# Patient Record
Sex: MALE | Race: WHITE | NOT HISPANIC OR LATINO | URBAN - METROPOLITAN AREA
[De-identification: names, ages, dates, MRNs, and addresses within clinical notes are randomized per-mention and may not be internally consistent; named-entity substitution may affect disease eponyms.]

---

## 2023-08-06 ENCOUNTER — INPATIENT (INPATIENT)
Facility: HOSPITAL | Age: 63
LOS: 9 days | Discharge: HOME HEALTH | DRG: 617 | End: 2023-08-16
Attending: GENERAL PRACTICE | Admitting: GENERAL PRACTICE
Payer: MEDICARE

## 2023-08-06 VITALS
TEMPERATURE: 99 F | DIASTOLIC BLOOD PRESSURE: 57 MMHG | HEART RATE: 89 BPM | OXYGEN SATURATION: 97 % | SYSTOLIC BLOOD PRESSURE: 93 MMHG | RESPIRATION RATE: 20 BRPM | HEIGHT: 73 IN | WEIGHT: 259.93 LBS

## 2023-08-06 DIAGNOSIS — E11.628 TYPE 2 DIABETES MELLITUS WITH OTHER SKIN COMPLICATIONS: ICD-10-CM

## 2023-08-06 DIAGNOSIS — E78.5 HYPERLIPIDEMIA, UNSPECIFIED: ICD-10-CM

## 2023-08-06 DIAGNOSIS — E11.9 TYPE 2 DIABETES MELLITUS WITHOUT COMPLICATIONS: ICD-10-CM

## 2023-08-06 DIAGNOSIS — L08.9 LOCAL INFECTION OF THE SKIN AND SUBCUTANEOUS TISSUE, UNSPECIFIED: ICD-10-CM

## 2023-08-06 DIAGNOSIS — I10 ESSENTIAL (PRIMARY) HYPERTENSION: ICD-10-CM

## 2023-08-06 DIAGNOSIS — I82.621 ACUTE EMBOLISM AND THROMBOSIS OF DEEP VEINS OF RIGHT UPPER EXTREMITY: ICD-10-CM

## 2023-08-06 DIAGNOSIS — I50.9 HEART FAILURE, UNSPECIFIED: ICD-10-CM

## 2023-08-06 LAB
ANION GAP SERPL CALC-SCNC: 17 MMOL/L — SIGNIFICANT CHANGE UP (ref 5–17)
BASE EXCESS BLDV CALC-SCNC: -0.3 MMOL/L — SIGNIFICANT CHANGE UP (ref -2–3)
BASOPHILS # BLD AUTO: 0.03 K/UL — SIGNIFICANT CHANGE UP (ref 0–0.2)
BASOPHILS NFR BLD AUTO: 0.4 % — SIGNIFICANT CHANGE UP (ref 0–2)
BUN SERPL-MCNC: 35 MG/DL — HIGH (ref 7–23)
CA-I SERPL-SCNC: 1.27 MMOL/L — SIGNIFICANT CHANGE UP (ref 1.15–1.33)
CALCIUM SERPL-MCNC: 10 MG/DL — SIGNIFICANT CHANGE UP (ref 8.4–10.5)
CHLORIDE BLDV-SCNC: 100 MMOL/L — SIGNIFICANT CHANGE UP (ref 96–108)
CHLORIDE SERPL-SCNC: 101 MMOL/L — SIGNIFICANT CHANGE UP (ref 96–108)
CO2 BLDV-SCNC: 28 MMOL/L — HIGH (ref 22–26)
CO2 SERPL-SCNC: 21 MMOL/L — LOW (ref 22–31)
CREAT SERPL-MCNC: 1.56 MG/DL — HIGH (ref 0.5–1.3)
EGFR: 50 ML/MIN/1.73M2 — LOW
EOSINOPHIL # BLD AUTO: 0.01 K/UL — SIGNIFICANT CHANGE UP (ref 0–0.5)
EOSINOPHIL NFR BLD AUTO: 0.1 % — SIGNIFICANT CHANGE UP (ref 0–6)
ERYTHROCYTE [SEDIMENTATION RATE] IN BLOOD: 114 MM/HR — HIGH (ref 0–20)
GAS PNL BLDV: 136 MMOL/L — SIGNIFICANT CHANGE UP (ref 136–145)
GAS PNL BLDV: SIGNIFICANT CHANGE UP
GAS PNL BLDV: SIGNIFICANT CHANGE UP
GLUCOSE BLDC GLUCOMTR-MCNC: 115 MG/DL — HIGH (ref 70–99)
GLUCOSE BLDV-MCNC: 151 MG/DL — HIGH (ref 70–99)
GLUCOSE SERPL-MCNC: 152 MG/DL — HIGH (ref 70–99)
HCO3 BLDV-SCNC: 26 MMOL/L — SIGNIFICANT CHANGE UP (ref 22–29)
HCT VFR BLD CALC: 32.8 % — LOW (ref 39–50)
HCT VFR BLDA CALC: 33 % — LOW (ref 39–51)
HGB BLD CALC-MCNC: 11.1 G/DL — LOW (ref 12.6–17.4)
HGB BLD-MCNC: 10.4 G/DL — LOW (ref 13–17)
IMM GRANULOCYTES NFR BLD AUTO: 1.3 % — HIGH (ref 0–0.9)
LACTATE BLDV-MCNC: 3.5 MMOL/L — HIGH (ref 0.5–2)
LYMPHOCYTES # BLD AUTO: 0.59 K/UL — LOW (ref 1–3.3)
LYMPHOCYTES # BLD AUTO: 8.3 % — LOW (ref 13–44)
MCHC RBC-ENTMCNC: 28.1 PG — SIGNIFICANT CHANGE UP (ref 27–34)
MCHC RBC-ENTMCNC: 31.7 GM/DL — LOW (ref 32–36)
MCV RBC AUTO: 88.6 FL — SIGNIFICANT CHANGE UP (ref 80–100)
MONOCYTES # BLD AUTO: 0.87 K/UL — SIGNIFICANT CHANGE UP (ref 0–0.9)
MONOCYTES NFR BLD AUTO: 12.3 % — SIGNIFICANT CHANGE UP (ref 2–14)
NEUTROPHILS # BLD AUTO: 5.51 K/UL — SIGNIFICANT CHANGE UP (ref 1.8–7.4)
NEUTROPHILS NFR BLD AUTO: 77.6 % — HIGH (ref 43–77)
NRBC # BLD: 0 /100 WBCS — SIGNIFICANT CHANGE UP (ref 0–0)
PCO2 BLDV: 51 MMHG — SIGNIFICANT CHANGE UP (ref 42–55)
PH BLDV: 7.32 — SIGNIFICANT CHANGE UP (ref 7.32–7.43)
PLATELET # BLD AUTO: 150 K/UL — SIGNIFICANT CHANGE UP (ref 150–400)
PO2 BLDV: 23 MMHG — LOW (ref 25–45)
POTASSIUM BLDV-SCNC: 4.3 MMOL/L — SIGNIFICANT CHANGE UP (ref 3.5–5.1)
POTASSIUM SERPL-MCNC: 4.6 MMOL/L — SIGNIFICANT CHANGE UP (ref 3.5–5.3)
POTASSIUM SERPL-SCNC: 4.6 MMOL/L — SIGNIFICANT CHANGE UP (ref 3.5–5.3)
RBC # BLD: 3.7 M/UL — LOW (ref 4.2–5.8)
RBC # FLD: 16 % — HIGH (ref 10.3–14.5)
SAO2 % BLDV: 25.6 % — LOW (ref 67–88)
SODIUM SERPL-SCNC: 139 MMOL/L — SIGNIFICANT CHANGE UP (ref 135–145)
WBC # BLD: 7.1 K/UL — SIGNIFICANT CHANGE UP (ref 3.8–10.5)
WBC # FLD AUTO: 7.1 K/UL — SIGNIFICANT CHANGE UP (ref 3.8–10.5)

## 2023-08-06 PROCEDURE — 73610 X-RAY EXAM OF ANKLE: CPT | Mod: 26,RT

## 2023-08-06 PROCEDURE — 99285 EMERGENCY DEPT VISIT HI MDM: CPT | Mod: FS

## 2023-08-06 PROCEDURE — 73620 X-RAY EXAM OF FOOT: CPT | Mod: 26,RT

## 2023-08-06 RX ORDER — SODIUM CHLORIDE 9 MG/ML
500 INJECTION INTRAMUSCULAR; INTRAVENOUS; SUBCUTANEOUS ONCE
Refills: 0 | Status: COMPLETED | OUTPATIENT
Start: 2023-08-06 | End: 2023-08-06

## 2023-08-06 RX ORDER — ACETAMINOPHEN 500 MG
650 TABLET ORAL EVERY 6 HOURS
Refills: 0 | Status: DISCONTINUED | OUTPATIENT
Start: 2023-08-06 | End: 2023-08-08

## 2023-08-06 RX ORDER — GLUCAGON INJECTION, SOLUTION 0.5 MG/.1ML
1 INJECTION, SOLUTION SUBCUTANEOUS ONCE
Refills: 0 | Status: DISCONTINUED | OUTPATIENT
Start: 2023-08-06 | End: 2023-08-08

## 2023-08-06 RX ORDER — ACETAMINOPHEN 500 MG
1000 TABLET ORAL ONCE
Refills: 0 | Status: COMPLETED | OUTPATIENT
Start: 2023-08-06 | End: 2023-08-06

## 2023-08-06 RX ORDER — PIPERACILLIN AND TAZOBACTAM 4; .5 G/20ML; G/20ML
3.38 INJECTION, POWDER, LYOPHILIZED, FOR SOLUTION INTRAVENOUS ONCE
Refills: 0 | Status: COMPLETED | OUTPATIENT
Start: 2023-08-06 | End: 2023-08-06

## 2023-08-06 RX ORDER — ATORVASTATIN CALCIUM 80 MG/1
80 TABLET, FILM COATED ORAL AT BEDTIME
Refills: 0 | Status: DISCONTINUED | OUTPATIENT
Start: 2023-08-06 | End: 2023-08-08

## 2023-08-06 RX ORDER — SACUBITRIL AND VALSARTAN 24; 26 MG/1; MG/1
1 TABLET, FILM COATED ORAL
Refills: 0 | Status: DISCONTINUED | OUTPATIENT
Start: 2023-08-06 | End: 2023-08-08

## 2023-08-06 RX ORDER — SODIUM CHLORIDE 9 MG/ML
1000 INJECTION INTRAMUSCULAR; INTRAVENOUS; SUBCUTANEOUS ONCE
Refills: 0 | Status: DISCONTINUED | OUTPATIENT
Start: 2023-08-06 | End: 2023-08-06

## 2023-08-06 RX ORDER — INSULIN LISPRO 100/ML
VIAL (ML) SUBCUTANEOUS
Refills: 0 | Status: DISCONTINUED | OUTPATIENT
Start: 2023-08-06 | End: 2023-08-08

## 2023-08-06 RX ORDER — RIVAROXABAN 15 MG-20MG
20 KIT ORAL
Refills: 0 | Status: DISCONTINUED | OUTPATIENT
Start: 2023-08-06 | End: 2023-08-08

## 2023-08-06 RX ORDER — ASPIRIN/CALCIUM CARB/MAGNESIUM 324 MG
1 TABLET ORAL
Refills: 0 | DISCHARGE

## 2023-08-06 RX ORDER — SODIUM CHLORIDE 9 MG/ML
1000 INJECTION, SOLUTION INTRAVENOUS
Refills: 0 | Status: DISCONTINUED | OUTPATIENT
Start: 2023-08-06 | End: 2023-08-08

## 2023-08-06 RX ORDER — FUROSEMIDE 40 MG
40 TABLET ORAL DAILY
Refills: 0 | Status: DISCONTINUED | OUTPATIENT
Start: 2023-08-06 | End: 2023-08-08

## 2023-08-06 RX ORDER — SPIRONOLACTONE 25 MG/1
25 TABLET, FILM COATED ORAL DAILY
Refills: 0 | Status: DISCONTINUED | OUTPATIENT
Start: 2023-08-06 | End: 2023-08-08

## 2023-08-06 RX ORDER — METOPROLOL TARTRATE 50 MG
25 TABLET ORAL DAILY
Refills: 0 | Status: DISCONTINUED | OUTPATIENT
Start: 2023-08-06 | End: 2023-08-08

## 2023-08-06 RX ORDER — HEPARIN SODIUM 5000 [USP'U]/ML
5000 INJECTION INTRAVENOUS; SUBCUTANEOUS EVERY 8 HOURS
Refills: 0 | Status: DISCONTINUED | OUTPATIENT
Start: 2023-08-06 | End: 2023-08-06

## 2023-08-06 RX ORDER — PIPERACILLIN AND TAZOBACTAM 4; .5 G/20ML; G/20ML
3.38 INJECTION, POWDER, LYOPHILIZED, FOR SOLUTION INTRAVENOUS EVERY 8 HOURS
Refills: 0 | Status: DISCONTINUED | OUTPATIENT
Start: 2023-08-06 | End: 2023-08-08

## 2023-08-06 RX ORDER — VANCOMYCIN HCL 1 G
1000 VIAL (EA) INTRAVENOUS EVERY 12 HOURS
Refills: 0 | Status: DISCONTINUED | OUTPATIENT
Start: 2023-08-06 | End: 2023-08-08

## 2023-08-06 RX ORDER — DEXTROSE 50 % IN WATER 50 %
15 SYRINGE (ML) INTRAVENOUS ONCE
Refills: 0 | Status: DISCONTINUED | OUTPATIENT
Start: 2023-08-06 | End: 2023-08-08

## 2023-08-06 RX ORDER — RIVAROXABAN 15 MG-20MG
1 KIT ORAL
Refills: 0 | DISCHARGE

## 2023-08-06 RX ORDER — DEXTROSE 50 % IN WATER 50 %
25 SYRINGE (ML) INTRAVENOUS ONCE
Refills: 0 | Status: DISCONTINUED | OUTPATIENT
Start: 2023-08-06 | End: 2023-08-08

## 2023-08-06 RX ORDER — ONDANSETRON 8 MG/1
4 TABLET, FILM COATED ORAL EVERY 8 HOURS
Refills: 0 | Status: DISCONTINUED | OUTPATIENT
Start: 2023-08-06 | End: 2023-08-08

## 2023-08-06 RX ORDER — LANOLIN ALCOHOL/MO/W.PET/CERES
3 CREAM (GRAM) TOPICAL AT BEDTIME
Refills: 0 | Status: DISCONTINUED | OUTPATIENT
Start: 2023-08-06 | End: 2023-08-08

## 2023-08-06 RX ORDER — DEXTROSE 50 % IN WATER 50 %
12.5 SYRINGE (ML) INTRAVENOUS ONCE
Refills: 0 | Status: DISCONTINUED | OUTPATIENT
Start: 2023-08-06 | End: 2023-08-08

## 2023-08-06 RX ORDER — METFORMIN HYDROCHLORIDE 850 MG/1
1 TABLET ORAL
Refills: 0 | DISCHARGE

## 2023-08-06 RX ORDER — SITAGLIPTIN 50 MG/1
1 TABLET, FILM COATED ORAL
Refills: 0 | DISCHARGE

## 2023-08-06 RX ORDER — VANCOMYCIN HCL 1 G
1000 VIAL (EA) INTRAVENOUS ONCE
Refills: 0 | Status: COMPLETED | OUTPATIENT
Start: 2023-08-06 | End: 2023-08-06

## 2023-08-06 RX ORDER — VANCOMYCIN HCL 1 G
1750 VIAL (EA) INTRAVENOUS EVERY 12 HOURS
Refills: 0 | Status: DISCONTINUED | OUTPATIENT
Start: 2023-08-06 | End: 2023-08-06

## 2023-08-06 RX ORDER — ROSUVASTATIN CALCIUM 5 MG/1
1 TABLET ORAL
Refills: 0 | DISCHARGE

## 2023-08-06 RX ADMIN — Medication 400 MILLIGRAM(S): at 12:52

## 2023-08-06 RX ADMIN — Medication 3 MILLIGRAM(S): at 21:34

## 2023-08-06 RX ADMIN — Medication 1000 MILLIGRAM(S): at 20:28

## 2023-08-06 RX ADMIN — PIPERACILLIN AND TAZOBACTAM 25 GRAM(S): 4; .5 INJECTION, POWDER, LYOPHILIZED, FOR SOLUTION INTRAVENOUS at 21:34

## 2023-08-06 RX ADMIN — SODIUM CHLORIDE 500 MILLILITER(S): 9 INJECTION INTRAMUSCULAR; INTRAVENOUS; SUBCUTANEOUS at 14:57

## 2023-08-06 RX ADMIN — Medication 100 MILLIGRAM(S): at 14:57

## 2023-08-06 RX ADMIN — PIPERACILLIN AND TAZOBACTAM 3.38 GRAM(S): 4; .5 INJECTION, POWDER, LYOPHILIZED, FOR SOLUTION INTRAVENOUS at 20:28

## 2023-08-06 RX ADMIN — SACUBITRIL AND VALSARTAN 1 TABLET(S): 24; 26 TABLET, FILM COATED ORAL at 21:34

## 2023-08-06 RX ADMIN — ATORVASTATIN CALCIUM 80 MILLIGRAM(S): 80 TABLET, FILM COATED ORAL at 21:34

## 2023-08-06 RX ADMIN — PIPERACILLIN AND TAZOBACTAM 200 GRAM(S): 4; .5 INJECTION, POWDER, LYOPHILIZED, FOR SOLUTION INTRAVENOUS at 12:18

## 2023-08-06 RX ADMIN — Medication 250 MILLIGRAM(S): at 12:18

## 2023-08-06 NOTE — ED ADULT NURSE NOTE - NSFALLUNIVINTERV_ED_ALL_ED
Bed/Stretcher in lowest position, wheels locked, appropriate side rails in place/Call bell, personal items and telephone in reach/Instruct patient to call for assistance before getting out of bed/chair/stretcher/Non-slip footwear applied when patient is off stretcher/Tiff to call system/Physically safe environment - no spills, clutter or unnecessary equipment/Purposeful proactive rounding/Room/bathroom lighting operational, light cord in reach

## 2023-08-06 NOTE — CONSULT NOTE ADULT - ASSESSMENT
Patient is a 63yo Male with past medical history of HTN, DM, defibrillator, CAD post CABG, CHF, hx of Sommers fracture, non-union many years ago, presenting with  ulceration, recent bone biopsy with Dr Foss as op , came back positive for staph, treated with Levaquin, presented to ED with acute and significant swelling to the foot as advised to go to the ER for eval.   denies recent fall or trauma.  states  over the past few days right foot has been increasing in swelling. and has been having some fevers ... states hard to walk on his foot due to pain. states had temp at home of 101-102. denies n/v/d, CP, SOB, HA, dizziness, abdominal pain, urinary symptoms, cough, leg pain, swelling.  patient evaluated by podiatry in ED for presence of gas.. MRI and vascular studies ordered already, antibiotics given..   pt with sig cardiac hx for possible vascular and podiatric surgery  will call PMD to get records  TTE  continue all cardiac meds  will adjust cardiac meds  awaiting ecg  will check AICD  abx

## 2023-08-06 NOTE — ED ADULT NURSE NOTE - OBJECTIVE STATEMENT
63 y/o male PMH diabetes presents to ED reporting RLE pain. Patient endorses recently treated by podiatry for infection to RLE, completed PO abx. On exam, AOx3, speaking in complete sentences. Unlabored, spontaneous respirations, NAD. Redness, swelling and blister noted to R foot. Seen and evaluated by MD. Patient endorses fever occasionally, taking acetaminophen at home. Heplock placed, labs sent.

## 2023-08-06 NOTE — H&P ADULT - PROBLEM SELECTOR PLAN 2
patient states has been overall well controlled but in past 1-2 days more elevated...   ---monitor finger sticks closely  ---Insulin regimen as ordered and monitor / adjust as needed  --- hold home medications for now   ---Diabetic diet  ---A1c

## 2023-08-06 NOTE — H&P ADULT - PROBLEM SELECTOR PLAN 1
appreciated podiatry evaluation  local wound care  post antibiotics in ED >> continue  ID consulted  MRI and vascular studies ordered>> follow   pain management as needed  supportive care   diuretics ( home dose: continue)   leg elevation

## 2023-08-06 NOTE — ED PROVIDER NOTE - ATTENDING APP SHARED VISIT CONTRIBUTION OF CARE
I have personally performed a face to face medical and diagnostic evaluation of the patient. I have discussed with and reviewed the Resident's and/or ACP's and/or Medical/PA/NP student's note and agree with the History, ROS, Physical Exam and MDM unless otherwise indicated. A brief summary of my personal evaluation and impression can be found below.     63 y/o male, hx of HTN, DM, CHF, defibrillator, pacemaker, on Eliquis, hx of Sommers fracture, non-union, with ulceration, recent bone biopsy with Dr Foss came back positive for staph, treated with Levaquin, was found to have swelling to the foot that increased this morning and advised to go to the ER today for eval. denies recent fall or trauma. states over the past few days right foot has been increasing in swelling. states hard to walk on his foot due to pain. states had temp at home of 101-102. denies n/v/d, CP, SOB, HA, dizziness, abdominal pain, urinary symptoms, cough, leg pain, swelling.    PE:  Gen: nad  	Lung: CTAB, tachypneic 23  	CV: rrr  	Abd: nondistended, soft  	MSK:  R foot + ankle erythema, edema, induration, +ttp to ankle and foot full ROM active + passive w/ no reproducible pain full foot ROM no fluctuance no discharge, distal str 5/5, refill <2 sec. wound to 5th MT base.   	  pt likely requiring abx for foot cellulitis, obtain xr r/o osteo, necrotising infection although clinically not supported at this time, will get inflammatory markers, labs, start iv abx, ekg, iv fliuds, podiatry consultaiton, admission. No concern for septic arthritis at this time w. full rom and overlying cellulitis contraindicates arthocentesis. I have personally performed a face to face medical and diagnostic evaluation of the patient. I have discussed with and reviewed the Resident's and/or ACP's and/or Medical/PA/NP student's note and agree with the History, ROS, Physical Exam and MDM unless otherwise indicated. A brief summary of my personal evaluation and impression can be found below.     61 y/o male, hx of HTN, DM, CHF, defibrillator, pacemaker, on Eliquis, hx of Sommers fracture, non-union, with ulceration, recent bone biopsy with Dr Foss came back positive for staph, treated with Levaquin, was found to have swelling to the foot that increased this morning and advised to go to the ER today for eval. denies recent fall or trauma. states over the past few days right foot has been increasing in swelling. states hard to walk on his foot due to pain. states had temp at home of 101-102. denies n/v/d, CP, SOB, HA, dizziness, abdominal pain, urinary symptoms, cough, leg pain, swelling.    PE:  Gen: nad  	Lung: CTAB  	CV: rrr  	Abd: nondistended, soft  	MSK:  R foot + ankle erythema, edema, induration, +ttp to ankle and foot full ROM active + passive w/ no reproducible pain full foot ROM no fluctuance no discharge, distal str 5/5, refill <2 sec. wound to 5th MT base.   	  pt likely requiring abx for foot cellulitis, obtain xr r/o osteo, necrotising infection although clinically not supported at this time, will get inflammatory markers, labs, start iv abx, ekg, iv fliuds, podiatry consultaiton, admission. No concern for septic arthritis at this time w. full rom and overlying cellulitis contraindicates arthocentesis.

## 2023-08-06 NOTE — ED ADULT NURSE REASSESSMENT NOTE - NS ED NURSE REASSESS COMMENT FT1
Received report from DAWN Amor RN. Patient admitted to medicine. Failed outpatient abx for osteomyelitis of L foot. patient notes no pain at this time. Scheduled for MRI and doppler. Prior RN completed MRI forms and sent to [facility].

## 2023-08-06 NOTE — CONSULT NOTE ADULT - ASSESSMENT
62 y.o M w/ Right foot 5th metatarsal base wound with cellulitis to distal leg  - Pt was seen and evaluated.   - Afebrile, WBC 7.1, ESR/CRP pending   - X-Ray: no OM, possible gas to lateral midfoot   - Right foot 5th metatarsal base wound to subQ w/ cellulitis to distal leg, abscess to the dorsal lateral midfoot, serous draining from the wound, no malodor, wound tracks dorsal lateral and circumferential. Left foot with no open wounds or signs of infection.   - After verbal consent was obtained, attention was drawn to the right foot where a V-block was given using 10cc of 1% lidocaine plain. Then, using Sterile suture removal kit and #15 blade, the wound was explored and stab incision down to the level of SubQ and not beyond the level of SubQ was made to area of fluctuance over the dorsal lateral right foot, 2cc of purulent drainge was expressed no probe to bone, no malodor.   - The wound was flushed and packed, and dressed with dry sterile dressing   - Pt tolerated the procedure well  - Recommend admission through medicine.  - Recommend IV antibiotics Vanco/zosyn   - Right foot wound cultured  - Ordered MRI of the right foot   - Ordered ABIs/PVR - recommend Vasc c/s if ABIs abnormal   - recommend EP consult for pacemaker interrogation   - Pod Plan: Local wound care pending cellulitis resolution   - Please document medical clearance for podiatric surgical intervention   - Discussed with attending.

## 2023-08-06 NOTE — CONSULT NOTE ADULT - SUBJECTIVE AND OBJECTIVE BOX
Patient is a 62y old  Male who presents with a chief complaint of      INTERVAL HPI/OVERNIGHT EVENTS:  Patient seen and evaluated at bedside.  Pt is resting comfortable in NAD. Denies N/V/F/C.    Allergies    No Known Allergies    Intolerances        Vital Signs Last 24 Hrs  T(C): 36.8 (06 Aug 2023 12:19), Max: 37.1 (06 Aug 2023 09:26)  T(F): 98.3 (06 Aug 2023 12:19), Max: 98.7 (06 Aug 2023 09:26)  HR: 77 (06 Aug 2023 12:19) (77 - 89)  BP: 104/66 (06 Aug 2023 12:19) (93/57 - 104/66)  BP(mean): 75 (06 Aug 2023 12:19) (75 - 75)  RR: 16 (06 Aug 2023 12:19) (16 - 20)  SpO2: 97% (06 Aug 2023 12:19) (97% - 97%)    Parameters below as of 06 Aug 2023 12:19  Patient On (Oxygen Delivery Method): room air        LABS:                        10.4   7.10  )-----------( 150      ( 06 Aug 2023 11:39 )             32.8     08-06    139  |  101  |  35<H>  ----------------------------<  152<H>  4.6   |  21<L>  |  1.56<H>    Ca    10.0      06 Aug 2023 11:40        Urinalysis Basic - ( 06 Aug 2023 11:40 )    Color: x / Appearance: x / SG: x / pH: x  Gluc: 152 mg/dL / Ketone: x  / Bili: x / Urobili: x   Blood: x / Protein: x / Nitrite: x   Leuk Esterase: x / RBC: x / WBC x   Sq Epi: x / Non Sq Epi: x / Bacteria: x      CAPILLARY BLOOD GLUCOSE          Lower Extremity Physical Exam:  Vascular: DP/PT 0/4 B/L, CFT <3 sec x 10, Temp gradient warm to warm, RLE, warm to cool LLE.    Neurology: Epicritic sensation intact to level of digits, B/L  Musculoskeletal/Ortho: pain to palpation over right foot 5th digit   Skin: Right foot 5th metatarsal base wound to subQ w/ cellulitis to distal leg, abscess to the dorsal lateral midfoot, serous draining from the wound, no malodor, wound tracks dorsal lateral and circumferential. Left foot with no open wounds or signs of infection.     RADIOLOGY & ADDITIONAL TESTS:   Patient is a 62y old  Male who presents with a chief complaint of  right foot wound     HPI: 63 y/o male, hx of HTN, DM, defibrillator, pacemaker, on Eliquis, hx of Sommers fracture, non-union, with ulceration, recent bone biopsy with Dr Foss came back positive for staph, treated with Levaquin, was found to have swelling to the foot that increased this morning and advised to go to the ER today for eval. denies recent fall or trauma. states over the past few days right foot has been increasing in swelling. states hard to walk on his foot due to pain. states had temp at home of 101-102. denies n/v/d, CP, SOB, HA, dizziness, abdominal pain, urinary symptoms, cough, leg pain, swelling.    Allergies    No Known Allergies    Intolerances        Vital Signs Last 24 Hrs  T(C): 36.8 (06 Aug 2023 12:19), Max: 37.1 (06 Aug 2023 09:26)  T(F): 98.3 (06 Aug 2023 12:19), Max: 98.7 (06 Aug 2023 09:26)  HR: 77 (06 Aug 2023 12:19) (77 - 89)  BP: 104/66 (06 Aug 2023 12:19) (93/57 - 104/66)  BP(mean): 75 (06 Aug 2023 12:19) (75 - 75)  RR: 16 (06 Aug 2023 12:19) (16 - 20)  SpO2: 97% (06 Aug 2023 12:19) (97% - 97%)    Parameters below as of 06 Aug 2023 12:19  Patient On (Oxygen Delivery Method): room air        LABS:                        10.4   7.10  )-----------( 150      ( 06 Aug 2023 11:39 )             32.8     08-06    139  |  101  |  35<H>  ----------------------------<  152<H>  4.6   |  21<L>  |  1.56<H>    Ca    10.0      06 Aug 2023 11:40        Urinalysis Basic - ( 06 Aug 2023 11:40 )    Color: x / Appearance: x / SG: x / pH: x  Gluc: 152 mg/dL / Ketone: x  / Bili: x / Urobili: x   Blood: x / Protein: x / Nitrite: x   Leuk Esterase: x / RBC: x / WBC x   Sq Epi: x / Non Sq Epi: x / Bacteria: x      CAPILLARY BLOOD GLUCOSE          Lower Extremity Physical Exam:  Vascular: DP/PT 0/4 B/L, CFT <3 sec x 10, Temp gradient warm to warm, RLE, warm to cool LLE.    Neurology: Epicritic sensation intact to level of digits, B/L  Musculoskeletal/Ortho: pain to palpation over right foot 5th digit   Skin: Right foot 5th metatarsal base wound to subQ w/ cellulitis to distal leg, abscess to the dorsal lateral midfoot, serous draining from the wound, no malodor, wound tracks dorsal lateral and circumferential. Left foot with no open wounds or signs of infection.     RADIOLOGY & ADDITIONAL TESTS:

## 2023-08-06 NOTE — H&P ADULT - PROBLEM SELECTOR PLAN 3
patient with likely chronic systolic CHF on entresto and lasix / Epleronon   continue home medications  check Echo  cardio consulted for optimization in case sx needed  monitor otherwise

## 2023-08-06 NOTE — ED PROVIDER NOTE - OBJECTIVE STATEMENT
63 y/o male, hx of HTN, DM, defibrillator, pacemaker, on Eliquis, hx of Sommers fracture, non-union, with ulceration, recent bone biopsy with Dr Foss came back positive for staph, treated with Levaquin, was found to have swelling to the foot that increased this morning and advised to go to the ER today for eval. denies recent fall or trauma. states over the past few days right foot has been increasing in swelling. states hard to walk on his foot due to pain. states had temp at home of 101-102. denies n/v/d, CP, SOB, HA, dizziness, abdominal pain, urinary symptoms, cough, leg pain, swelling. 61 y/o male, hx of HTN, DM, CHF, defibrillator, pacemaker, on Eliquis, hx of Sommers fracture, non-union, with ulceration, recent bone biopsy with Dr Foss came back positive for staph, treated with Levaquin, was found to have swelling to the foot that increased this morning and advised to go to the ER today for eval. denies recent fall or trauma. states over the past few days right foot has been increasing in swelling. states hard to walk on his foot due to pain. states had temp at home of 101-102. denies n/v/d, CP, SOB, HA, dizziness, abdominal pain, urinary symptoms, cough, leg pain, swelling.

## 2023-08-06 NOTE — H&P ADULT - HISTORY OF PRESENT ILLNESS
>>>>>>Medical Attending Initial / Admission note<<<<<<  -------------------------------------------------------------------------------  CHIEF COMPLAINT & HISTORY OF PRESENT ILLNESS:      Patient is a 61yo Male with past medical history of HTN, DM, defibrillator, CAD post CABG, CHF, hx of Sommers fracture, non-union many years ago, presenting with  ulceration, recent bone biopsy with Dr Foss as op , came back positive for staph, treated with Levaquin, presented to ED with acute and significant swelling to the foot as advised to go to the ER for eval.   denies recent fall or trauma. states over the past few days right foot has been increasing in swelling. and has been having some fevers ... states hard to walk on his foot due to pain. states had temp at home of 101-102. denies n/v/d, CP, SOB, HA, dizziness, abdominal pain, urinary symptoms, cough, leg pain, swelling.  patient evaluated by podiatry in ED for presence of gas.. MRI and vascular studies ordered already, antibiotics given..     --------------------------------------------------------------------------------  PAST MEDICAL / SURGICAL  HISTORY:    DM  HTN  CAD  CHF  recent right elbow infection post abc IV      recent Rt arm DVT >> xarelto   ( ~ 2.5 months ago)     CABG X3: ~ 2020  AICD  Rt hand sx with plate..   Rt Elbow I&D ~ 06/2023    --------------------------------------------------------------------------------  FAMILY HISTORY:    father : heart disease  father: brain cancer   otherwise negative     --------------------------------------------------------------------------------  SOCIAL HISTORY:  Alcohol: None reported  Smoking: None reported     --------------------------------------------------------------------------------  ALLERGIES:    [As bellow, reviewed]    --------------------------------------------------------------------------------  MEDICATIONS:   [As bellow, reviewed]    --------------------------------------------------------------------------------  REVIEW OF SYSTEM:    GEN: no fever, no chills, no pain  RESP: no SOB, no cough, no sputum  CVS: no chest pain, no palpitations, + edema, Rt leg   GI: no abdominal pain, no nausea, no vomiting, no constipation, no diarrhea  : no dysuria, no frequency, no hematuria  Neuro: no headache, no dizziness  PSYCH: no anxiety, no depression  Derm : no itching, no rash    --------------------------------------------------------------------------------  VITAL SIGNS:  Height (cm): 185.4  Weight (kg): 117.9  BMI (kg/m2): 34.3  Vital Signs Last 24 HrsT(C): 36.9 (08-06-23 @ 15:00)  T(F): 98.5 (08-06-23 @ 15:00), Max: 98.7 (08-06-23 @ 09:26)  HR: 67 (08-06-23 @ 15:00) (67 - 89)  BP: 100/49 (08-06-23 @ 16:55)  RR: 18 (08-06-23 @ 15:00) (16 - 20)  SpO2: 96% (08-06-23 @ 15:00) (96% - 97%)      CAPILLARY BLOOD GLUCOSE          --------------------------------------------------------------------------------  PHYSICAL EXAM:    GEN: A&O X 3 , NAD , comfortable, pleasant, calm  HEENT: NCAT, PERRL, MMM, hearing intact  Neck: supple , no JVD  CVS: S1S2 , regular , No M/R/G appreciated  PULM: CTA B/L,  no W/R/R appreciated  ABD.: soft. non tender, non distended,  bowel sounds present  Extrem: intact pulses , no edema   Derm: No rash , no ecchymoses  PSYCH : normal mood,  no delusion not anxious    --------------------------------------------------------------------------------  LAB AND IMAGING:   [As gus, reviewed]    --------------------------------------------------------------------------------  ASSESSMENT AND PLAN:   [As bellow]    --------------------  Case discussed with   ___________________________  H. RACHAEL Boudreaux.  Pager: [phone]  08-06-23

## 2023-08-06 NOTE — ED PROVIDER NOTE - PROGRESS NOTE DETAILS
Slick nava PA-C: spoke with Dr. Foss. aware patient in ER. okay with vanc and zosyn IV. podiatry team aware patient in ER. both will follow patient. discussed with ED attending. Slick Godwin PA-C: podiatry re-paged. imaging showing trace amount of subcutaneous emphysema. abnormal kidney function noted on labs. concern for necrotizing infection not excluded. awaiting podiatry call back. discussed with ED attending Slick Godwin PA-C: spoke with podiatry. aware of ALEJANDRO. states did bedside incision with little concern for necrotizing infection and will continue to follow. will admit to medicine for continued management. discussed with ED attending.

## 2023-08-06 NOTE — ED PROVIDER NOTE - NS_BEDUNITTYPES_ED_ALL_ED
Minutes:  45    Treatment/Activity Tolerance:  [x] Patient tolerated treatment well [] Patient limited by fatigue  [] Patient limited by pain  [] Patient limited by other medical complications  [] Other:     Plan:   [x] Continue per plan of care [] Alter current plan (see comments)  [] Plan of care initiated [] Hold pending MD visit [] Discharge  Plan for Next Session:         Treatment Charges: Mins Units   Initial Evaluation     Re-Evaluation     Ther Exercise         TE 20 1   Manual Therapy     MT     Ther Activities        TA 20 1   Gait Training          GT     Neuro Re-education NR     Modalities     Non-Billable Service Time 5    Other     Total Time/Units 45 2     Electronically signed by:   Gerald Miguel MEDICINE

## 2023-08-06 NOTE — CONSULT NOTE ADULT - SUBJECTIVE AND OBJECTIVE BOX
Date of Service, 08-06-23 @ 19:52  CHIEF Complaint: patient is a 62y old  Male who presents with a chief complaint of diabetic foot infection, possible osteo (06 Aug 2023 17:44    CHIEF COMPLAINT & HISTORY OF PRESENT ILLNESS:    Patient is a 61yo Male with past medical history of HTN, DM, defibrillator, CAD post CABG, CHF, hx of Sommers fracture, non-union many years ago, presenting with  ulceration, recent bone biopsy with Dr Foss as op , came back positive for staph, treated with Levaquin, presented to ED with acute and significant swelling to the foot as advised to go to the ER for eval.   denies recent fall or trauma.  states  over the past few days right foot has been increasing in swelling. and has been having some fevers ... states hard to walk on his foot due to pain. states had temp at home of 101-102. denies n/v/d, CP, SOB, HA, dizziness, abdominal pain, urinary symptoms, cough, leg pain, swelling.  patient evaluated by podiatry in ED for presence of gas.. MRI and vascular studies ordered already, antibiotics given..     PAST MEDICAL / SURGICAL  HISTORY:  DM  HTN  CAD  CHF  recent right elbow infection post abc IV      recent Rt arm DVT >> xarelto   ( ~ 2.5 months ago)   CABG X3: ~ 2020  AICD  Rt hand sx with plate..   Rt Elbow I&D ~ 06/2023      MEDICATIONS  (STANDING):  atorvastatin 80 milliGRAM(s) Oral at bedtime  dextrose 5%. 1000 milliLiter(s) (50 mL/Hr) IV Continuous <Continuous>  dextrose 5%. 1000 milliLiter(s) (100 mL/Hr) IV Continuous <Continuous>  dextrose 50% Injectable 25 Gram(s) IV Push once  dextrose 50% Injectable 12.5 Gram(s) IV Push once  dextrose 50% Injectable 25 Gram(s) IV Push once  furosemide    Tablet 40 milliGRAM(s) Oral daily  glucagon  Injectable 1 milliGRAM(s) IntraMuscular once  insulin lispro (ADMELOG) corrective regimen sliding scale   SubCutaneous three times a day before meals  metoprolol succinate ER 25 milliGRAM(s) Oral daily  piperacillin/tazobactam IVPB.. 3.375 Gram(s) IV Intermittent every 8 hours  rivaroxaban 20 milliGRAM(s) Oral with dinner  sacubitril 97 mG/valsartan 103 mG 1 Tablet(s) Oral two times a day  spironolactone 25 milliGRAM(s) Oral daily  vancomycin  IVPB 1000 milliGRAM(s) IV Intermittent every 12 hours    MEDICATIONS  (PRN):  acetaminophen     Tablet .. 650 milliGRAM(s) Oral every 6 hours PRN Temp greater or equal to 38C (100.4F), Mild Pain (1 - 3)  aluminum hydroxide/magnesium hydroxide/simethicone Suspension 30 milliLiter(s) Oral every 4 hours PRN Dyspepsia  dextrose Oral Gel 15 Gram(s) Oral once PRN Blood Glucose LESS THAN 70 milliGRAM(s)/deciliter  melatonin 3 milliGRAM(s) Oral at bedtime PRN Insomnia  ondansetron Injectable 4 milliGRAM(s) IV Push every 8 hours PRN Nausea and/or Vomiting      FAMILY HISTORY:      SOCIAL HISTORY:    [ x] Non-smoker  [ ] Smoker  [ ] Alcohol    Allergies    No Known Allergies    Intolerances    	  REVIEW OF SYSTEMS:  CONSTITUTIONAL: No fever, weight loss, or fatigue  EYES: No eye pain, visual disturbances, or discharge  ENT:  No difficulty hearing, tinnitus, vertigo; No sinus or throat pain  NECK: No pain or stiffness  RESPIRATORY: No cough, wheezing, chills or hemoptysis; + Shortness of Breath  CARDIOVASCULAR: No chest pain, palpitations, passing out, dizziness, or leg swelling  GASTROINTESTINAL: No abdominal or epigastric pain. No nausea, vomiting, or hematemesis; No diarrhea or constipation. No melena or hematochezia.  GENITOURINARY: No dysuria, frequency, hematuria, or incontinence  NEUROLOGICAL: No headaches, memory loss, loss of strength, numbness, or tremors  SKIN: No itching, burning, rashes, or lesions   LYMPH Nodes: No enlarged glands  ENDOCRINE: No heat or cold intolerance; No hair loss  MUSCULOSKELETAL: No joint pain or swelling; No muscle, back, or extremity pain  PSYCHIATRIC: No depression, anxiety, mood swings, or difficulty sleeping  HEME/LYMPH: No easy bruising, or bleeding gums  ALLERGY AND IMMUNOLOGIC: No hives or eczema	    [ x] All others negative	  [ ] Unable to obtain    PHYSICAL EXAM:  T(C): 36.9 (08-06-23 @ 15:00), Max: 37.1 (08-06-23 @ 09:26)  HR: 67 (08-06-23 @ 15:00) (67 - 89)  BP: 100/49 (08-06-23 @ 16:55) (93/57 - 104/66)  RR: 18 (08-06-23 @ 15:00) (16 - 20)  SpO2: 96% (08-06-23 @ 15:00) (96% - 97%)  Wt(kg): --  I&O's Summary      Appearance: Normal	  HEENT:   Normal oral mucosa, PERRL, EOMI	  Lymphatic: No lymphadenopathy  Cardiovascular: Normal S1 S2, No JVD, + murmurs, No edema  Respiratory: rhonchi  Psychiatry: A & O x 3, Mood & affect appropriate  Gastrointestinal:  Soft, Non-tender, + BS	  Skin: No rashes, No ecchymoses, No cyanosis	  Neurologic: Non-focal  Extremities: Normal range of motion, No clubbing, cyanosis or edema  Vascular + pvd    TELEMETRY: 	    ECG:  	  RADIOLOGY:  OTHER: 	  	  LABS:	 	    CARDIAC MARKERS:                        10.4   7.10  )-----------( 150      ( 06 Aug 2023 11:39 )             32.8     08-06    139  |  101  |  35<H>  ----------------------------<  152<H>  4.6   |  21<L>  |  1.56<H>    Ca    10.0      06 Aug 2023 11:40      proBNP:   Lipid Profile:   HgA1c:   TSH:       PREVIOUS DIAGNOSTIC TESTING:    < from: Xray Foot AP + Lateral, Right (08.06.23 @ 11:40) >  Diffuse soft tissue swelling throughout the right foot with a possible   trace amount of subcutaneous emphysema along the lateral aspect of the   right mid foot.  No definite cortical erosion seen to indicate acute osteomyelitis.    MR may be useful in further evaluation if there are no contraindications    - Pt was seen and evaluated.   - Afebrile, WBC 7.1, ESR/CRP pending   - X-Ray: no OM, possible gas to lateral midfoot   - Right foot 5th metatarsal base wound to subQ w/ cellulitis to distal leg, abscess to the dorsal lateral midfoot, serous draining from the wound, no malodor, wound tracks dorsal lateral and circumferential. Left foot with no open wounds or signs of infection.   - After verbal consent was obtained, attention was drawn to the right foot where a V-block was given using 10cc of 1% lidocaine plain. Then, using Sterile suture removal kit and #15 blade, the wound was explored and stab incision down to the level of SubQ and not beyond the level of SubQ was made to area of fluctuance over the dorsal lateral right foot, 2cc of purulent drainge was expressed no probe to bone, no malodor.   - The wound was flushed and packed, and dressed with dry sterile dressing   - Pt tolerated the procedure well  - Recommend admission through medicine.  - Recommend IV antibiotics Vanco/zosyn   - Right foot wound cultured  - Ordered MRI of the right foot   - Ordered ABIs/PVR - recommend Vasc c/s if ABIs abnormal   - recommend EP consult for pacemaker interrogation   - Pod Plan: Local wound care pending cellulitis resolution   - Please document medical clearance for podiatric surgical intervention

## 2023-08-06 NOTE — ED ADULT TRIAGE NOTE - WEIGHT METHOD
stated 17 year old who presents with R leg pain. Believes he started having pain after basketball tryouts. No trauma. No swelling. Has been able to walk without limp. Jumps without pain. On exam, has point tenderness in lower R tibia. No redness or swelling. Will get x-rays to r/o fracture and reassess.

## 2023-08-06 NOTE — H&P ADULT - CONVERSATION DETAILS
*** Advance directive /  goals of care discussion      I had a long discussion with patient ( and family) about patient's overall diagnosis, expected prognosis, and potential complications.       Discussed treatment options, comfort care / hospice as appropriate, and all other potential options of care.         Discussed risks, benefits, and alternatives of treatment as well.          Opportunity given for and all questions answered.            Reviewed available advance directives as available >      At this time patient to be > full code, with continuation of current medical therapy.     Goal is for pt to return > home and follow up as outpatient with current doctors      will continue to discuss GOC with pt and family and update plan as needed.     Patient's family have my contact information and will contact me with questions.     Additional time spent on Goals of care: 22 min. *** Advance directive /  goals of care discussion      I had a long discussion with patient ( and family) about patient's overall diagnosis, expected prognosis, and potential complications.       Discussed treatment options, comfort care / hospice as appropriate, and all other potential options of care.         Discussed risks, benefits, and alternatives of treatment as well.          Opportunity given for and all questions answered.            Reviewed available advance directives as available > none      At this time patient to be  full code, with continuation of current medical therapy.     Goal is for pt to return > home and follow up as outpatient with current doctors      will continue to discuss GOC with pt and family and update plan as needed.     Patient's family have my contact information and will contact me with questions.     Additional time spent on Goals of care: 22 min.

## 2023-08-06 NOTE — H&P ADULT - PROBLEM SELECTOR PLAN 6
on Xarelto for past ~ 2.5 months ( was on Eliqius first)  for Rt upper extremity DVT post infection of Elbow !   continue for now  likely would repeat Duplex and decide need for further treatment / duration...

## 2023-08-06 NOTE — ED ADULT NURSE NOTE - CCCP TRG CHIEF CMPLNT
lower leg pain/injury Dapsone Counseling: I discussed with the patient the risks of dapsone including but not limited to hemolytic anemia, agranulocytosis, rashes, methemoglobinemia, kidney failure, peripheral neuropathy, headaches, GI upset, and liver toxicity.  Patients who start dapsone require monitoring including baseline LFTs and weekly CBCs for the first month, then every month thereafter.  The patient verbalized understanding of the proper use and possible adverse effects of dapsone.  All of the patient's questions and concerns were addressed.

## 2023-08-06 NOTE — H&P ADULT - ASSESSMENT
Patient is a 63yo Male with past medical history of HTN, DM, defibrillator, CAD post CABG, CHF, hx of Sommers fracture, non-union many years ago, presenting with  ulceration, recent bone biopsy with Dr Foss as op , came back positive for staph, treated with Levaquin, presented to ED with acute and significant swelling to the foot as advised to go to the ER for eval.   denies recent fall or trauma. states over the past few days right foot has been increasing in swelling. and has been having some fevers ... states hard to walk on his foot due to pain. states had temp at home of 101-102. denies n/v/d, CP, SOB, HA, dizziness, abdominal pain, urinary symptoms, cough, leg pain, swelling.  patient evaluated by podiatry in ED for presence of gas.. MRI and vascular studies ordered already, antibiotics given..

## 2023-08-06 NOTE — ED PROVIDER NOTE - PHYSICAL EXAMINATION
MSK: swelling appreciated to right foot/ankle. +ttp to ankle and foot. full ROM present to ankle joint. able to range foot and toes. erythema, warmth and swelling present. no drainage/bleeding present.

## 2023-08-06 NOTE — ED PROVIDER NOTE - WR ORDER STATUS 2
Patient did not appear for in-person therapy visit today. Therapist attempted to call but was unable to get through or leave a voice message.   
Resulted

## 2023-08-06 NOTE — ED ADULT TRIAGE NOTE - CHIEF COMPLAINT QUOTE
R lower leg swelling, redness and pain since Monday. Went to the ortho, finished course of oral antibiotics without improvement. sent by ortho for IV antibiotics.

## 2023-08-07 ENCOUNTER — TRANSCRIPTION ENCOUNTER (OUTPATIENT)
Age: 63
End: 2023-08-07

## 2023-08-07 LAB
A1C WITH ESTIMATED AVERAGE GLUCOSE RESULT: 6 % — HIGH (ref 4–5.6)
ALBUMIN SERPL ELPH-MCNC: 3.5 G/DL — SIGNIFICANT CHANGE UP (ref 3.3–5)
ALP SERPL-CCNC: 61 U/L — SIGNIFICANT CHANGE UP (ref 40–120)
ALT FLD-CCNC: 33 U/L — SIGNIFICANT CHANGE UP (ref 10–45)
ANION GAP SERPL CALC-SCNC: 15 MMOL/L — SIGNIFICANT CHANGE UP (ref 5–17)
AST SERPL-CCNC: 44 U/L — HIGH (ref 10–40)
BASOPHILS # BLD AUTO: 0.02 K/UL — SIGNIFICANT CHANGE UP (ref 0–0.2)
BASOPHILS NFR BLD AUTO: 0.3 % — SIGNIFICANT CHANGE UP (ref 0–2)
BILIRUB SERPL-MCNC: 0.6 MG/DL — SIGNIFICANT CHANGE UP (ref 0.2–1.2)
BUN SERPL-MCNC: 30 MG/DL — HIGH (ref 7–23)
CALCIUM SERPL-MCNC: 9.4 MG/DL — SIGNIFICANT CHANGE UP (ref 8.4–10.5)
CHLORIDE SERPL-SCNC: 100 MMOL/L — SIGNIFICANT CHANGE UP (ref 96–108)
CHOLEST SERPL-MCNC: 75 MG/DL — SIGNIFICANT CHANGE UP
CO2 SERPL-SCNC: 23 MMOL/L — SIGNIFICANT CHANGE UP (ref 22–31)
CREAT SERPL-MCNC: 1.49 MG/DL — HIGH (ref 0.5–1.3)
EGFR: 53 ML/MIN/1.73M2 — LOW
EOSINOPHIL # BLD AUTO: 0.05 K/UL — SIGNIFICANT CHANGE UP (ref 0–0.5)
EOSINOPHIL NFR BLD AUTO: 0.8 % — SIGNIFICANT CHANGE UP (ref 0–6)
ERYTHROCYTE [SEDIMENTATION RATE] IN BLOOD: 120 MM/HR — HIGH (ref 0–20)
ESTIMATED AVERAGE GLUCOSE: 126 MG/DL — HIGH (ref 68–114)
GLUCOSE BLDC GLUCOMTR-MCNC: 122 MG/DL — HIGH (ref 70–99)
GLUCOSE BLDC GLUCOMTR-MCNC: 125 MG/DL — HIGH (ref 70–99)
GLUCOSE BLDC GLUCOMTR-MCNC: 165 MG/DL — HIGH (ref 70–99)
GLUCOSE BLDC GLUCOMTR-MCNC: 180 MG/DL — HIGH (ref 70–99)
GLUCOSE SERPL-MCNC: 122 MG/DL — HIGH (ref 70–99)
GRAM STN FLD: SIGNIFICANT CHANGE UP
GRAM STN FLD: SIGNIFICANT CHANGE UP
HCT VFR BLD CALC: 29.6 % — LOW (ref 39–50)
HCV AB S/CO SERPL IA: 0.09 S/CO — SIGNIFICANT CHANGE UP (ref 0–0.99)
HCV AB SERPL-IMP: SIGNIFICANT CHANGE UP
HDLC SERPL-MCNC: 27 MG/DL — LOW
HGB BLD-MCNC: 9.4 G/DL — LOW (ref 13–17)
IMM GRANULOCYTES NFR BLD AUTO: 0.6 % — SIGNIFICANT CHANGE UP (ref 0–0.9)
LIPID PNL WITH DIRECT LDL SERPL: 26 MG/DL — SIGNIFICANT CHANGE UP
LYMPHOCYTES # BLD AUTO: 0.71 K/UL — LOW (ref 1–3.3)
LYMPHOCYTES # BLD AUTO: 11.1 % — LOW (ref 13–44)
MCHC RBC-ENTMCNC: 28 PG — SIGNIFICANT CHANGE UP (ref 27–34)
MCHC RBC-ENTMCNC: 31.8 GM/DL — LOW (ref 32–36)
MCV RBC AUTO: 88.1 FL — SIGNIFICANT CHANGE UP (ref 80–100)
MONOCYTES # BLD AUTO: 0.69 K/UL — SIGNIFICANT CHANGE UP (ref 0–0.9)
MONOCYTES NFR BLD AUTO: 10.8 % — SIGNIFICANT CHANGE UP (ref 2–14)
NEUTROPHILS # BLD AUTO: 4.9 K/UL — SIGNIFICANT CHANGE UP (ref 1.8–7.4)
NEUTROPHILS NFR BLD AUTO: 76.4 % — SIGNIFICANT CHANGE UP (ref 43–77)
NON HDL CHOLESTEROL: 47 MG/DL — SIGNIFICANT CHANGE UP
NRBC # BLD: 0 /100 WBCS — SIGNIFICANT CHANGE UP (ref 0–0)
PLATELET # BLD AUTO: 133 K/UL — LOW (ref 150–400)
POTASSIUM SERPL-MCNC: 3.7 MMOL/L — SIGNIFICANT CHANGE UP (ref 3.5–5.3)
POTASSIUM SERPL-SCNC: 3.7 MMOL/L — SIGNIFICANT CHANGE UP (ref 3.5–5.3)
PROT SERPL-MCNC: 6.7 G/DL — SIGNIFICANT CHANGE UP (ref 6–8.3)
RBC # BLD: 3.36 M/UL — LOW (ref 4.2–5.8)
RBC # FLD: 16.1 % — HIGH (ref 10.3–14.5)
SODIUM SERPL-SCNC: 138 MMOL/L — SIGNIFICANT CHANGE UP (ref 135–145)
SPECIMEN SOURCE: SIGNIFICANT CHANGE UP
SPECIMEN SOURCE: SIGNIFICANT CHANGE UP
TRIGL SERPL-MCNC: 112 MG/DL — SIGNIFICANT CHANGE UP
WBC # BLD: 6.41 K/UL — SIGNIFICANT CHANGE UP (ref 3.8–10.5)
WBC # FLD AUTO: 6.41 K/UL — SIGNIFICANT CHANGE UP (ref 3.8–10.5)

## 2023-08-07 PROCEDURE — 99223 1ST HOSP IP/OBS HIGH 75: CPT | Mod: GC

## 2023-08-07 PROCEDURE — 93923 UPR/LXTR ART STDY 3+ LVLS: CPT | Mod: 26

## 2023-08-07 PROCEDURE — 93306 TTE W/DOPPLER COMPLETE: CPT | Mod: 26

## 2023-08-07 RX ORDER — CHLORHEXIDINE GLUCONATE 213 G/1000ML
1 SOLUTION TOPICAL
Refills: 0 | Status: DISCONTINUED | OUTPATIENT
Start: 2023-08-07 | End: 2023-08-08

## 2023-08-07 RX ADMIN — Medication 2: at 11:53

## 2023-08-07 RX ADMIN — Medication 250 MILLIGRAM(S): at 01:48

## 2023-08-07 RX ADMIN — SACUBITRIL AND VALSARTAN 1 TABLET(S): 24; 26 TABLET, FILM COATED ORAL at 09:49

## 2023-08-07 RX ADMIN — PIPERACILLIN AND TAZOBACTAM 25 GRAM(S): 4; .5 INJECTION, POWDER, LYOPHILIZED, FOR SOLUTION INTRAVENOUS at 21:41

## 2023-08-07 RX ADMIN — Medication 25 MILLIGRAM(S): at 05:28

## 2023-08-07 RX ADMIN — Medication 3 MILLIGRAM(S): at 21:41

## 2023-08-07 RX ADMIN — PIPERACILLIN AND TAZOBACTAM 25 GRAM(S): 4; .5 INJECTION, POWDER, LYOPHILIZED, FOR SOLUTION INTRAVENOUS at 05:28

## 2023-08-07 RX ADMIN — SACUBITRIL AND VALSARTAN 1 TABLET(S): 24; 26 TABLET, FILM COATED ORAL at 18:33

## 2023-08-07 RX ADMIN — PIPERACILLIN AND TAZOBACTAM 25 GRAM(S): 4; .5 INJECTION, POWDER, LYOPHILIZED, FOR SOLUTION INTRAVENOUS at 15:01

## 2023-08-07 RX ADMIN — Medication 40 MILLIGRAM(S): at 05:27

## 2023-08-07 RX ADMIN — RIVAROXABAN 20 MILLIGRAM(S): KIT at 18:33

## 2023-08-07 RX ADMIN — ATORVASTATIN CALCIUM 80 MILLIGRAM(S): 80 TABLET, FILM COATED ORAL at 21:41

## 2023-08-07 RX ADMIN — SPIRONOLACTONE 25 MILLIGRAM(S): 25 TABLET, FILM COATED ORAL at 05:28

## 2023-08-07 RX ADMIN — Medication 250 MILLIGRAM(S): at 18:34

## 2023-08-07 NOTE — PROGRESS NOTE ADULT - ASSESSMENT
_________________________________________________________________________________________  ========>>  M E D I C A L   A T T E N D I N G    F O L L O W  U P  N O T E  <<=========  -----------------------------------------------------------------------------------------------------    - Patient seen and examined by me earlier today.   - In summary,  RYLEE HOWE is a 62y year old man admitted with diabetic foot infection   - Patient today overall doing ok, comfortable, eating OK.     >> patient now telling our team, his AICD is not MRI compatible per prior experience) !!     ==================>> REVIEW OF SYSTEM <<=================    GEN: no fever, no chills, no significant pain reported   RESP: no SOB, no cough, no sputum  CVS: no chest pain, no palpitations, no edema  GI: no abdominal pain, no nausea, no constipation, no diarrhea  : no dysuria, no frequency, no hematuria  Neuro: no headache, no dizziness  Derm : no itching, no rash    ==================>> PHYSICAL EXAM <<=================    GEN: A&O X 3 , NAD , comfortable, pleasant, calm   HEENT: NCAT, PERRL, MMM, hearing intact  Neck: supple , no JVD appreciated  CVS: S1S2 , regular , No M/R/G appreciated  PULM: CTA B/L,  no W/R/R appreciated  ABD.: soft. non tender, non distended,  bowel sounds present  Extrem: intact pulses , + Rt LE edema and erythema,, wound dressed by pod team   PSYCH : normal mood,  not anxious                            ( Note Written / Date of service :  08-07-23 )    ==================>> MEDICATIONS <<====================    MEDICATIONS  (STANDING):  atorvastatin 80 milliGRAM(s) Oral at bedtime  dextrose 5%. 1000 milliLiter(s) (100 mL/Hr) IV Continuous <Continuous>  dextrose 5%. 1000 milliLiter(s) (50 mL/Hr) IV Continuous <Continuous>  dextrose 50% Injectable 25 Gram(s) IV Push once  dextrose 50% Injectable 25 Gram(s) IV Push once  dextrose 50% Injectable 12.5 Gram(s) IV Push once  furosemide    Tablet 40 milliGRAM(s) Oral daily  glucagon  Injectable 1 milliGRAM(s) IntraMuscular once  insulin lispro (ADMELOG) corrective regimen sliding scale   SubCutaneous three times a day before meals  metoprolol succinate ER 25 milliGRAM(s) Oral daily  piperacillin/tazobactam IVPB.. 3.375 Gram(s) IV Intermittent every 8 hours  rivaroxaban 20 milliGRAM(s) Oral with dinner  sacubitril 97 mG/valsartan 103 mG 1 Tablet(s) Oral two times a day  spironolactone 25 milliGRAM(s) Oral daily  vancomycin  IVPB 1000 milliGRAM(s) IV Intermittent every 12 hours    MEDICATIONS  (PRN):  acetaminophen     Tablet .. 650 milliGRAM(s) Oral every 6 hours PRN Temp greater or equal to 38C (100.4F), Mild Pain (1 - 3)  aluminum hydroxide/magnesium hydroxide/simethicone Suspension 30 milliLiter(s) Oral every 4 hours PRN Dyspepsia  dextrose Oral Gel 15 Gram(s) Oral once PRN Blood Glucose LESS THAN 70 milliGRAM(s)/deciliter  melatonin 3 milliGRAM(s) Oral at bedtime PRN Insomnia  ondansetron Injectable 4 milliGRAM(s) IV Push every 8 hours PRN Nausea and/or Vomiting    ___________  Active diet:  Diet, Regular:   Consistent Carbohydrate Evening Snack (CSTCHOSN)  DASH/TLC Sodium & Cholesterol Restricted (DASH)  ___________________    ==================>> VITAL SIGNS <<==================    T(C): 36.8 (08-07-23 @ 07:50), Max: 37.4 (08-06-23 @ 20:23)  HR: 82 (08-07-23 @ 07:50) (67 - 82)  BP: 100/62 (08-07-23 @ 07:50) (100/49 - 117/76)  BP(mean): 75 (08-07-23 @ 04:20)  RR: 18 (08-07-23 @ 07:50) (18 - 18)  SpO2: 98% (08-07-23 @ 07:50) (96% - 98%)     CAPILLARY BLOOD GLUCOSE  POCT Blood Glucose.: 165 mg/dL (07 Aug 2023 11:11)  POCT Blood Glucose.: 122 mg/dL (07 Aug 2023 07:38)  POCT Blood Glucose.: 115 mg/dL (06 Aug 2023 21:21)     ==================>> LAB AND IMAGING <<==================                        9.4    6.41  )-----------( 133      ( 07 Aug 2023 06:07 )             29.6        08-07    138  |  100  |  30<H>  ----------------------------<  122<H>  3.7   |  23  |  1.49<H>    Ca    9.4      07 Aug 2023 06:07    TPro  6.7  /  Alb  3.5  /  TBili  0.6  /  DBili  x   /  AST  44<H>  /  ALT  33  /  AlkPhos  61  08-07     Urinalysis Basic - ( 07 Aug 2023 06:07 )  Color: x / Appearance: x / SG: x / pH: x  Gluc: 122 mg/dL / Ketone: x  / Bili: x / Urobili: x   Blood: x / Protein: x / Nitrite: x   Leuk Esterase: x / RBC: x / WBC x   Sq Epi: x / Non Sq Epi: x / Bacteria: x    Lipid profile:  (08-07-23)     Total: 75     LDL  : (p)     HDL  :27     TG   :112     HgA1C:   (08-07-23)          (08-07-23)      6.0    < from: Xray Foot AP + Lateral, Right (08.06.23 @ 11:40) >  IMPRESSION:  Diffuse soft tissue swelling throughout the right foot with a possible   trace amount of subcutaneous emphysema along the lateral aspect of the   right mid foot.  No definite cortical erosion seen to indicate acute osteomyelitis.  MR may be useful in further evaluation if there are no contraindications  < end of copied text >    ___________________________________________________________________________________  ===============>>  A S S E S S M E N T   A N D   P L A N <<===============  ------------------------------------------------------------------------------------------    · Assessment	  Patient is a 63yo Male with past medical history of HTN, DM, defibrillator, CAD post CABG, CHF, hx of Sommers fracture, non-union many years ago, presenting with  ulceration, recent bone biopsy with Dr Foss as op , came back positive for staph, treated with Levaquin, presented to ED with acute and significant swelling to the foot as advised to go to the ER for eval.   denies recent fall or trauma. states over the past few days right foot has been increasing in swelling. and has been having some fevers ... states hard to walk on his foot due to pain. states had temp at home of 101-102. denies n/v/d, CP, SOB, HA, dizziness, abdominal pain, urinary symptoms, cough, leg pain, swelling.  patient evaluated by podiatry in ED for presence of gas.. MRI and vascular studies ordered already, antibiotics given..         Problem/Plan - 1:  ·  Problem: Diabetic foot infection.   ·  Plan: appreciated podiatry evaluation  local wound care  post antibiotics in ED >> continue  ID follow up and management appreciated      MRI likely not an options as above ( awaiting confirmation / interrogation)      vascular studies pending >> vascular consult if abnormal   likely plan for OR debridement tomorrow per podiatry  medically stable  Echo pending  cardio also following   pain management as needed  supportive care   diuretics ( home dose: continue)   leg elevation.    Problem/Plan - 2:  ·  Problem: Diabetes.   ·  Plan: patient states has been overall well controlled but in past 1-2 days more elevated...   ---monitor finger sticks closely  ---Insulin regimen as ordered and monitor / adjust as needed  --- hold home medications for now   ---Diabetic diet  ---A1c. 6    Problem/Plan - 3:  ·  Problem: Heart failure, unspecified HF chronicity, unspecified heart failure type.   ·  Plan: patient with likely chronic systolic CHF on entresto and lasix / Epleronon   continue home medications  check Echo  cardio following for optimization   monitor otherwise.    Problem/Plan - 4:  ·  Problem: HLD (hyperlipidemia).   ·  Plan: continue equivalent statin dose  lipid profile.    Problem/Plan - 5:  ·  Problem: Hypertension.   ·  Plan: Continue home medications and monitor.  echo  cardio f/u.    Problem/Plan - 6:  ·  Problem: Deep vein thrombosis (DVT) of right upper extremity.   ·  Plan: on Xarelto for past ~ 2.5 months ( was on Eliqius first)  for Rt upper extremity DVT post infection of Elbow !   continue for now  likely would repeat Duplex and decide need for further treatment / duration...    Problem/Plan - 7:  ·  Problem: R/O Acute osteomyelitis.   ·  Plan: as above  as above re MRI >> ? bone scan ? ( vs direct bone biopsy)     -GI/DVT Prophylaxis per protocol.    --------------------------------------------  Case discussed with patient, NP/..   Education given on findings and plan of care  ___________________________  H. RACHAEL Boudreaux.  Pager: [phone]

## 2023-08-07 NOTE — PATIENT PROFILE ADULT - FALL HARM RISK - RISK INTERVENTIONS

## 2023-08-07 NOTE — ED ADULT NURSE REASSESSMENT NOTE - NS ED NURSE REASSESS COMMENT FT1
Patient notes pain 5/10 at present. Notes discomfort secondary to stretcher. States pain is "alright" at this time.

## 2023-08-07 NOTE — PROGRESS NOTE ADULT - ASSESSMENT
62 y.o M w/ Right foot 5th metatarsal base wound with cellulitis to distal leg  - Pt was seen and evaluated.   - Afebrile, WBC 6,41, , CRP 24.1  - X-Ray: no OM, possible gas to lateral midfoot   - 8/7 s/p b/s Right foot I&D, 1cc of purulent drainage expressed, persistent cellulitis to distal leg, serous draining from peripheral wound, no malodor, wound tracks dorsal lateral and circumferential. Left foot with no open wounds or signs of infection.   - The wound was flushed and packed, and dressed with dry sterile dressing   - Re-cultured the wound 2/2 first culture likely yielding skin containment   - Recommend ID consult   - Right foot MRI pending   - DIONI/PVR pending - recommend Vasc c/s if ABIs abnormal   - recommend EP consult for pacemaker interrogation    - Please document medical clearance for podiatric surgical intervention   - Please document cardiac clearance for podiatric surgical intervention   - Pod Plan: Local wound vs. Right foot wound debridement pending MR/cellulitis resolution  - Discussed with attending.

## 2023-08-07 NOTE — CONSULT NOTE ADULT - SUBJECTIVE AND OBJECTIVE BOX
Patient is a 62y old  Male who presents with a chief complaint of diabetic foot infection, possible osteo (07 Aug 2023 09:23)    HPI:    >>>>>>Medical Attending Initial / Admission note<<<<<<  -------------------------------------------------------------------------------  CHIEF COMPLAINT & HISTORY OF PRESENT ILLNESS:      Patient is a 61yo Male with past medical history of HTN, DM, defibrillator, CAD post CABG, CHF, hx of Sommers fracture, non-union many years ago, presenting with  ulceration, recent bone biopsy with Dr Foss as op , came back positive for staph, treated with Levaquin, presented to ED with acute and significant swelling to the foot as advised to go to the ER for eval.   denies recent fall or trauma. states over the past few days right foot has been increasing in swelling. and has been having some fevers ... states hard to walk on his foot due to pain. states had temp at home of 101-102. denies n/v/d, CP, SOB, HA, dizziness, abdominal pain, urinary symptoms, cough, leg pain, swelling.  patient evaluated by podiatry in ED for presence of gas.. MRI and vascular studies ordered already, antibiotics given..     Foot ulcer duration?:  Symptoms such as purulence, erythema, warmth, edema, pain/tenderness?   Evidence of neuropathy or ischemia?    --------------------------------------------------------------------------------  PAST MEDICAL / SURGICAL  HISTORY:    DM  HTN  CAD  CHF  recent right elbow infection post abc IV      recent Rt arm DVT >> xarelto   ( ~ 2.5 months ago)     CABG X3: ~ 2020  AICD  Rt hand sx with plate..   Rt Elbow I&D ~ 06/2023    --------------------------------------------------------------------------------  FAMILY HISTORY:    father : heart disease  father: brain cancer   otherwise negative     --------------------------------------------------------------------------------  SOCIAL HISTORY:  Alcohol: None reported  Smoking: None reported     --------------------------------------------------------------------------------  ALLERGIES:    [As bellow, reviewed]    --------------------------------------------------------------------------------  MEDICATIONS:   [As bellow, reviewed]    --------------------------------------------------------------------------------  REVIEW OF SYSTEM:    GEN: no fever, no chills, no pain  RESP: no SOB, no cough, no sputum  CVS: no chest pain, no palpitations, + edema, Rt leg   GI: no abdominal pain, no nausea, no vomiting, no constipation, no diarrhea  : no dysuria, no frequency, no hematuria  Neuro: no headache, no dizziness  PSYCH: no anxiety, no depression  Derm : no itching, no rash    --------------------------------------------------------------------------------  VITAL SIGNS:  Height (cm): 185.4  Weight (kg): 117.9  BMI (kg/m2): 34.3  Vital Signs Last 24 HrsT(C): 36.9 (08-06-23 @ 15:00)  T(F): 98.5 (08-06-23 @ 15:00), Max: 98.7 (08-06-23 @ 09:26)  HR: 67 (08-06-23 @ 15:00) (67 - 89)  BP: 100/49 (08-06-23 @ 16:55)  RR: 18 (08-06-23 @ 15:00) (16 - 20)  SpO2: 96% (08-06-23 @ 15:00) (96% - 97%)      CAPILLARY BLOOD GLUCOSE          --------------------------------------------------------------------------------  PHYSICAL EXAM:    GEN: A&O X 3 , NAD , comfortable, pleasant, calm  HEENT: NCAT, PERRL, MMM, hearing intact  Neck: supple , no JVD  CVS: S1S2 , regular , No M/R/G appreciated  PULM: CTA B/L,  no W/R/R appreciated  ABD.: soft. non tender, non distended,  bowel sounds present  Extrem: intact pulses , no edema   Derm: No rash , no ecchymoses  PSYCH : normal mood,  no delusion not anxious    --------------------------------------------------------------------------------  LAB AND IMAGING:   [As gus, reviewed]    --------------------------------------------------------------------------------  ASSESSMENT AND PLAN:   [As gus]    --------------------  Case discussed with   ___________________________  HVicky Boudreaux D.O.  Pager: [phone]  08-06-23 (06 Aug 2023 17:44)       REVIEW OF SYSTEMS  Constitutional: No fevers, chills, weight loss or fatigue   Skin: No rash, no phlebitis	  Eyes: No discharge	  ENMT: No sore throat, oral thrush, ulcers or exudate  Respiratory: No cough, no SOB  Cardiovascular:  No chest pain, palpitations or edema   Gastrointestinal: No pain, nausea, vomiting, diarrhea or constipation	  Genitourinary: No dysuria, discharge or flank pain  MSK: No arthralgias or back pain   Neurological: No HA, no weakness, no seizures, no AMS       prior hospital charts reviewed [V]  primary team notes reviewed [V]  other consultant notes reviewed [V]    PAST MEDICAL & SURGICAL HISTORY:      SOCIAL HISTORY:  - Denied smoking/vaping/alcohol/recreational drug use    FAMILY HISTORY:      Allergies  No Known Allergies        ANTIMICROBIALS:  piperacillin/tazobactam IVPB.. 3.375 every 8 hours  vancomycin  IVPB 1000 every 12 hours      ANTIMICROBIALS (past 90 days):  MEDICATIONS  (STANDING):  clindamycin IVPB   100 mL/Hr IV Intermittent (08-06-23 @ 14:57)    piperacillin/tazobactam IVPB..   25 mL/Hr IV Intermittent (08-07-23 @ 05:28)   25 mL/Hr IV Intermittent (08-06-23 @ 21:34)    piperacillin/tazobactam IVPB...   200 mL/Hr IV Intermittent (08-06-23 @ 12:18)    vancomycin  IVPB   250 mL/Hr IV Intermittent (08-07-23 @ 01:48)    vancomycin  IVPB.   250 mL/Hr IV Intermittent (08-06-23 @ 12:18)        OTHER MEDS:   MEDICATIONS  (STANDING):  acetaminophen     Tablet .. 650 every 6 hours PRN  aluminum hydroxide/magnesium hydroxide/simethicone Suspension 30 every 4 hours PRN  atorvastatin 80 at bedtime  dextrose 50% Injectable 25 once  dextrose 50% Injectable 25 once  dextrose 50% Injectable 12.5 once  dextrose Oral Gel 15 once PRN  furosemide    Tablet 40 daily  glucagon  Injectable 1 once  insulin lispro (ADMELOG) corrective regimen sliding scale  three times a day before meals  melatonin 3 at bedtime PRN  metoprolol succinate ER 25 daily  ondansetron Injectable 4 every 8 hours PRN  rivaroxaban 20 with dinner  sacubitril 97 mG/valsartan 103 mG 1 two times a day  spironolactone 25 daily      VITALS:  Vital Signs Last 24 Hrs  T(F): 98.3 (08-07-23 @ 07:50), Max: 99.3 (08-06-23 @ 20:23)    Vital Signs Last 24 Hrs  HR: 82 (08-07-23 @ 07:50) (67 - 82)  BP: 100/62 (08-07-23 @ 07:50) (100/49 - 117/76)  RR: 18 (08-07-23 @ 07:50)  SpO2: 98% (08-07-23 @ 07:50) (96% - 98%)  Wt(kg): --    EXAM:  General: Patient in no acute distress   HEENT: NCAT, EOMI, PERRL, no oral lesions  CV: S1+S2, no m/r/g appreciated   Lungs: No respiratory distress, CTAB  Abd: Soft, nontender, no guarding, no rebound tenderness, + bowel sounds   Ext: No cyanosis, no edema  Neuro: Alert and oriented, no focal deficits, CN II-XII grossly intact   Skin: No rash   IV: No phlebitis      Labs:                        9.4    6.41  )-----------( 133      ( 07 Aug 2023 06:07 )             29.6     08-07    138  |  100  |  30<H>  ----------------------------<  122<H>  3.7   |  23  |  1.49<H>    Ca    9.4      07 Aug 2023 06:07    TPro  6.7  /  Alb  3.5  /  TBili  0.6  /  DBili  x   /  AST  44<H>  /  ALT  33  /  AlkPhos  61  08-07      WBC Trend:  WBC Count: 6.41 (08-07-23 @ 06:07)  WBC Count: 7.10 (08-06-23 @ 11:39)      Auto Neutrophil #: 4.90 K/uL (08-07-23 @ 06:07)  Auto Neutrophil #: 5.51 K/uL (08-06-23 @ 11:39)      Creatine Trend:  Creatinine: 1.49 (08-07)  Creatinine: 1.56 (08-06)      Liver Biochemical Testing Trend:  Alanine Aminotransferase (ALT/SGPT): 33 (08-07)  Aspartate Aminotransferase (AST/SGOT): 44 (08-07-23 @ 06:07)  Bilirubin Total: 0.6 (08-07)      Trend LDH      Auto Eosinophil %: 0.8 % (08-07-23 @ 06:07)  Auto Eosinophil %: 0.1 % (08-06-23 @ 11:39)      Urinalysis Basic - ( 07 Aug 2023 06:07 )    Color: x / Appearance: x / SG: x / pH: x  Gluc: 122 mg/dL / Ketone: x  / Bili: x / Urobili: x   Blood: x / Protein: x / Nitrite: x   Leuk Esterase: x / RBC: x / WBC x   Sq Epi: x / Non Sq Epi: x / Bacteria: x        MICROBIOLOGY:        Culture - Abscess with Gram Stain (collected 06 Aug 2023 15:42)  Source: .Abscess right foot    C-Reactive Protein, Serum: 241 (08-06)    Blood Gas Venous - Lactate: 3.5 (08-06 @ 11:30)        RADIOLOGY:  imaging below personally reviewed   Patient is a 62y old  Male who presents with a chief complaint of diabetic foot infection, possible osteo (07 Aug 2023 09:23)    HPI:    >>>>>>Adapted from Medical Attending Initial / Admission note<<<<<<  -------------------------------------------------------------------------------  CHIEF COMPLAINT & HISTORY OF PRESENT ILLNESS:      Patient is our 62M who is consulted for a diabetic foot infection of the right, dorsal lateral foot. Past medical history of DM, defibrillator, CAD post CABG, CHF, hx of non-union Sommers fracture around 2014, right upper extremity infection in June 2023 s/p 1 month of daptomycin and ceftriaxone.     Patient noticed an ulcer on his lateral right foot for the past several weeks. Over the past few days right foot has been having increased edema, erythema and pain on ambulation. Temperature at home was 101-102. Denies recent fall or trauma. states denies n/v/d, CP, SOB, HA, dizziness, abdominal pain, urinary symptoms, cough, leg pain, swelling.      On August 1, 2023, the patient saw podiatrist Dr. Foss for the foot infection and bone biopsy came back positive for staph. He began treatment with levaquin but his symptoms progressed and patient came to the ED for further evaluation.      In the ED, VSS stable. Podiatry team performed wound exploration and 2 cc of purulent drainage was noted. Probe to bone test was negative. Xray consistent with diffuse soft tissue swelling with possible air. MRI and vascular studies ordered and patient was started on vancomycin and zosyn.    --------------------------------------------------------------------------------  PAST MEDICAL / SURGICAL  HISTORY:    DM  HTN  CAD  CHF  recent right elbow infection post abc IV      recent Rt arm DVT >> xarelto   ( ~ 2.5 months ago)     CABG X3: ~ 2020  AICD  Rt hand sx with plate..   Rt Elbow I&D ~ 06/2023    --------------------------------------------------------------------------------  FAMILY HISTORY:    father : heart disease  father: brain cancer   otherwise negative     --------------------------------------------------------------------------------  SOCIAL HISTORY:  Alcohol: None reported  Smoking: None reported     --------------------------------------------------------------------------------  ALLERGIES:    [As bellow, reviewed]    --------------------------------------------------------------------------------  MEDICATIONS:   [As gus, reviewed]    --------------------------------------------------------------------------------  REVIEW OF SYSTEM:    GEN: no fever, no chills, no pain  RESP: no SOB, no cough, no sputum  CVS: no chest pain, no palpitations, + edema, Rt leg   GI: no abdominal pain, no nausea, no vomiting, no constipation, no diarrhea  : no dysuria, no frequency, no hematuria  Neuro: no headache, no dizziness  PSYCH: no anxiety, no depression  Derm : no itching, no rash    --------------------------------------------------------------------------------  VITAL SIGNS:  Height (cm): 185.4  Weight (kg): 117.9  BMI (kg/m2): 34.3  Vital Signs Last 24 HrsT(C): 36.9 (08-06-23 @ 15:00)  T(F): 98.5 (08-06-23 @ 15:00), Max: 98.7 (08-06-23 @ 09:26)  HR: 67 (08-06-23 @ 15:00) (67 - 89)  BP: 100/49 (08-06-23 @ 16:55)  RR: 18 (08-06-23 @ 15:00) (16 - 20)  SpO2: 96% (08-06-23 @ 15:00) (96% - 97%)      CAPILLARY BLOOD GLUCOSE          --------------------------------------------------------------------------------  PHYSICAL EXAM:    GEN: A&O X 3 , NAD , comfortable, pleasant, calm  HEENT: NCAT, PERRL, MMM, hearing intact  CVS: S1S2 , regular , No M/R/G appreciated  PULM: CTA B/L,  no W/R/R appreciated  ABD.: soft. non tender, non distended,  bowel sounds present  Foot: ulceration on right, dorsal lateral foot with serous drainage. surrounding edema and erythema present. Necrosis around bone biopsy site. left foot unremarkable  Leg:  overlying edema and erythema on right anterior leg. left leg unremarkable.  PSYCH : normal mood,  no delusion not anxious    --------------------------------------------------------------------------------  LAB AND IMAGING:   [As gus, reviewed]    --------------------------------------------------------------------------------  ASSESSMENT AND PLAN:   [As gus]    --------------------  Case discussed with   ___________________________  HVicky Boudreaux D.O.  Pager: [phone]  08-06-23 (06 Aug 2023 17:44)       REVIEW OF SYSTEMS  Constitutional: No fevers, chills, weight loss or fatigue   Skin: No rash, no phlebitis	  Eyes: No discharge	  ENMT: No sore throat, oral thrush, ulcers or exudate  Respiratory: No cough, no SOB  Cardiovascular:  No chest pain, palpitations or edema   Gastrointestinal: No pain, nausea, vomiting, diarrhea or constipation	  Genitourinary: No dysuria, discharge or flank pain  MSK: No arthralgias or back pain   Neurological: No HA, no weakness, no seizures, no AMS       prior hospital charts reviewed [V]  primary team notes reviewed [V]  other consultant notes reviewed [V]    PAST MEDICAL & SURGICAL HISTORY:      SOCIAL HISTORY:  - Denied smoking/vaping/alcohol/recreational drug use    FAMILY HISTORY:      Allergies  No Known Allergies        ANTIMICROBIALS:  piperacillin/tazobactam IVPB.. 3.375 every 8 hours  vancomycin  IVPB 1000 every 12 hours      ANTIMICROBIALS (past 90 days):  MEDICATIONS  (STANDING):  clindamycin IVPB   100 mL/Hr IV Intermittent (08-06-23 @ 14:57)    piperacillin/tazobactam IVPB..   25 mL/Hr IV Intermittent (08-07-23 @ 05:28)   25 mL/Hr IV Intermittent (08-06-23 @ 21:34)    piperacillin/tazobactam IVPB...   200 mL/Hr IV Intermittent (08-06-23 @ 12:18)    vancomycin  IVPB   250 mL/Hr IV Intermittent (08-07-23 @ 01:48)    vancomycin  IVPB.   250 mL/Hr IV Intermittent (08-06-23 @ 12:18)        OTHER MEDS:   MEDICATIONS  (STANDING):  acetaminophen     Tablet .. 650 every 6 hours PRN  aluminum hydroxide/magnesium hydroxide/simethicone Suspension 30 every 4 hours PRN  atorvastatin 80 at bedtime  dextrose 50% Injectable 25 once  dextrose 50% Injectable 25 once  dextrose 50% Injectable 12.5 once  dextrose Oral Gel 15 once PRN  furosemide    Tablet 40 daily  glucagon  Injectable 1 once  insulin lispro (ADMELOG) corrective regimen sliding scale  three times a day before meals  melatonin 3 at bedtime PRN  metoprolol succinate ER 25 daily  ondansetron Injectable 4 every 8 hours PRN  rivaroxaban 20 with dinner  sacubitril 97 mG/valsartan 103 mG 1 two times a day  spironolactone 25 daily      VITALS:  Vital Signs Last 24 Hrs  T(F): 98.3 (08-07-23 @ 07:50), Max: 99.3 (08-06-23 @ 20:23)    Vital Signs Last 24 Hrs  HR: 82 (08-07-23 @ 07:50) (67 - 82)  BP: 100/62 (08-07-23 @ 07:50) (100/49 - 117/76)  RR: 18 (08-07-23 @ 07:50)  SpO2: 98% (08-07-23 @ 07:50) (96% - 98%)  Wt(kg): --    EXAM:  General: Patient in no acute distress   HEENT: NCAT, EOMI, PERRL, no oral lesions  CV: S1+S2, no m/r/g appreciated   Lungs: No respiratory distress, CTAB  Abd: Soft, nontender, no guarding, no rebound tenderness, + bowel sounds   Ext: No cyanosis, no edema  Neuro: Alert and oriented, no focal deficits, CN II-XII grossly intact   Skin: No rash   IV: No phlebitis      Labs:                        9.4    6.41  )-----------( 133      ( 07 Aug 2023 06:07 )             29.6     08-07    138  |  100  |  30<H>  ----------------------------<  122<H>  3.7   |  23  |  1.49<H>    Ca    9.4      07 Aug 2023 06:07    TPro  6.7  /  Alb  3.5  /  TBili  0.6  /  DBili  x   /  AST  44<H>  /  ALT  33  /  AlkPhos  61  08-07      WBC Trend:  WBC Count: 6.41 (08-07-23 @ 06:07)  WBC Count: 7.10 (08-06-23 @ 11:39)      Auto Neutrophil #: 4.90 K/uL (08-07-23 @ 06:07)  Auto Neutrophil #: 5.51 K/uL (08-06-23 @ 11:39)      Creatine Trend:  Creatinine: 1.49 (08-07)  Creatinine: 1.56 (08-06)      Liver Biochemical Testing Trend:  Alanine Aminotransferase (ALT/SGPT): 33 (08-07)  Aspartate Aminotransferase (AST/SGOT): 44 (08-07-23 @ 06:07)  Bilirubin Total: 0.6 (08-07)      Trend LDH      Auto Eosinophil %: 0.8 % (08-07-23 @ 06:07)  Auto Eosinophil %: 0.1 % (08-06-23 @ 11:39)      Urinalysis Basic - ( 07 Aug 2023 06:07 )    Color: x / Appearance: x / SG: x / pH: x  Gluc: 122 mg/dL / Ketone: x  / Bili: x / Urobili: x   Blood: x / Protein: x / Nitrite: x   Leuk Esterase: x / RBC: x / WBC x   Sq Epi: x / Non Sq Epi: x / Bacteria: x    MICROBIOLOGY:  Culture - Abscess with Gram Stain (collected 06 Aug 2023 15:42)  Source:  Abscess right foot  C-Reactive Protein, Serum: 241 (08-06)  Blood Gas Venous - Lactate: 3.5 (08-06 @ 11:30)        RADIOLOGY:  imaging below personally reviewed   Patient is a 62y old  Male who presents with a chief complaint of diabetic foot infection, possible osteo (07 Aug 2023 09:23)    HPI:    >>>>>>Adapted from Medical Attending Initial / Admission note<<<<<<  -------------------------------------------------------------------------------  CHIEF COMPLAINT & HISTORY OF PRESENT ILLNESS:      Patient is our 62M who is consulted for a diabetic foot infection of the right, dorsal lateral foot. Past medical history of DM, defibrillator, CAD post CABG, CHF, hx of non-union Sommers fracture around 2014, right upper extremity infection in June 2023 s/p 1 month of daptomycin and ceftriaxone.     Patient noticed an ulcer on his lateral right foot for the past several weeks. Over the past few days right foot has been having increased edema, erythema and pain on ambulation. Temperature at home was 101-102. Denies recent fall or trauma. states denies n/v/d, CP, SOB, HA, dizziness, abdominal pain, urinary symptoms, cough, leg pain, swelling. Patient endorses stools have become darker.     On August 1, 2023, the patient saw podiatrist Dr. Foss for the foot infection and bone biopsy came back positive for staph. He began treatment with levaquin but his symptoms progressed and patient came to the ED for further evaluation.      In the ED, VSS stable. Podiatry team performed wound exploration and 2 cc of purulent drainage was noted. Probe to bone test was negative. Xray consistent with diffuse soft tissue swelling with possible air. MRI and vascular studies ordered and patient was started on vancomycin and zosyn.    --------------------------------------------------------------------------------  PAST MEDICAL / SURGICAL  HISTORY:    DM  HTN  CAD  CHF  recent right elbow infection post abc IV      recent Rt arm DVT >> xarelto   ( ~ 2.5 months ago)     CABG X3: ~ 2020  AICD  Rt hand sx with plate..   Rt Elbow I&D ~ 06/2023    --------------------------------------------------------------------------------  FAMILY HISTORY:    father : heart disease  father: brain cancer   otherwise negative     --------------------------------------------------------------------------------  SOCIAL HISTORY:  Alcohol: None reported  Smoking: None reported     --------------------------------------------------------------------------------  ALLERGIES:    [As bellow, reviewed]    --------------------------------------------------------------------------------  MEDICATIONS:   [As gus, reviewed]    --------------------------------------------------------------------------------  REVIEW OF SYSTEM:    GEN: no fever, no chills, no pain  RESP: no SOB, no cough, no sputum  CVS: no chest pain, no palpitations, + edema, Rt leg   GI: no abdominal pain, no nausea, no vomiting, no constipation, no diarrhea  : no dysuria, no frequency, no hematuria  Neuro: no headache, no dizziness  PSYCH: no anxiety, no depression  Derm : no itching, no rash    --------------------------------------------------------------------------------  VITAL SIGNS:  Height (cm): 185.4  Weight (kg): 117.9  BMI (kg/m2): 34.3  Vital Signs Last 24 HrsT(C): 36.9 (08-06-23 @ 15:00)  T(F): 98.5 (08-06-23 @ 15:00), Max: 98.7 (08-06-23 @ 09:26)  HR: 67 (08-06-23 @ 15:00) (67 - 89)  BP: 100/49 (08-06-23 @ 16:55)  RR: 18 (08-06-23 @ 15:00) (16 - 20)  SpO2: 96% (08-06-23 @ 15:00) (96% - 97%)      CAPILLARY BLOOD GLUCOSE          --------------------------------------------------------------------------------  PHYSICAL EXAM:    GEN: A&O X 3 , NAD , comfortable, pleasant, calm  HEENT: NCAT, PERRL, MMM, hearing intact  CVS: S1S2 , regular , No M/R/G appreciated  PULM: CTA B/L,  no W/R/R appreciated  ABD.: soft. non tender, non distended,  bowel sounds present  Foot: ulceration on right, dorsal lateral foot with serous drainage. surrounding edema and erythema present. Necrosis around bone biopsy site. left foot unremarkable  Leg:  overlying edema and erythema on right anterior leg. left leg unremarkable.  PSYCH : normal mood,  no delusion not anxious    --------------------------------------------------------------------------------  LAB AND IMAGING:   [As gus, reviewed]    --------------------------------------------------------------------------------  ASSESSMENT AND PLAN:   [As gus]    --------------------  Case discussed with   ___________________________  HVicky Boudreaux D.O.  Pager: [phone]  08-06-23 (06 Aug 2023 17:44)       REVIEW OF SYSTEMS  Constitutional: No fevers, chills, weight loss or fatigue   Skin: No rash, no phlebitis	  Eyes: No discharge	  ENMT: No sore throat, oral thrush, ulcers or exudate  Respiratory: No cough, no SOB  Cardiovascular:  No chest pain, palpitations or edema   Gastrointestinal: No pain, nausea, vomiting, diarrhea or constipation	  Genitourinary: No dysuria, discharge or flank pain  MSK: No arthralgias or back pain   Neurological: No HA, no weakness, no seizures, no AMS       prior hospital charts reviewed [V]  primary team notes reviewed [V]  other consultant notes reviewed [V]    PAST MEDICAL & SURGICAL HISTORY:      SOCIAL HISTORY:  - Denied smoking/vaping/alcohol/recreational drug use    FAMILY HISTORY:      Allergies  No Known Allergies        ANTIMICROBIALS:  piperacillin/tazobactam IVPB.. 3.375 every 8 hours  vancomycin  IVPB 1000 every 12 hours      ANTIMICROBIALS (past 90 days):  MEDICATIONS  (STANDING):  clindamycin IVPB   100 mL/Hr IV Intermittent (08-06-23 @ 14:57)    piperacillin/tazobactam IVPB..   25 mL/Hr IV Intermittent (08-07-23 @ 05:28)   25 mL/Hr IV Intermittent (08-06-23 @ 21:34)    piperacillin/tazobactam IVPB...   200 mL/Hr IV Intermittent (08-06-23 @ 12:18)    vancomycin  IVPB   250 mL/Hr IV Intermittent (08-07-23 @ 01:48)    vancomycin  IVPB.   250 mL/Hr IV Intermittent (08-06-23 @ 12:18)        OTHER MEDS:   MEDICATIONS  (STANDING):  acetaminophen     Tablet .. 650 every 6 hours PRN  aluminum hydroxide/magnesium hydroxide/simethicone Suspension 30 every 4 hours PRN  atorvastatin 80 at bedtime  dextrose 50% Injectable 25 once  dextrose 50% Injectable 25 once  dextrose 50% Injectable 12.5 once  dextrose Oral Gel 15 once PRN  furosemide    Tablet 40 daily  glucagon  Injectable 1 once  insulin lispro (ADMELOG) corrective regimen sliding scale  three times a day before meals  melatonin 3 at bedtime PRN  metoprolol succinate ER 25 daily  ondansetron Injectable 4 every 8 hours PRN  rivaroxaban 20 with dinner  sacubitril 97 mG/valsartan 103 mG 1 two times a day  spironolactone 25 daily      VITALS:  Vital Signs Last 24 Hrs  T(F): 98.3 (08-07-23 @ 07:50), Max: 99.3 (08-06-23 @ 20:23)    Vital Signs Last 24 Hrs  HR: 82 (08-07-23 @ 07:50) (67 - 82)  BP: 100/62 (08-07-23 @ 07:50) (100/49 - 117/76)  RR: 18 (08-07-23 @ 07:50)  SpO2: 98% (08-07-23 @ 07:50) (96% - 98%)  Wt(kg): --    EXAM:  General: Patient in no acute distress   HEENT: NCAT, EOMI, PERRL, no oral lesions  CV: S1+S2, no m/r/g appreciated   Lungs: No respiratory distress, CTAB  Abd: Soft, nontender, no guarding, no rebound tenderness, + bowel sounds   Ext: No cyanosis, no edema  Neuro: Alert and oriented, no focal deficits, CN II-XII grossly intact   Skin: No rash   IV: No phlebitis      Labs:                        9.4    6.41  )-----------( 133      ( 07 Aug 2023 06:07 )             29.6     08-07    138  |  100  |  30<H>  ----------------------------<  122<H>  3.7   |  23  |  1.49<H>    Ca    9.4      07 Aug 2023 06:07    TPro  6.7  /  Alb  3.5  /  TBili  0.6  /  DBili  x   /  AST  44<H>  /  ALT  33  /  [employer]  61  08-07      WBC Trend:  WBC Count: 6.41 (08-07-23 @ 06:07)  WBC Count: 7.10 (08-06-23 @ 11:39)      Auto Neutrophil #: 4.90 K/uL (08-07-23 @ 06:07)  Auto Neutrophil #: 5.51 K/uL (08-06-23 @ 11:39)      Creatine Trend:  Creatinine: 1.49 (08-07)  Creatinine: 1.56 (08-06)      Liver Biochemical Testing Trend:  Alanine Aminotransferase (ALT/SGPT): 33 (08-07)  Aspartate Aminotransferase (AST/SGOT): 44 (08-07-23 @ 06:07)  Bilirubin Total: 0.6 (08-07)      Trend LDH      Auto Eosinophil %: 0.8 % (08-07-23 @ 06:07)  Auto Eosinophil %: 0.1 % (08-06-23 @ 11:39)      Urinalysis Basic - ( 07 Aug 2023 06:07 )    Color: x / Appearance: x / SG: x / pH: x  Gluc: 122 mg/dL / Ketone: x  / Bili: x / Urobili: x   Blood: x / Protein: x / Nitrite: x   Leuk Esterase: x / RBC: x / WBC x   Sq Epi: x / Non Sq Epi: x / Bacteria: x    MICROBIOLOGY:  Culture - Abscess with Gram Stain (collected 06 Aug 2023 15:42)  Source:  Abscess right foot  C-Reactive Protein, Serum: 241 (08-06)  Blood Gas Venous - Lactate: 3.5 (08-06 @ 11:30)        RADIOLOGY:  imaging below personally reviewed   Patient is a 62y old  Male who presents with a chief complaint of diabetic foot infection, possible osteo (07 Aug 2023 09:23)    HPI:    >>>>>>Adapted from Medical Attending Initial / Admission note<<<<<<  -------------------------------------------------------------------------------  CHIEF COMPLAINT & HISTORY OF PRESENT ILLNESS:      Patient is our 62M who is consulted for a diabetic foot infection of the right, dorsal lateral foot. Past medical history of DM, defibrillator, CAD post CABG, CHF, hx of non-union Sommers fracture around 2014, right upper extremity infection in June 2023 s/p 1 month of daptomycin and ceftriaxone.     Patient noticed an ulcer on his lateral right foot for the past several weeks. Over the past few days right foot has been having increased edema, erythema and pain on ambulation. Temperature at home was 101-102. Denies recent fall or trauma. states denies n/v/d, CP, SOB, HA, dizziness, abdominal pain, urinary symptoms, cough, leg pain, swelling. Patient endorses stools have become darker.     On August 1, 2023, the patient saw podiatrist Dr. Foss for the foot infection and bone biopsy came back positive for staph. He began treatment with levaquin but his symptoms progressed and patient came to the ED for further evaluation.      In the ED, VSS stable. Podiatry team performed wound exploration and 2 cc of purulent drainage was noted. Probe to bone test was negative. Xray consistent with diffuse soft tissue swelling with possible air. MRI and vascular studies ordered and patient was started on vancomycin and zosyn.    --------------------------------------------------------------------------------  PAST MEDICAL / SURGICAL  HISTORY:    DM  HTN  CAD  CHF  recent right elbow infection post abc IV      recent Rt arm DVT >> xarelto   ( ~ 2.5 months ago)     CABG X3: ~ 2020  AICD  Rt hand sx with plate..   Rt Elbow I&D ~ 06/2023    --------------------------------------------------------------------------------  FAMILY HISTORY:    father : heart disease  father: brain cancer   otherwise negative     --------------------------------------------------------------------------------  SOCIAL HISTORY:  Alcohol: None reported  Smoking: None reported     --------------------------------------------------------------------------------  ALLERGIES:    [As bellow, reviewed]    --------------------------------------------------------------------------------  MEDICATIONS:   [As gus, reviewed]    --------------------------------------------------------------------------------  REVIEW OF SYSTEM:    GEN: no fever, no chills, no pain  RESP: no SOB, no cough, no sputum  CVS: no chest pain, no palpitations, + edema, Rt leg   GI: no abdominal pain, no nausea, no vomiting, no constipation, no diarrhea  : no dysuria, no frequency, no hematuria  Neuro: no headache, no dizziness  PSYCH: no anxiety, no depression  Derm : no itching, no rash    --------------------------------------------------------------------------------  VITAL SIGNS:  Height (cm): 185.4  Weight (kg): 117.9  BMI (kg/m2): 34.3  Vital Signs Last 24 HrsT(C): 36.9 (08-06-23 @ 15:00)  T(F): 98.5 (08-06-23 @ 15:00), Max: 98.7 (08-06-23 @ 09:26)  HR: 67 (08-06-23 @ 15:00) (67 - 89)  BP: 100/49 (08-06-23 @ 16:55)  RR: 18 (08-06-23 @ 15:00) (16 - 20)  SpO2: 96% (08-06-23 @ 15:00) (96% - 97%)      CAPILLARY BLOOD GLUCOSE          --------------------------------------------------------------------------------  PHYSICAL EXAM:    GEN: A&O X 3 , NAD , comfortable, pleasant, calm  HEENT: NCAT, PERRL, MMM, hearing intact  CVS: S1S2 , regular , No M/R/G appreciated  PULM: CTA B/L,  no W/R/R appreciated  ABD.: soft. non tender, non distended,  bowel sounds present  Foot: ulceration on right, dorsal lateral foot with serous drainage. surrounding edema and erythema present. Necrosis around bone biopsy site. left foot unremarkable  Leg:  overlying edema and erythema on right anterior leg. left leg unremarkable.  PSYCH : normal mood,  no delusion not anxious    --------------------------------------------------------------------------------  LAB AND IMAGING:   [As gus, reviewed]    --------------------------------------------------------------------------------  ASSESSMENT AND PLAN:   [As gus]    --------------------  Case discussed with   ___________________________  HVicky Boudreaux D.O.  Pager: [phone]  08-06-23 (06 Aug 2023 17:44)       REVIEW OF SYSTEMS  Constitutional: No fevers, chills, weight loss or fatigue   Skin: No rash, no phlebitis	  Eyes: No discharge	  ENMT: No sore throat, oral thrush, ulcers or exudate  Respiratory: No cough, no SOB  Cardiovascular:  No chest pain, palpitations or edema   Gastrointestinal: No pain, nausea, vomiting, diarrhea or constipation	  Genitourinary: No dysuria, discharge or flank pain  MSK: No arthralgias or back pain   Neurological: No HA, no weakness, no seizures, no AMS       prior hospital charts reviewed [V]  primary team notes reviewed [V]  other consultant notes reviewed [V]    PAST MEDICAL & SURGICAL HISTORY:      SOCIAL HISTORY:  - Denied smoking/vaping/alcohol/recreational drug use    FAMILY HISTORY:      Allergies  No Known Allergies        ANTIMICROBIALS:  piperacillin/tazobactam IVPB.. 3.375 every 8 hours  vancomycin  IVPB 1000 every 12 hours      ANTIMICROBIALS (past 90 days):  MEDICATIONS  (STANDING):  clindamycin IVPB   100 mL/Hr IV Intermittent (08-06-23 @ 14:57)    piperacillin/tazobactam IVPB..   25 mL/Hr IV Intermittent (08-07-23 @ 05:28)   25 mL/Hr IV Intermittent (08-06-23 @ 21:34)    piperacillin/tazobactam IVPB...   200 mL/Hr IV Intermittent (08-06-23 @ 12:18)    vancomycin  IVPB   250 mL/Hr IV Intermittent (08-07-23 @ 01:48)    vancomycin  IVPB.   250 mL/Hr IV Intermittent (08-06-23 @ 12:18)        OTHER MEDS:   MEDICATIONS  (STANDING):  acetaminophen     Tablet .. 650 every 6 hours PRN  aluminum hydroxide/magnesium hydroxide/simethicone Suspension 30 every 4 hours PRN  atorvastatin 80 at bedtime  dextrose 50% Injectable 25 once  dextrose 50% Injectable 25 once  dextrose 50% Injectable 12.5 once  dextrose Oral Gel 15 once PRN  furosemide    Tablet 40 daily  glucagon  Injectable 1 once  insulin lispro (ADMELOG) corrective regimen sliding scale  three times a day before meals  melatonin 3 at bedtime PRN  metoprolol succinate ER 25 daily  ondansetron Injectable 4 every 8 hours PRN  rivaroxaban 20 with dinner  sacubitril 97 mG/valsartan 103 mG 1 two times a day  spironolactone 25 daily      VITALS:  Vital Signs Last 24 Hrs  T(F): 98.3 (08-07-23 @ 07:50), Max: 99.3 (08-06-23 @ 20:23)    Vital Signs Last 24 Hrs  HR: 82 (08-07-23 @ 07:50) (67 - 82)  BP: 100/62 (08-07-23 @ 07:50) (100/49 - 117/76)  RR: 18 (08-07-23 @ 07:50)  SpO2: 98% (08-07-23 @ 07:50) (96% - 98%)  Wt(kg): --    EXAM:  General: Patient in no acute distress   HEENT: NCAT, EOMI, PERRL, no oral lesions  CV: S1+S2, no m/r/g appreciated   Lungs: No respiratory distress, CTAB  Abd: Soft, nontender, no guarding, no rebound tenderness, + bowel sounds   Ext: No cyanosis, no edema  Neuro: Alert and oriented, no focal deficits, CN II-XII grossly intact   Skin: No rash   IV: No phlebitis      Labs:                        9.4    6.41  )-----------( 133      ( 07 Aug 2023 06:07 )             29.6     08-07    138  |  100  |  30<H>  ----------------------------<  122<H>  3.7   |  23  |  1.49<H>    Ca    9.4      07 Aug 2023 06:07    TPro  6.7  /  Alb  3.5  /  TBili  0.6  /  DBili  x   /  AST  44<H>  /  ALT  33  /  AlkPhos  61  08-07      WBC Trend:  WBC Count: 6.41 (08-07-23 @ 06:07)  WBC Count: 7.10 (08-06-23 @ 11:39)      Auto Neutrophil #: 4.90 K/uL (08-07-23 @ 06:07)  Auto Neutrophil #: 5.51 K/uL (08-06-23 @ 11:39)      Creatine Trend:  Creatinine: 1.49 (08-07)  Creatinine: 1.56 (08-06)      Liver Biochemical Testing Trend:  Alanine Aminotransferase (ALT/SGPT): 33 (08-07)  Aspartate Aminotransferase (AST/SGOT): 44 (08-07-23 @ 06:07)  Bilirubin Total: 0.6 (08-07)      Trend LDH      Auto Eosinophil %: 0.8 % (08-07-23 @ 06:07)  Auto Eosinophil %: 0.1 % (08-06-23 @ 11:39)      Urinalysis Basic - ( 07 Aug 2023 06:07 )    Color: x / Appearance: x / SG: x / pH: x  Gluc: 122 mg/dL / Ketone: x  / Bili: x / Urobili: x   Blood: x / Protein: x / Nitrite: x   Leuk Esterase: x / RBC: x / WBC x   Sq Epi: x / Non Sq Epi: x / Bacteria: x    MICROBIOLOGY:  Culture - Abscess with Gram Stain (collected 06 Aug 2023 15:42)  Source:  Abscess right foot  C-Reactive Protein, Serum: 241 (08-06)  Blood Gas Venous - Lactate: 3.5 (08-06 @ 11:30)        RADIOLOGY:  < from: Xray Foot AP + Lateral, Right (08.06.23 @ 11:40) >  IMPRESSION:  Diffuse soft tissue swelling throughout the right foot with a possible   trace amount of subcutaneous emphysema along the lateral aspect of the   right mid foot.  No definite cortical erosion seen to indicate acute osteomyelitis.    MR may be useful in further evaluation if there are no contraindications    < end of copied text >

## 2023-08-07 NOTE — PROGRESS NOTE ADULT - SUBJECTIVE AND OBJECTIVE BOX
Patient is a 62y old  Male who presents with a chief complaint of diabetic foot infection, possible osteo (06 Aug 2023 19:51)       INTERVAL HPI/OVERNIGHT EVENTS:  Patient seen and evaluated at bedside.  Pt is resting comfortable in NAD. Denies N/V/F/C.    Allergies    No Known Allergies    Intolerances        Vital Signs Last 24 Hrs  T(C): 36.8 (07 Aug 2023 07:50), Max: 37.4 (06 Aug 2023 20:23)  T(F): 98.3 (07 Aug 2023 07:50), Max: 99.3 (06 Aug 2023 20:23)  HR: 82 (07 Aug 2023 07:50) (67 - 89)  BP: 100/62 (07 Aug 2023 07:50) (93/57 - 117/76)  BP(mean): 75 (07 Aug 2023 04:20) (66 - 75)  RR: 18 (07 Aug 2023 07:50) (16 - 20)  SpO2: 98% (07 Aug 2023 07:50) (96% - 98%)    Parameters below as of 07 Aug 2023 07:50  Patient On (Oxygen Delivery Method): room air        LABS:                        9.4    6.41  )-----------( 133      ( 07 Aug 2023 06:07 )             29.6     08-07    138  |  100  |  30<H>  ----------------------------<  122<H>  3.7   |  23  |  1.49<H>    Ca    9.4      07 Aug 2023 06:07    TPro  6.7  /  Alb  3.5  /  TBili  0.6  /  DBili  x   /  AST  44<H>  /  ALT  33  /  AlkPhos  61  08-07      Urinalysis Basic - ( 07 Aug 2023 06:07 )    Color: x / Appearance: x / SG: x / pH: x  Gluc: 122 mg/dL / Ketone: x  / Bili: x / Urobili: x   Blood: x / Protein: x / Nitrite: x   Leuk Esterase: x / RBC: x / WBC x   Sq Epi: x / Non Sq Epi: x / Bacteria: x      CAPILLARY BLOOD GLUCOSE      POCT Blood Glucose.: 122 mg/dL (07 Aug 2023 07:38)  POCT Blood Glucose.: 115 mg/dL (06 Aug 2023 21:21)      Lower Extremity Physical Exam:  Vascular: DP/PT 0/4 B/L, CFT <3 sec x 10, Temp gradient warm to warm, RLE, warm to cool LLE.    Neurology: Epicritic sensation intact to level of digits, B/L  Musculoskeletal/Ortho: pain to palpation over right foot 5th digit   Skin: 8/7 s/p b/s Right foot I&D, 1cc of purulent drainage expressed, persistent cellulitis to distal leg, serous draining from peripheral wound, no malodor, wound tracks dorsal lateral and circumferential. Left foot with no open wounds or signs of infection.       RADIOLOGY & ADDITIONAL TESTS:

## 2023-08-07 NOTE — PROGRESS NOTE ADULT - SUBJECTIVE AND OBJECTIVE BOX
Date of Service: 08-07-23 @ 10:29           CARDIOLOGY     PROGRESS  NOTE   ________________________________________________    CHIEF COMPLAINT:Patient is a 62y old  Male who presents with a chief complaint of diabetic foot infection, possible osteo (07 Aug 2023 09:45)  no complain.  	  REVIEW OF SYSTEMS:  CONSTITUTIONAL: No fever, weight loss, or fatigue  EYES: No eye pain, visual disturbances, or discharge  ENT:  No difficulty hearing, tinnitus, vertigo; No sinus or throat pain  NECK: No pain or stiffness  RESPIRATORY: No cough, wheezing, chills or hemoptysis; No Shortness of Breath  CARDIOVASCULAR: No chest pain, palpitations, passing out, dizziness, or leg swelling  GASTROINTESTINAL: No abdominal or epigastric pain. No nausea, vomiting, or hematemesis; No diarrhea or constipation. No melena or hematochezia.  GENITOURINARY: No dysuria, frequency, hematuria, or incontinence  NEUROLOGICAL: No headaches, memory loss, loss of strength, numbness, or tremors  SKIN: No itching, burning, rashes, or lesions   LYMPH Nodes: No enlarged glands  ENDOCRINE: No heat or cold intolerance; No hair loss  MUSCULOSKELETAL: No joint pain or swelling; No muscle, back, + extremity pain  PSYCHIATRIC: No depression, anxiety, mood swings, or difficulty sleeping  HEME/LYMPH: No easy bruising, or bleeding gums  ALLERGY AND IMMUNOLOGIC: No hives or eczema	    [x ] All others negative	  [ ] Unable to obtain    PHYSICAL EXAM:  T(C): 36.8 (08-07-23 @ 07:50), Max: 37.4 (08-06-23 @ 20:23)  HR: 82 (08-07-23 @ 07:50) (67 - 82)  BP: 100/62 (08-07-23 @ 07:50) (100/49 - 117/76)  RR: 18 (08-07-23 @ 07:50) (16 - 18)  SpO2: 98% (08-07-23 @ 07:50) (96% - 98%)  Wt(kg): --  I&O's Summary      Appearance: Normal	  HEENT:   Normal oral mucosa, PERRL, EOMI	  Lymphatic: No lymphadenopathy  Cardiovascular: Normal S1 S2, No JVD,+murmurs, No edema  Respiratory: rhonchi  Psychiatry: A & O x 3, Mood & affect appropriate  Gastrointestinal:  Soft, Non-tender, + BS	  Skin: No rashes, No ecchymoses, No cyanosis	  Neurologic: Non-focal  Extremities: Normal range of motion,RLE swollen and red    MEDICATIONS  (STANDING):  atorvastatin 80 milliGRAM(s) Oral at bedtime  dextrose 5%. 1000 milliLiter(s) (100 mL/Hr) IV Continuous <Continuous>  dextrose 5%. 1000 milliLiter(s) (50 mL/Hr) IV Continuous <Continuous>  dextrose 50% Injectable 25 Gram(s) IV Push once  dextrose 50% Injectable 25 Gram(s) IV Push once  dextrose 50% Injectable 12.5 Gram(s) IV Push once  furosemide    Tablet 40 milliGRAM(s) Oral daily  glucagon  Injectable 1 milliGRAM(s) IntraMuscular once  insulin lispro (ADMELOG) corrective regimen sliding scale   SubCutaneous three times a day before meals  metoprolol succinate ER 25 milliGRAM(s) Oral daily  piperacillin/tazobactam IVPB.. 3.375 Gram(s) IV Intermittent every 8 hours  rivaroxaban 20 milliGRAM(s) Oral with dinner  sacubitril 97 mG/valsartan 103 mG 1 Tablet(s) Oral two times a day  spironolactone 25 milliGRAM(s) Oral daily  vancomycin  IVPB 1000 milliGRAM(s) IV Intermittent every 12 hours      TELEMETRY: 	    ECG:  	  RADIOLOGY:  OTHER: 	  	  LABS:	 	    CARDIAC MARKERS:                                9.4    6.41  )-----------( 133      ( 07 Aug 2023 06:07 )             29.6     08-07    138  |  100  |  30<H>  ----------------------------<  122<H>  3.7   |  23  |  1.49<H>    Ca    9.4      07 Aug 2023 06:07    TPro  6.7  /  Alb  3.5  /  TBili  0.6  /  DBili  x   /  AST  44<H>  /  ALT  33  /  AlkPhos  61  08-07    proBNP:   Lipid Profile: Cholesterol 75  LDL --  HDL 27      HgA1c:   TSH:     Culture - Abscess with Gram Stain (08.06.23 @ 15:42)    Gram Stain:   Rare polymorphonuclear leukocytes seen per low power field  No organisms seen per oil power field   Specimen Source: .Abscess right foot        Assessment and plan  ---------------------------  Patient is a 63yo Male with past medical history of HTN, DM, defibrillator, CAD post CABG, CHF, hx of Sommers fracture, non-union many years ago, presenting with  ulceration, recent bone biopsy with Dr Foss as op , came back positive for staph, treated with Levaquin, presented to ED with acute and significant swelling to the foot as advised to go to the ER for eval.   denies recent fall or trauma.  states  over the past few days right foot has been increasing in swelling. and has been having some fevers ... states hard to walk on his foot due to pain. states had temp at home of 101-102. denies n/v/d, CP, SOB, HA, dizziness, abdominal pain, urinary symptoms, cough, leg pain, swelling.  patient evaluated by podiatry in ED for presence of gas.. MRI and vascular studies ordered already, antibiotics given..   pt with sig cardiac hx for possible vascular and podiatric surgery  will call PMD to get records  TTE  continue all cardiac meds  will adjust cardiac meds  awaiting ecg  will check AICD  abx , fu blood cultures  vascular/ ID eval  doubt DVT pt on AC  will call primary cardiology to get records

## 2023-08-07 NOTE — CONSULT NOTE ADULT - ASSESSMENT
Patient is a 63yo Male with past medical history of HTN, DM, defibrillator, CAD post CABG, CHF, Sommers fracture with staph osteomyelitis in _____ treated with Levaquin, presented to ED with acute and significant swelling to the foot and consulted for a likely diabetic foot infection. IWGDF grade 2 or 3.     Plan:  -continue vanc/zosyn  -f/u cultures  -f/u MRI  -f/u TTE    Note incomplete Patient is our 62M who is consulted for a diabetic foot infection of the right, dorsal lateral foot that is most consistent with MSSA osteomyelitis based on symptoms and bone biopsy culture. IWGDF grade 3(O). Patient was on 5 days of levaquin with symptom progression, likely due to a component of poor vascularization.    Plan:  -switch vanc/zosyn to either nafcillin or cefazolin MSSA  -f/u abscess cultures  -f/u DIONI and rest of vascular workup  -consider MRI if AICD is compatible, otherwise obtain a CT with contrast  -obtain TTE  -consult podiatry or orthopedic surgery for surgical evaluation due to likely poor perfusion and antibiotic penetration     Note incomplete Patient is our 62M who is consulted for a diabetic foot infection of the right, dorsal lateral foot that is most consistent with MSSA osteomyelitis based on symptoms and bone biopsy culture. IWGDF grade 3(O). Patient was on 5 days of levaquin with symptom progression, likely due to a component of poor vascularization.    Plan:  -discontinue vanc and zosyn   -start cefazolin 2 grams q8h IV for MSSA osteo for likely 6 weeks. Duration pending further workup  -f/u abscess cultures  -f/u DIONI and rest of vascular workup  -consider MRI if AICD is compatible, otherwise obtain a CT with contrast  -obtain TTE

## 2023-08-08 ENCOUNTER — TRANSCRIPTION ENCOUNTER (OUTPATIENT)
Age: 63
End: 2023-08-08

## 2023-08-08 LAB
ALBUMIN SERPL ELPH-MCNC: 3.2 G/DL — LOW (ref 3.3–5)
ALP SERPL-CCNC: 77 U/L — SIGNIFICANT CHANGE UP (ref 40–120)
ALT FLD-CCNC: 78 U/L — HIGH (ref 10–45)
ANION GAP SERPL CALC-SCNC: 13 MMOL/L — SIGNIFICANT CHANGE UP (ref 5–17)
ANION GAP SERPL CALC-SCNC: 14 MMOL/L — SIGNIFICANT CHANGE UP (ref 5–17)
APTT BLD: 38.5 SEC — HIGH (ref 24.5–35.6)
AST SERPL-CCNC: 128 U/L — HIGH (ref 10–40)
BILIRUB SERPL-MCNC: 0.5 MG/DL — SIGNIFICANT CHANGE UP (ref 0.2–1.2)
BLD GP AB SCN SERPL QL: NEGATIVE — SIGNIFICANT CHANGE UP
BUN SERPL-MCNC: 23 MG/DL — SIGNIFICANT CHANGE UP (ref 7–23)
BUN SERPL-MCNC: 24 MG/DL — HIGH (ref 7–23)
CALCIUM SERPL-MCNC: 9.1 MG/DL — SIGNIFICANT CHANGE UP (ref 8.4–10.5)
CALCIUM SERPL-MCNC: 9.2 MG/DL — SIGNIFICANT CHANGE UP (ref 8.4–10.5)
CHLORIDE SERPL-SCNC: 103 MMOL/L — SIGNIFICANT CHANGE UP (ref 96–108)
CHLORIDE SERPL-SCNC: 104 MMOL/L — SIGNIFICANT CHANGE UP (ref 96–108)
CO2 SERPL-SCNC: 24 MMOL/L — SIGNIFICANT CHANGE UP (ref 22–31)
CO2 SERPL-SCNC: 24 MMOL/L — SIGNIFICANT CHANGE UP (ref 22–31)
CREAT SERPL-MCNC: 1.13 MG/DL — SIGNIFICANT CHANGE UP (ref 0.5–1.3)
CREAT SERPL-MCNC: 1.26 MG/DL — SIGNIFICANT CHANGE UP (ref 0.5–1.3)
CULTURE RESULTS: SIGNIFICANT CHANGE UP
EGFR: 64 ML/MIN/1.73M2 — SIGNIFICANT CHANGE UP
EGFR: 73 ML/MIN/1.73M2 — SIGNIFICANT CHANGE UP
GLUCOSE BLDC GLUCOMTR-MCNC: 128 MG/DL — HIGH (ref 70–99)
GLUCOSE BLDC GLUCOMTR-MCNC: 131 MG/DL — HIGH (ref 70–99)
GLUCOSE BLDC GLUCOMTR-MCNC: 145 MG/DL — HIGH (ref 70–99)
GLUCOSE BLDC GLUCOMTR-MCNC: 151 MG/DL — HIGH (ref 70–99)
GLUCOSE SERPL-MCNC: 125 MG/DL — HIGH (ref 70–99)
GLUCOSE SERPL-MCNC: 133 MG/DL — HIGH (ref 70–99)
HCT VFR BLD CALC: 30.8 % — LOW (ref 39–50)
HCT VFR BLD CALC: 31.6 % — LOW (ref 39–50)
HGB BLD-MCNC: 10 G/DL — LOW (ref 13–17)
HGB BLD-MCNC: 9.8 G/DL — LOW (ref 13–17)
INR BLD: 2.39 RATIO — HIGH (ref 0.85–1.18)
MCHC RBC-ENTMCNC: 27.9 PG — SIGNIFICANT CHANGE UP (ref 27–34)
MCHC RBC-ENTMCNC: 28.1 PG — SIGNIFICANT CHANGE UP (ref 27–34)
MCHC RBC-ENTMCNC: 31.6 GM/DL — LOW (ref 32–36)
MCHC RBC-ENTMCNC: 31.8 GM/DL — LOW (ref 32–36)
MCV RBC AUTO: 88.3 FL — SIGNIFICANT CHANGE UP (ref 80–100)
MCV RBC AUTO: 88.3 FL — SIGNIFICANT CHANGE UP (ref 80–100)
MRSA PCR RESULT.: SIGNIFICANT CHANGE UP
NRBC # BLD: 0 /100 WBCS — SIGNIFICANT CHANGE UP (ref 0–0)
NRBC # BLD: 0 /100 WBCS — SIGNIFICANT CHANGE UP (ref 0–0)
PLATELET # BLD AUTO: 141 K/UL — LOW (ref 150–400)
PLATELET # BLD AUTO: 147 K/UL — LOW (ref 150–400)
POTASSIUM SERPL-MCNC: 3.8 MMOL/L — SIGNIFICANT CHANGE UP (ref 3.5–5.3)
POTASSIUM SERPL-MCNC: 3.8 MMOL/L — SIGNIFICANT CHANGE UP (ref 3.5–5.3)
POTASSIUM SERPL-SCNC: 3.8 MMOL/L — SIGNIFICANT CHANGE UP (ref 3.5–5.3)
POTASSIUM SERPL-SCNC: 3.8 MMOL/L — SIGNIFICANT CHANGE UP (ref 3.5–5.3)
PROT SERPL-MCNC: 6.5 G/DL — SIGNIFICANT CHANGE UP (ref 6–8.3)
PROTHROM AB SERPL-ACNC: 24.5 SEC — HIGH (ref 9.5–13)
RBC # BLD: 3.49 M/UL — LOW (ref 4.2–5.8)
RBC # BLD: 3.58 M/UL — LOW (ref 4.2–5.8)
RBC # FLD: 16.1 % — HIGH (ref 10.3–14.5)
RBC # FLD: 16.1 % — HIGH (ref 10.3–14.5)
RH IG SCN BLD-IMP: POSITIVE — SIGNIFICANT CHANGE UP
S AUREUS DNA NOSE QL NAA+PROBE: SIGNIFICANT CHANGE UP
SODIUM SERPL-SCNC: 141 MMOL/L — SIGNIFICANT CHANGE UP (ref 135–145)
SODIUM SERPL-SCNC: 141 MMOL/L — SIGNIFICANT CHANGE UP (ref 135–145)
SPECIMEN SOURCE: SIGNIFICANT CHANGE UP
VANCOMYCIN TROUGH SERPL-MCNC: 9 UG/ML — LOW (ref 10–20)
WBC # BLD: 5.92 K/UL — SIGNIFICANT CHANGE UP (ref 3.8–10.5)
WBC # BLD: 5.98 K/UL — SIGNIFICANT CHANGE UP (ref 3.8–10.5)
WBC # FLD AUTO: 5.92 K/UL — SIGNIFICANT CHANGE UP (ref 3.8–10.5)
WBC # FLD AUTO: 5.98 K/UL — SIGNIFICANT CHANGE UP (ref 3.8–10.5)

## 2023-08-08 PROCEDURE — 71045 X-RAY EXAM CHEST 1 VIEW: CPT | Mod: 26

## 2023-08-08 PROCEDURE — 99232 SBSQ HOSP IP/OBS MODERATE 35: CPT

## 2023-08-08 PROCEDURE — 88311 DECALCIFY TISSUE: CPT | Mod: 26

## 2023-08-08 PROCEDURE — 73701 CT LOWER EXTREMITY W/DYE: CPT | Mod: 26,RT

## 2023-08-08 PROCEDURE — 93284 PRGRMG EVAL IMPLANTABLE DFB: CPT | Mod: 26

## 2023-08-08 PROCEDURE — 73630 X-RAY EXAM OF FOOT: CPT | Mod: 26,RT

## 2023-08-08 PROCEDURE — 88304 TISSUE EXAM BY PATHOLOGIST: CPT | Mod: 26

## 2023-08-08 RX ORDER — VANCOMYCIN HCL 1 G
1250 VIAL (EA) INTRAVENOUS EVERY 12 HOURS
Refills: 0 | Status: DISCONTINUED | OUTPATIENT
Start: 2023-08-08 | End: 2023-08-08

## 2023-08-08 RX ORDER — DEXTROSE 50 % IN WATER 50 %
12.5 SYRINGE (ML) INTRAVENOUS ONCE
Refills: 0 | Status: DISCONTINUED | OUTPATIENT
Start: 2023-08-08 | End: 2023-08-16

## 2023-08-08 RX ORDER — HYDROMORPHONE HYDROCHLORIDE 2 MG/ML
0.5 INJECTION INTRAMUSCULAR; INTRAVENOUS; SUBCUTANEOUS EVERY 4 HOURS
Refills: 0 | Status: DISCONTINUED | OUTPATIENT
Start: 2023-08-08 | End: 2023-08-10

## 2023-08-08 RX ORDER — METOPROLOL TARTRATE 50 MG
25 TABLET ORAL DAILY
Refills: 0 | Status: DISCONTINUED | OUTPATIENT
Start: 2023-08-08 | End: 2023-08-09

## 2023-08-08 RX ORDER — HYDROMORPHONE HYDROCHLORIDE 2 MG/ML
0.5 INJECTION INTRAMUSCULAR; INTRAVENOUS; SUBCUTANEOUS
Refills: 0 | Status: DISCONTINUED | OUTPATIENT
Start: 2023-08-08 | End: 2023-08-08

## 2023-08-08 RX ORDER — FUROSEMIDE 40 MG
40 TABLET ORAL DAILY
Refills: 0 | Status: DISCONTINUED | OUTPATIENT
Start: 2023-08-08 | End: 2023-08-12

## 2023-08-08 RX ORDER — SODIUM CHLORIDE 9 MG/ML
1000 INJECTION, SOLUTION INTRAVENOUS
Refills: 0 | Status: DISCONTINUED | OUTPATIENT
Start: 2023-08-08 | End: 2023-08-16

## 2023-08-08 RX ORDER — ACETAMINOPHEN 500 MG
650 TABLET ORAL EVERY 6 HOURS
Refills: 0 | Status: DISCONTINUED | OUTPATIENT
Start: 2023-08-08 | End: 2023-08-16

## 2023-08-08 RX ORDER — PIPERACILLIN AND TAZOBACTAM 4; .5 G/20ML; G/20ML
3.38 INJECTION, POWDER, LYOPHILIZED, FOR SOLUTION INTRAVENOUS EVERY 8 HOURS
Refills: 0 | Status: DISCONTINUED | OUTPATIENT
Start: 2023-08-08 | End: 2023-08-09

## 2023-08-08 RX ORDER — SPIRONOLACTONE 25 MG/1
25 TABLET, FILM COATED ORAL DAILY
Refills: 0 | Status: DISCONTINUED | OUTPATIENT
Start: 2023-08-08 | End: 2023-08-16

## 2023-08-08 RX ORDER — SACUBITRIL AND VALSARTAN 24; 26 MG/1; MG/1
1 TABLET, FILM COATED ORAL
Refills: 0 | Status: DISCONTINUED | OUTPATIENT
Start: 2023-08-08 | End: 2023-08-11

## 2023-08-08 RX ORDER — SODIUM CHLORIDE 9 MG/ML
1000 INJECTION, SOLUTION INTRAVENOUS
Refills: 0 | Status: DISCONTINUED | OUTPATIENT
Start: 2023-08-08 | End: 2023-08-08

## 2023-08-08 RX ORDER — INSULIN LISPRO 100/ML
VIAL (ML) SUBCUTANEOUS
Refills: 0 | Status: DISCONTINUED | OUTPATIENT
Start: 2023-08-08 | End: 2023-08-16

## 2023-08-08 RX ORDER — ONDANSETRON 8 MG/1
4 TABLET, FILM COATED ORAL ONCE
Refills: 0 | Status: DISCONTINUED | OUTPATIENT
Start: 2023-08-08 | End: 2023-08-16

## 2023-08-08 RX ORDER — CEFAZOLIN SODIUM 1 G
2000 VIAL (EA) INJECTION EVERY 8 HOURS
Refills: 0 | Status: DISCONTINUED | OUTPATIENT
Start: 2023-08-08 | End: 2023-08-14

## 2023-08-08 RX ORDER — GLUCAGON INJECTION, SOLUTION 0.5 MG/.1ML
1 INJECTION, SOLUTION SUBCUTANEOUS ONCE
Refills: 0 | Status: DISCONTINUED | OUTPATIENT
Start: 2023-08-08 | End: 2023-08-16

## 2023-08-08 RX ORDER — ONDANSETRON 8 MG/1
4 TABLET, FILM COATED ORAL EVERY 8 HOURS
Refills: 0 | Status: DISCONTINUED | OUTPATIENT
Start: 2023-08-08 | End: 2023-08-16

## 2023-08-08 RX ORDER — CEFAZOLIN SODIUM 1 G
2000 VIAL (EA) INJECTION EVERY 8 HOURS
Refills: 0 | Status: DISCONTINUED | OUTPATIENT
Start: 2023-08-08 | End: 2023-08-08

## 2023-08-08 RX ORDER — CHLORHEXIDINE GLUCONATE 213 G/1000ML
1 SOLUTION TOPICAL
Refills: 0 | Status: DISCONTINUED | OUTPATIENT
Start: 2023-08-08 | End: 2023-08-16

## 2023-08-08 RX ORDER — LANOLIN ALCOHOL/MO/W.PET/CERES
3 CREAM (GRAM) TOPICAL AT BEDTIME
Refills: 0 | Status: DISCONTINUED | OUTPATIENT
Start: 2023-08-08 | End: 2023-08-16

## 2023-08-08 RX ORDER — POLYETHYLENE GLYCOL 3350 17 G/17G
17 POWDER, FOR SOLUTION ORAL DAILY
Refills: 0 | Status: DISCONTINUED | OUTPATIENT
Start: 2023-08-08 | End: 2023-08-16

## 2023-08-08 RX ORDER — DEXTROSE 50 % IN WATER 50 %
15 SYRINGE (ML) INTRAVENOUS ONCE
Refills: 0 | Status: DISCONTINUED | OUTPATIENT
Start: 2023-08-08 | End: 2023-08-16

## 2023-08-08 RX ORDER — ATORVASTATIN CALCIUM 80 MG/1
80 TABLET, FILM COATED ORAL AT BEDTIME
Refills: 0 | Status: DISCONTINUED | OUTPATIENT
Start: 2023-08-08 | End: 2023-08-16

## 2023-08-08 RX ORDER — POLYETHYLENE GLYCOL 3350 17 G/17G
17 POWDER, FOR SOLUTION ORAL DAILY
Refills: 0 | Status: DISCONTINUED | OUTPATIENT
Start: 2023-08-08 | End: 2023-08-08

## 2023-08-08 RX ORDER — CEFAZOLIN SODIUM 1 G
VIAL (EA) INJECTION
Refills: 0 | Status: DISCONTINUED | OUTPATIENT
Start: 2023-08-08 | End: 2023-08-08

## 2023-08-08 RX ORDER — DEXTROSE 50 % IN WATER 50 %
25 SYRINGE (ML) INTRAVENOUS ONCE
Refills: 0 | Status: DISCONTINUED | OUTPATIENT
Start: 2023-08-08 | End: 2023-08-16

## 2023-08-08 RX ORDER — CEFAZOLIN SODIUM 1 G
2000 VIAL (EA) INJECTION ONCE
Refills: 0 | Status: COMPLETED | OUTPATIENT
Start: 2023-08-08 | End: 2023-08-08

## 2023-08-08 RX ADMIN — ATORVASTATIN CALCIUM 80 MILLIGRAM(S): 80 TABLET, FILM COATED ORAL at 22:06

## 2023-08-08 RX ADMIN — SODIUM CHLORIDE 75 MILLILITER(S): 9 INJECTION, SOLUTION INTRAVENOUS at 20:15

## 2023-08-08 RX ADMIN — Medication 650 MILLIGRAM(S): at 22:12

## 2023-08-08 RX ADMIN — SACUBITRIL AND VALSARTAN 1 TABLET(S): 24; 26 TABLET, FILM COATED ORAL at 05:14

## 2023-08-08 RX ADMIN — PIPERACILLIN AND TAZOBACTAM 25 GRAM(S): 4; .5 INJECTION, POWDER, LYOPHILIZED, FOR SOLUTION INTRAVENOUS at 22:05

## 2023-08-08 RX ADMIN — Medication 3 MILLIGRAM(S): at 22:05

## 2023-08-08 RX ADMIN — Medication 650 MILLIGRAM(S): at 05:13

## 2023-08-08 RX ADMIN — Medication 650 MILLIGRAM(S): at 23:12

## 2023-08-08 RX ADMIN — Medication 25 MILLIGRAM(S): at 05:14

## 2023-08-08 RX ADMIN — Medication 100 MILLIGRAM(S): at 09:23

## 2023-08-08 RX ADMIN — Medication 650 MILLIGRAM(S): at 06:13

## 2023-08-08 RX ADMIN — Medication 100 MILLIGRAM(S): at 22:05

## 2023-08-08 RX ADMIN — SPIRONOLACTONE 25 MILLIGRAM(S): 25 TABLET, FILM COATED ORAL at 05:14

## 2023-08-08 RX ADMIN — Medication 40 MILLIGRAM(S): at 05:14

## 2023-08-08 RX ADMIN — CHLORHEXIDINE GLUCONATE 1 APPLICATION(S): 213 SOLUTION TOPICAL at 06:54

## 2023-08-08 RX ADMIN — PIPERACILLIN AND TAZOBACTAM 25 GRAM(S): 4; .5 INJECTION, POWDER, LYOPHILIZED, FOR SOLUTION INTRAVENOUS at 05:13

## 2023-08-08 RX ADMIN — Medication 100 MILLIGRAM(S): at 14:43

## 2023-08-08 NOTE — CONSULT NOTE ADULT - ASSESSMENT
63yo Male with past medical history of HTN, DM, defibrillator, CAD post CABG, CHF, hx of Sommers fracture, non-union many years ago, presenting with  ulceration, recent bone biopsy with Dr Foss as op , came back positive for staph, treated with Levaquin, presented to ED with acute and significant swelling to the foot as advised to go to the ER for eval.   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     - plan for podiatry OR I&D today   - c/w abx for cellulitis and possible MSSA osteo, ID following   - Recommend Duplex of bilateral lower extremities to rule out DVT, ordered  - no immediate vascular surgery invention at this time   - discussed with vascular fellow on call, Dr. Sb Poon on behalf of Dr. Jauregui   - will continue to follow   - care per primary team     Vascular   [id number]

## 2023-08-08 NOTE — PROGRESS NOTE ADULT - SUBJECTIVE AND OBJECTIVE BOX
In summary,  RYLEE HOWE is a 62y year old man admitted with diabetic foot infection   - Patient today overall doing ok, comfortable, NPO for OR today     ==================>> REVIEW OF SYSTEM <<=================    GEN: no fever, no chills, no significant pain reported   RESP: no SOB, no cough, no sputum  CVS: no chest pain, no palpitations, no edema  GI: no abdominal pain, no nausea, no constipation, no diarrhea  : no dysuria, no frequency, no hematuria  Neuro: no headache, no dizziness  Derm : no itching, no rash    ==================>> PHYSICAL EXAM <<=================    GEN: A&O X 3 , NAD , comfortable, pleasant, calm   HEENT: NCAT, PERRL, MMM, hearing intact  Neck: supple , no JVD appreciated  CVS: S1S2 , regular , No M/R/G appreciated  PULM: CTA B/L,  no W/R/R appreciated  ABD.: soft. non tender, non distended,  bowel sounds present  Extrem: intact pulses , + Rt LE edema and erythema MUCH improved ,, wound dressed by pod team   PSYCH : normal mood,  not anxious                             ( Note written / Date of service 08-08-23 )    ==================>> MEDICATIONS <<====================    atorvastatin 80 milliGRAM(s) Oral at bedtime  ceFAZolin   IVPB 2000 milliGRAM(s) IV Intermittent every 8 hours  ceFAZolin   IVPB      chlorhexidine 2% Cloths 1 Application(s) Topical <User Schedule>  dextrose 5%. 1000 milliLiter(s) IV Continuous <Continuous>  dextrose 5%. 1000 milliLiter(s) IV Continuous <Continuous>  dextrose 50% Injectable 12.5 Gram(s) IV Push once  dextrose 50% Injectable 25 Gram(s) IV Push once  dextrose 50% Injectable 25 Gram(s) IV Push once  furosemide    Tablet 40 milliGRAM(s) Oral daily  glucagon  Injectable 1 milliGRAM(s) IntraMuscular once  insulin lispro (ADMELOG) corrective regimen sliding scale   SubCutaneous three times a day before meals  metoprolol succinate ER 25 milliGRAM(s) Oral daily  polyethylene glycol 3350 17 Gram(s) Oral daily  rivaroxaban 20 milliGRAM(s) Oral with dinner  sacubitril 97 mG/valsartan 103 mG 1 Tablet(s) Oral two times a day  spironolactone 25 milliGRAM(s) Oral daily    MEDICATIONS  (PRN):  acetaminophen     Tablet .. 650 milliGRAM(s) Oral every 6 hours PRN Temp greater or equal to 38C (100.4F), Mild Pain (1 - 3)  aluminum hydroxide/magnesium hydroxide/simethicone Suspension 30 milliLiter(s) Oral every 4 hours PRN Dyspepsia  dextrose Oral Gel 15 Gram(s) Oral once PRN Blood Glucose LESS THAN 70 milliGRAM(s)/deciliter  melatonin 3 milliGRAM(s) Oral at bedtime PRN Insomnia  ondansetron Injectable 4 milliGRAM(s) IV Push every 8 hours PRN Nausea and/or Vomiting    ___________  Active diet:  Diet, NPO  ___________________    ==================>> VITAL SIGNS <<==================    Height (cm): 185.4  Weight (kg): 117.9  BMI (kg/m2): 34.3  Vital Signs Last 24 HrsT(C): 36.9 (08-08-23 @ 12:36)  T(F): 98.4 (08-08-23 @ 12:36), Max: 98.6 (08-07-23 @ 20:03)  HR: 84 (08-08-23 @ 12:36) (72 - 95)  BP: 97/60 (08-08-23 @ 12:36)  RR: 18 (08-08-23 @ 12:36) (17 - 18)  SpO2: 95% (08-08-23 @ 12:36) (94% - 99%)      CAPILLARY BLOOD GLUCOSE  POCT Blood Glucose.: 145 mg/dL (08 Aug 2023 12:29)  POCT Blood Glucose.: 128 mg/dL (08 Aug 2023 08:19)  POCT Blood Glucose.: 180 mg/dL (07 Aug 2023 21:21)  POCT Blood Glucose.: 125 mg/dL (07 Aug 2023 17:09)     ==================>> LAB AND IMAGING <<==================                        10.0   5.98  )-----------( 147      ( 08 Aug 2023 08:33 )             31.6        08-08    141  |  104  |  23  ----------------------------<  133<H>  3.8   |  24  |  1.13    Ca    9.2      08 Aug 2023 08:33    TPro  6.5  /  Alb  3.2<L>  /  TBili  0.5  /  DBili  x   /  AST  128<H>  /  ALT  78<H>  /  AlkPhos  77  08-08    WBC count:   5.98 <<== ,  5.92 <<== ,  6.41 <<== ,  7.10 <<==   Hemoglobin:   10.0 <<==,  9.8 <<==,  9.4 <<==,  10.4 <<==  platelets:  147 <==, 141 <==, 133 <==, 150 <==    Creatinine:  1.13  <<==, 1.26  <<==, 1.49  <<==, 1.56  <<==  Sodium:   141  <==, 141  <==, 138  <==, 139  <==       AST:          128 <== , 44 <==      ALT:        78  <== , 33  <==      AP:        77  <=, 61  <=     Bili:        0.5  <=, 0.6  <=    ____________________________    M I C R O B I O L O G Y :    Culture - Abscess with Gram Stain (collected 07 Aug 2023 08:21)  Source: .Abscess right foot  Gram Stain (07 Aug 2023 23:24):    Rare polymorphonuclear leukocytes per low power field    No organisms seen per oil power field  Preliminary Report (08 Aug 2023 12:01):    No growth    Culture - Abscess with Gram Stain (collected 06 Aug 2023 15:42)  Source: .Abscess right foot  Gram Stain (07 Aug 2023 01:46):    Rare polymorphonuclear leukocytes seen per low power field    No organisms seen per oil power field  Preliminary Report (07 Aug 2023 16:33):    Rare Coag Negative Staphylococcus "Susceptibilities not performed"    Culture - Blood (collected 06 Aug 2023 11:30)  Source: .Blood Blood  Preliminary Report (07 Aug 2023 17:02):    No growth at 24 hours    Culture - Blood (collected 06 Aug 2023 11:15)  Source: .Blood Blood  Preliminary Report (07 Aug 2023 17:02):    No growth at 24 hours    < from: TTE W or WO Ultrasound Enhancing Agent (08.07.23 @ 15:33) >  CONCLUSIONS:   1. Severely dilated left ventricular cavity size.The left ventricular systolic function is severely decreased with an ejection fraction visually estimated at 25 to 30 %. There is global left ventricular hypokinesis.   2. Multiple segmental abnormalities exist. See findings.   3. Device lead is visualized in the right ventricle.   4. The aortic annulus and aortic root appear normal in size.  < end of copied text >    < from: Xray Foot AP + Lateral, Right (08.06.23 @ 11:40) >  IMPRESSION:  Diffuse soft tissue swelling throughout the right foot with a possible   trace amount of subcutaneous emphysema along the lateral aspect of the   right mid foot.  No definite cortical erosion seen to indicate acute osteomyelitis.  MR may be useful in further evaluation if there are no contraindications  < end of copied text >    ___________________________________________________________________________________  ===============>>  A S S E S S M E N T   A N D   P L A N <<===============  ------------------------------------------------------------------------------------------    · Assessment	  Patient is a 63yo Male with past medical history of HTN, DM, defibrillator, CAD post CABG, CHF, hx of Sommers fracture, non-union many years ago, presenting with  ulceration, recent bone biopsy with Dr Foss as op , came back positive for staph, treated with Levaquin, presented to ED with acute and significant swelling to the foot as advised to go to the ER for eval.   denies recent fall or trauma. states over the past few days right foot has been increasing in swelling. and has been having some fevers ... states hard to walk on his foot due to pain. states had temp at home of 101-102. denies n/v/d, CP, SOB, HA, dizziness, abdominal pain, urinary symptoms, cough, leg pain, swelling.  patient evaluated by podiatry in ED for presence of gas.. MRI and vascular studies ordered already, antibiotics given..          Problem/Plan - 1:  ·  Problem: Diabetic foot infection.   ·  Plan: appreciated podiatry evaluation  local wound care  continue antibiotics per ID  : appreciated      MRI likely not an options as ICD / wires not compatible above ( awaiting confirmation / interrogation)          CT scan ordered      vascular studies noted >> vascular consulted /   plan for OR debridement today per podiatry  medically stable  Echo as above   cardio following , appreciated   pain management as needed  supportive care   diuretics ( home dose: continue)   leg elevation: helping a lot      Problem/Plan - 2:  ·  Problem: Diabetes.   ·  Plan: patient states has been overall well controlled but in past 1-2 days more elevated...   ---monitor finger sticks closely  ---Insulin regimen as ordered and monitor / adjust as needed  --- hold home medications for now   ---Diabetic diet  ---A1c. 6     Problem/Plan - 3:  ·  Problem: Heart failure, unspecified HF chronicity, unspecified heart failure type.   ·  Plan: patient with likely chronic systolic CHF on entresto and lasix / Epleronon   continue home medications  Echo as above   cardio following for optimization   monitor otherwise.     Problem/Plan - 4:  ·  Problem: HLD (hyperlipidemia).   ·  Plan: continue equivalent statin dose  lipid profile.     Problem/Plan - 5:  ·  Problem: Deep vein thrombosis (DVT) of right upper extremity.   ·  Plan: on Xarelto for past ~ 2.5 months ( was on Eliqius first)  for Rt upper extremity DVT post infection of Elbow !   continue for now  likely would repeat Duplex and decide need for further treatment / duration...     Problem/Plan - 6:  ·  Problem: R/O Acute osteomyelitis.   ·  Plan: as above  follow CT scan and OR biopsy ..   ID /  pod following     -GI/DVT Prophylaxis per protocol.    --------------------------------------------  Case discussed with patient, NP/..   Education given on findings and plan of care  ___________________________

## 2023-08-08 NOTE — PROGRESS NOTE ADULT - SUBJECTIVE AND OBJECTIVE BOX
Date of Service: 08-08-23 @ 07:45           CARDIOLOGY     PROGRESS  NOTE   ________________________________________________    CHIEF COMPLAINT:Patient is a 62y old  Male who presents with a chief complaint of diabetic foot infection, possible osteo (07 Aug 2023 12:43)  doing better  	  REVIEW OF SYSTEMS:  CONSTITUTIONAL: No fever, weight loss, or fatigue  EYES: No eye pain, visual disturbances, or discharge  ENT:  No difficulty hearing, tinnitus, vertigo; No sinus or throat pain  NECK: No pain or stiffness  RESPIRATORY: No cough, wheezing, chills or hemoptysis; No Shortness of Breath  CARDIOVASCULAR: No chest pain, palpitations, passing out, dizziness, or leg swelling  GASTROINTESTINAL: No abdominal or epigastric pain. No nausea, vomiting, or hematemesis; No diarrhea or constipation. No melena or hematochezia.  GENITOURINARY: No dysuria, frequency, hematuria, or incontinence  NEUROLOGICAL: No headaches, memory loss, loss of strength, numbness, or tremors  SKIN: No itching, burning, rashes, or lesions   LYMPH Nodes: No enlarged glands  ENDOCRINE: No heat or cold intolerance; No hair loss  MUSCULOSKELETAL: No joint pain or swelling; No muscle, back, or extremity pain  PSYCHIATRIC: No depression, anxiety, mood swings, or difficulty sleeping  HEME/LYMPH: No easy bruising, or bleeding gums  ALLERGY AND IMMUNOLOGIC: No hives or eczema	    [x ] All others negative	  [ ] Unable to obtain    PHYSICAL EXAM:  T(C): 36.8 (08-08-23 @ 04:45), Max: 37 (08-07-23 @ 20:03)  HR: 95 (08-08-23 @ 04:45) (73 - 95)  BP: 103/65 (08-08-23 @ 04:45) (100/62 - 114/72)  RR: 17 (08-08-23 @ 04:45) (17 - 18)  SpO2: 95% (08-08-23 @ 04:45) (94% - 99%)  Wt(kg): --  I&O's Summary    07 Aug 2023 07:01  -  08 Aug 2023 07:00  --------------------------------------------------------  IN: 810 mL / OUT: 1050 mL / NET: -240 mL        Appearance: Normal	  HEENT:   Normal oral mucosa, PERRL, EOMI	  Lymphatic: No lymphadenopathy  Cardiovascular: Normal S1 S2, No JVD,+ murmurs, +edema  Respiratory:rho  Psychiatry: A & O x 3, Mood & affect appropriate  Gastrointestinal:  Soft, Non-tender, + BS	  Skin: No rashes, No ecchymoses, No cyanosis	  Neurologic: Non-focal  Extremities: Normal range of motion, + less erythema RLE, bandaged, swollen   Vascular: Peripheral pulses palpable 2+ bilaterally    MEDICATIONS  (STANDING):  atorvastatin 80 milliGRAM(s) Oral at bedtime  chlorhexidine 2% Cloths 1 Application(s) Topical <User Schedule>  dextrose 5%. 1000 milliLiter(s) (100 mL/Hr) IV Continuous <Continuous>  dextrose 5%. 1000 milliLiter(s) (50 mL/Hr) IV Continuous <Continuous>  dextrose 50% Injectable 25 Gram(s) IV Push once  dextrose 50% Injectable 12.5 Gram(s) IV Push once  dextrose 50% Injectable 25 Gram(s) IV Push once  furosemide    Tablet 40 milliGRAM(s) Oral daily  glucagon  Injectable 1 milliGRAM(s) IntraMuscular once  insulin lispro (ADMELOG) corrective regimen sliding scale   SubCutaneous three times a day before meals  metoprolol succinate ER 25 milliGRAM(s) Oral daily  piperacillin/tazobactam IVPB.. 3.375 Gram(s) IV Intermittent every 8 hours  rivaroxaban 20 milliGRAM(s) Oral with dinner  sacubitril 97 mG/valsartan 103 mG 1 Tablet(s) Oral two times a day  spironolactone 25 milliGRAM(s) Oral daily  vancomycin  IVPB 1250 milliGRAM(s) IV Intermittent every 12 hours  vancomycin  IVPB 1000 milliGRAM(s) IV Intermittent every 12 hours      TELEMETRY: 	    ECG:  	  RADIOLOGY:  OTHER: 	  	  LABS:	 	    CARDIAC MARKERS:                                9.8    5.92  )-----------( 141      ( 08 Aug 2023 05:06 )             30.8     08-08    141  |  103  |  24<H>  ----------------------------<  125<H>  3.8   |  24  |  1.26    Ca    9.1      08 Aug 2023 05:05    TPro  6.5  /  Alb  3.2<L>  /  TBili  0.5  /  DBili  x   /  AST  128<H>  /  ALT  78<H>  /  AlkPhos  77  08-08    proBNP:   Lipid Profile: Cholesterol 75  LDL --  HDL 27      HgA1c:   TSH:     - please obtain a copy of outpatient cultures for records  - please repeat blood and wound cultures  - check vascular studies, vascular input  -change to cefazolin  - MRI if AICD is MRI compatible     < from: TTE W or WO Ultrasound Enhancing Agent (08.07.23 @ 15:33) >   1. Severely dilated left ventricular cavity size.The left ventricular systolic function is severely decreased with an ejection fraction visually estimated at 25 to 30 %. There is global left ventricular hypokinesis.   2. Multiple segmental abnormalities exist. See findings.   3. Device lead is visualized in the right ventricle.   4. The aortic annulus and aortic root appear normal in size.    - DIONI/PVR pending - recommend Vasc c/s if ABIs abnormal   - recommend EP consult for pacemaker interrogation    - Please document medical clearance for podiatric surgical intervention   - Please document cardiac clearance for podiatric surgical intervention   - Pod Plan: Local wound vs. Right foot wound debridement pending MR/cellulitis resolution      Assessment and plan  ---------------------------  Patient is a 61yo Male with past medical history of HTN, DM, defibrillator, CAD post CABG, CHF, hx of Sommers fracture, non-union many years ago, presenting with  ulceration, recent bone biopsy with Dr Foss as op , came back positive for staph, treated with Levaquin, presented to ED with acute and significant swelling to the foot as advised to go to the ER for eval.   denies recent fall or trauma.  states  over the past few days right foot has been increasing in swelling. and has been having some fevers ... states hard to walk on his foot due to pain. states had temp at home of 101-102. denies n/v/d, CP, SOB, HA, dizziness, abdominal pain, urinary symptoms, cough, leg pain, swelling.  patient evaluated by podiatry in ED for presence of gas.. MRI and vascular studies ordered already, antibiotics given..   pt with sig cardiac hx for possible vascular and podiatric surgery  will call PMD to get records  continue all cardiac meds  will adjust cardiac meds  will check AICD,about MRI compatibility  abx , fu blood cultures  doubt DVT pt on AC  will call primary cardiology to get records  echo noted with severe LV dysfunction  continue current meds, may add Farxiga 10 mg upon dc for heart failure  will check AICD will call NP  ID noted

## 2023-08-08 NOTE — DISCHARGE NOTE PROVIDER - NPI NUMBER (FOR [employer] USE ONLY) :
[[id number]] Rapid strep negative.  Discussed with pt.  Discussed supportive care and return precautions. [[id number]],[[id number]] [[id number]],[[id number]]

## 2023-08-08 NOTE — PROGRESS NOTE ADULT - ASSESSMENT
_________________________________________________________________________________________  ========>>  M E D I C A L   A T T E N D I N G    F O L L O W  U P  N O T E  <<=========  -----------------------------------------------------------------------------------------------------    - Patient seen and examined by me earlier today.   - In summary,  RYLEE HOWE is a 62y year old man admitted with diabetic foot infection   - Patient today overall doing ok, comfortable, NPO for OR today     ==================>> REVIEW OF SYSTEM <<=================    GEN: no fever, no chills, no significant pain reported   RESP: no SOB, no cough, no sputum  CVS: no chest pain, no palpitations, no edema  GI: no abdominal pain, no nausea, no constipation, no diarrhea  : no dysuria, no frequency, no hematuria  Neuro: no headache, no dizziness  Derm : no itching, no rash    ==================>> PHYSICAL EXAM <<=================    GEN: A&O X 3 , NAD , comfortable, pleasant, calm   HEENT: NCAT, PERRL, MMM, hearing intact  Neck: supple , no JVD appreciated  CVS: S1S2 , regular , No M/R/G appreciated  PULM: CTA B/L,  no W/R/R appreciated  ABD.: soft. non tender, non distended,  bowel sounds present  Extrem: intact pulses , + Rt LE edema and erythema MUCH improved ,, wound dressed by pod team   PSYCH : normal mood,  not anxious                             ( Note written / Date of service 08-08-23 )    ==================>> MEDICATIONS <<====================    atorvastatin 80 milliGRAM(s) Oral at bedtime  ceFAZolin   IVPB 2000 milliGRAM(s) IV Intermittent every 8 hours  ceFAZolin   IVPB      chlorhexidine 2% Cloths 1 Application(s) Topical <User Schedule>  dextrose 5%. 1000 milliLiter(s) IV Continuous <Continuous>  dextrose 5%. 1000 milliLiter(s) IV Continuous <Continuous>  dextrose 50% Injectable 12.5 Gram(s) IV Push once  dextrose 50% Injectable 25 Gram(s) IV Push once  dextrose 50% Injectable 25 Gram(s) IV Push once  furosemide    Tablet 40 milliGRAM(s) Oral daily  glucagon  Injectable 1 milliGRAM(s) IntraMuscular once  insulin lispro (ADMELOG) corrective regimen sliding scale   SubCutaneous three times a day before meals  metoprolol succinate ER 25 milliGRAM(s) Oral daily  polyethylene glycol 3350 17 Gram(s) Oral daily  rivaroxaban 20 milliGRAM(s) Oral with dinner  sacubitril 97 mG/valsartan 103 mG 1 Tablet(s) Oral two times a day  spironolactone 25 milliGRAM(s) Oral daily    MEDICATIONS  (PRN):  acetaminophen     Tablet .. 650 milliGRAM(s) Oral every 6 hours PRN Temp greater or equal to 38C (100.4F), Mild Pain (1 - 3)  aluminum hydroxide/magnesium hydroxide/simethicone Suspension 30 milliLiter(s) Oral every 4 hours PRN Dyspepsia  dextrose Oral Gel 15 Gram(s) Oral once PRN Blood Glucose LESS THAN 70 milliGRAM(s)/deciliter  melatonin 3 milliGRAM(s) Oral at bedtime PRN Insomnia  ondansetron Injectable 4 milliGRAM(s) IV Push every 8 hours PRN Nausea and/or Vomiting    ___________  Active diet:  Diet, NPO  ___________________    ==================>> VITAL SIGNS <<==================    Height (cm): 185.4  Weight (kg): 117.9  BMI (kg/m2): 34.3  Vital Signs Last 24 HrsT(C): 36.9 (08-08-23 @ 12:36)  T(F): 98.4 (08-08-23 @ 12:36), Max: 98.6 (08-07-23 @ 20:03)  HR: 84 (08-08-23 @ 12:36) (72 - 95)  BP: 97/60 (08-08-23 @ 12:36)  RR: 18 (08-08-23 @ 12:36) (17 - 18)  SpO2: 95% (08-08-23 @ 12:36) (94% - 99%)      CAPILLARY BLOOD GLUCOSE  POCT Blood Glucose.: 145 mg/dL (08 Aug 2023 12:29)  POCT Blood Glucose.: 128 mg/dL (08 Aug 2023 08:19)  POCT Blood Glucose.: 180 mg/dL (07 Aug 2023 21:21)  POCT Blood Glucose.: 125 mg/dL (07 Aug 2023 17:09)     ==================>> LAB AND IMAGING <<==================                        10.0   5.98  )-----------( 147      ( 08 Aug 2023 08:33 )             31.6        08-08    141  |  104  |  23  ----------------------------<  133<H>  3.8   |  24  |  1.13    Ca    9.2      08 Aug 2023 08:33    TPro  6.5  /  Alb  3.2<L>  /  TBili  0.5  /  DBili  x   /  AST  128<H>  /  ALT  78<H>  /  AlkPhos  77  08-08    WBC count:   5.98 <<== ,  5.92 <<== ,  6.41 <<== ,  7.10 <<==   Hemoglobin:   10.0 <<==,  9.8 <<==,  9.4 <<==,  10.4 <<==  platelets:  147 <==, 141 <==, 133 <==, 150 <==    Creatinine:  1.13  <<==, 1.26  <<==, 1.49  <<==, 1.56  <<==  Sodium:   141  <==, 141  <==, 138  <==, 139  <==       AST:          128 <== , 44 <==      ALT:        78  <== , 33  <==      AP:        77  <=, 61  <=     Bili:        0.5  <=, 0.6  <=    ____________________________    M I C R O B I O L O G Y :    Culture - Abscess with Gram Stain (collected 07 Aug 2023 08:21)  Source: .Abscess right foot  Gram Stain (07 Aug 2023 23:24):    Rare polymorphonuclear leukocytes per low power field    No organisms seen per oil power field  Preliminary Report (08 Aug 2023 12:01):    No growth    Culture - Abscess with Gram Stain (collected 06 Aug 2023 15:42)  Source: .Abscess right foot  Gram Stain (07 Aug 2023 01:46):    Rare polymorphonuclear leukocytes seen per low power field    No organisms seen per oil power field  Preliminary Report (07 Aug 2023 16:33):    Rare Coag Negative Staphylococcus "Susceptibilities not performed"    Culture - Blood (collected 06 Aug 2023 11:30)  Source: .Blood Blood  Preliminary Report (07 Aug 2023 17:02):    No growth at 24 hours    Culture - Blood (collected 06 Aug 2023 11:15)  Source: .Blood Blood  Preliminary Report (07 Aug 2023 17:02):    No growth at 24 hours    < from: TTE W or WO Ultrasound Enhancing Agent (08.07.23 @ 15:33) >  CONCLUSIONS:   1. Severely dilated left ventricular cavity size.The left ventricular systolic function is severely decreased with an ejection fraction visually estimated at 25 to 30 %. There is global left ventricular hypokinesis.   2. Multiple segmental abnormalities exist. See findings.   3. Device lead is visualized in the right ventricle.   4. The aortic annulus and aortic root appear normal in size.  < end of copied text >    < from: Xray Foot AP + Lateral, Right (08.06.23 @ 11:40) >  IMPRESSION:  Diffuse soft tissue swelling throughout the right foot with a possible   trace amount of subcutaneous emphysema along the lateral aspect of the   right mid foot.  No definite cortical erosion seen to indicate acute osteomyelitis.  MR may be useful in further evaluation if there are no contraindications  < end of copied text >    ___________________________________________________________________________________  ===============>>  A S S E S S M E N T   A N D   P L A N <<===============  ------------------------------------------------------------------------------------------    · Assessment	  Patient is a 63yo Male with past medical history of HTN, DM, defibrillator, CAD post CABG, CHF, hx of Sommers fracture, non-union many years ago, presenting with  ulceration, recent bone biopsy with Dr Foss as op , came back positive for staph, treated with Levaquin, presented to ED with acute and significant swelling to the foot as advised to go to the ER for eval.   denies recent fall or trauma. states over the past few days right foot has been increasing in swelling. and has been having some fevers ... states hard to walk on his foot due to pain. states had temp at home of 101-102. denies n/v/d, CP, SOB, HA, dizziness, abdominal pain, urinary symptoms, cough, leg pain, swelling.  patient evaluated by podiatry in ED for presence of gas.. MRI and vascular studies ordered already, antibiotics given..         Problem/Plan - 1:  ·  Problem: Diabetic foot infection.   ·  Plan: appreciated podiatry evaluation  local wound care  continue antibiotics per ID  : appreciated      MRI likely not an options as ICD / wires not compatible above ( awaiting confirmation / interrogation)          CT scan ordered      vascular studies noted >> vascular consulted /   plan for OR debridement today per podiatry  medically stable  Echo as above   cardio following , appreciated   pain management as needed  supportive care   diuretics ( home dose: continue)   leg elevation: helping a lot     Problem/Plan - 2:  ·  Problem: Diabetes.   ·  Plan: patient states has been overall well controlled but in past 1-2 days more elevated...   ---monitor finger sticks closely  ---Insulin regimen as ordered and monitor / adjust as needed  --- hold home medications for now   ---Diabetic diet  ---A1c. 6    Problem/Plan - 3:  ·  Problem: Heart failure, unspecified HF chronicity, unspecified heart failure type.   ·  Plan: patient with likely chronic systolic CHF on entresto and lasix / Epleronon   continue home medications  Echo as above   cardio following for optimization   monitor otherwise.    Problem/Plan - 4:  ·  Problem: HLD (hyperlipidemia).   ·  Plan: continue equivalent statin dose  lipid profile.    Problem/Plan - 5:  ·  Problem: Deep vein thrombosis (DVT) of right upper extremity.   ·  Plan: on Xarelto for past ~ 2.5 months ( was on Eliqius first)  for Rt upper extremity DVT post infection of Elbow !   continue for now  likely would repeat Duplex and decide need for further treatment / duration...    Problem/Plan - 6:  ·  Problem: R/O Acute osteomyelitis.   ·  Plan: as above  follow CT scan and OR biopsy ..   ID /  pod following     -GI/DVT Prophylaxis per protocol.    --------------------------------------------  Case discussed with patient, NP/..   Education given on findings and plan of care  ___________________________  HVicky Boudreaux D.O.  Pager: [phone]

## 2023-08-08 NOTE — CONSULT NOTE ADULT - ATTENDING COMMENTS
Seen ex'ed dw'ed fellow  R foot infection/ulcer  No reported previous vasc probs.  +R PT, L DP pulses.  R infection limited to foot.  Not ascending.  small v dz suggested by DIONI/PVRs, but overall perf appears to be adequate    Rec:   Med mgmt  Fu podiatry plans.  Can fu outpt for vasc surveillance.
Patient is a 61yo Male with past medical history of HTN, DM, defibrillator, CAD post CABG, CHF, hx of Sommers fracture, non-union many years ago, presenting with  ulceration, recent bone biopsy with Dr Foss as op ,allegedly  came back positive for mssa, treated with Levaquin, presented to ED with acute and significant swelling to the foot as advised to go to the ER for eval.   denies recent fall or trauma. states over the past few days right foot has been increasing in swelling. and has been having some fevers ... states hard to walk on his foot due to pain. states had temp at home of 101-102. denies n/v/d, CP, SOB, HA, dizziness, abdominal pain, urinary symptoms, cough, leg pain, swelling.  patient evaluated by podiatry in ED for presence of gas.. MRI and vascular studies ordered already, antibiotics given..     - please obtain a copy of outpatient cultures for records  - please repeat blood and wound cultures  - check vascular studies, vascular input  -change to cefazolin  - MRI if AICD is MRI compatible       Lori Bennett M.D. ,   please reach via teams   If no answer, or after 5PM/ weekends,  then please call  [phone]    Assessment and plan discussed with the primary team .

## 2023-08-08 NOTE — DISCHARGE NOTE PROVIDER - NSDCCAREPROVSEEN_GEN_ALL_CORE_FT
Jamari Boudreaux Hoorbod Delshadfar  Hoorbod Delshadfar  Hoorbod Delshadfar  Carmelina Bennett  Ad[facility] PracticeTeam [facility]      [ Greater than 35 min spent for discharge services.   CORTNEY Boudreaux ]       ( Note written / Date of service 09-05-23 ( This is certified to be the same as "ENTERED" date above ( for billing purposes)))

## 2023-08-08 NOTE — PROGRESS NOTE ADULT - ASSESSMENT
62 y.o M w/ Right foot 5th metatarsal base wound with cellulitis to distal leg  - Pt was seen and evaluated.   - Afebrile, WBC 5.92, , CRP 24.1  - X-Ray: no OM, possible gas to lateral midfoot   - 8/7 s/p b/s Right foot I&D, 1cc of purulent drainage expressed, increase in cellulitis to distal leg, serous draining from peripheral wound, no malodor, wound tracks dorsal lateral and circumferential. Left foot with no open wounds or signs of infection.   - The wound was flushed and packed, and dressed with dry sterile dressing   - MRI cx 2/2 not PM compatible   - recommend EP consult for pacemaker interrogation    - Emergency add on for right foot incision and drainage w/ Dr. Foss after 4pm   - NPO STAT  - Preop labs ordered   - Please document medical clearance for podiatric surgery today  - Discussed with attending

## 2023-08-08 NOTE — PROGRESS NOTE ADULT - ASSESSMENT
Patient is a 61yo Male with past medical history of HTN, DM, defibrillator, CAD post CABG, CHF, hx of Sommers fracture, non-union many years ago, presenting with  ulceration, recent bone biopsy with Dr Foss as op ,allegedly  came back positive for mssa, treated with Levaquin, presented to ED with acute and significant swelling to the foot as advised to go to the ER for eval.   denies recent fall or trauma. states over the past few days right foot has been increasing in swelling. and has been having some fevers ... states hard to walk on his foot due to pain. states had temp at home of 101-102. denies n/v/d, CP, SOB, HA, dizziness, abdominal pain, urinary symptoms, cough, leg pain, swelling.  patient evaluated by podiatry in ED for presence of gas.. MRI and vascular studies ordered already, antibiotics given..     - please obtain a copy of outpatient cultures for records  - please repeat blood and wound cultures  - vascular studies reviewed , await vascular input  -changed to cefazolin  for surgical debridement today, please obtain cultures   - check mrsa nasal swab      Lori Bennett M.D. ,   please reach via teams   If no answer, or after 5PM/ weekends,  then please call  [phone]    Assessment and plan discussed with the primary team .     I will be away as of tomorrow. My associates will be covering. Please call [phone] with acute issues, questions.

## 2023-08-08 NOTE — DISCHARGE NOTE PROVIDER - CARE PROVIDERS DIRECT ADDRESSES
,DirectAddress_Unknown ,DirectAddress_Unknown,DirectAddress_Unknown ,DirectAddress_Unknown,DirectAddress_Unknown,[email or web address],DirectAddress_Unknown

## 2023-08-08 NOTE — BRIEF OPERATIVE NOTE - SPECIMENS
Path: 1) dirty bone R foot 5th met base. Micro 1) dirty bone R foot 5th met base. 2) deep wound culture

## 2023-08-08 NOTE — DISCHARGE NOTE PROVIDER - HOSPITAL COURSE
Patient is a 61yo Male with past medical history of HTN, DM, defibrillator, CAD post CABG, CHF, hx of Sommers fracture, non-union many years ago, presenting with  ulceration, recent bone biopsy with Dr Foss as op , came back positive for staph, treated with Levaquin, presented to ED with acute and significant swelling to the foot as advised to go to the ER for eval. Pt denies recent fall or trauma. states over the past few days right foot has been increasing in swelling and has been having some fevers. States hard to walk on his foot due to pain. PT ALSO states had temp at home of 101-102. Denies n/v/d, CP, SOB, HA, dizziness, abdominal pain, urinary symptoms, cough, leg pain, swelling.  patient evaluated by podiatry in ED for presence of gas.. MRI and vascular studies ordered already, antibiotics given..         Diabetic foot infection.    podiatry management -8/8 s/p Right Foot incision and drainage with partial 5th ray resection, open.   - High concern for residual bone infection  - High concern for viability  - OR wound culture showing finegoldia   - OR bone culture showing no growth (prelim)     - Vasc recommendations, appreciated->   R LE dsg dry. No asc infeciton.  +R PT, L DP pulses.  small v dz suggested by DIONI/PVRs, but overall perf appears to be adequate    - ID recs appreciated   CT: Chronic transverse fracture through the fifth metatarsal base. Soft tissue wound with foci of air is seen tracking to the level of the base of the fifth metatarsal tracking up to the site of fracture. There is adjacent cortical irregularity along the fifth metatarsal base which could be secondary to prior fracture versus osteomyelitis, soft tissue swelling around the foot most significant at the   lateral aspect. No mature, drainable, or enhancing collection is seen at this time.  s/p I&D with necrotic tissue close to bone so high suspicion for residual osteo  -continue antibiotics per ID  : long-term antibiotics via new picc already inserted  -  Pt is growing MSSA and Finegoldia. No clear if ancef will cover the finegoldia-  switched to unasyn but now in view of abs for easier administration at d/c switched back to ancef 2 gms IVSS q 8 hours and add po flagyl 500 mg po q 12 hours  - reviewed with pharm D   - monitor CBC/diff and CMP  - check ESR and CRP and CBC with diff and CMP weekly at discharge  follow up with ID in two - three weeks once discharged      Diabetes.   ·  Plan: patient states has been overall well controlled but in past 1-2 days more elevated...   ---monitor finger sticks closely  ---Insulin regimen as ordered and monitor / adjust as needed  --- hold home medications for now   ---Diabetic diet  ---A1c. 6    May consider adding Farxiga on discharge give CHF       Heart failure, unspecified HF chronicity, unspecified heart failure type.   ·  Plan: patient with likely chronic systolic CHF on entresto and lasix / Epleronon   continue home medications  Patient takes 75 mg of toprol  at home, here is a 25.  Increase to 50 for now: monitor closely     Further adjustment as per cardiology is needed  Echo-> left ventricular systolic function is severely decreased with an ejection fraction visually estimated at 25 to 30 %. There is global left ventricular hypokinesis.  cardio following for optimization ->bp is better, will gradually will inncrease meds  will increase Lasix to 40 mg daily/ Entresto 49/51 mg bid    monitor otherwise.    HLD (hyperlipidemia).   ·  Plan: continue equivalent statin dose  lipid profile.     history of Deep vein thrombosis (DVT) of right upper extremity.   ·  Plan: on Xarelto for past ~ 2.5 months ( was on Eliqius first)  for Rt upper extremity DVT post infection of Elbow !   continue for now  repeat Duplex as outpatient to decide on duration of further therapy >> recom in 2 weeks ..       Pt is medically stable for discharge 63yo Male with past medical history of HTN, DM, defibrillator, CAD post CABG, CHF, hx of Sommers fracture, non-union many years ago, presenting with  ulceration, recent bone biopsy with Dr Foss as op , came back positive for staph, treated with Levaquin, presented to ED with acute and significant swelling to the foot as advised to go to the ER for eval. Pt denies recent fall or trauma. states over the past few days right foot has been increasing in swelling and has been having some fevers. States hard to walk on his foot due to pain. PT ALSO states had temp at home of 101-102. Denies n/v/d, CP, SOB, HA, dizziness, abdominal pain, urinary symptoms, cough, leg pain, swelling.  patient evaluated by podiatry in ED for presence of gas.. MRI and vascular studies ordered already, antibiotics given..        Diabetic foot infection.    podiatry management -8/8 s/p Right Foot incision and drainage with partial 5th ray resection, open.   - High concern for residual bone infection  - High concern for viability  - OR wound culture showing finegoldia   - OR bone culture showing no growth (prelim)     - Vasc recommendations, appreciated->   R LE dsg dry. No asc infeciton.  +R PT, L DP pulses.  small v dz suggested by DIONI/PVRs, but overall perf appears to be adequate    - ID recs appreciated   CT: Chronic transverse fracture through the fifth metatarsal base. Soft tissue wound with foci of air is seen tracking to the level of the base of the fifth metatarsal tracking up to the site of fracture. There is adjacent cortical irregularity along the fifth metatarsal base which could be secondary to prior fracture versus osteomyelitis, soft tissue swelling around the foot most significant at the   lateral aspect. No mature, drainable, or enhancing collection is seen at this time.  s/p I&D with necrotic tissue close to bone so high suspicion for residual osteo  -continue antibiotics per ID  : long-term antibiotics via new picc already inserted  -  Pt is growing MSSA and Finegoldia. No clear if ancef will cover the finegoldia-  switched to unasyn but now in view of abs for easier administration at d/c switched back to ancef 2 gms IVSS q 8 hours and add po flagyl 500 mg po q 12 hours  - reviewed with pharm D   - monitor CBC/diff and CMP  - check ESR and CRP and CBC with diff and CMP weekly at discharge  follow up with ID in two - three weeks once discharged      Diabetes.   ·  Plan: patient states has been overall well controlled but in past 1-2 days more elevated...   ---monitor finger sticks closely  ---Insulin regimen as ordered and monitor / adjust as needed  --- hold home medications for now   ---Diabetic diet  ---A1c. 6       Heart failure, unspecified HF chronicity, unspecified heart failure type.   ·  Plan: patient with likely chronic systolic CHF on entresto and lasix / Epleronon   continue home medications  Patient takes 75 mg of toprol  at home, here is a 25.  Increase to 50 for now: monitor closely     Further adjustment as per cardiology is needed  Echo-> left ventricular systolic function is severely decreased with an ejection fraction visually estimated at 25 to 30 %. There is global left ventricular hypokinesis.  cardio following for optimization ->bp is better, will gradually will inncrease meds  will increase Lasix to 40 mg daily/ Entresto 49/51 mg bid    monitor otherwise.    HLD (hyperlipidemia).   ·  Plan: continue equivalent statin dose  lipid profile.     history of Deep vein thrombosis (DVT) of right upper extremity.   ·  Plan: on Xarelto for past ~ 2.5 months ( was on Eliqius first)  for Rt upper extremity DVT post infection of Elbow !   continue for now  repeat Duplex as outpatient to decide on duration of further therapy >> recom in 2 weeks ..       Patient is medically clear for discharge by Dr. Boudreaux to home with homecare. Outpatient follow up with podiatry, PCP  Med recc and clearance discussed with attending 63yo Male with past medical history of HTN, DM, defibrillator, CAD post CABG, CHF, hx of Sommers fracture, non-union many years ago, presenting with  ulceration, recent bone biopsy with Dr Foss as op , came back positive for staph, treated with Levaquin, presented to ED with acute and significant swelling to the foot as advised to go to the ER for eval. Pt denies recent fall or trauma. states over the past few days right foot has been increasing in swelling and has been having some fevers. States hard to walk on his foot due to pain. PT ALSO states had temp at home of 101-102. Denies n/v/d, CP, SOB, HA, dizziness, abdominal pain, urinary symptoms, cough, leg pain, swelling.  patient evaluated by podiatry in ED for presence of gas.. MRI and vascular studies ordered already, antibiotics given..     Summery of hospital course:  Patient was seen and evaluated and followed by multiple specialists throughout the stay and treated medically with improvement.  Patient was otherwise stable and had no other complications. See chart for further details.  Patient was discharged to home in stable condition to follow up with primary doctor as outpatient for follow up and further care.    Patient is medically clear for discharge by Dr. Boudreaux to home with homecare. Outpatient follow up with podiatry, PCP  Med recc and clearance discussed with attending

## 2023-08-08 NOTE — DISCHARGE NOTE PROVIDER - CARE PROVIDER_API CALL
Sidney Foss.  Podiatric Medicine and Surgery  [address] [facility], [place] [address]  Phone: [phone]  Fax: [phone]  Follow Up Time:    Sidney Foss.  Podiatric Medicine and Surgery  [address] [facility], [address] [place] [address]  Phone: [phone]  Fax: [phone]  Follow Up Time:     Keyshawn German  Surgery  [address] [place] [address]  Phone: [phone]  Fax: [phone]  Follow Up Time: Routine   Sidney Foss  Podiatric Medicine and Surgery  [address] [facility], [address]  [place] [address]  Phone: [phone]  Fax: [phone]  Follow Up Time:     Keyshawn German  Surgery  [address] [place] [address]  Phone: [phone]  Fax: [phone]  Follow Up Time: Routine    Lori Bennett  Infectious Disease  [address] [place] [id number]  Phone: [phone]  Fax: [phone]  Follow Up Time: 2 weeks    Jhon Wild  Cardiovascular Disease  [address] [place] [address], [address]  [place] [address]  Phone: [phone]  Fax: [phone]  Follow Up Time: Routine

## 2023-08-08 NOTE — DISCHARGE NOTE PROVIDER - PROVIDER TOKENS
PROVIDER:[TOKEN:[[id number]]] PROVIDER:[TOKEN:[[id number]]],PROVIDER:[TOKEN:[[id number]],FOLLOWUP:[Routine]] PROVIDER:[TOKEN:[[id number]]],PROVIDER:[TOKEN:[[id number]],FOLLOWUP:[Routine]],PROVIDER:[TOKEN:[[id number]],FOLLOWUP:[2 weeks]],PROVIDER:[TOKEN:[[id number]],FOLLOWUP:[Routine]]

## 2023-08-08 NOTE — BRIEF OPERATIVE NOTE - COMMENTS
Pt is s/p R foot Incision and Drainage and partial 5th met base resection   - High concern for residual bone infection: bone was hard at level of resection and appeared vitalized intra-op, however given the close proximity of the abscess and the 5th met base, bone residual infection concern remains high.   - High concern for viability: adequate intro-op bleeding, however large necrotic tissue encountered during Incision and Drainage which concerns soft tissue flap viability   - Pod plan VAC starting on Thursday   - R foot prognosis guarded   - RTOR pending erythema improvement and skin flap viability

## 2023-08-08 NOTE — PRE-OP CHECKLIST - IV STARTED
GENERAL SURGERY PROGRESS NOTE     LOS: 0 days       Interval History:     No acute events overnight.  Patient continues to pass flatus, but is still nauseated.  No episodes of emesis.    Medication Review:   melatonin, 6 mg, Oral, Nightly  pantoprazole, 40 mg, Intravenous, Q AM  sodium chloride, 10 mL, Intravenous, Q12H  sodium chloride, , ,         sodium chloride, 125 mL/hr, Last Rate: 125 mL/hr (07/21/22 0927)    Objective     Vital Signs:  Temp:  [96.8 °F (36 °C)-99 °F (37.2 °C)] 96.8 °F (36 °C)  Heart Rate:  [] 94  Resp:  [16-20] 18  BP: (106-139)/(62-83) 127/70    Intake/Output Summary (Last 24 hours) at 7/21/2022 1049  Last data filed at 7/21/2022 0945  Gross per 24 hour   Intake 1000 ml   Output 950 ml   Net 50 ml       Physical Exam  Constitutional:       Appearance: Normal appearance.   HENT:      Head: Normocephalic and atraumatic.   Eyes:      Extraocular Movements: Extraocular movements intact.      Pupils: Pupils are equal, round, and reactive to light.   Cardiovascular:      Rate and Rhythm: Normal rate and regular rhythm.   Pulmonary:      Effort: No respiratory distress.   Abdominal:      Comments: Soft, mildly distended, mildly tender to palpation diffusely without rebound or guarding   Musculoskeletal:         General: No swelling or deformity.   Skin:     General: Skin is warm and dry.      Coloration: Skin is not jaundiced.   Neurological:      General: No focal deficit present.      Mental Status: He is alert and oriented to person, place, and time.   Psychiatric:         Mood and Affect: Mood normal.         Behavior: Behavior normal.         Results Review:    Results from last 7 days   Lab Units 07/21/22  0237 07/20/22  1404   SODIUM mmol/L 138 135*   POTASSIUM mmol/L 4.0 4.8   CHLORIDE mmol/L 102 101   CO2 mmol/L 24.0 21.0*   BUN mg/dL 10 11   CREATININE mg/dL 0.86 0.86   GLUCOSE mg/dL 111* 108*   CALCIUM mg/dL 8.5* 8.7     Results from last 7 days   Lab Units 07/21/22 0238  07/20/22  1233   WBC 10*3/mm3 11.99* 16.63*   HEMOGLOBIN g/dL 14.5 16.9   HEMATOCRIT % 44.8 51.9*   PLATELETS 10*3/mm3 237 359       Assessment:    Intestinal obstruction (HCC)        Plan:    Continue n.p.o. and IV fluids.  Encourage ambulation.  I will obtain an acute abdominal series tomorrow if the patient is not improving.        This document has been electronically signed by Kelechi Sosa MD on July 21, 2022 10:49 CDT       on arrival to sda/yes

## 2023-08-08 NOTE — CONSULT NOTE ADULT - REASON FOR ADMISSION
diabetic foot infection, possible osteo

## 2023-08-08 NOTE — PROGRESS NOTE ADULT - SUBJECTIVE AND OBJECTIVE BOX
Patient is a 62y old  Male who presents with a chief complaint of diabetic foot infection, possible osteo (08 Aug 2023 15:14)    Being followed by ID for        Interval history:  No other acute events      ROS:  No cough,SOB,CP  No N/V/D  No abd pain  No urinary complaints  No HA  No joint or limb pain  No other complaints    PAST MEDICAL & SURGICAL HISTORY:    Allergies    No Known Allergies    Intolerances      Antimicrobials:    ceFAZolin   IVPB      ceFAZolin   IVPB 2000 milliGRAM(s) IV Intermittent every 8 hours    MEDICATIONS  (STANDING):  atorvastatin 80 milliGRAM(s) Oral at bedtime  ceFAZolin   IVPB      ceFAZolin   IVPB 2000 milliGRAM(s) IV Intermittent every 8 hours  chlorhexidine 2% Cloths 1 Application(s) Topical <User Schedule>  dextrose 5%. 1000 milliLiter(s) (100 mL/Hr) IV Continuous <Continuous>  dextrose 5%. 1000 milliLiter(s) (50 mL/Hr) IV Continuous <Continuous>  dextrose 50% Injectable 25 Gram(s) IV Push once  dextrose 50% Injectable 12.5 Gram(s) IV Push once  dextrose 50% Injectable 25 Gram(s) IV Push once  furosemide    Tablet 40 milliGRAM(s) Oral daily  glucagon  Injectable 1 milliGRAM(s) IntraMuscular once  insulin lispro (ADMELOG) corrective regimen sliding scale   SubCutaneous three times a day before meals  metoprolol succinate ER 25 milliGRAM(s) Oral daily  polyethylene glycol 3350 17 Gram(s) Oral daily  rivaroxaban 20 milliGRAM(s) Oral with dinner  sacubitril 97 mG/valsartan 103 mG 1 Tablet(s) Oral two times a day  spironolactone 25 milliGRAM(s) Oral daily      Vital Signs Last 24 Hrs  T(C): 36.9 (08-08-23 @ 12:36), Max: 37 (08-07-23 @ 20:03)  T(F): 98.4 (08-08-23 @ 12:36), Max: 98.6 (08-07-23 @ 20:03)  HR: 84 (08-08-23 @ 12:36) (72 - 95)  BP: 97/60 (08-08-23 @ 12:36) (97/60 - 114/72)  BP(mean): --  RR: 18 (08-08-23 @ 12:36) (17 - 18)  SpO2: 95% (08-08-23 @ 12:36) (94% - 99%)    Physical Exam:    Constitutional well preserved,comfortable,pleasant    HEENT PERRLA EOMI,No pallor or icterus    No oral exudate or erythema    Neck supple no JVD or LN    Chest Good AE,CTA    CVS RRR S1 S2 WNl No murmur or rub or gallop    Abd soft BS normal No tenderness no masses    Ext No cyanosis clubbing or edema    IV site no erythema tenderness or discharge    Joints no swelling or LOM    CNS AAO X 3 no focal    Lab Data:                          10.0   5.98  )-----------( 147      ( 08 Aug 2023 08:33 )             31.6       08-08    141  |  104  |  23  ----------------------------<  133<H>  3.8   |  24  |  1.13    Ca    9.2      08 Aug 2023 08:33    TPro  6.5  /  Alb  3.2<L>  /  TBili  0.5  /  DBili  x   /  AST  128<H>  /  ALT  78<H>  /  AlkPhos  77  08-08      Urinalysis Basic - ( 08 Aug 2023 08:33 )    Color: x / Appearance: x / SG: x / pH: x  Gluc: 133 mg/dL / Ketone: x  / Bili: x / Urobili: x   Blood: x / Protein: x / Nitrite: x   Leuk Esterase: x / RBC: x / WBC x   Sq Epi: x / Non Sq Epi: x / Bacteria: x        .Abscess right foot  08-07-23   No growth  --    Rare polymorphonuclear leukocytes per low power field  No organisms seen per oil power field      .Abscess right foot  08-06-23   Rare Coag Negative Staphylococcus "Susceptibilities not performed"  --    Rare polymorphonuclear leukocytes seen per low power field  No organisms seen per oil power field      .Blood Blood  08-06-23   No growth at 24 hours  --  --      .Blood Blood  08-06-23   No growth at 24 hours  --  --                Vancomycin Level, Trough: 9.0 ug/mL (08-08-23 @ 05:06)      WBC Count: 5.98 (08-08-23 @ 08:33)  WBC Count: 5.92 (08-08-23 @ 05:06)  WBC Count: 6.41 (08-07-23 @ 06:07)  WBC Count: 7.10 (08-06-23 @ 11:39)       Bilirubin Total: 0.5 mg/dL (08-08-23 @ 05:05)  Aspartate Aminotransferase (AST/SGOT): 128 U/L (08-08-23 @ 05:05)  Alanine Aminotransferase (ALT/SGPT): 78 U/L (08-08-23 @ 05:05)  Alkaline Phosphatase: 77 U/L (08-08-23 @ 05:05)  Bilirubin Total: 0.6 mg/dL (08-07-23 @ 06:07)  Aspartate Aminotransferase (AST/SGOT): 44 U/L (08-07-23 @ 06:07)  Alanine Aminotransferase (ALT/SGPT): 33 U/L (08-07-23 @ 06:07)  Alkaline Phosphatase: 61 U/L (08-07-23 @ 06:07)         Patient is a 62y old  Male who presents with a chief complaint of diabetic foot infection, possible osteo (08 Aug 2023 15:14)    Being followed by ID for cellulitis        Interval history:  pt feeling improved  for surgical intervention later today  No other acute events          PAST MEDICAL & SURGICAL HISTORY:    Allergies    No Known Allergies    Intolerances      Antimicrobials:    ceFAZolin   IVPB      ceFAZolin   IVPB 2000 milliGRAM(s) IV Intermittent every 8 hours    MEDICATIONS  (STANDING):  atorvastatin 80 milliGRAM(s) Oral at bedtime  ceFAZolin   IVPB      ceFAZolin   IVPB 2000 milliGRAM(s) IV Intermittent every 8 hours  chlorhexidine 2% Cloths 1 Application(s) Topical <User Schedule>  dextrose 5%. 1000 milliLiter(s) (100 mL/Hr) IV Continuous <Continuous>  dextrose 5%. 1000 milliLiter(s) (50 mL/Hr) IV Continuous <Continuous>  dextrose 50% Injectable 25 Gram(s) IV Push once  dextrose 50% Injectable 12.5 Gram(s) IV Push once  dextrose 50% Injectable 25 Gram(s) IV Push once  furosemide    Tablet 40 milliGRAM(s) Oral daily  glucagon  Injectable 1 milliGRAM(s) IntraMuscular once  insulin lispro (ADMELOG) corrective regimen sliding scale   SubCutaneous three times a day before meals  metoprolol succinate ER 25 milliGRAM(s) Oral daily  polyethylene glycol 3350 17 Gram(s) Oral daily  rivaroxaban 20 milliGRAM(s) Oral with dinner  sacubitril 97 mG/valsartan 103 mG 1 Tablet(s) Oral two times a day  spironolactone 25 milliGRAM(s) Oral daily      Vital Signs Last 24 Hrs  T(C): 36.9 (08-08-23 @ 12:36), Max: 37 (08-07-23 @ 20:03)  T(F): 98.4 (08-08-23 @ 12:36), Max: 98.6 (08-07-23 @ 20:03)  HR: 84 (08-08-23 @ 12:36) (72 - 95)  BP: 97/60 (08-08-23 @ 12:36) (97/60 - 114/72)  BP(mean): --  RR: 18 (08-08-23 @ 12:36) (17 - 18)  SpO2: 95% (08-08-23 @ 12:36) (94% - 99%)    Physical Exam:    Constitutional well preserved,comfortable,pleasant    HEENT PERRLA EOMI,No pallor or icterus    No oral exudate or erythema    Neck supple no JVD or LN    Chest Good AE,CTA    CVS S1 S2     Abd soft BS normal No tenderness     Ext right leg less swollen , still area of distension , erythema ,purulence    IV site no erythema tenderness or discharge    CNS AAO X 3 no focal    Lab Data:                          10.0   5.98  )-----------( 147      ( 08 Aug 2023 08:33 )             31.6       08-08    141  |  104  |  23  ----------------------------<  133<H>  3.8   |  24  |  1.13    Ca    9.2      08 Aug 2023 08:33    TPro  6.5  /  Alb  3.2<L>  /  TBili  0.5  /  DBili  x   /  AST  128<H>  /  ALT  78<H>  /  AlkPhos  77  08-08      < from: [facility] Physiol Extremity Lower 3+ Level, BI (08.07.23 @ 14:02) >  PRESSION:    The quality of this examination is adversely impacted by the   noncompressible nature of the diabetic arteries.    The right toe-brachial index of 0.50 is moderately abnormal and when   coupled with the decreased amplitude of the pulse volume recordings at   the levels of the right metatarsals and toes is evidence for disease   affecting the small arteries of the rightfoot.    The left toe-brachial index of 0.68 is close to normal.    < end of copied text >          .Abscess right foot  08-07-23   No growth  --    Rare polymorphonuclear leukocytes per low power field  No organisms seen per oil power field      .Abscess right foot  08-06-23   Rare Coag Negative Staphylococcus "Susceptibilities not performed"  --    Rare polymorphonuclear leukocytes seen per low power field  No organisms seen per oil power field      .Blood Blood  08-06-23   No growth at 24 hours  --  --      .Blood Blood  08-06-23   No growth at 24 hours  --  --      < from: CT Foot w/ IV Cont, Right (08.08.23 @ 10:21) >    IMPRESSION:  1.  Chronic transverse fracture through the fifth metatarsal base. Soft   tissue wound with foci of air is seen tracking to the level of the base   of the fifth metatarsal tracking up to the site of fracture. There is   adjacent cortical irregularity along the fifth metatarsal base which   could be secondary to prior fracture versus osteomyelitis. Likely   correlate patient history.  2.  There is soft tissue swelling around the foot most significant at the   lateral aspect. No mature, drainable, or enhancing collection is seen at   this time.    Please note there is a delay in the signing of this report waiting for   the technologist to complete reformats.    --- End of Report ---    < end of copied text >            Vancomycin Level, Trough: 9.0 ug/mL (08-08-23 @ 05:06)      WBC Count: 5.98 (08-08-23 @ 08:33)  WBC Count: 5.92 (08-08-23 @ 05:06)  WBC Count: 6.41 (08-07-23 @ 06:07)  WBC Count: 7.10 (08-06-23 @ 11:39)       Bilirubin Total: 0.5 mg/dL (08-08-23 @ 05:05)  Aspartate Aminotransferase (AST/SGOT): 128 U/L (08-08-23 @ 05:05)  Alanine Aminotransferase (ALT/SGPT): 78 U/L (08-08-23 @ 05:05)  Alkaline Phosphatase: 77 U/L (08-08-23 @ 05:05)    Bilirubin Total: 0.6 mg/dL (08-07-23 @ 06:07)  Aspartate Aminotransferase (AST/SGOT): 44 U/L (08-07-23 @ 06:07)  Alanine Aminotransferase (ALT/SGPT): 33 U/L (08-07-23 @ 06:07)  Alkaline Phosphatase: 61 U/L (08-07-23 @ 06:07)

## 2023-08-08 NOTE — CONSULT NOTE ADULT - SUBJECTIVE AND OBJECTIVE BOX
General Surgery Consult  Consulting surgical team: Vascular Surgery   Consulting attending: Dr. Jauregui     HPI:    >>>>>>Medical Attending Initial / Admission note<<<<<<  -------------------------------------------------------------------------------  CHIEF COMPLAINT & HISTORY OF PRESENT ILLNESS:      Patient is a 63yo Male with past medical history of HTN, DM, defibrillator, CAD post CABG, CHF, hx of Sommers fracture, non-union many years ago, presenting with  ulceration, recent bone biopsy with Dr Foss as op , came back positive for staph, treated with Levaquin, presented to ED with acute and significant swelling to the foot as advised to go to the ER for eval.   denies recent fall or trauma. states over the past few days right foot has been increasing in swelling. and has been having some fevers ... states hard to walk on his foot due to pain. states had temp at home of 101-102. denies n/v/d, CP, SOB, HA, dizziness, abdominal pain, urinary symptoms, cough, leg pain, swelling.  patient evaluated by podiatry in ED for presence of gas.. MRI and vascular studies ordered already, antibiotics given..     Subjective: Patient seen and evaluated at the bedside. Patient reports that Dr. Foss is taking him to the operating room today for a right foot incision and drainage. Patient states that he was at home taking oral antibiotics and the swelling got worse and he represented to Dr. Foss's office and he was advised to go to the emergency room. Patient reports that the swelling has gotten better since being in the hospital. Reports that the erythema and bruising looks worse this morning. Patient seen by infectious disease recommending starting IV Cefazolin for MSSA osteo. Patient reports no pain to right foot. Was walking at home but told by podiatry team not to walk on foot while in the hospital. Patient has never seen a vascular surgeon in the past.     --------------------------------------------------------------------------------  PAST MEDICAL / SURGICAL  HISTORY:    DM  HTN  CAD  CHF  recent right elbow infection post abc IV      recent Rt arm DVT >> xarelto   ( ~ 2.5 months ago)     CABG X3: ~ 2020  AICD  Rt hand sx with plate..   Rt Elbow I&D ~ 06/2023    --------------------------------------------------------------------------------  FAMILY HISTORY:    father : heart disease  father: brain cancer   otherwise negative     --------------------------------------------------------------------------------  SOCIAL HISTORY:  Alcohol: None reported  Smoking: None reported     --------------------------------------------------------------------------------  ALLERGIES:   No Known Allergies    Intolerances    --------------------------------------------------------------------------------  REVIEW OF SYSTEM:    GEN: no fever, no chills, no pain  RESP: no SOB, no cough, no sputum  CVS: no chest pain, no palpitations, + edema, Rt foot   GI: no abdominal pain, no nausea, no vomiting, no constipation, no diarrhea  : no dysuria, no frequency, no hematuria  Neuro: no headache, no dizziness  PSYCH: no anxiety, no depression  Derm : no itching, no rash    --------------------------------------------------------------------------------  MEDICATIONS:  acetaminophen     Tablet .. 650 milliGRAM(s) Oral every 6 hours PRN  aluminum hydroxide/magnesium hydroxide/simethicone Suspension 30 milliLiter(s) Oral every 4 hours PRN  atorvastatin 80 milliGRAM(s) Oral at bedtime  ceFAZolin   IVPB      ceFAZolin   IVPB 2000 milliGRAM(s) IV Intermittent every 8 hours  chlorhexidine 2% Cloths 1 Application(s) Topical <User Schedule>  dextrose 5%. 1000 milliLiter(s) IV Continuous <Continuous>  dextrose 5%. 1000 milliLiter(s) IV Continuous <Continuous>  dextrose 50% Injectable 25 Gram(s) IV Push once  dextrose 50% Injectable 25 Gram(s) IV Push once  dextrose 50% Injectable 12.5 Gram(s) IV Push once  dextrose Oral Gel 15 Gram(s) Oral once PRN  furosemide    Tablet 40 milliGRAM(s) Oral daily  glucagon  Injectable 1 milliGRAM(s) IntraMuscular once  insulin lispro (ADMELOG) corrective regimen sliding scale   SubCutaneous three times a day before meals  melatonin 3 milliGRAM(s) Oral at bedtime PRN  metoprolol succinate ER 25 milliGRAM(s) Oral daily  ondansetron Injectable 4 milliGRAM(s) IV Push every 8 hours PRN  rivaroxaban 20 milliGRAM(s) Oral with dinner  sacubitril 97 mG/valsartan 103 mG 1 Tablet(s) Oral two times a day  spironolactone 25 milliGRAM(s) Oral daily        --------------------------------------------------------------------------------  OBJECTIVE:   VITALS & I/Os:  Vital Signs Last 24 Hrs  T(C): 36.4 (08 Aug 2023 08:33), Max: 37 (07 Aug 2023 20:03)  T(F): 97.6 (08 Aug 2023 08:33), Max: 98.6 (07 Aug 2023 20:03)  HR: 72 (08 Aug 2023 08:33) (72 - 95)  BP: 101/64 (08 Aug 2023 08:33) (100/64 - 114/72)  BP(mean): --  RR: 17 (08 Aug 2023 08:33) (17 - 18)  SpO2: 95% (08 Aug 2023 08:33) (94% - 99%)    Parameters below as of 08 Aug 2023 08:24  Patient On (Oxygen Delivery Method): room air        I&O's Summary    07 Aug 2023 07:01  -  08 Aug 2023 07:00  --------------------------------------------------------  IN: 810 mL / OUT: 1050 mL / NET: -240 mL    08 Aug 2023 07:01  -  08 Aug 2023 10:12  --------------------------------------------------------  IN: 50 mL / OUT: 0 mL / NET: 50 mL            LABS:                        10.0   5.98  )-----------( 147      ( 08 Aug 2023 08:33 )             31.6     08-08    141  |  104  |  23  ----------------------------<  133<H>  3.8   |  24  |  1.13    Ca    9.2      08 Aug 2023 08:33    TPro  6.5  /  Alb  3.2<L>  /  TBili  0.5  /  DBili  x   /  AST  128<H>  /  ALT  78<H>  /  AlkPhos  77  08-08    Lactate:    PT/INR - ( 08 Aug 2023 08:33 )   PT: 24.5 sec;   INR: 2.39 ratio   PTT - ( 08 Aug 2023 08:33 )  PTT:38.5 sec    Urinalysis Basic - ( 08 Aug 2023 08:33 )    Color: x / Appearance: x / SG: x / pH: x  Gluc: 133 mg/dL / Ketone: x  / Bili: x / Urobili: x   Blood: x / Protein: x / Nitrite: x   Leuk Esterase: x / RBC: x / WBC x   Sq Epi: x / Non Sq Epi: x / Bacteria: x    Physical Exam:   General: A&O X 3 , NAD , comfortable, pleasant, calm   HEENT: NCAT, PERRL,   Neck: supple , no JVD appreciated  Cardiac: regular pulse   Pulmonary: nonlabored breathing on room air   Abdomen.: soft. non tender, non distended   Extremitry: + Rt LE 3+ pitting pedal edema, pedal erythema, +PT pulse palpable, +DP pulse faintly palpable, strong signal, motor intact, decreased sensation on medial aspect of foot from hallux to heel to light palpation, sensation improved with deep palpation  Left DP/PT palpable   Pysch : normal mood     IMAGING:  < from: [facility] Physiol Extremity Lower 3+ Level, BI (08.07.23 @ 14:02) >  ACC: [id number] EXAM:  PHYSIOL EXTREM LOW 3+ LEV BI   ORDERED BY:  WILEY HORN     PROCEDURE DATE:  08/07/2023          INTERPRETATION:  History: Diminished pedal pulses, nonhealing ulcer    Risk factors: Diabetes, hyperlipidemia, hypertension,coronary artery   disease.    The blood pressure measurements on the right are 175 mmHg in the upper   thigh, 176 in the lower thigh, and in excess of 250 mmHg in the upper   calf, and at the right ankle in both the posterior tibial and dorsal   pedis arteries.    Artificially elevated blood pressure measurements are characteristic for   calcified, noncompressible, diabetic arteries. As such, meaningful   ankle-brachial indices cannot be calculated.    The blood pressure measurement at the right great toe is 57 mmHg and the   right toe-brachial index equals 0.50. This is a moderately low, abnormal   value.    The blood pressure measurements on the left are 158 mmHg in the upper   thigh, 180 in the lower thigh, in excess of 250 in the upper calf, and at   the left ankle in excess of 250 in the posterior tibial artery and 204 in   the dorsal pedis artery.    The blood pressure measurement at the left great toe is 77 mmHg and the   left toe-brachial index equals 0.68.    The amplitude of thepulse volume recordings on the right are normal from   the level of the upper thigh through the ankle and moderately reduced at   the level of the metatarsals and toes.    The amplitude of the pulse volume recordings on the left are normal from   thelevel of the upper thigh through the ankles and moderately reduced at   the levels of the metatarsals and toes.    IMPRESSION:    The quality of this examination is adversely impacted by the   noncompressible nature of the diabetic arteries.    The right toe-brachial index of 0.50 is moderately abnormal and when   coupled with the decreased amplitude of the pulse volume recordings at   the levels of the right metatarsals and toes is evidence for disease   affecting the small arteries of the rightfoot.    The left toe-brachial index of 0.68 is close to normal.    --- End of Report ---      < end of copied text >     General Surgery Consult  Consulting surgical team: Vascular Surgery   Consulting attending: Dr. Jauregui       CHIEF COMPLAINT & HISTORY OF PRESENT ILLNESS:      Patient is a 63yo Male with past medical history of HTN, DM, defibrillator, CAD post CABG, CHF, hx of Sommers fracture, non-union many years ago, presenting with  ulceration, recent bone biopsy with Dr Foss as op , came back positive for staph, treated with Levaquin, presented to ED with acute and significant swelling to the foot as advised to go to the ER for eval.   denies recent fall or trauma. states over the past few days right foot has been increasing in swelling. and has been having some fevers ... states hard to walk on his foot due to pain. states had temp at home of 101-102. denies n/v/d, CP, SOB, HA, dizziness, abdominal pain, urinary symptoms, cough, leg pain, swelling.  patient evaluated by podiatry in ED for presence of gas.. MRI and vascular studies ordered already, antibiotics given..     Subjective: Patient seen and evaluated at the bedside. Patient reports that Dr. Foss is taking him to the operating room today for a right foot incision and drainage. Patient states that he was at home taking oral antibiotics and the swelling got worse and he represented to Dr. Foss's office and he was advised to go to the emergency room. Patient reports that the swelling has gotten better since being in the hospital. Reports that the erythema and bruising looks worse this morning. Patient seen by infectious disease recommending starting IV Cefazolin for MSSA osteo. Patient reports no pain to right foot. Was walking at home but told by podiatry team not to walk on foot while in the hospital. Patient has never seen a vascular surgeon in the past.     --------------------------------------------------------------------------------  PAST MEDICAL / SURGICAL  HISTORY:    DM  HTN  CAD  CHF  recent right elbow infection post abc IV      recent Rt arm DVT >> xarelto   ( ~ 2.5 months ago)     CABG X3: ~ 2020  AICD  Rt hand sx with plate..   Rt Elbow I&D ~ 06/2023    --------------------------------------------------------------------------------  FAMILY HISTORY:    father : heart disease  father: brain cancer   otherwise negative     --------------------------------------------------------------------------------  SOCIAL HISTORY:  Alcohol: None reported  Smoking: None reported     --------------------------------------------------------------------------------  ALLERGIES:   No Known Allergies    Intolerances    --------------------------------------------------------------------------------  REVIEW OF SYSTEM:    GEN: no fever, no chills, no pain  RESP: no SOB, no cough, no sputum  CVS: no chest pain, no palpitations, + edema, Rt foot   GI: no abdominal pain, no nausea, no vomiting, no constipation, no diarrhea  : no dysuria, no frequency, no hematuria  Neuro: no headache, no dizziness  PSYCH: no anxiety, no depression  Derm : no itching, no rash    --------------------------------------------------------------------------------  MEDICATIONS:  acetaminophen     Tablet .. 650 milliGRAM(s) Oral every 6 hours PRN  aluminum hydroxide/magnesium hydroxide/simethicone Suspension 30 milliLiter(s) Oral every 4 hours PRN  atorvastatin 80 milliGRAM(s) Oral at bedtime  ceFAZolin   IVPB      ceFAZolin   IVPB 2000 milliGRAM(s) IV Intermittent every 8 hours  chlorhexidine 2% Cloths 1 Application(s) Topical <User Schedule>  dextrose 5%. 1000 milliLiter(s) IV Continuous <Continuous>  dextrose 5%. 1000 milliLiter(s) IV Continuous <Continuous>  dextrose 50% Injectable 25 Gram(s) IV Push once  dextrose 50% Injectable 25 Gram(s) IV Push once  dextrose 50% Injectable 12.5 Gram(s) IV Push once  dextrose Oral Gel 15 Gram(s) Oral once PRN  furosemide    Tablet 40 milliGRAM(s) Oral daily  glucagon  Injectable 1 milliGRAM(s) IntraMuscular once  insulin lispro (ADMELOG) corrective regimen sliding scale   SubCutaneous three times a day before meals  melatonin 3 milliGRAM(s) Oral at bedtime PRN  metoprolol succinate ER 25 milliGRAM(s) Oral daily  ondansetron Injectable 4 milliGRAM(s) IV Push every 8 hours PRN  rivaroxaban 20 milliGRAM(s) Oral with dinner  sacubitril 97 mG/valsartan 103 mG 1 Tablet(s) Oral two times a day  spironolactone 25 milliGRAM(s) Oral daily        --------------------------------------------------------------------------------  OBJECTIVE:   VITALS & I/Os:  Vital Signs Last 24 Hrs  T(C): 36.4 (08 Aug 2023 08:33), Max: 37 (07 Aug 2023 20:03)  T(F): 97.6 (08 Aug 2023 08:33), Max: 98.6 (07 Aug 2023 20:03)  HR: 72 (08 Aug 2023 08:33) (72 - 95)  BP: 101/64 (08 Aug 2023 08:33) (100/64 - 114/72)  BP(mean): --  RR: 17 (08 Aug 2023 08:33) (17 - 18)  SpO2: 95% (08 Aug 2023 08:33) (94% - 99%)    Parameters below as of 08 Aug 2023 08:24  Patient On (Oxygen Delivery Method): room air        I&O's Summary    07 Aug 2023 07:01  -  08 Aug 2023 07:00  --------------------------------------------------------  IN: 810 mL / OUT: 1050 mL / NET: -240 mL    08 Aug 2023 07:01  -  08 Aug 2023 10:12  --------------------------------------------------------  IN: 50 mL / OUT: 0 mL / NET: 50 mL            LABS:                        10.0   5.98  )-----------( 147      ( 08 Aug 2023 08:33 )             31.6     08-08    141  |  104  |  23  ----------------------------<  133<H>  3.8   |  24  |  1.13    Ca    9.2      08 Aug 2023 08:33    TPro  6.5  /  Alb  3.2<L>  /  TBili  0.5  /  DBili  x   /  AST  128<H>  /  ALT  78<H>  /  AlkPhos  77  08-08    Lactate:    PT/INR - ( 08 Aug 2023 08:33 )   PT: 24.5 sec;   INR: 2.39 ratio   PTT - ( 08 Aug 2023 08:33 )  PTT:38.5 sec    Urinalysis Basic - ( 08 Aug 2023 08:33 )    Color: x / Appearance: x / SG: x / pH: x  Gluc: 133 mg/dL / Ketone: x  / Bili: x / Urobili: x   Blood: x / Protein: x / Nitrite: x   Leuk Esterase: x / RBC: x / WBC x   Sq Epi: x / Non Sq Epi: x / Bacteria: x    Physical Exam:   General: A&O X 3 , NAD , comfortable, pleasant, calm   HEENT: NCAT, PERRL,   Neck: supple , no JVD appreciated  Cardiac: regular pulse   Pulmonary: nonlabored breathing on room air   Abdomen.: soft. non tender, non distended   Extremitry: + Rt LE 3+ pitting pedal edema, pedal erythema, +PT pulse palpable, +DP pulse faintly palpable, strong signal, motor intact, decreased sensation on medial aspect of foot from hallux to heel to light palpation, sensation improved with deep palpation  Left DP/PT palpable   Pysch : normal mood     IMAGING:  < from: [facility] Physiol Extremity Lower 3+ Level, BI (08.07.23 @ 14:02) >  ACC: [id number] EXAM:  PHYSIOL EXTREM LOW 3+ LEV BI   ORDERED BY:  WILEY HORN     PROCEDURE DATE:  08/07/2023          INTERPRETATION:  History: Diminished pedal pulses, nonhealing ulcer    Risk factors: Diabetes, hyperlipidemia, hypertension,coronary artery   disease.    The blood pressure measurements on the right are 175 mmHg in the upper   thigh, 176 in the lower thigh, and in excess of 250 mmHg in the upper   calf, and at the right ankle in both the posterior tibial and dorsal   pedis arteries.    Artificially elevated blood pressure measurements are characteristic for   calcified, noncompressible, diabetic arteries. As such, meaningful   ankle-brachial indices cannot be calculated.    The blood pressure measurement at the right great toe is 57 mmHg and the   right toe-brachial index equals 0.50. This is a moderately low, abnormal   value.    The blood pressure measurements on the left are 158 mmHg in the upper   thigh, 180 in the lower thigh, in excess of 250 in the upper calf, and at   the left ankle in excess of 250 in the posterior tibial artery and 204 in   the dorsal pedis artery.    The blood pressure measurement at the left great toe is 77 mmHg and the   left toe-brachial index equals 0.68.    The amplitude of thepulse volume recordings on the right are normal from   the level of the upper thigh through the ankle and moderately reduced at   the level of the metatarsals and toes.    The amplitude of the pulse volume recordings on the left are normal from   thelevel of the upper thigh through the ankles and moderately reduced at   the levels of the metatarsals and toes.    IMPRESSION:    The quality of this examination is adversely impacted by the   noncompressible nature of the diabetic arteries.    The right toe-brachial index of 0.50 is moderately abnormal and when   coupled with the decreased amplitude of the pulse volume recordings at   the levels of the right metatarsals and toes is evidence for disease   affecting the small arteries of the rightfoot.    The left toe-brachial index of 0.68 is close to normal.    --- End of Report ---      < end of copied text >

## 2023-08-08 NOTE — DISCHARGE NOTE PROVIDER - NSDCFUADDAPPT_GEN_ALL_CORE_FT
================================  Podiatry Discharge Instructions:  - Follow up: Please follow up with Dr. AU within 1 week of discharge from the hospital, please call [phone] for appointment and discuss that you recently were seen in the hospital.  - Wound Care: Please apply VAC at 75 mmgh to R foot wound three times a week.   - Weight bearing: Please weight bearing as tolerated to heel in a surgical shoes to R foot   - Antibiotics: Please continue as instructed.  ================================

## 2023-08-08 NOTE — DISCHARGE NOTE PROVIDER - NSDCCPCAREPLAN_GEN_ALL_CORE_FT
PRINCIPAL DISCHARGE DIAGNOSIS  Diagnosis: Foot infection  Assessment and Plan of Treatment: podiatry management -8/8 s/p Right Foot incision and drainage with partial 5th ray resection, open.   - High concern for residual bone infection  - High concern for viability  - OR wound culture showing finegoldia   - OR bone culture showing no growth (prelim)   - Vasc recommendations, appreciated->   R LE dsg dry. No asc infeciton.  +R PT, L DP pulses.  small v dz suggested by DIONI/PVRs, but overall perf appears to be adequate  - ID recs appreciated   CT: Chronic transverse fracture through the fifth metatarsal base. Soft tissue wound with foci of air is seen tracking to the level of the base of the fifth metatarsal tracking up to the site of fracture. There is adjacent cortical irregularity along the fifth metatarsal base which could be secondary to prior fracture versus osteomyelitis, soft tissue swelling around the foot most significant at the   lateral aspect. No mature, drainable, or enhancing collection is seen at this time.  s/p I&D with necrotic tissue close to bone so high suspicion for residual osteo  -continue antibiotics per ID  : long-term antibiotics via new picc already inserted  -  Pt is growing MSSA and Finegoldia. No clear if ancef will cover the finegoldia-  switched to unasyn but now in view of abs for easier administration at d/c switched back to ancef 2 gms IVSS q 8 hours and add po flagyl 500 mg po q 12 hours  - reviewed with pharm D   - monitor CBC/diff and CMP  - check ESR and CRP and CBC with diff and CMP weekly at discharge  follow up with ID in two - three weeks once discharged         SECONDARY DISCHARGE DIAGNOSES  Diagnosis: Diabetes  Assessment and Plan of Treatment: HgA1C this admission. (6.0)  Make sure you get your HgA1c checked every three months.  If you take oral diabetes medications, check your blood glucose two times a day.  If you take insulin, check your blood glucose before meals and at bedtime.  It's important not to skip any meals.  Keep a log of your blood glucose results and always take it with you to your doctor appointments.  Keep a list of your current medications including injectables and over the counter medications and bring this medication list with you to all your doctor appointments.  If you have not seen your ophthalmologist this year call for appointment.  Check your feet daily for redness, sores, or openings. Do not self treat. If no improvement in two days call your primary care physician for an appointment.  Low blood sugar (hypoglycemia) is a blood sugar below 70mg/dl. Check your blood sugar if you feel signs/symptoms of hypoglycemia. If your blood sugar is below 70 take 15 grams of carbohydrates (ex 4 oz of apple juice, 3-4 glucose tablets, or 4-6 oz of regular soda) wait 15 minutes and repeat blood sugar to make sure it comes up above 70.  If your blood sugar is above 70 and you are due for a meal, have a meal.  If you are not due for a meal have a snack.  This snack helps keeps your blood sugar at a safe range.      Diagnosis: HLD (hyperlipidemia)  Assessment and Plan of Treatment: Continue with your cholesterol medications. Eat a heart healthy diet that is low in saturated fats and salt, and includes whole grains, fruits, vegetables and lean protein; exercise regularly (consult with your physician or cardiologist first); maintain a heart healthy weight; if you smoke - quit (A resource to help you stop smoking is the [facility] – phone number [phone].). Continue to follow with your primary physician or cardiologist.  Seek medical help for dizziness, Lightheadedness, Blurry vision, Headache, Chest pain, Shortness of breath      Diagnosis: Hypertension  Assessment and Plan of Treatment: Low salt diet  Activity as tolerated.  Take all medication as prescribed.  Follow up with your medical doctor for routine blood pressure monitoring at your next visit.  Notify your doctor if you have any of the following symptoms:   Dizziness, Lightheadedness, Blurry vision, Headache, Chest pain, Shortness of breath      Diagnosis: Deep vein thrombosis (DVT) of right upper extremity  Assessment and Plan of Treatment: Take your "blood thinners" as prescribed.  Walking is encouraged, increase activity as tolerated.  If you develop new leg pain, swelling, and/or redness contact your healthcare provider.  If you develop new chest pain with difficulty breathing, a rapid heart rate and/or a feeling of passing out call emergency medical services 911.      Diagnosis: Heart failure, unspecified HF chronicity, unspecified heart failure type  Assessment and Plan of Treatment: Weigh yourself daily.  If you gain 3lbs in 3 days, or 5lbs in a week call your Health Care Provider.  Do not eat or drink foods containing more than 2000mg of salt (sodium) in your diet every day.  Call your Health Care Provider if you have any swelling or increased swelling in your feet, ankles, and/or stomach.  Take all of your medication as directed.  If you become dizzy call your Health Care Provider.       PRINCIPAL DISCHARGE DIAGNOSIS  Diagnosis: Foot infection  Assessment and Plan of Treatment: podiatry management -8/8 s/p Right Foot incision and drainage with partial 5th ray resection, open.   - High concern for residual bone infection  - High concern for viability  - OR wound culture showing finegoldia   - OR bone culture showing no growth (prelim)   - Vasc recommendations, appreciated->   R LE dsg dry. No asc infeciton.  +R PT, L DP pulses.  small v dz suggested by DIONI/PVRs, but overall perf appears to be adequate  - ID recs appreciated   CT: Chronic transverse fracture through the fifth metatarsal base. Soft tissue wound with foci of air is seen tracking to the level of the base of the fifth metatarsal tracking up to the site of fracture. There is adjacent cortical irregularity along the fifth metatarsal base which could be secondary to prior fracture versus osteomyelitis, soft tissue swelling around the foot most significant at the   lateral aspect. No mature, drainable, or enhancing collection is seen at this time.  s/p I&D with necrotic tissue close to bone so high suspicion for residual osteo  -continue antibiotics per ID  : long-term antibiotics via new picc already inserted  -  Pt is growing MSSA and Finegoldia. No clear if ancef will cover the finegoldia-  switched to unasyn but now in view of abs for easier administration at d/c switched back to ancef 2 gms IVSS q 8 hours and add po flagyl 500 mg po q 12 hours  - reviewed with pharm D   - monitor CBC/diff and CMP  - check ESR and CRP and CBC with diff and CMP weekly at discharge  follow up with ID in two - three weeks once discharged         SECONDARY DISCHARGE DIAGNOSES  Diagnosis: Heart failure, unspecified HF chronicity, unspecified heart failure type  Assessment and Plan of Treatment: Weigh yourself daily.  If you gain 3lbs in 3 days, or 5lbs in a week call your Health Care Provider.  Do not eat or drink foods containing more than 2000mg of salt (sodium) in your diet every day.  Call your Health Care Provider if you have any swelling or increased swelling in your feet, ankles, and/or stomach.  Take all of your medication as directed.  If you become dizzy call your Health Care Provider.      Diagnosis: Diabetes  Assessment and Plan of Treatment: HgA1C this admission. (6.0)  Make sure you get your HgA1c checked every three months.  If you take oral diabetes medications, check your blood glucose two times a day.  If you take insulin, check your blood glucose before meals and at bedtime.  It's important not to skip any meals.  Keep a log of your blood glucose results and always take it with you to your doctor appointments.  Keep a list of your current medications including injectables and over the counter medications and bring this medication list with you to all your doctor appointments.  If you have not seen your ophthalmologist this year call for appointment.  Check your feet daily for redness, sores, or openings. Do not self treat. If no improvement in two days call your primary care physician for an appointment.  Low blood sugar (hypoglycemia) is a blood sugar below 70mg/dl. Check your blood sugar if you feel signs/symptoms of hypoglycemia. If your blood sugar is below 70 take 15 grams of carbohydrates (ex 4 oz of apple juice, 3-4 glucose tablets, or 4-6 oz of regular soda) wait 15 minutes and repeat blood sugar to make sure it comes up above 70.  If your blood sugar is above 70 and you are due for a meal, have a meal.  If you are not due for a meal have a snack.  This snack helps keeps your blood sugar at a safe range.      Diagnosis: HLD (hyperlipidemia)  Assessment and Plan of Treatment: Continue with your cholesterol medications. Eat a heart healthy diet that is low in saturated fats and salt, and includes whole grains, fruits, vegetables and lean protein; exercise regularly (consult with your physician or cardiologist first); maintain a heart healthy weight; if you smoke - quit (A resource to help you stop smoking is the [facility] Tobacco [facility] – phone number [phone].). Continue to follow with your primary physician or cardiologist.  Seek medical help for dizziness, Lightheadedness, Blurry vision, Headache, Chest pain, Shortness of breath      Diagnosis: Hypertension  Assessment and Plan of Treatment: Low salt diet  Activity as tolerated.  Take all medication as prescribed.  Follow up with your medical doctor for routine blood pressure monitoring at your next visit.  Notify your doctor if you have any of the following symptoms:   Dizziness, Lightheadedness, Blurry vision, Headache, Chest pain, Shortness of breath      Diagnosis: Deep vein thrombosis (DVT) of right upper extremity  Assessment and Plan of Treatment: Take your "blood thinners" as prescribed.  Walking is encouraged, increase activity as tolerated.  If you develop new leg pain, swelling, and/or redness contact your healthcare provider.  If you develop new chest pain with difficulty breathing, a rapid heart rate and/or a feeling of passing out call emergency medical services 911.

## 2023-08-08 NOTE — DISCHARGE NOTE PROVIDER - NSDCMRMEDTOKEN_GEN_ALL_CORE_FT
aspirin 81 mg oral delayed release tablet: 1 tab(s) orally once a day  Entresto 97 mg-103 mg oral tablet: 1 tab(s) orally 2 times a day  eplerenone 25 mg oral tablet: 1 tab(s) orally every other day  Farxiga 10 mg oral tablet: 1 tab(s) orally once a day  furosemide 40 mg oral tablet: 1 tab(s) orally once a day  Januvia 100 mg oral tablet: 1 tab(s) orally once a day  metFORMIN 1000 mg oral tablet: 1 tab(s) orally 2 times a day  metoprolol succinate 25 mg oral tablet, extended release: 3 tab(s) orally once a day  rosuvastatin 40 mg oral tablet: 1 tab(s) orally once a day  Xarelto 20 mg oral tablet: 1 tab(s) orally once a day   aspirin 81 mg oral delayed release tablet: 1 tab(s) orally once a day  Entresto 97 mg-103 mg oral tablet: 1 tab(s) orally 2 times a day  eplerenone 25 mg oral tablet: 1 tab(s) orally every other day  Farxiga 10 mg oral tablet: 1 tab(s) orally once a day  furosemide 40 mg oral tablet: 1 tab(s) orally once a day  IV Ancef 2g every 8 hours till 9/19/23: Weekly CBC, CMP, ESR, CRP [email or web address] and fax to ID Office [phone]  Januvia 100 mg oral tablet: 1 tab(s) orally once a day  metFORMIN 1000 mg oral tablet: 1 tab(s) orally 2 times a day  metoprolol succinate 25 mg oral tablet, extended release: 3 tab(s) orally once a day  Rolling Walker: Dx Foot OM M86.9, Weakness R53.1  rosuvastatin 40 mg oral tablet: 1 tab(s) orally once a day  Xarelto 20 mg oral tablet: 1 tab(s) orally once a day   aspirin 81 mg oral delayed release tablet: 1 tab(s) orally once a day  eplerenone 25 mg oral tablet: 1 tab(s) orally every other day  Farxiga 10 mg oral tablet: 1 tab(s) orally once a day  furosemide 20 mg oral tablet: 1 tab(s) orally once a day  IV Ancef 2g every 8 hours till 9/19/23: Weekly CBC, CMP, ESR, CRP emailed to [email or web address] and fax to ID Office [phone]  Januvia 100 mg oral tablet: 1 tab(s) orally once a day  metFORMIN 1000 mg oral tablet: 1 tab(s) orally 2 times a day  metoprolol succinate 50 mg oral tablet, extended release: 1 tab(s) orally once a day  metroNIDAZOLE 500 mg oral tablet: 1 tab(s) orally every 12 hours To be taken until 9/19/23  polyethylene glycol 3350 oral powder for reconstitution: 17 gram(s) orally once a day  rosuvastatin 40 mg oral tablet: 1 tab(s) orally once a day  sacubitril-valsartan 49 mg-51 mg oral tablet: 1 tab(s) orally 2 times a day  Xarelto 20 mg oral tablet: 1 tab(s) orally once a day

## 2023-08-08 NOTE — BRIEF OPERATIVE NOTE - OPERATION/FINDINGS
s/p R foot Incision and Drainage and partial 5th met base resection :   - no evidence of purulence tracking proximally, however wound tracking distally from lateral 5th base to central midfoot  - large necrotic tissue encountered

## 2023-08-08 NOTE — PROGRESS NOTE ADULT - SUBJECTIVE AND OBJECTIVE BOX
Patient is a 62y old  Male who presents with a chief complaint of diabetic foot infection, possible osteo (07 Aug 2023 12:43)       INTERVAL HPI/OVERNIGHT EVENTS:  Patient seen and evaluated at bedside.  Pt is resting comfortable in NAD. Denies N/V/F/C.    Allergies    No Known Allergies    Intolerances        Vital Signs Last 24 Hrs  T(C): 36.8 (08 Aug 2023 04:45), Max: 37 (07 Aug 2023 20:03)  T(F): 98.2 (08 Aug 2023 04:45), Max: 98.6 (07 Aug 2023 20:03)  HR: 95 (08 Aug 2023 04:45) (73 - 95)  BP: 103/65 (08 Aug 2023 04:45) (100/62 - 114/72)  BP(mean): --  RR: 17 (08 Aug 2023 04:45) (17 - 18)  SpO2: 95% (08 Aug 2023 04:45) (94% - 99%)    Parameters below as of 08 Aug 2023 04:45  Patient On (Oxygen Delivery Method): room air        LABS:                        9.8    5.92  )-----------( 141      ( 08 Aug 2023 05:06 )             30.8     08-08    141  |  103  |  24<H>  ----------------------------<  125<H>  3.8   |  24  |  1.26    Ca    9.1      08 Aug 2023 05:05    TPro  6.5  /  Alb  3.2<L>  /  TBili  0.5  /  DBili  x   /  AST  128<H>  /  ALT  78<H>  /  AlkPhos  77  08-08      Urinalysis Basic - ( 08 Aug 2023 05:05 )    Color: x / Appearance: x / SG: x / pH: x  Gluc: 125 mg/dL / Ketone: x  / Bili: x / Urobili: x   Blood: x / Protein: x / Nitrite: x   Leuk Esterase: x / RBC: x / WBC x   Sq Epi: x / Non Sq Epi: x / Bacteria: x      CAPILLARY BLOOD GLUCOSE      POCT Blood Glucose.: 180 mg/dL (07 Aug 2023 21:21)  POCT Blood Glucose.: 125 mg/dL (07 Aug 2023 17:09)  POCT Blood Glucose.: 165 mg/dL (07 Aug 2023 11:11)      Lower Extremity Physical Exam:  Vascular: DP/PT 0/4 B/L, CFT <3 sec x 10, Temp gradient warm to warm, RLE, warm to cool LLE.    Neurology: Epicritic sensation intact to level of digits, B/L  Musculoskeletal/Ortho: pain to palpation over right foot 5th digit   Skin: 8/7 s/p b/s Right foot I&D, 1cc of purulent drainage expressed, persistent cellulitis to distal leg, serous draining from peripheral wound, no malodor, wound tracks dorsal lateral and circumferential. Left foot with no open wounds or signs of infection.       RADIOLOGY & ADDITIONAL TESTS:

## 2023-08-09 LAB
ALBUMIN SERPL ELPH-MCNC: 3.3 G/DL — SIGNIFICANT CHANGE UP (ref 3.3–5)
ALP SERPL-CCNC: 93 U/L — SIGNIFICANT CHANGE UP (ref 40–120)
ALT FLD-CCNC: 83 U/L — HIGH (ref 10–45)
ANION GAP SERPL CALC-SCNC: 16 MMOL/L — SIGNIFICANT CHANGE UP (ref 5–17)
AST SERPL-CCNC: 111 U/L — HIGH (ref 10–40)
BILIRUB SERPL-MCNC: 0.5 MG/DL — SIGNIFICANT CHANGE UP (ref 0.2–1.2)
BUN SERPL-MCNC: 26 MG/DL — HIGH (ref 7–23)
CALCIUM SERPL-MCNC: 9 MG/DL — SIGNIFICANT CHANGE UP (ref 8.4–10.5)
CHLORIDE SERPL-SCNC: 101 MMOL/L — SIGNIFICANT CHANGE UP (ref 96–108)
CO2 SERPL-SCNC: 24 MMOL/L — SIGNIFICANT CHANGE UP (ref 22–31)
CREAT SERPL-MCNC: 1.18 MG/DL — SIGNIFICANT CHANGE UP (ref 0.5–1.3)
EGFR: 70 ML/MIN/1.73M2 — SIGNIFICANT CHANGE UP
GLUCOSE BLDC GLUCOMTR-MCNC: 148 MG/DL — HIGH (ref 70–99)
GLUCOSE BLDC GLUCOMTR-MCNC: 151 MG/DL — HIGH (ref 70–99)
GLUCOSE BLDC GLUCOMTR-MCNC: 156 MG/DL — HIGH (ref 70–99)
GLUCOSE BLDC GLUCOMTR-MCNC: 165 MG/DL — HIGH (ref 70–99)
GLUCOSE SERPL-MCNC: 150 MG/DL — HIGH (ref 70–99)
GRAM STN FLD: SIGNIFICANT CHANGE UP
HCT VFR BLD CALC: 32.5 % — LOW (ref 39–50)
HGB BLD-MCNC: 10.3 G/DL — LOW (ref 13–17)
MCHC RBC-ENTMCNC: 28.1 PG — SIGNIFICANT CHANGE UP (ref 27–34)
MCHC RBC-ENTMCNC: 31.7 GM/DL — LOW (ref 32–36)
MCV RBC AUTO: 88.8 FL — SIGNIFICANT CHANGE UP (ref 80–100)
NRBC # BLD: 0 /100 WBCS — SIGNIFICANT CHANGE UP (ref 0–0)
PLATELET # BLD AUTO: 204 K/UL — SIGNIFICANT CHANGE UP (ref 150–400)
POTASSIUM SERPL-MCNC: 3.7 MMOL/L — SIGNIFICANT CHANGE UP (ref 3.5–5.3)
POTASSIUM SERPL-SCNC: 3.7 MMOL/L — SIGNIFICANT CHANGE UP (ref 3.5–5.3)
PROT SERPL-MCNC: 6.8 G/DL — SIGNIFICANT CHANGE UP (ref 6–8.3)
RBC # BLD: 3.66 M/UL — LOW (ref 4.2–5.8)
RBC # FLD: 16.2 % — HIGH (ref 10.3–14.5)
SODIUM SERPL-SCNC: 141 MMOL/L — SIGNIFICANT CHANGE UP (ref 135–145)
SPECIMEN SOURCE: SIGNIFICANT CHANGE UP
WBC # BLD: 7.81 K/UL — SIGNIFICANT CHANGE UP (ref 3.8–10.5)
WBC # FLD AUTO: 7.81 K/UL — SIGNIFICANT CHANGE UP (ref 3.8–10.5)

## 2023-08-09 PROCEDURE — 93970 EXTREMITY STUDY: CPT | Mod: 26

## 2023-08-09 PROCEDURE — 76705 ECHO EXAM OF ABDOMEN: CPT | Mod: 26

## 2023-08-09 RX ORDER — METOPROLOL TARTRATE 50 MG
50 TABLET ORAL DAILY
Refills: 0 | Status: DISCONTINUED | OUTPATIENT
Start: 2023-08-09 | End: 2023-08-16

## 2023-08-09 RX ADMIN — Medication 100 MILLIGRAM(S): at 13:10

## 2023-08-09 RX ADMIN — POLYETHYLENE GLYCOL 3350 17 GRAM(S): 17 POWDER, FOR SOLUTION ORAL at 12:50

## 2023-08-09 RX ADMIN — Medication 2: at 17:00

## 2023-08-09 RX ADMIN — PIPERACILLIN AND TAZOBACTAM 25 GRAM(S): 4; .5 INJECTION, POWDER, LYOPHILIZED, FOR SOLUTION INTRAVENOUS at 05:31

## 2023-08-09 RX ADMIN — CHLORHEXIDINE GLUCONATE 1 APPLICATION(S): 213 SOLUTION TOPICAL at 06:18

## 2023-08-09 RX ADMIN — Medication 100 MILLIGRAM(S): at 05:31

## 2023-08-09 RX ADMIN — Medication 25 MILLIGRAM(S): at 06:11

## 2023-08-09 RX ADMIN — Medication 3 MILLIGRAM(S): at 21:59

## 2023-08-09 RX ADMIN — Medication 100 MILLIGRAM(S): at 21:23

## 2023-08-09 RX ADMIN — PIPERACILLIN AND TAZOBACTAM 25 GRAM(S): 4; .5 INJECTION, POWDER, LYOPHILIZED, FOR SOLUTION INTRAVENOUS at 13:45

## 2023-08-09 RX ADMIN — SACUBITRIL AND VALSARTAN 1 TABLET(S): 24; 26 TABLET, FILM COATED ORAL at 17:16

## 2023-08-09 RX ADMIN — Medication 2: at 09:11

## 2023-08-09 RX ADMIN — ATORVASTATIN CALCIUM 80 MILLIGRAM(S): 80 TABLET, FILM COATED ORAL at 21:23

## 2023-08-09 RX ADMIN — SACUBITRIL AND VALSARTAN 1 TABLET(S): 24; 26 TABLET, FILM COATED ORAL at 06:11

## 2023-08-09 RX ADMIN — Medication 2: at 12:49

## 2023-08-09 NOTE — PHARMACOTHERAPY INTERVENTION NOTE - COMMENTS
JUNAID Keen is a 61 y/o M with a significant PMH including HTN, DM, defibrillator, CAD post CABG, CHF, hx of Sommers fracture. Pt is currently on cefazolin 2g q8h for diabetic foot infection with abscess cultures growing staph aureus (recent bone biopsy with Dr. Foss came back positive for MSSA). Recommend discontinuing Zosyn. Spoke with provider who agreed to change.    Ankush uDbois, PharmD  PGY1 Pharmacy Resident  Spectra: [phone]

## 2023-08-09 NOTE — PROGRESS NOTE ADULT - ASSESSMENT
62 y.o M 8/8 s/p Right Foot incision and drainage with partial 5th ray resection, open  - Pt seen and evaluated  - Afebrile, no leukocytosis   - 8/8 s/p Right Foot incision and drainage with partial 5th ray resection, open, retention sutures intact, no hematoma expressed, mild danielle-wound erythema and dorsal ecchymosis, no pus or drainage noted, skin flap appears warm, viable with normal cap refill time.   - High concern for residual bone infection  - High concern for viability  - OR cultures and pathology, pending   - Pod plan: start VAC tomororw to the Right foot w/ possible return to OR pending viability/ appearance   - Discussed with attending

## 2023-08-09 NOTE — PROGRESS NOTE ADULT - SUBJECTIVE AND OBJECTIVE BOX
Date of Service: 08-09-23 @ 07:08           CARDIOLOGY     PROGRESS  NOTE   ________________________________________________    CHIEF COMPLAINT:Patient is a 62y old  Male who presents with a chief complaint of diabetic foot infection, possible osteo (08 Aug 2023 19:25)  no complain  	  REVIEW OF SYSTEMS:  CONSTITUTIONAL: No fever, weight loss, or fatigue  EYES: No eye pain, visual disturbances, or discharge  ENT:  No difficulty hearing, tinnitus, vertigo; No sinus or throat pain  NECK: No pain or stiffness  RESPIRATORY: No cough, wheezing, chills or hemoptysis; No Shortness of Breath  CARDIOVASCULAR: No chest pain, palpitations, passing out, dizziness, or leg swelling  GASTROINTESTINAL: No abdominal or epigastric pain. No nausea, vomiting, or hematemesis; No diarrhea or constipation. No melena or hematochezia.  GENITOURINARY: No dysuria, frequency, hematuria, or incontinence  NEUROLOGICAL: No headaches, memory loss, loss of strength, numbness, or tremors  SKIN: No itching, burning, rashes, or lesions   LYMPH Nodes: No enlarged glands  ENDOCRINE: No heat or cold intolerance; No hair loss  MUSCULOSKELETAL: No joint pain or swelling; No muscle, back, + extremity pain  PSYCHIATRIC: No depression, anxiety, mood swings, or difficulty sleeping  HEME/LYMPH: No easy bruising, or bleeding gums  ALLERGY AND IMMUNOLOGIC: No hives or eczema	    [x ] All others negative	  [ ] Unable to obtain    PHYSICAL EXAM:  T(C): 36.8 (08-09-23 @ 05:53), Max: 37.2 (08-08-23 @ 17:11)  HR: 86 (08-09-23 @ 05:53) (72 - 101)  BP: 97/63 (08-09-23 @ 05:53) (95/58 - 109/61)  RR: 18 (08-09-23 @ 05:53) (17 - 18)  SpO2: 95% (08-09-23 @ 05:53) (95% - 98%)  Wt(kg): --  I&O's Summary    08 Aug 2023 07:01  -  09 Aug 2023 07:00  --------------------------------------------------------  IN: 275 mL / OUT: 1501 mL / NET: -1226 mL        Appearance: Normal	  HEENT:   Normal oral mucosa, PERRL, EOMI	  Lymphatic: No lymphadenopathy  Cardiovascular: Normal S1 S2, No JVD, + murmurs, No edema  Respiratory: rhonchi  Psychiatry: A & O x 3, Mood & affect appropriate  Gastrointestinal:  Soft, Non-tender, + BS	  Skin: No rashes, No ecchymoses, No cyanosis	  Neurologic: Non-focal  Extremities: Normal range of motion, + swelling/ edema of RLE/ bandaged R foot  Vascular:+ pvd    MEDICATIONS  (STANDING):  atorvastatin 80 milliGRAM(s) Oral at bedtime  ceFAZolin   IVPB 2000 milliGRAM(s) IV Intermittent every 8 hours  chlorhexidine 2% Cloths 1 Application(s) Topical <User Schedule>  dextrose 5%. 1000 milliLiter(s) (100 mL/Hr) IV Continuous <Continuous>  dextrose 5%. 1000 milliLiter(s) (50 mL/Hr) IV Continuous <Continuous>  dextrose 50% Injectable 25 Gram(s) IV Push once  dextrose 50% Injectable 25 Gram(s) IV Push once  dextrose 50% Injectable 12.5 Gram(s) IV Push once  furosemide    Tablet 40 milliGRAM(s) Oral daily  glucagon  Injectable 1 milliGRAM(s) IntraMuscular once  insulin lispro (ADMELOG) corrective regimen sliding scale   SubCutaneous three times a day before meals  metoprolol succinate ER 25 milliGRAM(s) Oral daily  ondansetron Injectable 4 milliGRAM(s) IV Push once  piperacillin/tazobactam IVPB.. 3.375 Gram(s) IV Intermittent every 8 hours  polyethylene glycol 3350 17 Gram(s) Oral daily  sacubitril 97 mG/valsartan 103 mG 1 Tablet(s) Oral two times a day  spironolactone 25 milliGRAM(s) Oral daily      TELEMETRY: 	    ECG:  	  RADIOLOGY:  OTHER: 	  	  LABS:	 	    CARDIAC MARKERS:                                10.0   5.98  )-----------( 147      ( 08 Aug 2023 08:33 )             31.6     08-08    141  |  104  |  23  ----------------------------<  133<H>  3.8   |  24  |  1.13    Ca    9.2      08 Aug 2023 08:33    TPro  6.5  /  Alb  3.2<L>  /  TBili  0.5  /  DBili  x   /  AST  128<H>  /  ALT  78<H>  /  AlkPhos  77  08-08    proBNP:   Lipid Profile: Cholesterol 75  LDL --  HDL 27      HgA1c:   TSH:   PT/INR - ( 08 Aug 2023 08:33 )   PT: 24.5 sec;   INR: 2.39 ratio         PTT - ( 08 Aug 2023 08:33 )  PTT:38.5 sec    - MRI cx 2/2 not PM compatible   - recommend EP consult for pacemaker interrogation    - Emergency add on for right foot incision and drainage w/ Dr. Foss after 4pm   - NPO STAT  - Preop labs ordered   - Please document medical clearance for podiatric surgery today      s/p R foot Incision and Drainage and partial 5th met base resection :   - no evidence of purulence tracking proximally, however wound tracking distally from lateral 5th base to central midfoot  - large necrotic tissue encountered    Assessment and plan  ---------------------------  Patient is a 63yo Male with past medical history of HTN, DM, defibrillator, CAD post CABG, CHF, hx of Sommers fracture, non-union many years ago, presenting with  ulceration, recent bone biopsy with Dr Foss as op , came back positive for staph, treated with Levaquin, presented to ED with acute and significant swelling to the foot as advised to go to the ER for eval.   denies recent fall or trauma.  states  over the past few days right foot has been increasing in swelling. and has been having some fevers ... states hard to walk on his foot due to pain. states had temp at home of 101-102. denies n/v/d, CP, SOB, HA, dizziness, abdominal pain, urinary symptoms, cough, leg pain, swelling.  patient evaluated by podiatry in ED for presence of gas.. MRI and vascular studies ordered already, antibiotics given..   pt with sig cardiac hx for possible vascular and podiatric surgery  will call PMD to get records  continue all cardiac meds  will adjust cardiac meds  will check AICD,about MRI compatibility  abx , fu blood cultures  doubt DVT pt on AC  will call primary cardiology to get records  echo noted with severe LV dysfunction  continue current meds, may add Farxiga 10 mg upon dc for heart failure  will check AICD will call NP, [facility] not MRI compatible  ID noted, s/p I&d  DVT prophylaxis

## 2023-08-09 NOTE — PROGRESS NOTE ADULT - ASSESSMENT
_________________________________________________________________________________________  ========>>  M E D I C A L   A T T E N D I N G    F O L L O W  U P  N O T E  <<=========  -----------------------------------------------------------------------------------------------------    - Patient seen and examined by me earlier today.   - In summary,  RYLEE HOWE is a 62y year old man admitted with diabetic foot infection   - Patient today overall doing ok, comfortable, NPO for OR today     ==================>> REVIEW OF SYSTEM <<=================    GEN: no fever, no chills, no significant pain reported   RESP: no SOB, no cough, no sputum  CVS: no chest pain, no palpitations, no edema  GI: no abdominal pain, no nausea, no constipation, no diarrhea  : no dysuria, no frequency, no hematuria  Neuro: no headache, no dizziness  Derm : no itching, no rash    ==================>> PHYSICAL EXAM <<=================    GEN: A&O X 3 , NAD , comfortable, pleasant, calm   HEENT: NCAT, PERRL, MMM, hearing intact  Neck: supple , no JVD appreciated  CVS: S1S2 , regular , No M/R/G appreciated  PULM: CTA B/L,  no W/R/R appreciated  ABD.: soft. non tender, non distended,  bowel sounds present  Extrem: intact pulses , + Rt LE edema and erythema MUCH improved ,, wound dressed by pod team   PSYCH : normal mood,  not anxious                               ( Note written / Date of service 08-09-23 )    ==================>> MEDICATIONS <<====================    atorvastatin 80 milliGRAM(s) Oral at bedtime  ceFAZolin   IVPB 2000 milliGRAM(s) IV Intermittent every 8 hours  chlorhexidine 2% Cloths 1 Application(s) Topical <User Schedule>  dextrose 5%. 1000 milliLiter(s) IV Continuous <Continuous>  dextrose 5%. 1000 milliLiter(s) IV Continuous <Continuous>  dextrose 50% Injectable 25 Gram(s) IV Push once  dextrose 50% Injectable 12.5 Gram(s) IV Push once  dextrose 50% Injectable 25 Gram(s) IV Push once  furosemide    Tablet 40 milliGRAM(s) Oral daily  glucagon  Injectable 1 milliGRAM(s) IntraMuscular once  insulin lispro (ADMELOG) corrective regimen sliding scale   SubCutaneous three times a day before meals  metoprolol succinate ER 25 milliGRAM(s) Oral daily  ondansetron Injectable 4 milliGRAM(s) IV Push once  piperacillin/tazobactam IVPB.. 3.375 Gram(s) IV Intermittent every 8 hours  polyethylene glycol 3350 17 Gram(s) Oral daily  sacubitril 97 mG/valsartan 103 mG 1 Tablet(s) Oral two times a day  spironolactone 25 milliGRAM(s) Oral daily    MEDICATIONS  (PRN):  acetaminophen     Tablet .. 650 milliGRAM(s) Oral every 6 hours PRN Temp greater or equal to 38C (100.4F), Mild Pain (1 - 3)  acetaminophen     Tablet .. 650 milliGRAM(s) Oral every 6 hours PRN Mild Pain (1 - 3)  aluminum hydroxide/magnesium hydroxide/simethicone Suspension 30 milliLiter(s) Oral every 4 hours PRN Dyspepsia  dextrose Oral Gel 15 Gram(s) Oral once PRN Blood Glucose LESS THAN 70 milliGRAM(s)/deciliter  HYDROmorphone  Injectable 0.5 milliGRAM(s) IV Push every 4 hours PRN Severe Pain (7 - 10)  melatonin 3 milliGRAM(s) Oral at bedtime PRN Insomnia  ondansetron Injectable 4 milliGRAM(s) IV Push every 8 hours PRN Nausea and/or Vomiting  oxycodone    5 mG/acetaminophen 325 mG 2 Tablet(s) Oral every 4 hours PRN Moderate Pain (4 - 6)    ___________  Active diet:  Diet, Regular:   Consistent Carbohydrate Evening Snack (CSTCHOSN)  DASH/TLC Sodium & Cholesterol Restricted (DASH)  ___________________    ==================>> VITAL SIGNS <<==================  Height (cm): 185.4  Weight (kg): 117.9  BMI (kg/m2): 34.3  Vital Signs Last 24 HrsT(C): 36.4 (08-09-23 @ 16:18)  T(F): 97.5 (08-09-23 @ 16:18), Max: 99 (08-08-23 @ 17:11)  HR: 75 (08-09-23 @ 16:18) (60 - 101)  BP: 105/67 (08-09-23 @ 16:18)  RR: 18 (08-09-23 @ 16:18) (18 - 18)  SpO2: 94% (08-09-23 @ 16:18) (94% - 98%)      CAPILLARY BLOOD GLUCOSE      POCT Blood Glucose.: 165 mg/dL (09 Aug 2023 16:53)  POCT Blood Glucose.: 156 mg/dL (09 Aug 2023 12:38)  POCT Blood Glucose.: 151 mg/dL (09 Aug 2023 08:31)  POCT Blood Glucose.: 151 mg/dL (08 Aug 2023 21:18)  POCT Blood Glucose.: 131 mg/dL (08 Aug 2023 19:22)     ==================>> LAB AND IMAGING <<==================                        10.3   7.81  )-----------( 204      ( 09 Aug 2023 07:20 )             32.5        08-09    141  |  101  |  26<H>  ----------------------------<  150<H>  3.7   |  24  |  1.18    Ca    9.0      09 Aug 2023 07:17    TPro  6.8  /  Alb  3.3  /  TBili  0.5  /  DBili  x   /  AST  111<H>  /  ALT  83<H>  /  AlkPhos  93  08-09    WBC count:   7.81 <<== ,  5.98 <<== ,  5.92 <<== ,  6.41 <<== ,  7.10 <<==   Hemoglobin:   10.3 <<==,  10.0 <<==,  9.8 <<==,  9.4 <<==,  10.4 <<==  platelets:  204 <==, 147 <==, 141 <==, 133 <==, 150 <==    Creatinine:  1.18  <<==, 1.13  <<==, 1.26  <<==, 1.49  <<==, 1.56  <<==  Sodium:   141  <==, 141  <==, 141  <==, 138  <==, 139  <==       AST:          111 <== , 128 <== , 44 <==      ALT:        83  <== , 78  <== , 33  <==      AP:        93  <=, 77  <=, 61  <=     Bili:        0.5  <=, 0.5  <=, 0.6  <=    ____________________________    M I C R O B I O L O G Y :    Culture - Tissue with Gram Stain (collected 08 Aug 2023 22:08)  Source: .Tissue Other  Gram Stain (09 Aug 2023 03:19):    No polymorphonuclear cells seen per low power field    No organisms seen per oil power field    Culture - Abscess with Gram Stain (collected 07 Aug 2023 08:21)  Source: .Abscess right foot  Gram Stain (07 Aug 2023 23:24):    Rare polymorphonuclear leukocytes per low power field    No organisms seen per oil power field  Preliminary Report (09 Aug 2023 15:39):    Rare Staphylococcus aureus Susceptibility to follow.    Few Finegoldia magna "Susceptibilities not performed"    Culture - Abscess with Gram Stain (collected 06 Aug 2023 15:42)  Source: .Abscess right foot  Gram Stain (07 Aug 2023 01:46):    Rare polymorphonuclear leukocytes seen per low power field    No organisms seen per oil power field  Final Report (08 Aug 2023 17:11):    Rare Coag Negative Staphylococcus "Susceptibilities not performed"    Few Finegoldia magna "Susceptibilities not performed"    Culture - Blood (collected 06 Aug 2023 11:30)  Source: .Blood Blood  Preliminary Report (08 Aug 2023 17:01):    No growth at 48 Hours    Culture - Blood (collected 06 Aug 2023 11:15)  Source: .Blood Blood  Preliminary Report (08 Aug 2023 17:01):    No growth at 48 Hours            < from: TTE W or WO Ultrasound Enhancing Agent (08.07.23 @ 15:33) >  CONCLUSIONS:   1. Severely dilated left ventricular cavity size.The left ventricular systolic function is severely decreased with an ejection fraction visually estimated at 25 to 30 %. There is global left ventricular hypokinesis.   2. Multiple segmental abnormalities exist. See findings.   3. Device lead is visualized in the right ventricle.   4. The aortic annulus and aortic root appear normal in size.  < end of copied text >    < from: Xray Foot AP + Lateral, Right (08.06.23 @ 11:40) >  IMPRESSION:  Diffuse soft tissue swelling throughout the right foot with a possible   trace amount of subcutaneous emphysema along the lateral aspect of the   right mid foot.  No definite cortical erosion seen to indicate acute osteomyelitis.  MR may be useful in further evaluation if there are no contraindications  < end of copied text >    ___________________________________________________________________________________  ===============>>  A S S E S S M E N T   A N D   P L A N <<===============  ------------------------------------------------------------------------------------------    · Assessment	  Patient is a 63yo Male with past medical history of HTN, DM, defibrillator, CAD post CABG, CHF, hx of Sommers fracture, non-union many years ago, presenting with  ulceration, recent bone biopsy with Dr Foss as op , came back positive for staph, treated with Levaquin, presented to ED with acute and significant swelling to the foot as advised to go to the ER for eval.   denies recent fall or trauma. states over the past few days right foot has been increasing in swelling. and has been having some fevers ... states hard to walk on his foot due to pain. states had temp at home of 101-102. denies n/v/d, CP, SOB, HA, dizziness, abdominal pain, urinary symptoms, cough, leg pain, swelling.  patient evaluated by podiatry in ED for presence of gas.. MRI and vascular studies ordered already, antibiotics given..         Problem/Plan - 1:  ·  Problem: Diabetic foot infection.   ·  Plan: appreciated podiatry evaluation  local wound care  continue antibiotics per ID  : appreciated      MRI likely not an options as ICD / wires not compatible above ( awaiting confirmation / interrogation)          CT scan ordered      vascular studies noted >> vascular consulted /   plan for OR debridement today per podiatry  medically stable  Echo as above   cardio following , appreciated   pain management as needed  supportive care   diuretics ( home dose: continue)   leg elevation: helping a lot     Problem/Plan - 2:  ·  Problem: Diabetes.   ·  Plan: patient states has been overall well controlled but in past 1-2 days more elevated...   ---monitor finger sticks closely  ---Insulin regimen as ordered and monitor / adjust as needed  --- hold home medications for now   ---Diabetic diet  ---A1c. 6    Problem/Plan - 3:  ·  Problem: Heart failure, unspecified HF chronicity, unspecified heart failure type.   ·  Plan: patient with likely chronic systolic CHF on entresto and lasix / Epleronon   continue home medications  Echo as above   cardio following for optimization   monitor otherwise.    Problem/Plan - 4:  ·  Problem: HLD (hyperlipidemia).   ·  Plan: continue equivalent statin dose  lipid profile.    Problem/Plan - 5:  ·  Problem: Deep vein thrombosis (DVT) of right upper extremity.   ·  Plan: on Xarelto for past ~ 2.5 months ( was on Eliqius first)  for Rt upper extremity DVT post infection of Elbow !   continue for now  likely would repeat Duplex and decide need for further treatment / duration...    Problem/Plan - 6:  ·  Problem: R/O Acute osteomyelitis.   ·  Plan: as above  follow CT scan and OR biopsy ..   ID /  pod following     -GI/DVT Prophylaxis per protocol.    --------------------------------------------  Case discussed with patient, NP/..   Education given on findings and plan of care  ___________________________  H. RACHAEL Boudreaux.  Pager: [phone]       _________________________________________________________________________________________  ========>>  M E D I C A L   A T T E N D I N G    F O L L O W  U P  N O T E  <<=========  -----------------------------------------------------------------------------------------------------    - Patient seen and examined by me earlier today.   - In summary,  RYLEE HOWE is a 62y year old man admitted with diabetic foot infection   - Patient today overall doing ok, comfortable, No significant pain post op      Patient is concerned that he's not getting 75 mg of metoprolol as it usually gets at home    ==================>> REVIEW OF SYSTEM <<=================    GEN: no fever, no chills, no significant pain reported   RESP: no SOB, no cough, no sputum  CVS: no chest pain, no palpitations, no edema  GI: no abdominal pain, no nausea, no constipation, no diarrhea  : no dysuria, no frequency, no hematuria  Neuro: no headache, no dizziness  Derm : no itching, no rash    ==================>> PHYSICAL EXAM <<=================    GEN: A&O X 3 , NAD , comfortable, pleasant, calm   HEENT: NCAT, PERRL, MMM, hearing intact  Neck: supple , no JVD appreciated  CVS: S1S2 , regular , No M/R/G appreciated  PULM: CTA B/L,  no W/R/R appreciated  ABD.: soft. non tender, non distended,  bowel sounds present  Extrem: intact pulses , + Rt LE edema and erythema MUCH improved ,, wound dressed by pod team   PSYCH : normal mood,  not anxious                               ( Note written / Date of service 08-09-23 )    ==================>> MEDICATIONS <<====================    atorvastatin 80 milliGRAM(s) Oral at bedtime  ceFAZolin   IVPB 2000 milliGRAM(s) IV Intermittent every 8 hours  chlorhexidine 2% Cloths 1 Application(s) Topical <User Schedule>  dextrose 5%. 1000 milliLiter(s) IV Continuous <Continuous>  dextrose 5%. 1000 milliLiter(s) IV Continuous <Continuous>  dextrose 50% Injectable 25 Gram(s) IV Push once  dextrose 50% Injectable 12.5 Gram(s) IV Push once  dextrose 50% Injectable 25 Gram(s) IV Push once  furosemide    Tablet 40 milliGRAM(s) Oral daily  glucagon  Injectable 1 milliGRAM(s) IntraMuscular once  insulin lispro (ADMELOG) corrective regimen sliding scale   SubCutaneous three times a day before meals  metoprolol succinate ER 25 milliGRAM(s) Oral daily  ondansetron Injectable 4 milliGRAM(s) IV Push once  piperacillin/tazobactam IVPB.. 3.375 Gram(s) IV Intermittent every 8 hours  polyethylene glycol 3350 17 Gram(s) Oral daily  sacubitril 97 mG/valsartan 103 mG 1 Tablet(s) Oral two times a day  spironolactone 25 milliGRAM(s) Oral daily    MEDICATIONS  (PRN):  acetaminophen     Tablet .. 650 milliGRAM(s) Oral every 6 hours PRN Temp greater or equal to 38C (100.4F), Mild Pain (1 - 3)  acetaminophen     Tablet .. 650 milliGRAM(s) Oral every 6 hours PRN Mild Pain (1 - 3)  aluminum hydroxide/magnesium hydroxide/simethicone Suspension 30 milliLiter(s) Oral every 4 hours PRN Dyspepsia  dextrose Oral Gel 15 Gram(s) Oral once PRN Blood Glucose LESS THAN 70 milliGRAM(s)/deciliter  HYDROmorphone  Injectable 0.5 milliGRAM(s) IV Push every 4 hours PRN Severe Pain (7 - 10)  melatonin 3 milliGRAM(s) Oral at bedtime PRN Insomnia  ondansetron Injectable 4 milliGRAM(s) IV Push every 8 hours PRN Nausea and/or Vomiting  oxycodone    5 mG/acetaminophen 325 mG 2 Tablet(s) Oral every 4 hours PRN Moderate Pain (4 - 6)    ___________  Active diet:  Diet, Regular:   Consistent Carbohydrate Evening Snack (CSTCHOSN)  DASH/TLC Sodium & Cholesterol Restricted (DASH)  ___________________    ==================>> VITAL SIGNS <<==================  Height (cm): 185.4  Weight (kg): 117.9  BMI (kg/m2): 34.3  Vital Signs Last 24 HrsT(C): 36.4 (08-09-23 @ 16:18)  T(F): 97.5 (08-09-23 @ 16:18), Max: 99 (08-08-23 @ 17:11)  HR: 75 (08-09-23 @ 16:18) (60 - 101)  BP: 105/67 (08-09-23 @ 16:18)  RR: 18 (08-09-23 @ 16:18) (18 - 18)  SpO2: 94% (08-09-23 @ 16:18) (94% - 98%)      CAPILLARY BLOOD GLUCOSE    POCT Blood Glucose.: 165 mg/dL (09 Aug 2023 16:53)  POCT Blood Glucose.: 156 mg/dL (09 Aug 2023 12:38)  POCT Blood Glucose.: 151 mg/dL (09 Aug 2023 08:31)  POCT Blood Glucose.: 151 mg/dL (08 Aug 2023 21:18)  POCT Blood Glucose.: 131 mg/dL (08 Aug 2023 19:22)     ==================>> LAB AND IMAGING <<==================                        10.3   7.81  )-----------( 204      ( 09 Aug 2023 07:20 )             32.5        08-09    141  |  101  |  26<H>  ----------------------------<  150<H>  3.7   |  24  |  1.18    Ca    9.0      09 Aug 2023 07:17    TPro  6.8  /  Alb  3.3  /  TBili  0.5  /  DBili  x   /  AST  111<H>  /  ALT  83<H>  /  AlkPhos  93  08-09    WBC count:   7.81 <<== ,  5.98 <<== ,  5.92 <<== ,  6.41 <<== ,  7.10 <<==   Hemoglobin:   10.3 <<==,  10.0 <<==,  9.8 <<==,  9.4 <<==,  10.4 <<==  platelets:  204 <==, 147 <==, 141 <==, 133 <==, 150 <==    Creatinine:  1.18  <<==, 1.13  <<==, 1.26  <<==, 1.49  <<==, 1.56  <<==  Sodium:   141  <==, 141  <==, 141  <==, 138  <==, 139  <==       AST:          111 <== , 128 <== , 44 <==      ALT:        83  <== , 78  <== , 33  <==      AP:        93  <=, 77  <=, 61  <=     Bili:        0.5  <=, 0.5  <=, 0.6  <=    ____________________________    M I C R O B I O L O G Y :    Culture - Tissue with Gram Stain (collected 08 Aug 2023 22:08)  Source: .Tissue Other  Gram Stain (09 Aug 2023 03:19):    No polymorphonuclear cells seen per low power field    No organisms seen per oil power field    Culture - Abscess with Gram Stain (collected 07 Aug 2023 08:21)  Source: .Abscess right foot  Gram Stain (07 Aug 2023 23:24):    Rare polymorphonuclear leukocytes per low power field    No organisms seen per oil power field  Preliminary Report (09 Aug 2023 15:39):    Rare Staphylococcus aureus Susceptibility to follow.    Few Finegoldia magna "Susceptibilities not performed"    Culture - Abscess with Gram Stain (collected 06 Aug 2023 15:42)  Source: .Abscess right foot  Gram Stain (07 Aug 2023 01:46):    Rare polymorphonuclear leukocytes seen per low power field    No organisms seen per oil power field  Final Report (08 Aug 2023 17:11):    Rare Coag Negative Staphylococcus "Susceptibilities not performed"    Few Finegoldia magna "Susceptibilities not performed"    Culture - Blood (collected 06 Aug 2023 11:30)  Source: .Blood Blood  Preliminary Report (08 Aug 2023 17:01):    No growth at 48 Hours    Culture - Blood (collected 06 Aug 2023 11:15)  Source: .Blood Blood  Preliminary Report (08 Aug 2023 17:01):    No growth at 48 Hours      < from: TTE W or WO Ultrasound Enhancing Agent (08.07.23 @ 15:33) >  CONCLUSIONS:   1. Severely dilated left ventricular cavity size.The left ventricular systolic function is severely decreased with an ejection fraction visually estimated at 25 to 30 %. There is global left ventricular hypokinesis.   2. Multiple segmental abnormalities exist. See findings.   3. Device lead is visualized in the right ventricle.   4. The aortic annulus and aortic root appear normal in size.  < end of copied text >    < from: Xray Foot AP + Lateral, Right (08.06.23 @ 11:40) >  IMPRESSION:  Diffuse soft tissue swelling throughout the right foot with a possible   trace amount of subcutaneous emphysema along the lateral aspect of the   right mid foot.  No definite cortical erosion seen to indicate acute osteomyelitis.  MR may be useful in further evaluation if there are no contraindications  < end of copied text >    ___________________________________________________________________________________  ===============>>  A S S E S S M E N T   A N D   P L A N <<===============  ------------------------------------------------------------------------------------------    · Assessment	  Patient is a 63yo Male with past medical history of HTN, DM, defibrillator, CAD post CABG, CHF, hx of Sommers fracture, non-union many years ago, presenting with  ulceration, recent bone biopsy with Dr Foss as op , came back positive for staph, treated with Levaquin, presented to ED with acute and significant swelling to the foot as advised to go to the ER for eval.   denies recent fall or trauma. states over the past few days right foot has been increasing in swelling. and has been having some fevers ... states hard to walk on his foot due to pain. states had temp at home of 101-102. denies n/v/d, CP, SOB, HA, dizziness, abdominal pain, urinary symptoms, cough, leg pain, swelling.  patient evaluated by podiatry in ED for presence of gas.. MRI and vascular studies ordered already, antibiotics given..         Problem/Plan - 1:  ·  Problem: Diabetic foot infection. , Osteomyelitis  ·  Plan: appreciated podiatry evaluation  local wound care  continue antibiotics per ID  : Likely will need long-term antibiotics      Infectious disease, podiatry, vascular follow-up  pain management as needed  supportive care   diuretics leg elevation: helping a lot     Problem/Plan - 2:  ·  Problem: Diabetes.   ·  Plan: patient states has been overall well controlled but in past 1-2 days more elevated...   ---monitor finger sticks closely  ---Insulin regimen as ordered and monitor / adjust as needed  --- hold home medications for now   ---Diabetic diet  ---A1c. 6    May consider adding Farxiga on discharge give CHF    Problem/Plan - 3:  ·  Problem: Heart failure, unspecified HF chronicity, unspecified heart failure type.   ·  Plan: patient with likely chronic systolic CHF on entresto and lasix / Epleronon   continue home medications  Patient takes 75 mg of toprol  at home, here is a 25.  Increase to 50 for now: monitor closely  Echo as above   cardio following for optimization   monitor otherwise.    Problem/Plan - 4:  ·  Problem: HLD (hyperlipidemia).   ·  Plan: continue equivalent statin dose  lipid profile.    Problem/Plan - 5:  ·  Problem: Deep vein thrombosis (DVT) of right upper extremity.   ·  Plan: on Xarelto for past ~ 2.5 months ( was on Eliqius first)  for Rt upper extremity DVT post infection of Elbow !   continue for now  likely would repeat Duplex and decide need for further treatment / duration...    -GI/DVT Prophylaxis per protocol.    --------------------------------------------  Case discussed with patient, NP, RN   Education given on findings and plan of care  ___________________________  H. RACHAEL Boudreaux.  Pager: [phone]

## 2023-08-09 NOTE — PROGRESS NOTE ADULT - SUBJECTIVE AND OBJECTIVE BOX
Patient is a 62y old  Male who presents with a chief complaint of diabetic foot infection, possible osteo (09 Aug 2023 07:07)       INTERVAL HPI/OVERNIGHT EVENTS:  Patient seen and evaluated at bedside.  Pt is resting comfortable in NAD. Denies N/V/F/C.    Allergies    No Known Allergies    Intolerances        Vital Signs Last 24 Hrs  T(C): 36.8 (09 Aug 2023 05:53), Max: 37.2 (08 Aug 2023 17:11)  T(F): 98.3 (09 Aug 2023 05:53), Max: 99 (08 Aug 2023 17:11)  HR: 86 (09 Aug 2023 05:53) (84 - 101)  BP: 97/63 (09 Aug 2023 05:53) (95/58 - 109/61)  BP(mean): 73 (08 Aug 2023 20:00) (72 - 81)  RR: 18 (09 Aug 2023 05:53) (18 - 18)  SpO2: 95% (09 Aug 2023 05:53) (95% - 98%)    Parameters below as of 09 Aug 2023 05:53  Patient On (Oxygen Delivery Method): room air        LABS:                        10.3   7.81  )-----------( 204      ( 09 Aug 2023 07:20 )             32.5     08-09    141  |  101  |  26<H>  ----------------------------<  150<H>  3.7   |  24  |  1.18    Ca    9.0      09 Aug 2023 07:17    TPro  6.8  /  Alb  3.3  /  TBili  0.5  /  DBili  x   /  AST  111<H>  /  ALT  83<H>  /  AlkPhos  93  08-09    PT/INR - ( 08 Aug 2023 08:33 )   PT: 24.5 sec;   INR: 2.39 ratio         PTT - ( 08 Aug 2023 08:33 )  PTT:38.5 sec  Urinalysis Basic - ( 09 Aug 2023 07:17 )    Color: x / Appearance: x / SG: x / pH: x  Gluc: 150 mg/dL / Ketone: x  / Bili: x / Urobili: x   Blood: x / Protein: x / Nitrite: x   Leuk Esterase: x / RBC: x / WBC x   Sq Epi: x / Non Sq Epi: x / Bacteria: x      CAPILLARY BLOOD GLUCOSE      POCT Blood Glucose.: 151 mg/dL (09 Aug 2023 08:31)  POCT Blood Glucose.: 151 mg/dL (08 Aug 2023 21:18)  POCT Blood Glucose.: 131 mg/dL (08 Aug 2023 19:22)  POCT Blood Glucose.: 145 mg/dL (08 Aug 2023 12:29)      Lower Extremity Physical Exam:  Vascular: DP/PT 0/4 B/L, CFT <3 sec x 10, Temp gradient warm to warm, RLE, warm to cool LLE.    Neurology: Epicritic sensation intact to level of digits, B/L  Musculoskeletal/Ortho: pain to palpation over right foot 5th digit   Skin: 8/8 s/p Right Foot incision and drainage with partial 5th ray resection, open, retention sutures intact, no hematoma expressed, mild danielle-wound erythema and dorsal ecchymosis, no pus or drainage noted, skin flap appears warm, viable with normal cap refill time.     RADIOLOGY & ADDITIONAL TESTS:

## 2023-08-10 LAB
-  AMPICILLIN/SULBACTAM: SIGNIFICANT CHANGE UP
-  CEFAZOLIN: SIGNIFICANT CHANGE UP
-  CLINDAMYCIN: SIGNIFICANT CHANGE UP
-  ERYTHROMYCIN: SIGNIFICANT CHANGE UP
-  GENTAMICIN: SIGNIFICANT CHANGE UP
-  OXACILLIN: SIGNIFICANT CHANGE UP
-  PENICILLIN: SIGNIFICANT CHANGE UP
-  RIFAMPIN: SIGNIFICANT CHANGE UP
-  TETRACYCLINE: SIGNIFICANT CHANGE UP
-  TRIMETHOPRIM/SULFAMETHOXAZOLE: SIGNIFICANT CHANGE UP
-  VANCOMYCIN: SIGNIFICANT CHANGE UP
ALBUMIN SERPL ELPH-MCNC: 3.3 G/DL — SIGNIFICANT CHANGE UP (ref 3.3–5)
ALP SERPL-CCNC: 90 U/L — SIGNIFICANT CHANGE UP (ref 40–120)
ALT FLD-CCNC: 55 U/L — HIGH (ref 10–45)
ANION GAP SERPL CALC-SCNC: 16 MMOL/L — SIGNIFICANT CHANGE UP (ref 5–17)
AST SERPL-CCNC: 65 U/L — HIGH (ref 10–40)
BILIRUB SERPL-MCNC: 0.6 MG/DL — SIGNIFICANT CHANGE UP (ref 0.2–1.2)
BUN SERPL-MCNC: 22 MG/DL — SIGNIFICANT CHANGE UP (ref 7–23)
CALCIUM SERPL-MCNC: 9.3 MG/DL — SIGNIFICANT CHANGE UP (ref 8.4–10.5)
CHLORIDE SERPL-SCNC: 100 MMOL/L — SIGNIFICANT CHANGE UP (ref 96–108)
CO2 SERPL-SCNC: 23 MMOL/L — SIGNIFICANT CHANGE UP (ref 22–31)
CREAT SERPL-MCNC: 0.97 MG/DL — SIGNIFICANT CHANGE UP (ref 0.5–1.3)
CULTURE RESULTS: SIGNIFICANT CHANGE UP
EGFR: 88 ML/MIN/1.73M2 — SIGNIFICANT CHANGE UP
GLUCOSE BLDC GLUCOMTR-MCNC: 150 MG/DL — HIGH (ref 70–99)
GLUCOSE BLDC GLUCOMTR-MCNC: 157 MG/DL — HIGH (ref 70–99)
GLUCOSE BLDC GLUCOMTR-MCNC: 170 MG/DL — HIGH (ref 70–99)
GLUCOSE BLDC GLUCOMTR-MCNC: 211 MG/DL — HIGH (ref 70–99)
GLUCOSE SERPL-MCNC: 138 MG/DL — HIGH (ref 70–99)
HCT VFR BLD CALC: 31.4 % — LOW (ref 39–50)
HGB BLD-MCNC: 9.9 G/DL — LOW (ref 13–17)
MCHC RBC-ENTMCNC: 28 PG — SIGNIFICANT CHANGE UP (ref 27–34)
MCHC RBC-ENTMCNC: 31.5 GM/DL — LOW (ref 32–36)
MCV RBC AUTO: 88.7 FL — SIGNIFICANT CHANGE UP (ref 80–100)
METHOD TYPE: SIGNIFICANT CHANGE UP
NRBC # BLD: 0 /100 WBCS — SIGNIFICANT CHANGE UP (ref 0–0)
ORGANISM # SPEC MICROSCOPIC CNT: SIGNIFICANT CHANGE UP
ORGANISM # SPEC MICROSCOPIC CNT: SIGNIFICANT CHANGE UP
PLATELET # BLD AUTO: 240 K/UL — SIGNIFICANT CHANGE UP (ref 150–400)
POTASSIUM SERPL-MCNC: 3.4 MMOL/L — LOW (ref 3.5–5.3)
POTASSIUM SERPL-SCNC: 3.4 MMOL/L — LOW (ref 3.5–5.3)
PROT SERPL-MCNC: 7 G/DL — SIGNIFICANT CHANGE UP (ref 6–8.3)
RBC # BLD: 3.54 M/UL — LOW (ref 4.2–5.8)
RBC # FLD: 16.1 % — HIGH (ref 10.3–14.5)
SODIUM SERPL-SCNC: 139 MMOL/L — SIGNIFICANT CHANGE UP (ref 135–145)
SPECIMEN SOURCE: SIGNIFICANT CHANGE UP
WBC # BLD: 6.77 K/UL — SIGNIFICANT CHANGE UP (ref 3.8–10.5)
WBC # FLD AUTO: 6.77 K/UL — SIGNIFICANT CHANGE UP (ref 3.8–10.5)

## 2023-08-10 PROCEDURE — 99222 1ST HOSP IP/OBS MODERATE 55: CPT | Mod: GC

## 2023-08-10 PROCEDURE — 99232 SBSQ HOSP IP/OBS MODERATE 35: CPT

## 2023-08-10 RX ORDER — ACETAMINOPHEN 500 MG
1000 TABLET ORAL ONCE
Refills: 0 | Status: COMPLETED | OUTPATIENT
Start: 2023-08-10 | End: 2023-08-10

## 2023-08-10 RX ORDER — POTASSIUM CHLORIDE 20 MEQ
20 PACKET (EA) ORAL ONCE
Refills: 0 | Status: COMPLETED | OUTPATIENT
Start: 2023-08-10 | End: 2023-08-10

## 2023-08-10 RX ADMIN — Medication 1000 MILLIGRAM(S): at 17:22

## 2023-08-10 RX ADMIN — Medication 100 MILLIGRAM(S): at 14:04

## 2023-08-10 RX ADMIN — Medication 650 MILLIGRAM(S): at 10:25

## 2023-08-10 RX ADMIN — SACUBITRIL AND VALSARTAN 1 TABLET(S): 24; 26 TABLET, FILM COATED ORAL at 05:05

## 2023-08-10 RX ADMIN — Medication 2: at 12:49

## 2023-08-10 RX ADMIN — Medication 650 MILLIGRAM(S): at 09:50

## 2023-08-10 RX ADMIN — SPIRONOLACTONE 25 MILLIGRAM(S): 25 TABLET, FILM COATED ORAL at 05:05

## 2023-08-10 RX ADMIN — Medication 100 MILLIGRAM(S): at 05:05

## 2023-08-10 RX ADMIN — HYDROMORPHONE HYDROCHLORIDE 0.5 MILLIGRAM(S): 2 INJECTION INTRAMUSCULAR; INTRAVENOUS; SUBCUTANEOUS at 11:43

## 2023-08-10 RX ADMIN — Medication 3 MILLIGRAM(S): at 21:53

## 2023-08-10 RX ADMIN — ATORVASTATIN CALCIUM 80 MILLIGRAM(S): 80 TABLET, FILM COATED ORAL at 21:02

## 2023-08-10 RX ADMIN — Medication 100 MILLIGRAM(S): at 21:03

## 2023-08-10 RX ADMIN — Medication 400 MILLIGRAM(S): at 17:07

## 2023-08-10 RX ADMIN — SACUBITRIL AND VALSARTAN 1 TABLET(S): 24; 26 TABLET, FILM COATED ORAL at 18:13

## 2023-08-10 RX ADMIN — Medication 4: at 17:14

## 2023-08-10 RX ADMIN — Medication 20 MILLIEQUIVALENT(S): at 17:07

## 2023-08-10 RX ADMIN — Medication 40 MILLIGRAM(S): at 05:04

## 2023-08-10 RX ADMIN — HYDROMORPHONE HYDROCHLORIDE 0.5 MILLIGRAM(S): 2 INJECTION INTRAMUSCULAR; INTRAVENOUS; SUBCUTANEOUS at 12:00

## 2023-08-10 RX ADMIN — Medication 50 MILLIGRAM(S): at 05:05

## 2023-08-10 NOTE — PHYSICAL THERAPY INITIAL EVALUATION ADULT - ACTIVE RANGE OF MOTION EXAMINATION, REHAB EVAL
BUE and BLE WFL except R foot NT 2* pain and dressing, wiggle R toes/Left UE Active ROM was WFL (within functional limits)/Right UE Active ROM was WFL (within functional limits)/Left LE Active ROM was WFL (within functional limits)

## 2023-08-10 NOTE — PROGRESS NOTE ADULT - SUBJECTIVE AND OBJECTIVE BOX
Reeval-R foot infection/ulcer  Comfortable.  R LE dsg dry. No asc infeciton.    +R PT, L DP pulses.  small v dz suggested by DIONI/PVRs, but overall perf appears to be adequate    Rec:   Med mgmt  Fu podiatry plans.  Can fu outpt for vasc surveillance.

## 2023-08-10 NOTE — PROGRESS NOTE ADULT - SUBJECTIVE AND OBJECTIVE BOX
Patient is a 62y old  Male who presents with a chief complaint of diabetic foot infection, possible osteo (10 Aug 2023 12:44)       INTERVAL HPI/OVERNIGHT EVENTS:  Patient seen and evaluated at bedside.  Pt is resting comfortable in NAD. Denies N/V/F/C.    Allergies    No Known Allergies    Intolerances        Vital Signs Last 24 Hrs  T(C): 36.4 (10 Aug 2023 12:18), Max: 36.8 (10 Aug 2023 04:14)  T(F): 97.5 (10 Aug 2023 12:18), Max: 98.3 (10 Aug 2023 04:14)  HR: 75 (10 Aug 2023 12:18) (60 - 98)  BP: 112/68 (10 Aug 2023 12:18) (90/56 - 113/69)  BP(mean): --  RR: 18 (10 Aug 2023 12:18) (18 - 18)  SpO2: 95% (10 Aug 2023 12:18) (94% - 96%)    Parameters below as of 10 Aug 2023 12:18  Patient On (Oxygen Delivery Method): room air        LABS:                        9.9    6.77  )-----------( 240      ( 10 Aug 2023 07:27 )             31.4     08-10    139  |  100  |  22  ----------------------------<  138<H>  3.4<L>   |  23  |  0.97    Ca    9.3      10 Aug 2023 07:32    TPro  7.0  /  Alb  3.3  /  TBili  0.6  /  DBili  x   /  AST  65<H>  /  ALT  55<H>  /  AlkPhos  90  08-10      Urinalysis Basic - ( 10 Aug 2023 07:32 )    Color: x / Appearance: x / SG: x / pH: x  Gluc: 138 mg/dL / Ketone: x  / Bili: x / Urobili: x   Blood: x / Protein: x / Nitrite: x   Leuk Esterase: x / RBC: x / WBC x   Sq Epi: x / Non Sq Epi: x / Bacteria: x      CAPILLARY BLOOD GLUCOSE      POCT Blood Glucose.: 170 mg/dL (10 Aug 2023 12:17)  POCT Blood Glucose.: 150 mg/dL (10 Aug 2023 08:30)  POCT Blood Glucose.: 148 mg/dL (09 Aug 2023 21:58)  POCT Blood Glucose.: 165 mg/dL (09 Aug 2023 16:53)      Lower Extremity Physical Exam:  Vascular: DP/PT 0/4 B/L, CFT <3 sec x 10, Temp gradient warm to warm, RLE, warm to cool LLE.    Neurology: Epicritic sensation intact to level of digits, B/L  Musculoskeletal/Ortho: pain to palpation over right foot 5th digit   Skin: 8/8 s/p Right Foot incision and drainage with partial 5th ray resection, open. VAC left intact, peripheral wound noted w/ decreased edema, ecchymosis to the forefoot and midfoot noted.     RADIOLOGY & ADDITIONAL TESTS:

## 2023-08-10 NOTE — PROGRESS NOTE ADULT - SUBJECTIVE AND OBJECTIVE BOX
Date of Service: 08-10-23 @ 07:45           CARDIOLOGY     PROGRESS  NOTE   ________________________________________________    CHIEF COMPLAINT:Patient is a 62y old  Male who presents with a chief complaint of diabetic foot infection, possible osteo (09 Aug 2023 16:58)  doing well  	  REVIEW OF SYSTEMS:  CONSTITUTIONAL: No fever, weight loss, or fatigue  EYES: No eye pain, visual disturbances, or discharge  ENT:  No difficulty hearing, tinnitus, vertigo; No sinus or throat pain  NECK: No pain or stiffness  RESPIRATORY: No cough, wheezing, chills or hemoptysis; No Shortness of Breath  CARDIOVASCULAR: No chest pain, palpitations, passing out, dizziness, or leg swelling  GASTROINTESTINAL: No abdominal or epigastric pain. No nausea, vomiting, or hematemesis; No diarrhea or constipation. No melena or hematochezia.  GENITOURINARY: No dysuria, frequency, hematuria, or incontinence  NEUROLOGICAL: No headaches, memory loss, loss of strength, numbness, or tremors  SKIN: No itching, burning, rashes, or lesions   LYMPH Nodes: No enlarged glands  ENDOCRINE: No heat or cold intolerance; No hair loss  MUSCULOSKELETAL: No joint pain or swelling; No muscle, back, + r extremity pain  PSYCHIATRIC: No depression, anxiety, mood swings, or difficulty sleeping  HEME/LYMPH: No easy bruising, or bleeding gums  ALLERGY AND IMMUNOLOGIC: No hives or eczema	    [x ] All others negative	  [ ] Unable to obtain    PHYSICAL EXAM:  T(C): 36.8 (08-10-23 @ 04:14), Max: 36.8 (08-10-23 @ 04:14)  HR: 89 (08-10-23 @ 04:14) (60 - 89)  BP: 104/61 (08-10-23 @ 04:14) (90/56 - 105/67)  RR: 18 (08-10-23 @ 04:14) (18 - 18)  SpO2: 96% (08-10-23 @ 04:14) (94% - 96%)  Wt(kg): --  I&O's Summary    09 Aug 2023 07:01  -  10 Aug 2023 07:00  --------------------------------------------------------  IN: 700 mL / OUT: 1025 mL / NET: -325 mL        Appearance: Normal	  HEENT:   Normal oral mucosa, PERRL, EOMI	  Lymphatic: No lymphadenopathy  Cardiovascular: Normal S1 S2, No JVD, + murmurs, No edema  Respiratory: rhonchi	  Psychiatry: A & O x 3, Mood & affect appropriate  Gastrointestinal:  Soft, Non-tender, + BS	  Skin: No rashes, No ecchymoses, No cyanosis	  Neurologic: Non-focal  Extremities: Normal range of motion, R foot bandaged, decrease swelling and erythema  Vascular: + pvd    MEDICATIONS  (STANDING):  atorvastatin 80 milliGRAM(s) Oral at bedtime  ceFAZolin   IVPB 2000 milliGRAM(s) IV Intermittent every 8 hours  chlorhexidine 2% Cloths 1 Application(s) Topical <User Schedule>  dextrose 5%. 1000 milliLiter(s) (50 mL/Hr) IV Continuous <Continuous>  dextrose 5%. 1000 milliLiter(s) (100 mL/Hr) IV Continuous <Continuous>  dextrose 50% Injectable 25 Gram(s) IV Push once  dextrose 50% Injectable 12.5 Gram(s) IV Push once  dextrose 50% Injectable 25 Gram(s) IV Push once  furosemide    Tablet 40 milliGRAM(s) Oral daily  glucagon  Injectable 1 milliGRAM(s) IntraMuscular once  insulin lispro (ADMELOG) corrective regimen sliding scale   SubCutaneous three times a day before meals  metoprolol succinate ER 50 milliGRAM(s) Oral daily  ondansetron Injectable 4 milliGRAM(s) IV Push once  polyethylene glycol 3350 17 Gram(s) Oral daily  sacubitril 97 mG/valsartan 103 mG 1 Tablet(s) Oral two times a day  spironolactone 25 milliGRAM(s) Oral daily      TELEMETRY: 	    ECG:  	  RADIOLOGY:  OTHER: 	  	  LABS:	 	    CARDIAC MARKERS:                                10.3   7.81  )-----------( 204      ( 09 Aug 2023 07:20 )             32.5     08-09    141  |  101  |  26<H>  ----------------------------<  150<H>  3.7   |  24  |  1.18    Ca    9.0      09 Aug 2023 07:17    TPro  6.8  /  Alb  3.3  /  TBili  0.5  /  DBili  x   /  AST  111<H>  /  ALT  83<H>  /  AlkPhos  93  08-09    proBNP:   Lipid Profile: Cholesterol 75  LDL --  HDL 27      HgA1c:   TSH:   PT/INR - ( 08 Aug 2023 08:33 )   PT: 24.5 sec;   INR: 2.39 ratio         PTT - ( 08 Aug 2023 08:33 )  PTT:38.5 sec  - Afebrile, no leukocytosis   - 8/8 s/p Right Foot incision and drainage with partial 5th ray resection, open, retention sutures intact, no hematoma expressed, mild danielle-wound erythema and dorsal ecchymosis, no pus or drainage noted, skin flap appears warm, viable with normal cap refill time.   - High concern for residual bone infection  - High concern for viability  - OR cultures and pathology, pending   - Pod plan: start VAC tomororw to the Right foot w/ possible return to OR pending viability/ appearance     Assessment and plan  ---------------------------  Patient is a 61yo Male with past medical history of HTN, DM, defibrillator, CAD post CABG, CHF, hx of Sommers fracture, non-union many years ago, presenting with  ulceration, recent bone biopsy with Dr Foss as op , came back positive for staph, treated with Levaquin, presented to ED with acute and significant swelling to the foot as advised to go to the ER for eval.   denies recent fall or trauma.  states  over the past few days right foot has been increasing in swelling. and has been having some fevers ... states hard to walk on his foot due to pain. states had temp at home of 101-102. denies n/v/d, CP, SOB, HA, dizziness, abdominal pain, urinary symptoms, cough, leg pain, swelling.  patient evaluated by podiatry in ED for presence of gas.. MRI and vascular studies ordered already, antibiotics given..   pt with sig cardiac hx for possible vascular and podiatric surgery  will call PMD to get records  continue all cardiac meds  will adjust cardiac meds  will check AICD,about MRI compatibility  abx , fu blood cultures  doubt DVT pt on AC  will call primary cardiology to get records  echo noted with severe LV dysfunction  continue current meds, may add Farxiga 10 mg upon dc for heart failure  will check AICD will call NP, [facility] not MRI compatible  ID noted, s/p I&d, continue abx awaiting cultures  DVT prophylaxis  awaiting VAC  increase lft fu closely  continue all cardiac meds

## 2023-08-10 NOTE — PROGRESS NOTE ADULT - ASSESSMENT
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 Soft tissue wound with foci of air is seen tracking to the level of the base of the fifth metatarsal tracking up to the site of fracture. There is adjacent cortical irregularity along the fifth metatarsal base which could be secondary to prior fracture versus osteomyelitis, soft tissue swelling around the foot most significant at the   lateral aspect. No mature, drainable, or enhancing collection is seen at this time.  wound cx with MSSA and fingoldia  s/p I&D with necrotic tissue close to bone so high suspicion for residual osteo  OR cx negative for now    * c/w cefazolin  * f/u the final OR cx and path  * monitor CBC/diff and CMP

## 2023-08-10 NOTE — PHYSICAL THERAPY INITIAL EVALUATION ADULT - ADDITIONAL COMMENTS
Pt lives at home with spouse and 2 sons, +5 steps to enter with HR, +Flight of stairs to bedroom/bathroom with HR, +tub shower, PTA ind amb and ADLs, no device; +Drives and community ambulator.

## 2023-08-10 NOTE — PHYSICAL THERAPY INITIAL EVALUATION ADULT - GENERAL OBSERVATIONS, REHAB EVAL
Pt a/w incr swelling to LLE; +Right foot 5th metatarsal wound with cellulitis to distal leg; s/p R foot Incision and Drainage and partial 5th met base resection 8/8/23, Per podiatry and per orders in EMR, pt is WBAT RLE in surgical shoe, amb as tole. Pt received supine in bed, +IVL, A&OX4, follows commands, pleasant and cooperative, +ace dressing to R foot, c/d/i pre and post PT. No c/o pain t/o session. PT obtained darco XL shoe.

## 2023-08-10 NOTE — PHYSICAL THERAPY INITIAL EVALUATION ADULT - NSPTDISCHREC_GEN_A_CORE
DC home with home PT services, assist from family for mobility/ADLs, +recommend rolling walker for WBAT, improve overall independence, promote improved balance and assist with gait mechanics, pt may benefit from tub transfer bench, +5 steps to enter with HR and +flight of stairs to bed/bathroom, ORLIN Garcia aware./Home PT

## 2023-08-10 NOTE — PROGRESS NOTE ADULT - ASSESSMENT
62 y.o M 8/8 s/p Right Foot incision and drainage with partial 5th ray resection, open  - Pt seen and evaluated  - Afebrile, no leukocytosis   - 8/8 s/p Right Foot incision and drainage with partial 5th ray resection, open. VAC left intact.   - High concern for residual bone infection  - High concern for viability  - OR wound culture showing no growth (prelim)  - OR bone culture showing no growth (prelim)   - Vasc recommendations, appreciated   - Pod plan: Local wound care w/ VAC therapy pending erythema resolution/viability   - Discussed with attending

## 2023-08-10 NOTE — PROGRESS NOTE ADULT - SUBJECTIVE AND OBJECTIVE BOX
Follow Up:  diabetic foot infection and osteo     Interval History/ROS: pt afebrile, no cough or diarrhea           Allergies  No Known Allergies        ANTIMICROBIALS:  ceFAZolin   IVPB 2000 every 8 hours      OTHER MEDS:  acetaminophen     Tablet .. 650 milliGRAM(s) Oral every 6 hours PRN  acetaminophen     Tablet .. 650 milliGRAM(s) Oral every 6 hours PRN  aluminum hydroxide/magnesium hydroxide/simethicone Suspension 30 milliLiter(s) Oral every 4 hours PRN  atorvastatin 80 milliGRAM(s) Oral at bedtime  chlorhexidine 2% Cloths 1 Application(s) Topical <User Schedule>  dextrose 5%. 1000 milliLiter(s) IV Continuous <Continuous>  dextrose 5%. 1000 milliLiter(s) IV Continuous <Continuous>  dextrose 50% Injectable 25 Gram(s) IV Push once  dextrose 50% Injectable 25 Gram(s) IV Push once  dextrose 50% Injectable 12.5 Gram(s) IV Push once  dextrose Oral Gel 15 Gram(s) Oral once PRN  furosemide    Tablet 40 milliGRAM(s) Oral daily  glucagon  Injectable 1 milliGRAM(s) IntraMuscular once  HYDROmorphone  Injectable 0.5 milliGRAM(s) IV Push every 4 hours PRN  insulin lispro (ADMELOG) corrective regimen sliding scale   SubCutaneous three times a day before meals  melatonin 3 milliGRAM(s) Oral at bedtime PRN  metoprolol succinate ER 50 milliGRAM(s) Oral daily  ondansetron Injectable 4 milliGRAM(s) IV Push once  ondansetron Injectable 4 milliGRAM(s) IV Push every 8 hours PRN  oxycodone    5 mG/acetaminophen 325 mG 2 Tablet(s) Oral every 4 hours PRN  polyethylene glycol 3350 17 Gram(s) Oral daily  sacubitril 97 mG/valsartan 103 mG 1 Tablet(s) Oral two times a day  spironolactone 25 milliGRAM(s) Oral daily      Vital Signs Last 24 Hrs  T(C): 36.8 (10 Aug 2023 15:54), Max: 36.8 (10 Aug 2023 04:14)  T(F): 98.2 (10 Aug 2023 15:54), Max: 98.3 (10 Aug 2023 04:14)  HR: 66 (10 Aug 2023 15:54) (66 - 98)  BP: 116/66 (10 Aug 2023 15:54) (98/63 - 116/66)  BP(mean): --  RR: 18 (10 Aug 2023 15:54) (18 - 18)  SpO2: 95% (10 Aug 2023 15:54) (94% - 96%)    Parameters below as of 10 Aug 2023 15:54  Patient On (Oxygen Delivery Method): room air        Physical Exam:  General:    NAD,  non toxic  Respiratory:    comfortable on RA  abd:     soft,   BS +,   no tenderness  :   no CVAT,  no suprapubic tenderness,   no  costello  Musculoskeletal:   R 5th resection with vac and dressing  vascular: no phlebitis  Skin:    no rash                        9.9    6.77  )-----------( 240      ( 10 Aug 2023 07:27 )             31.4       08-10    139  |  100  |  22  ----------------------------<  138<H>  3.4<L>   |  23  |  0.97    Ca    9.3      10 Aug 2023 07:32    TPro  7.0  /  Alb  3.3  /  TBili  0.6  /  DBili  x   /  AST  65<H>  /  ALT  55<H>  /  AlkPhos  90  08-10      Urinalysis Basic - ( 10 Aug 2023 07:32 )    Color: x / Appearance: x / SG: x / pH: x  Gluc: 138 mg/dL / Ketone: x  / Bili: x / Urobili: x   Blood: x / Protein: x / Nitrite: x   Leuk Esterase: x / RBC: x / WBC x   Sq Epi: x / Non Sq Epi: x / Bacteria: x        MICROBIOLOGY:  v  .Tissue Other  08-08-23   No growth to date.  --    No polymorphonuclear cells seen per low power field  No organisms seen per oil power field      .Surgical Swab deep culture right foot  08-08-23   No growth to date.  --  --      .Abscess right foot  08-07-23   Rare Staphylococcus aureus  Few Finegoldia magna "Susceptibilities not performed"  --  Staphylococcus aureus      .Abscess right foot  08-06-23   Rare Coag Negative Staphylococcus "Susceptibilities not performed"  Few Finegoldia magna "Susceptibilities not performed"  --    Rare polymorphonuclear leukocytes seen per low power field  No organisms seen per oil power field      .Blood Blood  08-06-23   No growth at 72 Hours  --  --      .Blood Blood  08-06-23   No growth at 72 Hours  --  --                RADIOLOGY:  Images independently visualized and reviewed personally, findings as below  < from: US Abdomen Upper Quadrant Right (08.09.23 @ 13:25) >  IMPRESSION: Gallbladder sludge. No cholelithiasis or gallbladder wall   thickening.    Suggestion of mild hepatic steatosis.    < end of copied text >  < from: [facility] Duplex Lower Ext Vein Scan, Bilat (08.09.23 @ 10:51) >  IMPRESSION:  No evidence of deep venous thrombosis in either lower extremity.      < end of copied text >  < from: CT Foot w/ IV Cont, Right (08.08.23 @ 10:21) >  IMPRESSION:  1.  Chronic transverse fracture through the fifth metatarsal base. Soft   tissue wound with foci of air is seen tracking to the level of the base   of the fifth metatarsal tracking up to the site of fracture. There is   adjacent cortical irregularity along the fifth metatarsal base which   could be secondary to prior fracture versus osteomyelitis. Likely   correlate patient history.  2.  There is soft tissue swelling around the foot most significant at the   lateral aspect. No mature, drainable, or enhancing collection is seen at   this time.    Please note there is a delay in the signing of this report waiting for   the technologist to complete reformats.    < end of copied text >

## 2023-08-10 NOTE — PROGRESS NOTE ADULT - ASSESSMENT
_________________________________________________________________________________________  ========>>  M E D I C A L   A T T E N D I N G    F O L L O W  U P  N O T E  <<=========  -----------------------------------------------------------------------------------------------------    - Patient seen and examined by me earlier today.   - In summary,  RYLEE HOWE is a 62y year old man admitted with diabetic foot infection   - Patient today overall doing ok, comfortable, No significant pain post op     ==================>> REVIEW OF SYSTEM <<=================    GEN: no fever, no chills, no significant pain reported   RESP: no SOB, no cough, no sputum  CVS: no chest pain, no palpitations, no edema  GI: no abdominal pain, no nausea,   : no dysuria, no frequency,   Neuro: no headache, no dizziness  Derm : no itching, no rash    ==================>> PHYSICAL EXAM <<=================    GEN: A&O X 3 , NAD , comfortable, pleasant, calm in bed ( but ambulated with walker... )   HEENT: NCAT, PERRL, MMM, hearing intact  Neck: supple , no JVD appreciated  CVS: S1S2 , regular , No M/R/G appreciated  PULM: CTA B/L,  no W/R/R appreciated  ABD.: soft. non tender, non distended,  bowel sounds present  Extrem: intact pulses , + Rt LE edema and erythema MUCH improved ,, wound dressed by pod team .. wound vac on   PSYCH : normal mood,  not anxious                             ( Note written / Date of service 08-10-23 )    ==================>> MEDICATIONS <<====================    atorvastatin 80 milliGRAM(s) Oral at bedtime  ceFAZolin   IVPB 2000 milliGRAM(s) IV Intermittent every 8 hours  chlorhexidine 2% Cloths 1 Application(s) Topical <User Schedule>  dextrose 5%. 1000 milliLiter(s) IV Continuous <Continuous>  dextrose 5%. 1000 milliLiter(s) IV Continuous <Continuous>  dextrose 50% Injectable 25 Gram(s) IV Push once  dextrose 50% Injectable 25 Gram(s) IV Push once  dextrose 50% Injectable 12.5 Gram(s) IV Push once  furosemide    Tablet 40 milliGRAM(s) Oral daily  glucagon  Injectable 1 milliGRAM(s) IntraMuscular once  insulin lispro (ADMELOG) corrective regimen sliding scale   SubCutaneous three times a day before meals  metoprolol succinate ER 50 milliGRAM(s) Oral daily  ondansetron Injectable 4 milliGRAM(s) IV Push once  polyethylene glycol 3350 17 Gram(s) Oral daily  sacubitril 97 mG/valsartan 103 mG 1 Tablet(s) Oral two times a day  spironolactone 25 milliGRAM(s) Oral daily    MEDICATIONS  (PRN):  acetaminophen     Tablet .. 650 milliGRAM(s) Oral every 6 hours PRN Temp greater or equal to 38C (100.4F), Mild Pain (1 - 3)  acetaminophen     Tablet .. 650 milliGRAM(s) Oral every 6 hours PRN Mild Pain (1 - 3)  aluminum hydroxide/magnesium hydroxide/simethicone Suspension 30 milliLiter(s) Oral every 4 hours PRN Dyspepsia  dextrose Oral Gel 15 Gram(s) Oral once PRN Blood Glucose LESS THAN 70 milliGRAM(s)/deciliter  HYDROmorphone  Injectable 0.5 milliGRAM(s) IV Push every 4 hours PRN Severe Pain (7 - 10)  melatonin 3 milliGRAM(s) Oral at bedtime PRN Insomnia  ondansetron Injectable 4 milliGRAM(s) IV Push every 8 hours PRN Nausea and/or Vomiting  oxycodone    5 mG/acetaminophen 325 mG 2 Tablet(s) Oral every 4 hours PRN Moderate Pain (4 - 6)    ___________  Active diet:  Diet, Regular:   Consistent Carbohydrate Evening Snack (CSTCHOSN)  DASH/TLC Sodium & Cholesterol Restricted (DASH)  ___________________    ==================>> VITAL SIGNS <<==================    Height (cm): 185.4  Weight (kg): 117.9  BMI (kg/m2): 34.3  Vital Signs Last 24 HrsT(C): 36.4 (08-10-23 @ 12:18)  T(F): 97.5 (08-10-23 @ 12:18), Max: 98.3 (08-10-23 @ 04:14)  HR: 75 (08-10-23 @ 12:18) (60 - 98)  BP: 112/68 (08-10-23 @ 12:18)  RR: 18 (08-10-23 @ 12:18) (18 - 18)  SpO2: 95% (08-10-23 @ 12:18) (94% - 96%)      CAPILLARY BLOOD GLUCOSE  POCT Blood Glucose.: 170 mg/dL (10 Aug 2023 12:17)  POCT Blood Glucose.: 150 mg/dL (10 Aug 2023 08:30)  POCT Blood Glucose.: 148 mg/dL (09 Aug 2023 21:58)  POCT Blood Glucose.: 165 mg/dL (09 Aug 2023 16:53)     ==================>> LAB AND IMAGING <<==================                        9.9    6.77  )-----------( 240      ( 10 Aug 2023 07:27 )             31.4        08-10    139  |  100  |  22  ----------------------------<  138<H>  3.4<L>   |  23  |  0.97    Ca    9.3      10 Aug 2023 07:32    TPro  7.0  /  Alb  3.3  /  TBili  0.6  /  DBili  x   /  AST  65<H>  /  ALT  55<H>  /  AlkPhos  90  08-10    WBC count:   6.77 <<== ,  7.81 <<== ,  5.98 <<== ,  5.92 <<== ,  6.41 <<== ,  7.10 <<==   Hemoglobin:   9.9 <<==,  10.3 <<==,  10.0 <<==,  9.8 <<==,  9.4 <<==,  10.4 <<==  platelets:  240 <==, 204 <==, 147 <==, 141 <==, 133 <==, 150 <==    Creatinine:  0.97  <<==, 1.18  <<==, 1.13  <<==, 1.26  <<==, 1.49  <<==, 1.56  <<==  Sodium:   139  <==, 141  <==, 141  <==, 141  <==, 138  <==, 139  <==       AST:          65 <== , 111 <== , 128 <== , 44 <==      ALT:        55  <== , 83  <== , 78  <== , 33  <==      AP:        90  <=, 93  <=, 77  <=, 61  <=     Bili:        0.6  <=, 0.5  <=, 0.5  <=, 0.6  <=    ____________________________    M I C R O B I O L O G Y :    Culture - Tissue with Gram Stain (collected 08 Aug 2023 22:08)  Source: .Tissue Other  Gram Stain (09 Aug 2023 03:19):    No polymorphonuclear cells seen per low power field    No organisms seen per oil power field  Preliminary Report (10 Aug 2023 08:07):    No growth to date.    Culture - Surgical Swab (collected 08 Aug 2023 22:06)  Source: .Surgical Swab deep culture right foot  Preliminary Report (10 Aug 2023 07:55):    No growth to date.    Culture - Abscess with Gram Stain (collected 07 Aug 2023 08:21)  Source: .Abscess right foot  Gram Stain (07 Aug 2023 23:24):    Rare polymorphonuclear leukocytes per low power field    No organisms seen per oil power field  Final Report (10 Aug 2023 09:38):    Rare Staphylococcus aureus    Few Finegoldia magna "Susceptibilities not performed"  Organism: Staphylococcus aureus (10 Aug 2023 09:38)  Organism: Staphylococcus aureus (10 Aug 2023 09:38)    Sensitivities:      Method Type: HEATHER      -  Ampicillin/Sulbactam: S <=8/4      -  Cefazolin: S <=4      -  Clindamycin: S 0.5      -  Erythromycin: S <=0.25      -  Gentamicin: S <=1 Should not be used as monotherapy      -  Oxacillin: S <=0.25 Oxacillin predicts susceptibility for dicloxacillin, methicillin, and nafcillin      -  Penicillin: R >8      -  Rifampin: S <=1 Should not be used as monotherapy      -  Tetracycline: S <=1      -  Trimethoprim/Sulfamethoxazole: S <=0.5/9.5      -  Vancomycin: S 2    Culture - Abscess with Gram Stain (collected 06 Aug 2023 15:42)  Source: .Abscess right foot  Gram Stain (07 Aug 2023 01:46):    Rare polymorphonuclear leukocytes seen per low power field    No organisms seen per oil power field  Final Report (08 Aug 2023 17:11):    Rare Coag Negative Staphylococcus "Susceptibilities not performed"    Few Finegoldia magna "Susceptibilities not performed"    Culture - Blood (collected 06 Aug 2023 11:30)  Source: .Blood Blood  Preliminary Report (09 Aug 2023 17:02):    No growth at 72 Hours    Culture - Blood (collected 06 Aug 2023 11:15)  Source: .Blood Blood  Preliminary Report (09 Aug 2023 17:02):    No growth at 72 Hours        < from: TTE W or WO Ultrasound Enhancing Agent (08.07.23 @ 15:33) >  CONCLUSIONS:   1. Severely dilated left ventricular cavity size.The left ventricular systolic function is severely decreased with an ejection fraction visually estimated at 25 to 30 %. There is global left ventricular hypokinesis.   2. Multiple segmental abnormalities exist. See findings.   3. Device lead is visualized in the right ventricle.   4. The aortic annulus and aortic root appear normal in size.  < end of copied text >    < from: Xray Foot AP + Lateral, Right (08.06.23 @ 11:40) >  IMPRESSION:  Diffuse soft tissue swelling throughout the right foot with a possible   trace amount of subcutaneous emphysema along the lateral aspect of the   right mid foot.  No definite cortical erosion seen to indicate acute osteomyelitis.  MR may be useful in further evaluation if there are no contraindications  < end of copied text >    ___________________________________________________________________________________  ===============>>  A S S E S S M E N T   A N D   P L A N <<===============  ------------------------------------------------------------------------------------------    · Assessment	  Patient is a 63yo Male with past medical history of HTN, DM, defibrillator, CAD post CABG, CHF, hx of Sommers fracture, non-union many years ago, presenting with  ulceration, recent bone biopsy with Dr Foss as op , came back positive for staph, treated with Levaquin, presented to ED with acute and significant swelling to the foot as advised to go to the ER for eval.   denies recent fall or trauma. states over the past few days right foot has been increasing in swelling. and has been having some fevers ... states hard to walk on his foot due to pain. states had temp at home of 101-102. denies n/v/d, CP, SOB, HA, dizziness, abdominal pain, urinary symptoms, cough, leg pain, swelling.  patient evaluated by podiatry in ED for presence of gas.. MRI and vascular studies ordered already, antibiotics given..         Problem/Plan - 1:  ·  Problem: Diabetic foot infection. , Osteomyelitis  appreciated podiatry management   local wound care  continue antibiotics per ID  : Likely will need long-term antibiotics >> ID to finalize recom ..      Infectious disease, podiatry, vascular follow-up  pain management as needed  supportive care   diuretics | leg elevation: helping a lot     Problem/Plan - 2:  ·  Problem: Diabetes.   ·  Plan: patient states has been overall well controlled but in past 1-2 days more elevated...   ---monitor finger sticks closely  ---Insulin regimen as ordered and monitor / adjust as needed  --- hold home medications for now   ---Diabetic diet  ---A1c. 6    May consider adding Farxiga on discharge give CHF    Problem/Plan - 3:  ·  Problem: Heart failure, unspecified HF chronicity, unspecified heart failure type.   ·  Plan: patient with likely chronic systolic CHF on entresto and lasix / Epleronon   continue home medications  Patient takes 75 mg of toprol  at home, here is a 25.  Increase to 50 for now: monitor closely  Echo as above   cardio following for optimization   monitor otherwise.    Problem/Plan - 4:  ·  Problem: HLD (hyperlipidemia).   ·  Plan: continue equivalent statin dose  lipid profile.    Problem/Plan - 5:  ·  Problem: history of Deep vein thrombosis (DVT) of right upper extremity.   ·  Plan: on Xarelto for past ~ 2.5 months ( was on Eliqius first)  for Rt upper extremity DVT post infection of Elbow !   continue for now  repeat Duplex as outpatient to decide on duration of further therapy >> recom in 2 weeks ..     -GI/DVT Prophylaxis per protocol.    --------------------------------------------  Case discussed with patient, NP  Education given on findings and plan of care  ___________________________  H. RACHAEL Boudreaux.  Pager: [phone]

## 2023-08-10 NOTE — PHYSICAL THERAPY INITIAL EVALUATION ADULT - PERTINENT HX OF CURRENT PROBLEM, REHAB EVAL
Pt is a 63yo Male admitted to [facility] on 8/6/23 with past medical history of HTN, DM, defibrillator, CAD post CABG, CHF, hx of Sommers fracture, non-union many years ago, presenting with ulceration, recent bone biopsy with Dr Foss as op , came back positive for staph, treated with Levaquin, presented to ED with acute and significant swelling to the foot as advised to go to the ER for eval. Pt denies recent fall or trauma. states over the past few days right foot has been increasing in swelling. and has been having some fevers ... states hard to walk on his foot due to pain. states had temp at home of 101-102. denies n/v/d, CP, SOB, HA, dizziness, abdominal pain, urinary symptoms, cough, leg pain, swelling. Hospital course: pt evaluated by podiatry in ED for presence of gas.. MRI and vascular studies ordered already, antibiotics given.. + Diabetic foot infection, podiatry c/s, local wound care, post abx in ED, ID consult; hx DVT RUE;  on Xarelto for past ~ 2.5 months ( was on Eliqius first)  for Rt upper extremity DVT post infection of Elbow. Per podiatry, Right foot 5th metatarsal base wound with cellulitis to distal leg, X-Ray: no OM, possible gas to lateral midfoot, 8/7 s/p bedside R foot I&D, 1cc of purulent drainage expressed, persistent cellulitis to distal leg, serous draining from peripheral wound, no malodor, wound tracks dorsal lateral and circumferential. Left foot with no open wounds or signs of infection. MRI cx 2/2 not PM compatible. 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 ICD interrogated 8/8 preop, +s/p R foot Incision and Drainage and partial 5th met base resection 8/8/23 (no evidence of purulence tracking proximally, however wound tracking distally from lateral 5th base to central midfoot, large necrotic tissue encountered), per pods, High concern for residual bone infection,High concern for viability,  Pod plan: start VAC tomororw to the Right foot w/ possible return to OR pending viability/ appearance, continue antibiotics per ID- Likely will need long-term antibiotics. BLE duplex: (-) DVT. RUQ US: Gallbladder sludge. No cholelithiasis or gallbladder wall thickening. Suggestion of mild hepatic steatosis. xray R foot postop; Postsurgical soft tissue debridement changes along lateral foot with overlying bandaging/packing material along lateral foot. No metallic hardware implants or devices.  Chronic lucent defect in proximal 5th metatarsal shaft from prior Sommers fracture. Remainder of foot unchanged. Also correlate with intraoperative findings.

## 2023-08-10 NOTE — PHYSICAL THERAPY INITIAL EVALUATION ADULT - GAIT DEVIATIONS NOTED, PT EVAL
decreased amarjit/decreased velocity of limb motion/decreased step length/decreased weight-shifting ability

## 2023-08-11 LAB
ALBUMIN SERPL ELPH-MCNC: 3.2 G/DL — LOW (ref 3.3–5)
ALP SERPL-CCNC: 90 U/L — SIGNIFICANT CHANGE UP (ref 40–120)
ALT FLD-CCNC: 41 U/L — SIGNIFICANT CHANGE UP (ref 10–45)
ANION GAP SERPL CALC-SCNC: 19 MMOL/L — HIGH (ref 5–17)
AST SERPL-CCNC: 50 U/L — HIGH (ref 10–40)
BILIRUB SERPL-MCNC: 0.4 MG/DL — SIGNIFICANT CHANGE UP (ref 0.2–1.2)
BUN SERPL-MCNC: 21 MG/DL — SIGNIFICANT CHANGE UP (ref 7–23)
CALCIUM SERPL-MCNC: 8.8 MG/DL — SIGNIFICANT CHANGE UP (ref 8.4–10.5)
CHLORIDE SERPL-SCNC: 95 MMOL/L — LOW (ref 96–108)
CO2 SERPL-SCNC: 22 MMOL/L — SIGNIFICANT CHANGE UP (ref 22–31)
CREAT SERPL-MCNC: 0.93 MG/DL — SIGNIFICANT CHANGE UP (ref 0.5–1.3)
CULTURE RESULTS: SIGNIFICANT CHANGE UP
EGFR: 93 ML/MIN/1.73M2 — SIGNIFICANT CHANGE UP
GLUCOSE BLDC GLUCOMTR-MCNC: 167 MG/DL — HIGH (ref 70–99)
GLUCOSE BLDC GLUCOMTR-MCNC: 173 MG/DL — HIGH (ref 70–99)
GLUCOSE BLDC GLUCOMTR-MCNC: 179 MG/DL — HIGH (ref 70–99)
GLUCOSE BLDC GLUCOMTR-MCNC: 191 MG/DL — HIGH (ref 70–99)
GLUCOSE SERPL-MCNC: 246 MG/DL — HIGH (ref 70–99)
HCT VFR BLD CALC: 33.3 % — LOW (ref 39–50)
HGB BLD-MCNC: 10.4 G/DL — LOW (ref 13–17)
MCHC RBC-ENTMCNC: 28 PG — SIGNIFICANT CHANGE UP (ref 27–34)
MCHC RBC-ENTMCNC: 31.2 GM/DL — LOW (ref 32–36)
MCV RBC AUTO: 89.5 FL — SIGNIFICANT CHANGE UP (ref 80–100)
NRBC # BLD: 0 /100 WBCS — SIGNIFICANT CHANGE UP (ref 0–0)
PLATELET # BLD AUTO: 306 K/UL — SIGNIFICANT CHANGE UP (ref 150–400)
POTASSIUM SERPL-MCNC: 3.8 MMOL/L — SIGNIFICANT CHANGE UP (ref 3.5–5.3)
POTASSIUM SERPL-SCNC: 3.8 MMOL/L — SIGNIFICANT CHANGE UP (ref 3.5–5.3)
PROT SERPL-MCNC: 6.8 G/DL — SIGNIFICANT CHANGE UP (ref 6–8.3)
RBC # BLD: 3.72 M/UL — LOW (ref 4.2–5.8)
RBC # FLD: 15.9 % — HIGH (ref 10.3–14.5)
SODIUM SERPL-SCNC: 136 MMOL/L — SIGNIFICANT CHANGE UP (ref 135–145)
SPECIMEN SOURCE: SIGNIFICANT CHANGE UP
WBC # BLD: 6.78 K/UL — SIGNIFICANT CHANGE UP (ref 3.8–10.5)
WBC # FLD AUTO: 6.78 K/UL — SIGNIFICANT CHANGE UP (ref 3.8–10.5)

## 2023-08-11 PROCEDURE — 99232 SBSQ HOSP IP/OBS MODERATE 35: CPT

## 2023-08-11 RX ORDER — SACUBITRIL AND VALSARTAN 24; 26 MG/1; MG/1
1 TABLET, FILM COATED ORAL
Refills: 0 | Status: DISCONTINUED | OUTPATIENT
Start: 2023-08-11 | End: 2023-08-12

## 2023-08-11 RX ADMIN — Medication 100 MILLIGRAM(S): at 13:54

## 2023-08-11 RX ADMIN — Medication 50 MILLIGRAM(S): at 05:09

## 2023-08-11 RX ADMIN — Medication 2: at 13:15

## 2023-08-11 RX ADMIN — ONDANSETRON 4 MILLIGRAM(S): 8 TABLET, FILM COATED ORAL at 05:37

## 2023-08-11 RX ADMIN — Medication 3 MILLIGRAM(S): at 21:38

## 2023-08-11 RX ADMIN — SACUBITRIL AND VALSARTAN 1 TABLET(S): 24; 26 TABLET, FILM COATED ORAL at 05:09

## 2023-08-11 RX ADMIN — Medication 2: at 08:51

## 2023-08-11 RX ADMIN — POLYETHYLENE GLYCOL 3350 17 GRAM(S): 17 POWDER, FOR SOLUTION ORAL at 13:16

## 2023-08-11 RX ADMIN — Medication 2: at 17:32

## 2023-08-11 RX ADMIN — Medication 40 MILLIGRAM(S): at 05:09

## 2023-08-11 RX ADMIN — ATORVASTATIN CALCIUM 80 MILLIGRAM(S): 80 TABLET, FILM COATED ORAL at 21:05

## 2023-08-11 RX ADMIN — Medication 100 MILLIGRAM(S): at 05:10

## 2023-08-11 RX ADMIN — Medication 100 MILLIGRAM(S): at 21:05

## 2023-08-11 RX ADMIN — SPIRONOLACTONE 25 MILLIGRAM(S): 25 TABLET, FILM COATED ORAL at 05:09

## 2023-08-11 NOTE — PROGRESS NOTE ADULT - ASSESSMENT
62 y.o M 8/8 s/p Right Foot incision and drainage with partial 5th ray resection, open  - Pt seen and evaluated  - Afebrile, no leukocytosis   - 8/8 s/p Right Foot incision and drainage with partial 5th ray resection, open.   - High concern for residual bone infection  - High concern for viability  - OR wound culture showing no growth (prelim)  - OR bone culture showing no growth (prelim)   - Vasc recommendations, appreciated   - Pod plan: Local wound care w/ VAC   - Discussed with attending

## 2023-08-11 NOTE — PROVIDER CONTACT NOTE (MEDICATION) - SITUATION
Patients' BP has run low throughout the day (88/59 & 107/67).  Provider was contacted regarding the low BP because the patient was due to receive Entresto, but there were no BP parameters for the medication.  ANNE Lobo ordered to hold the medication.
Lab drawn, vancomycin level 9.0

## 2023-08-11 NOTE — PROGRESS NOTE ADULT - ASSESSMENT
_________________________________________________________________________________________  ========>>  M E D I C A L   A T T E N D I N G    F O L L O W  U P  N O T E  <<=========  -----------------------------------------------------------------------------------------------------    - Patient seen and examined by me earlier today.   - In summary,  RYLEE HOWE is a 62y year old man admitted with diabetic foot infection   - Patient today overall doing ok, comfortable, No significant pain today but reports had a lot of pain last night >> improved post Percocet     ==================>> REVIEW OF SYSTEM <<=================    GEN: no fever, no chills, as above   RESP: no SOB, no cough, no sputum  CVS: no chest pain, no palpitations,  GI: no abdominal pain, no nausea,   : no dysuria, no frequency,   Neuro: no headache, no dizziness  Derm : no itching, no rash    ==================>> PHYSICAL EXAM <<=================    GEN: A&O X 3 , NAD , comfortable, pleasant, calm ambulating to bathroom with walker...  HEENT: NCAT, PERRL, MMM, hearing intact  Neck: supple , no JVD appreciated  CVS: S1S2 , regular , No M/R/G appreciated  PULM: CTA B/L,  no W/R/R appreciated  ABD.: soft. non tender, non distended,  bowel sounds present  Extrem: intact pulses , + Rt LE edema and erythema MUCH improved ,, wound dressed by pod team .. wound vac on   PSYCH : normal mood,  not anxious                             ( Note written / Date of service 08-11-23 )    ==================>> MEDICATIONS <<====================    atorvastatin 80 milliGRAM(s) Oral at bedtime  ceFAZolin   IVPB 2000 milliGRAM(s) IV Intermittent every 8 hours  chlorhexidine 2% Cloths 1 Application(s) Topical <User Schedule>  dextrose 5%. 1000 milliLiter(s) IV Continuous <Continuous>  dextrose 5%. 1000 milliLiter(s) IV Continuous <Continuous>  dextrose 50% Injectable 25 Gram(s) IV Push once  dextrose 50% Injectable 25 Gram(s) IV Push once  dextrose 50% Injectable 12.5 Gram(s) IV Push once  furosemide    Tablet 40 milliGRAM(s) Oral daily  glucagon  Injectable 1 milliGRAM(s) IntraMuscular once  insulin lispro (ADMELOG) corrective regimen sliding scale   SubCutaneous three times a day before meals  metoprolol succinate ER 50 milliGRAM(s) Oral daily  ondansetron Injectable 4 milliGRAM(s) IV Push once  polyethylene glycol 3350 17 Gram(s) Oral daily  sacubitril 97 mG/valsartan 103 mG 1 Tablet(s) Oral two times a day  spironolactone 25 milliGRAM(s) Oral daily    MEDICATIONS  (PRN):  acetaminophen     Tablet .. 650 milliGRAM(s) Oral every 6 hours PRN Temp greater or equal to 38C (100.4F), Mild Pain (1 - 3)  acetaminophen     Tablet .. 650 milliGRAM(s) Oral every 6 hours PRN Mild Pain (1 - 3)  aluminum hydroxide/magnesium hydroxide/simethicone Suspension 30 milliLiter(s) Oral every 4 hours PRN Dyspepsia  dextrose Oral Gel 15 Gram(s) Oral once PRN Blood Glucose LESS THAN 70 milliGRAM(s)/deciliter  HYDROmorphone  Injectable 0.5 milliGRAM(s) IV Push every 4 hours PRN Severe Pain (7 - 10)  melatonin 3 milliGRAM(s) Oral at bedtime PRN Insomnia  ondansetron Injectable 4 milliGRAM(s) IV Push every 8 hours PRN Nausea and/or Vomiting  oxycodone    5 mG/acetaminophen 325 mG 2 Tablet(s) Oral every 4 hours PRN Moderate Pain (4 - 6)    ___________  Active diet:  Diet, Regular:   Consistent Carbohydrate Evening Snack (CSTCHOSN)  DASH/TLC Sodium & Cholesterol Restricted (DASH)  ___________________    ==================>> VITAL SIGNS <<==================    Height (cm): 185.4  Weight (kg): 117.9  BMI (kg/m2): 34.3  Vital Signs Last 24 HrsT(C): 36.8 (08-11-23 @ 15:57)  T(F): 98.2 (08-11-23 @ 15:57), Max: 98.6 (08-11-23 @ 12:05)  HR: 77 (08-11-23 @ 15:57) (61 - 79)  BP: 88/59 (08-11-23 @ 15:57)  RR: 18 (08-11-23 @ 15:57) (18 - 18)  SpO2: 94% (08-11-23 @ 15:57) (94% - 98%)      CAPILLARY BLOOD GLUCOSE  POCT Blood Glucose.: 191 mg/dL (11 Aug 2023 12:21)  POCT Blood Glucose.: 173 mg/dL (11 Aug 2023 08:09)  POCT Blood Glucose.: 157 mg/dL (10 Aug 2023 21:50)  POCT Blood Glucose.: 211 mg/dL (10 Aug 2023 16:52)     ==================>> LAB AND IMAGING <<==================                        10.4   6.78  )-----------( 306      ( 11 Aug 2023 07:10 )             33.3        08-11    136  |  95<L>  |  21  ----------------------------<  246<H>  3.8   |  22  |  0.93    Ca    8.8      11 Aug 2023 07:11    TPro  6.8  /  Alb  3.2<L>  /  TBili  0.4  /  DBili  x   /  AST  50<H>  /  ALT  41  /  AlkPhos  90  08-11    WBC count:   6.78 <<== ,  6.77 <<== ,  7.81 <<== ,  5.98 <<== ,  5.92 <<== ,  6.41 <<==   Hemoglobin:   10.4 <<==,  9.9 <<==,  10.3 <<==,  10.0 <<==,  9.8 <<==,  9.4 <<==  platelets:  306 <==, 240 <==, 204 <==, 147 <==, 141 <==, 133 <==, 150 <==    Creatinine:  0.93  <<==, 0.97  <<==, 1.18  <<==, 1.13  <<==, 1.26  <<==, 1.49  <<==  Sodium:   136  <==, 139  <==, 141  <==, 141  <==, 141  <==, 138  <==, 139  <==       AST:          50 <== , 65 <== , 111 <== , 128 <== , 44 <==      ALT:        41  <== , 55  <== , 83  <== , 78  <== , 33  <==      AP:        90  <=, 90  <=, 93  <=, 77  <=, 61  <=     Bili:        0.4  <=, 0.6  <=, 0.5  <=, 0.5  <=, 0.6  <=    ____________________________    M I C R O B I O L O G Y :    Culture - Tissue with Gram Stain (collected 08 Aug 2023 22:08)  Source: .Tissue Other  Gram Stain (09 Aug 2023 03:19):    No polymorphonuclear cells seen per low power field    No organisms seen per oil power field  Preliminary Report (10 Aug 2023 08:07):    No growth to date.    Culture - Surgical Swab (collected 08 Aug 2023 22:06)  Source: .Surgical Swab deep culture right foot  Final Report (11 Aug 2023 13:54):    Few Finegoldia magna "Susceptibilities not performed"    Culture - Abscess with Gram Stain (collected 07 Aug 2023 08:21)  Source: .Abscess right foot  Gram Stain (07 Aug 2023 23:24):    Rare polymorphonuclear leukocytes per low power field    No organisms seen per oil power field  Final Report (10 Aug 2023 09:38):    Rare Staphylococcus aureus    Few Finegoldia magna "Susceptibilities not performed"  Organism: Staphylococcus aureus (10 Aug 2023 09:38)  Organism: Staphylococcus aureus (10 Aug 2023 09:38)    Sensitivities:      -  Clindamycin: S 0.5      -  Oxacillin: S <=0.25 Oxacillin predicts susceptibility for dicloxacillin, methicillin, and nafcillin      -  Gentamicin: S <=1 Should not be used as monotherapy      -  Cefazolin: S <=4      -  Vancomycin: S 2      -  Tetracycline: S <=1      Method Type: HEATHER      -  Ampicillin/Sulbactam: S <=8/4      -  Penicillin: R >8      -  Rifampin: S <=1 Should not be used as monotherapy      -  Erythromycin: S <=0.25      -  Trimethoprim/Sulfamethoxazole: S <=0.5/9.5    Culture - Abscess with Gram Stain (collected 06 Aug 2023 15:42)  Source: .Abscess right foot  Gram Stain (07 Aug 2023 01:46):    Rare polymorphonuclear leukocytes seen per low power field    No organisms seen per oil power field  Final Report (08 Aug 2023 17:11):    Rare Coag Negative Staphylococcus "Susceptibilities not performed"    Few Finegoldia magna "Susceptibilities not performed"    Culture - Blood (collected 06 Aug 2023 11:30)  Source: .Blood Blood  Preliminary Report (10 Aug 2023 17:01):    No growth at 4 days    Culture - Blood (collected 06 Aug 2023 11:15)  Source: .Blood Blood  Preliminary Report (10 Aug 2023 17:01):    No growth at 4 days      < from: TTE W or WO Ultrasound Enhancing Agent (08.07.23 @ 15:33) >  CONCLUSIONS:   1. Severely dilated left ventricular cavity size.The left ventricular systolic function is severely decreased with an ejection fraction visually estimated at 25 to 30 %. There is global left ventricular hypokinesis.   2. Multiple segmental abnormalities exist. See findings.   3. Device lead is visualized in the right ventricle.   4. The aortic annulus and aortic root appear normal in size.  < end of copied text >    < from: Xray Foot AP + Lateral, Right (08.06.23 @ 11:40) >  IMPRESSION:  Diffuse soft tissue swelling throughout the right foot with a possible   trace amount of subcutaneous emphysema along the lateral aspect of the   right mid foot.  No definite cortical erosion seen to indicate acute osteomyelitis.  MR may be useful in further evaluation if there are no contraindications  < end of copied text >    ___________________________________________________________________________________  ===============>>  A S S E S S M E N T   A N D   P L A N <<===============  ------------------------------------------------------------------------------------------    · Assessment	  Patient is a 63yo Male with past medical history of HTN, DM, defibrillator, CAD post CABG, CHF, hx of Sommers fracture, non-union many years ago, presenting with  ulceration, recent bone biopsy with Dr Foss as op , came back positive for staph, treated with Levaquin, presented to ED with acute and significant swelling to the foot as advised to go to the ER for eval.   denies recent fall or trauma. states over the past few days right foot has been increasing in swelling. and has been having some fevers ... states hard to walk on his foot due to pain. states had temp at home of 101-102. denies n/v/d, CP, SOB, HA, dizziness, abdominal pain, urinary symptoms, cough, leg pain, swelling.  patient evaluated by podiatry in ED for presence of gas.. MRI and vascular studies ordered already, antibiotics given..         Problem/Plan - 1:  ·  Problem: Diabetic foot infection. , Osteomyelitis  appreciated podiatry management   local wound care  continue antibiotics per ID  : Likely will need long-term antibiotics >> ID to finalize recom ..      Infectious disease, podiatry, vascular follow-up appreciated   pain management as needed  supportive care   diuretics | leg elevation: helping a lot     Problem/Plan - 2:  ·  Problem: Diabetes.   ·  Plan: patient states has been overall well controlled but in past 1-2 days more elevated...   ---monitor finger sticks closely  ---Insulin regimen as ordered and monitor / adjust as needed  --- hold home medications for now   ---Diabetic diet  ---A1c. 6    May consider adding Farxiga on discharge give CHF    Problem/Plan - 3:  ·  Problem: Heart failure, unspecified HF chronicity, unspecified heart failure type.   ·  Plan: patient with likely chronic systolic CHF on entresto and lasix / Epleronon   continue home medications  Patient takes 75 mg of toprol  at home, here is a 25.  Increase to 50 for now: monitor closely  Echo as above   cardio following for optimization   monitor otherwise.    Problem/Plan - 4:  ·  Problem: HLD (hyperlipidemia).   ·  Plan: continue equivalent statin dose  lipid profile.    Problem/Plan - 5:  ·  Problem: history of Deep vein thrombosis (DVT) of right upper extremity.   ·  Plan: on Xarelto for past ~ 2.5 months ( was on Eliqius first)  for Rt upper extremity DVT post infection of Elbow !   continue for now  repeat Duplex as outpatient to decide on duration of further therapy >> recom in 2 weeks ..     -GI/DVT Prophylaxis per protocol.    --------------------------------------------  Case discussed with patient, wife, RN..   Education given on findings and plan of care  ___________________________  H. RACHAEL Boudreaux.  Pager: [phone]

## 2023-08-11 NOTE — PROGRESS NOTE ADULT - SUBJECTIVE AND OBJECTIVE BOX
Date of Service: 08-11-23 @ 07:00           CARDIOLOGY     PROGRESS  NOTE   ________________________________________________    CHIEF COMPLAINT:Patient is a 62y old  Male who presents with a chief complaint of diabetic foot infection, possible osteo (10 Aug 2023 16:37)  no complain  	  REVIEW OF SYSTEMS:  CONSTITUTIONAL: No fever, weight loss, or fatigue  EYES: No eye pain, visual disturbances, or discharge  ENT:  No difficulty hearing, tinnitus, vertigo; No sinus or throat pain  NECK: No pain or stiffness  RESPIRATORY: No cough, wheezing, chills or hemoptysis; No Shortness of Breath  CARDIOVASCULAR: No chest pain, palpitations, passing out, dizziness, or leg swelling  GASTROINTESTINAL: No abdominal or epigastric pain. No nausea, vomiting, or hematemesis; No diarrhea or constipation. No melena or hematochezia.  GENITOURINARY: No dysuria, frequency, hematuria, or incontinence  NEUROLOGICAL: No headaches, memory loss, loss of strength, numbness, or tremors  SKIN: No itching, burning, rashes, or lesions   LYMPH Nodes: No enlarged glands  ENDOCRINE: No heat or cold intolerance; No hair loss  MUSCULOSKELETAL: No joint pain or swelling; No muscle, back, o+ RL extremity pain  PSYCHIATRIC: No depression, anxiety, mood swings, or difficulty sleeping  HEME/LYMPH: No easy bruising, or bleeding gums  ALLERGY AND IMMUNOLOGIC: No hives or eczema	    [x ] All others negative	  [ ] Unable to obtain    PHYSICAL EXAM:  T(C): 36.3 (08-11-23 @ 05:00), Max: 36.8 (08-10-23 @ 15:54)  HR: 79 (08-11-23 @ 05:00) (66 - 98)  BP: 108/65 (08-11-23 @ 05:00) (105/60 - 116/66)  RR: 18 (08-11-23 @ 05:00) (18 - 18)  SpO2: 98% (08-11-23 @ 05:00) (94% - 98%)  Wt(kg): --  I&O's Summary    10 Aug 2023 07:01  -  11 Aug 2023 07:00  --------------------------------------------------------  IN: 760 mL / OUT: 700 mL / NET: 60 mL        Appearance: Normal	  HEENT:   Normal oral mucosa, PERRL, EOMI	  Lymphatic: No lymphadenopathy  Cardiovascular: Normal S1 S2, No JVD, + murmurs, No edema  Respiratory: rhonchi  Psychiatry: A & O x 3, Mood & affect appropriate  Gastrointestinal:  Soft, Non-tender, + BS	  Skin: No rashes, No ecchymoses, No cyanosis	  Neurologic: Non-focal  Extremities: Normal range of motion, +wound vac r foot  Vascular: + pvd    MEDICATIONS  (STANDING):  atorvastatin 80 milliGRAM(s) Oral at bedtime  ceFAZolin   IVPB 2000 milliGRAM(s) IV Intermittent every 8 hours  chlorhexidine 2% Cloths 1 Application(s) Topical <User Schedule>  dextrose 5%. 1000 milliLiter(s) (50 mL/Hr) IV Continuous <Continuous>  dextrose 5%. 1000 milliLiter(s) (100 mL/Hr) IV Continuous <Continuous>  dextrose 50% Injectable 25 Gram(s) IV Push once  dextrose 50% Injectable 25 Gram(s) IV Push once  dextrose 50% Injectable 12.5 Gram(s) IV Push once  furosemide    Tablet 40 milliGRAM(s) Oral daily  glucagon  Injectable 1 milliGRAM(s) IntraMuscular once  insulin lispro (ADMELOG) corrective regimen sliding scale   SubCutaneous three times a day before meals  metoprolol succinate ER 50 milliGRAM(s) Oral daily  ondansetron Injectable 4 milliGRAM(s) IV Push once  polyethylene glycol 3350 17 Gram(s) Oral daily  sacubitril 97 mG/valsartan 103 mG 1 Tablet(s) Oral two times a day  spironolactone 25 milliGRAM(s) Oral daily      TELEMETRY: 	    ECG:  	  RADIOLOGY:  OTHER: 	  	  LABS:	 	    CARDIAC MARKERS:                                9.9    6.77  )-----------( 240      ( 10 Aug 2023 07:27 )             31.4     08-10    139  |  100  |  22  ----------------------------<  138<H>  3.4<L>   |  23  |  0.97    Ca    9.3      10 Aug 2023 07:32    TPro  7.0  /  Alb  3.3  /  TBili  0.6  /  DBili  x   /  AST  65<H>  /  ALT  55<H>  /  AlkPhos  90  08-10    proBNP:   Lipid Profile: Cholesterol 75  LDL --  HDL 27      HgA1c:   TSH:     Culture - Tissue with Gram Stain (08.08.23 @ 22:08)    Gram Stain:   No polymorphonuclear cells seen per low power field  No organisms seen per oil power field   Specimen Source: .Tissue Other   Culture Results:   No growth to date.        Assessment and plan  ---------------------------  Patient is a 61yo Male with past medical history of HTN, DM, defibrillator, CAD post CABG, CHF, hx of Sommers fracture, non-union many years ago, presenting with  ulceration, recent bone biopsy with Dr Foss as op , came back positive for staph, treated with Levaquin, presented to ED with acute and significant swelling to the foot as advised to go to the ER for eval.   denies recent fall or trauma.  states  over the past few days right foot has been increasing in swelling. and has been having some fevers ... states hard to walk on his foot due to pain. states had temp at home of 101-102. denies n/v/d, CP, SOB, HA, dizziness, abdominal pain, urinary symptoms, cough, leg pain, swelling.  patient evaluated by podiatry in ED for presence of gas.. MRI and vascular studies ordered already, antibiotics given..   pt with sig cardiac hx for possible vascular and podiatric surgery  will call PMD to get records  continue all cardiac meds  will adjust cardiac meds  will check AICD,about MRI compatibility  abx , fu blood cultures  doubt DVT pt on AC  will call primary cardiology to get records  echo noted with severe LV dysfunction  continue current meds, may add Farxiga 10 mg upon dc for heart failure  ID noted, s/p I&d, continue abx awaiting cultures  DVT prophylaxis  s/p  VAC to the wound  increase lft fu closely  continue all cardiac meds, hold if sbp less than 90  culture noted

## 2023-08-11 NOTE — PROGRESS NOTE ADULT - SUBJECTIVE AND OBJECTIVE BOX
Patient is a 62y old  Male who presents with a chief complaint of diabetic foot infection, possible osteo (11 Aug 2023 07:00)       INTERVAL HPI/OVERNIGHT EVENTS:  Patient seen and evaluated at bedside.  Pt is resting comfortable in NAD. Denies N/V/F/C.    Allergies    No Known Allergies    Intolerances        Vital Signs Last 24 Hrs  T(C): 36.7 (11 Aug 2023 08:27), Max: 36.8 (10 Aug 2023 15:54)  T(F): 98.1 (11 Aug 2023 08:27), Max: 98.2 (10 Aug 2023 15:54)  HR: 61 (11 Aug 2023 08:27) (61 - 79)  BP: 114/73 (11 Aug 2023 08:27) (105/60 - 116/66)  BP(mean): --  RR: 18 (11 Aug 2023 08:27) (18 - 18)  SpO2: 95% (11 Aug 2023 08:27) (95% - 98%)    Parameters below as of 11 Aug 2023 08:27  Patient On (Oxygen Delivery Method): room air        LABS:                        10.4   6.78  )-----------( 306      ( 11 Aug 2023 07:10 )             33.3     08-11    136  |  95<L>  |  21  ----------------------------<  246<H>  3.8   |  22  |  0.93    Ca    8.8      11 Aug 2023 07:11    TPro  6.8  /  Alb  3.2<L>  /  TBili  0.4  /  DBili  x   /  AST  50<H>  /  ALT  41  /  AlkPhos  90  08-11      Urinalysis Basic - ( 11 Aug 2023 07:11 )    Color: x / Appearance: x / SG: x / pH: x  Gluc: 246 mg/dL / Ketone: x  / Bili: x / Urobili: x   Blood: x / Protein: x / Nitrite: x   Leuk Esterase: x / RBC: x / WBC x   Sq Epi: x / Non Sq Epi: x / Bacteria: x      CAPILLARY BLOOD GLUCOSE      POCT Blood Glucose.: 173 mg/dL (11 Aug 2023 08:09)  POCT Blood Glucose.: 157 mg/dL (10 Aug 2023 21:50)  POCT Blood Glucose.: 211 mg/dL (10 Aug 2023 16:52)  POCT Blood Glucose.: 170 mg/dL (10 Aug 2023 12:17)      Lower Extremity Physical Exam:    Vascular: DP/PT 0/4 B/L, CFT <3 sec x 10, Temp gradient warm to warm, RLE, warm to cool LLE.    Neurology: Epicritic sensation intact to level of digits, B/L  Musculoskeletal/Ortho: pain to palpation over right foot 5th digit   Skin: 8/8 s/p Right Foot incision and drainage with partial 5th ray resection, open. VAC left intact, peripheral wound noted w/ decreased edema, ecchymosis to the forefoot and midfoot noted.   RADIOLOGY & ADDITIONAL TESTS:

## 2023-08-11 NOTE — PROVIDER CONTACT NOTE (MEDICATION) - BACKGROUND
Local infection of skin and SubQ tissue
Pt admitted for local infection of skin and subcutaneous tissue, pt on vancomycin 1g iv q12h

## 2023-08-11 NOTE — PROGRESS NOTE ADULT - SUBJECTIVE AND OBJECTIVE BOX
Patient is a 62y old  Male who presents with a chief complaint of diabetic foot infection, possible osteo (11 Aug 2023 16:28)    Being followed by ID for        Interval history:  No other acute events      ROS:  No cough,SOB,CP  No N/V/D  No abd pain  No urinary complaints  No HA  No joint or limb pain  No other complaints    PAST MEDICAL & SURGICAL HISTORY:    Allergies    No Known Allergies    Intolerances      Antimicrobials:    ceFAZolin   IVPB 2000 milliGRAM(s) IV Intermittent every 8 hours    MEDICATIONS  (STANDING):  atorvastatin 80 milliGRAM(s) Oral at bedtime  ceFAZolin   IVPB 2000 milliGRAM(s) IV Intermittent every 8 hours  chlorhexidine 2% Cloths 1 Application(s) Topical <User Schedule>  dextrose 5%. 1000 milliLiter(s) (100 mL/Hr) IV Continuous <Continuous>  dextrose 5%. 1000 milliLiter(s) (50 mL/Hr) IV Continuous <Continuous>  dextrose 50% Injectable 25 Gram(s) IV Push once  dextrose 50% Injectable 25 Gram(s) IV Push once  dextrose 50% Injectable 12.5 Gram(s) IV Push once  furosemide    Tablet 40 milliGRAM(s) Oral daily  glucagon  Injectable 1 milliGRAM(s) IntraMuscular once  insulin lispro (ADMELOG) corrective regimen sliding scale   SubCutaneous three times a day before meals  metoprolol succinate ER 50 milliGRAM(s) Oral daily  ondansetron Injectable 4 milliGRAM(s) IV Push once  polyethylene glycol 3350 17 Gram(s) Oral daily  sacubitril 97 mG/valsartan 103 mG 1 Tablet(s) Oral two times a day  spironolactone 25 milliGRAM(s) Oral daily      Vital Signs Last 24 Hrs  T(C): 36.8 (08-11-23 @ 15:57), Max: 37 (08-11-23 @ 12:05)  T(F): 98.2 (08-11-23 @ 15:57), Max: 98.6 (08-11-23 @ 12:05)  HR: 77 (08-11-23 @ 15:57) (61 - 79)  BP: 88/59 (08-11-23 @ 15:57) (88/59 - 114/73)  BP(mean): --  RR: 18 (08-11-23 @ 15:57) (18 - 18)  SpO2: 94% (08-11-23 @ 15:57) (94% - 98%)    Physical Exam:    Constitutional well preserved,comfortable,pleasant    HEENT PERRLA EOMI,No pallor or icterus    No oral exudate or erythema    Neck supple no JVD or LN    Chest Good AE,CTA    CVS RRR S1 S2 WNl No murmur or rub or gallop    Abd soft BS normal No tenderness no masses    Ext No cyanosis clubbing or edema    IV site no erythema tenderness or discharge    Joints no swelling or LOM    CNS AAO X 3 no focal    Lab Data:                          10.4   6.78  )-----------( 306      ( 11 Aug 2023 07:10 )             33.3       08-11    136  |  95<L>  |  21  ----------------------------<  246<H>  3.8   |  22  |  0.93    Ca    8.8      11 Aug 2023 07:11    TPro  6.8  /  Alb  3.2<L>  /  TBili  0.4  /  DBili  x   /  AST  50<H>  /  ALT  41  /  AlkPhos  90  08-11      Urinalysis Basic - ( 11 Aug 2023 07:11 )    Color: x / Appearance: x / SG: x / pH: x  Gluc: 246 mg/dL / Ketone: x  / Bili: x / Urobili: x   Blood: x / Protein: x / Nitrite: x   Leuk Esterase: x / RBC: x / WBC x   Sq Epi: x / Non Sq Epi: x / Bacteria: x        .Tissue Other  08-08-23   No growth to date.  --    No polymorphonuclear cells seen per low power field  No organisms seen per oil power field      .Surgical Swab deep culture right foot  08-08-23   Few Finegoldia magna "Susceptibilities not performed"  --  --      .Abscess right foot  08-07-23   Rare Staphylococcus aureus  Few Finegoldia magna "Susceptibilities not performed"  --  Staphylococcus aureus      .Abscess right foot  08-06-23   Rare Coag Negative Staphylococcus "Susceptibilities not performed"  Few Finegoldia magna "Susceptibilities not performed"  --    Rare polymorphonuclear leukocytes seen per low power field  No organisms seen per oil power field      .Blood Blood  08-06-23   No growth at 5 days  --  --      .Blood Blood  08-06-23   No growth at 5 days  --  --                    WBC Count: 6.78 (08-11-23 @ 07:10)  WBC Count: 6.77 (08-10-23 @ 07:27)  WBC Count: 7.81 (08-09-23 @ 07:20)  WBC Count: 5.98 (08-08-23 @ 08:33)  WBC Count: 5.92 (08-08-23 @ 05:06)  WBC Count: 6.41 (08-07-23 @ 06:07)  WBC Count: 7.10 (08-06-23 @ 11:39)       Bilirubin Total: 0.4 mg/dL (08-11-23 @ 07:11)  Aspartate Aminotransferase (AST/SGOT): 50 U/L (08-11-23 @ 07:11)  Alanine Aminotransferase (ALT/SGPT): 41 U/L (08-11-23 @ 07:11)  Alkaline Phosphatase: 90 U/L (08-11-23 @ 07:11)  Bilirubin Total: 0.6 mg/dL (08-10-23 @ 07:32)  Aspartate Aminotransferase (AST/SGOT): 65 U/L (08-10-23 @ 07:32)  Alanine Aminotransferase (ALT/SGPT): 55 U/L (08-10-23 @ 07:32)  Alkaline Phosphatase: 90 U/L (08-10-23 @ 07:32)  Bilirubin Total: 0.5 mg/dL (08-09-23 @ 07:17)  Aspartate Aminotransferase (AST/SGOT): 111 U/L (08-09-23 @ 07:17)  Alanine Aminotransferase (ALT/SGPT): 83 U/L (08-09-23 @ 07:17)  Alkaline Phosphatase: 93 U/L (08-09-23 @ 07:17)  Bilirubin Total: 0.5 mg/dL (08-08-23 @ 05:05)  Aspartate Aminotransferase (AST/SGOT): 128 U/L (08-08-23 @ 05:05)  Alanine Aminotransferase (ALT/SGPT): 78 U/L (08-08-23 @ 05:05)  Alkaline Phosphatase: 77 U/L (08-08-23 @ 05:05)  Bilirubin Total: 0.6 mg/dL (08-07-23 @ 06:07)  Aspartate Aminotransferase (AST/SGOT): 44 U/L (08-07-23 @ 06:07)  Alanine Aminotransferase (ALT/SGPT): 33 U/L (08-07-23 @ 06:07)  Alkaline Phosphatase: 61 U/L (08-07-23 @ 06:07)         Patient is a 62y old  Male who presents with a chief complaint of diabetic foot infection, possible osteo (11 Aug 2023 16:28)    Being followed by ID for osteomyelitis         Interval history:  pt feeling much improved   s/p surgery on 8/8  · Operative Findings	s/p R foot Incision and Drainage and partial 5th met base resection :   - no evidence of purulence tracking proximally, however wound tracking distally from lateral 5th base to central midfoot  - large necrotic tissue encountered  No other acute events        PAST MEDICAL & SURGICAL HISTORY:    Allergies    No Known Allergies    Intolerances      Antimicrobials:    ceFAZolin   IVPB 2000 milliGRAM(s) IV Intermittent every 8 hours    MEDICATIONS  (STANDING):  atorvastatin 80 milliGRAM(s) Oral at bedtime  ceFAZolin   IVPB 2000 milliGRAM(s) IV Intermittent every 8 hours  chlorhexidine 2% Cloths 1 Application(s) Topical <User Schedule>  dextrose 5%. 1000 milliLiter(s) (100 mL/Hr) IV Continuous <Continuous>  dextrose 5%. 1000 milliLiter(s) (50 mL/Hr) IV Continuous <Continuous>  dextrose 50% Injectable 25 Gram(s) IV Push once  dextrose 50% Injectable 25 Gram(s) IV Push once  dextrose 50% Injectable 12.5 Gram(s) IV Push once  furosemide    Tablet 40 milliGRAM(s) Oral daily  glucagon  Injectable 1 milliGRAM(s) IntraMuscular once  insulin lispro (ADMELOG) corrective regimen sliding scale   SubCutaneous three times a day before meals  metoprolol succinate ER 50 milliGRAM(s) Oral daily  ondansetron Injectable 4 milliGRAM(s) IV Push once  polyethylene glycol 3350 17 Gram(s) Oral daily  sacubitril 97 mG/valsartan 103 mG 1 Tablet(s) Oral two times a day  spironolactone 25 milliGRAM(s) Oral daily      Vital Signs Last 24 Hrs  T(C): 36.8 (08-11-23 @ 15:57), Max: 37 (08-11-23 @ 12:05)  T(F): 98.2 (08-11-23 @ 15:57), Max: 98.6 (08-11-23 @ 12:05)  HR: 77 (08-11-23 @ 15:57) (61 - 79)  BP: 88/59 (08-11-23 @ 15:57) (88/59 - 114/73)  BP(mean): --  RR: 18 (08-11-23 @ 15:57) (18 - 18)  SpO2: 94% (08-11-23 @ 15:57) (94% - 98%)    Physical Exam:    Constitutional well preserved,comfortable,pleasant    HEENT PERRLA EOMI,No pallor or icterus    No oral exudate or erythema    Neck supple no JVD or LN    Chest Good AE,CTA    CVS  S1 S2     Abd soft BS normal No tenderness     Ext right foot dressed  less erythema less swelling     IV site no erythema tenderness or discharge      CNS AAO X 3 no focal    Lab Data:                          10.4   6.78  )-----------( 306      ( 11 Aug 2023 07:10 )             33.3       08-11    136  |  95<L>  |  21  ----------------------------<  246<H>  3.8   |  22  |  0.93    Ca    8.8      11 Aug 2023 07:11    TPro  6.8  /  Alb  3.2<L>  /  TBili  0.4  /  DBili  x   /  AST  50<H>  /  ALT  41  /  AlkPhos  90  08-11      .Tissue Other  08-08-23   No growth to date.  --    No polymorphonuclear cells seen per low power field  No organisms seen per oil power field      .Surgical Swab deep culture right foot  08-08-23   Few Finegoldia magna "Susceptibilities not performed"  --  --      .Abscess right foot  08-07-23   Rare Staphylococcus aureus  Few Finegoldia magna "Susceptibilities not performed"  --  Staphylococcus aureus      .Abscess right foot  08-06-23   Rare Coag Negative Staphylococcus "Susceptibilities not performed"  Few Finegoldia magna "Susceptibilities not performed"  --    Rare polymorphonuclear leukocytes seen per low power field  No organisms seen per oil power field      .Blood Blood  08-06-23   No growth at 5 days  --  --      .Blood Blood  08-06-23   No growth at 5 days  --  --      WBC Count: 6.78 (08-11-23 @ 07:10)  WBC Count: 6.77 (08-10-23 @ 07:27)  WBC Count: 7.81 (08-09-23 @ 07:20)  WBC Count: 5.98 (08-08-23 @ 08:33)  WBC Count: 5.92 (08-08-23 @ 05:06)  WBC Count: 6.41 (08-07-23 @ 06:07)  WBC Count: 7.10 (08-06-23 @ 11:39)       Bilirubin Total: 0.4 mg/dL (08-11-23 @ 07:11)  Aspartate Aminotransferase (AST/SGOT): 50 U/L (08-11-23 @ 07:11)  Alanine Aminotransferase (ALT/SGPT): 41 U/L (08-11-23 @ 07:11)  Alkaline Phosphatase: 90 U/L (08-11-23 @ 07:11)      < from: US Abdomen Upper Quadrant Right (08.09.23 @ 13:25) >  IMPRESSION: Gallbladder sludge. No cholelithiasis or gallbladder wall   thickening.    Suggestion of mild hepatic steatosis.    < end of copied text >    < from: CT Foot w/ IV Cont, Right (08.08.23 @ 10:21) >    IMPRESSION:  1.  Chronic transverse fracture through the fifth metatarsal base. Soft   tissue wound with foci of air is seen tracking to the level of the base   of the fifth metatarsal tracking up to the site of fracture. There is   adjacent cortical irregularity along the fifth metatarsal base which   could be secondary to prior fracture versus osteomyelitis. Likely   correlate patient history.  2.  There is soft tissue swelling around the foot most significant at the   lateral aspect. No mature, drainable, or enhancing collection is seen at   this time.    Please note there is a delay in the signing of this report waiting for   the technologist to complete reformats.    --- End of Report ---        < end of copied text >

## 2023-08-12 LAB
ALBUMIN SERPL ELPH-MCNC: 3 G/DL — LOW (ref 3.3–5)
ALP SERPL-CCNC: 90 U/L — SIGNIFICANT CHANGE UP (ref 40–120)
ALT FLD-CCNC: 47 U/L — HIGH (ref 10–45)
ANION GAP SERPL CALC-SCNC: 13 MMOL/L — SIGNIFICANT CHANGE UP (ref 5–17)
AST SERPL-CCNC: 76 U/L — HIGH (ref 10–40)
BASOPHILS # BLD AUTO: 0.08 K/UL — SIGNIFICANT CHANGE UP (ref 0–0.2)
BASOPHILS NFR BLD AUTO: 1.4 % — SIGNIFICANT CHANGE UP (ref 0–2)
BILIRUB SERPL-MCNC: 0.4 MG/DL — SIGNIFICANT CHANGE UP (ref 0.2–1.2)
BUN SERPL-MCNC: 27 MG/DL — HIGH (ref 7–23)
CALCIUM SERPL-MCNC: 9.1 MG/DL — SIGNIFICANT CHANGE UP (ref 8.4–10.5)
CHLORIDE SERPL-SCNC: 99 MMOL/L — SIGNIFICANT CHANGE UP (ref 96–108)
CO2 SERPL-SCNC: 27 MMOL/L — SIGNIFICANT CHANGE UP (ref 22–31)
CREAT SERPL-MCNC: 1.08 MG/DL — SIGNIFICANT CHANGE UP (ref 0.5–1.3)
EGFR: 78 ML/MIN/1.73M2 — SIGNIFICANT CHANGE UP
EOSINOPHIL # BLD AUTO: 0.18 K/UL — SIGNIFICANT CHANGE UP (ref 0–0.5)
EOSINOPHIL NFR BLD AUTO: 3.2 % — SIGNIFICANT CHANGE UP (ref 0–6)
GLUCOSE BLDC GLUCOMTR-MCNC: 136 MG/DL — HIGH (ref 70–99)
GLUCOSE BLDC GLUCOMTR-MCNC: 149 MG/DL — HIGH (ref 70–99)
GLUCOSE BLDC GLUCOMTR-MCNC: 151 MG/DL — HIGH (ref 70–99)
GLUCOSE BLDC GLUCOMTR-MCNC: 201 MG/DL — HIGH (ref 70–99)
GLUCOSE SERPL-MCNC: 187 MG/DL — HIGH (ref 70–99)
HCT VFR BLD CALC: 29.7 % — LOW (ref 39–50)
HGB BLD-MCNC: 9.3 G/DL — LOW (ref 13–17)
IMM GRANULOCYTES NFR BLD AUTO: 3.5 % — HIGH (ref 0–0.9)
LYMPHOCYTES # BLD AUTO: 1.25 K/UL — SIGNIFICANT CHANGE UP (ref 1–3.3)
LYMPHOCYTES # BLD AUTO: 22.1 % — SIGNIFICANT CHANGE UP (ref 13–44)
MCHC RBC-ENTMCNC: 28.1 PG — SIGNIFICANT CHANGE UP (ref 27–34)
MCHC RBC-ENTMCNC: 31.3 GM/DL — LOW (ref 32–36)
MCV RBC AUTO: 89.7 FL — SIGNIFICANT CHANGE UP (ref 80–100)
MONOCYTES # BLD AUTO: 0.54 K/UL — SIGNIFICANT CHANGE UP (ref 0–0.9)
MONOCYTES NFR BLD AUTO: 9.6 % — SIGNIFICANT CHANGE UP (ref 2–14)
NEUTROPHILS # BLD AUTO: 3.4 K/UL — SIGNIFICANT CHANGE UP (ref 1.8–7.4)
NEUTROPHILS NFR BLD AUTO: 60.2 % — SIGNIFICANT CHANGE UP (ref 43–77)
NRBC # BLD: 0 /100 WBCS — SIGNIFICANT CHANGE UP (ref 0–0)
PLATELET # BLD AUTO: 272 K/UL — SIGNIFICANT CHANGE UP (ref 150–400)
POTASSIUM SERPL-MCNC: 4.7 MMOL/L — SIGNIFICANT CHANGE UP (ref 3.5–5.3)
POTASSIUM SERPL-SCNC: 4.7 MMOL/L — SIGNIFICANT CHANGE UP (ref 3.5–5.3)
PROT SERPL-MCNC: 6.5 G/DL — SIGNIFICANT CHANGE UP (ref 6–8.3)
RBC # BLD: 3.31 M/UL — LOW (ref 4.2–5.8)
RBC # FLD: 16.1 % — HIGH (ref 10.3–14.5)
SODIUM SERPL-SCNC: 139 MMOL/L — SIGNIFICANT CHANGE UP (ref 135–145)
WBC # BLD: 5.65 K/UL — SIGNIFICANT CHANGE UP (ref 3.8–10.5)
WBC # FLD AUTO: 5.65 K/UL — SIGNIFICANT CHANGE UP (ref 3.8–10.5)

## 2023-08-12 RX ORDER — HEPARIN SODIUM 5000 [USP'U]/ML
5000 INJECTION INTRAVENOUS; SUBCUTANEOUS EVERY 12 HOURS
Refills: 0 | Status: DISCONTINUED | OUTPATIENT
Start: 2023-08-12 | End: 2023-08-13

## 2023-08-12 RX ORDER — SACUBITRIL AND VALSARTAN 24; 26 MG/1; MG/1
1 TABLET, FILM COATED ORAL
Refills: 0 | Status: DISCONTINUED | OUTPATIENT
Start: 2023-08-12 | End: 2023-08-16

## 2023-08-12 RX ADMIN — Medication 100 MILLIGRAM(S): at 14:19

## 2023-08-12 RX ADMIN — SACUBITRIL AND VALSARTAN 1 TABLET(S): 24; 26 TABLET, FILM COATED ORAL at 17:18

## 2023-08-12 RX ADMIN — SPIRONOLACTONE 25 MILLIGRAM(S): 25 TABLET, FILM COATED ORAL at 09:08

## 2023-08-12 RX ADMIN — Medication 100 MILLIGRAM(S): at 05:34

## 2023-08-12 RX ADMIN — Medication 4: at 13:01

## 2023-08-12 RX ADMIN — Medication 3 MILLIGRAM(S): at 21:43

## 2023-08-12 RX ADMIN — Medication 2: at 09:07

## 2023-08-12 RX ADMIN — ATORVASTATIN CALCIUM 80 MILLIGRAM(S): 80 TABLET, FILM COATED ORAL at 21:43

## 2023-08-12 RX ADMIN — CHLORHEXIDINE GLUCONATE 1 APPLICATION(S): 213 SOLUTION TOPICAL at 05:34

## 2023-08-12 RX ADMIN — HEPARIN SODIUM 5000 UNIT(S): 5000 INJECTION INTRAVENOUS; SUBCUTANEOUS at 17:18

## 2023-08-12 RX ADMIN — Medication 100 MILLIGRAM(S): at 21:43

## 2023-08-12 NOTE — PROGRESS NOTE ADULT - SUBJECTIVE AND OBJECTIVE BOX
Date of Service: 08-12-23 @ 08:14           CARDIOLOGY     PROGRESS  NOTE   ________________________________________________    CHIEF COMPLAINT:Patient is a 62y old  Male who presents with a chief complaint of diabetic foot infection, possible osteo (11 Aug 2023 17:14)  doing better, much iomprovement  	  REVIEW OF SYSTEMS:  CONSTITUTIONAL: No fever, weight loss, or fatigue  EYES: No eye pain, visual disturbances, or discharge  ENT:  No difficulty hearing, tinnitus, vertigo; No sinus or throat pain  NECK: No pain or stiffness  RESPIRATORY: No cough, wheezing, chills or hemoptysis; No Shortness of Breath  CARDIOVASCULAR: No chest pain, palpitations, passing out, dizziness, or leg swelling  GASTROINTESTINAL: No abdominal or epigastric pain. No nausea, vomiting, or hematemesis; No diarrhea or constipation. No melena or hematochezia.  GENITOURINARY: No dysuria, frequency, hematuria, or incontinence  NEUROLOGICAL: No headaches, memory loss, loss of strength, numbness, or tremors  SKIN: No itching, burning, rashes, or lesions   LYMPH Nodes: No enlarged glands  ENDOCRINE: No heat or cold intolerance; No hair loss  MUSCULOSKELETAL: No joint pain or swelling; No muscle, back, + r  extremity pain  PSYCHIATRIC: No depression, anxiety, mood swings, or difficulty sleeping  HEME/LYMPH: No easy bruising, or bleeding gums  ALLERGY AND IMMUNOLOGIC: No hives or eczema	    [ x] All others negative	  [ ] Unable to obtain    PHYSICAL EXAM:  T(C): 36.8 (08-12-23 @ 04:30), Max: 37 (08-11-23 @ 12:05)  HR: 67 (08-12-23 @ 04:30) (61 - 77)  BP: 91/51 (08-12-23 @ 04:30) (88/59 - 114/73)  RR: 18 (08-12-23 @ 04:30) (18 - 18)  SpO2: 98% (08-12-23 @ 04:30) (94% - 98%)  Wt(kg): --  I&O's Summary    11 Aug 2023 07:01  -  12 Aug 2023 07:00  --------------------------------------------------------  IN: 1360 mL / OUT: 1000 mL / NET: 360 mL    12 Aug 2023 07:01  -  12 Aug 2023 08:14  --------------------------------------------------------  IN: 0 mL / OUT: 300 mL / NET: -300 mL        Appearance: Normal	  HEENT:   Normal oral mucosa, PERRL, EOMI	  Lymphatic: No lymphadenopathy  Cardiovascular: Normal S1 S2, No JVD, + murmurs, No edema  Respiratory:rhonchi  Psychiatry: A & O x 3, Mood & affect appropriate  Gastrointestinal:  Soft, Non-tender, + BS	  Skin: No rashes, No ecchymoses, No cyanosis	  Neurologic: Non-focal  Extremities: Normal range of motion, + bandaged/vac r foot  Vascular:= pvd    MEDICATIONS  (STANDING):  atorvastatin 80 milliGRAM(s) Oral at bedtime  ceFAZolin   IVPB 2000 milliGRAM(s) IV Intermittent every 8 hours  chlorhexidine 2% Cloths 1 Application(s) Topical <User Schedule>  dextrose 5%. 1000 milliLiter(s) (100 mL/Hr) IV Continuous <Continuous>  dextrose 5%. 1000 milliLiter(s) (50 mL/Hr) IV Continuous <Continuous>  dextrose 50% Injectable 25 Gram(s) IV Push once  dextrose 50% Injectable 25 Gram(s) IV Push once  dextrose 50% Injectable 12.5 Gram(s) IV Push once  furosemide    Tablet 40 milliGRAM(s) Oral daily  glucagon  Injectable 1 milliGRAM(s) IntraMuscular once  insulin lispro (ADMELOG) corrective regimen sliding scale   SubCutaneous three times a day before meals  metoprolol succinate ER 50 milliGRAM(s) Oral daily  ondansetron Injectable 4 milliGRAM(s) IV Push once  polyethylene glycol 3350 17 Gram(s) Oral daily  sacubitril 97 mG/valsartan 103 mG 1 Tablet(s) Oral two times a day  spironolactone 25 milliGRAM(s) Oral daily      TELEMETRY: 	    ECG:  	  RADIOLOGY:  OTHER: 	  	  LABS:	 	    CARDIAC MARKERS:                                9.3    5.65  )-----------( 272      ( 12 Aug 2023 06:57 )             29.7     08-12    139  |  99  |  27<H>  ----------------------------<  187<H>  4.7   |  27  |  1.08    Ca    9.1      12 Aug 2023 06:55    TPro  6.5  /  Alb  3.0<L>  /  TBili  0.4  /  DBili  x   /  AST  76<H>  /  ALT  47<H>  /  AlkPhos  90  08-12    proBNP:   Lipid Profile: Cholesterol 75  LDL --  HDL 27      HgA1c:   TSH:   62 y.o M 8/8 s/p Right Foot incision and drainage with partial 5th ray resection, open  - Pt seen and evaluated  - Afebrile, no leukocytosis   - 8/8 s/p Right Foot incision and drainage with partial 5th ray resection, open.   - High concern for residual bone infection  - High concern for viability  - OR wound culture showing no growth (prelim)  - OR bone culture showing no growth (prelim)   - Vasc recommendations, appreciated   - Pod plan: Local wound care w/ VAC   - Discussed with attending      Assessment and plan  ---------------------------  Patient is a 63yo Male with past medical history of HTN, DM, defibrillator, CAD post CABG, CHF, hx of Sommers fracture, non-union many years ago, presenting with  ulceration, recent bone biopsy with Dr Foss as op , came back positive for staph, treated with Levaquin, presented to ED with acute and significant swelling to the foot as advised to go to the ER for eval.   denies recent fall or trauma.  states  over the past few days right foot has been increasing in swelling. and has been having some fevers ... states hard to walk on his foot due to pain. states had temp at home of 101-102. denies n/v/d, CP, SOB, HA, dizziness, abdominal pain, urinary symptoms, cough, leg pain, swelling.  patient evaluated by podiatry in ED for presence of gas.. MRI and vascular studies ordered already, antibiotics given..   pt with sig cardiac hx for possible vascular and podiatric surgery  will call PMD to get records  continue all cardiac meds  will adjust cardiac meds  will check AICD,about MRI compatibility  abx , fu blood cultures  doubt DVT pt on AC  will call primary cardiology to get records  echo noted with severe LV dysfunction  continue current meds, may add Farxiga 10 mg upon dc for heart failure  ID noted, s/p I&d, continue abx awaiting cultures  DVT prophylaxis  s/p  VAC to the wound  increase lft fu closely  continue all cardiac meds, hold if sbp less than 90  culture noted  decrease bp, hold lasix, decrease entresto to 49/54 mg bid  increase ambulation

## 2023-08-12 NOTE — PROGRESS NOTE ADULT - ASSESSMENT
Patient is a 61yo Male   with past medical history of HTN, DM, defibrillator, CAD post CABG, CHF, hx of Sommres fracture, non-union many years ago,     presenting with  ulceration, recent bone biopsy with Dr Foss as op        presented to ED with   swelling to the foot as advised to go to the ER for eval.      over the past few days right foot has been increasing in swelling. and has been having some fevers    has   AICD,   echo noted with severe LV dysfunction  continue current meds, may add Farxiga 10 mg upon dc for heart failure  ID noted, s/p I&d, continue abx . on  iv  ancef  DVT prophylaxis  s/p  VAC to the wound  MSSA  wound  cx/ on iv  ancef/  s/p  I/D  on 8/8  decrease bp, hold lasix, decreased  entresto to 49/54 mg bid    Cardiomyopathy/   CAD/ CHF.  C/C . DM,  HTN/  HLD  awiat  duartion of  ab, pe r ID

## 2023-08-12 NOTE — PROGRESS NOTE ADULT - SUBJECTIVE AND OBJECTIVE BOX
date of service: 08-12-23 @ 09:50  afberile  REVIEW OF SYSTEMS:  CONSTITUTIONAL: No fever,  no  weight loss  ENT:  No  tinnitus,   no   vertigo  NECK: No pain or stiffness  RESPIRATORY: No cough, wheezing, chills or hemoptysis;    No Shortness of Breath  CARDIOVASCULAR: No chest pain, palpitations, dizziness  GASTROINTESTINAL: No abdominal or epigastric pain. No nausea, vomiting, or hematemesis; No diarrhea  No melena or hematochezia.  GENITOURINARY: No dysuria, frequency, hematuria, or incontinence  NEUROLOGICAL: No headaches  SKIN: No itching,  no   rash  LYMPH Nodes: No enlarged glands  ENDOCRINE: No heat or cold intolerance  MUSCULOSKELETAL: No joint pain or swelling  PSYCHIATRIC: No depression, anxiety  HEME/LYMPH: No easy bruising, or bleeding gums  ALLERGY AND IMMUNOLOGIC: No hives or eczema	    MEDICATIONS  (STANDING):  atorvastatin 80 milliGRAM(s) Oral at bedtime  ceFAZolin   IVPB 2000 milliGRAM(s) IV Intermittent every 8 hours  chlorhexidine 2% Cloths 1 Application(s) Topical <User Schedule>  dextrose 5%. 1000 milliLiter(s) (100 mL/Hr) IV Continuous <Continuous>  dextrose 5%. 1000 milliLiter(s) (50 mL/Hr) IV Continuous <Continuous>  dextrose 50% Injectable 25 Gram(s) IV Push once  dextrose 50% Injectable 25 Gram(s) IV Push once  dextrose 50% Injectable 12.5 Gram(s) IV Push once  glucagon  Injectable 1 milliGRAM(s) IntraMuscular once  insulin lispro (ADMELOG) corrective regimen sliding scale   SubCutaneous three times a day before meals  metoprolol succinate ER 50 milliGRAM(s) Oral daily  ondansetron Injectable 4 milliGRAM(s) IV Push once  polyethylene glycol 3350 17 Gram(s) Oral daily  sacubitril 49 mG/valsartan 51 mG 1 Tablet(s) Oral two times a day  spironolactone 25 milliGRAM(s) Oral daily    MEDICATIONS  (PRN):  acetaminophen     Tablet .. 650 milliGRAM(s) Oral every 6 hours PRN Temp greater or equal to 38C (100.4F), Mild Pain (1 - 3)  acetaminophen     Tablet .. 650 milliGRAM(s) Oral every 6 hours PRN Mild Pain (1 - 3)  aluminum hydroxide/magnesium hydroxide/simethicone Suspension 30 milliLiter(s) Oral every 4 hours PRN Dyspepsia  dextrose Oral Gel 15 Gram(s) Oral once PRN Blood Glucose LESS THAN 70 milliGRAM(s)/deciliter  HYDROmorphone  Injectable 0.5 milliGRAM(s) IV Push every 4 hours PRN Severe Pain (7 - 10)  melatonin 3 milliGRAM(s) Oral at bedtime PRN Insomnia  ondansetron Injectable 4 milliGRAM(s) IV Push every 8 hours PRN Nausea and/or Vomiting  oxycodone    5 mG/acetaminophen 325 mG 2 Tablet(s) Oral every 4 hours PRN Moderate Pain (4 - 6)      Vital Signs Last 24 Hrs  T(C): 36.8 (12 Aug 2023 04:30), Max: 37 (11 Aug 2023 12:05)  T(F): 98.2 (12 Aug 2023 04:30), Max: 98.6 (11 Aug 2023 12:05)  HR: 67 (12 Aug 2023 04:30) (65 - 77)  BP: 91/51 (12 Aug 2023 04:30) (88/59 - 107/67)  BP(mean): --  RR: 18 (12 Aug 2023 04:30) (18 - 18)  SpO2: 98% (12 Aug 2023 04:30) (94% - 98%)    Parameters below as of 12 Aug 2023 04:30  Patient On (Oxygen Delivery Method): room air      CAPILLARY BLOOD GLUCOSE      POCT Blood Glucose.: 151 mg/dL (12 Aug 2023 08:27)  POCT Blood Glucose.: 167 mg/dL (11 Aug 2023 21:54)  POCT Blood Glucose.: 179 mg/dL (11 Aug 2023 17:06)  POCT Blood Glucose.: 191 mg/dL (11 Aug 2023 12:21)    I&O's Summary    11 Aug 2023 07:01  -  12 Aug 2023 07:00  --------------------------------------------------------  IN: 1360 mL / OUT: 1000 mL / NET: 360 mL    12 Aug 2023 07:01  -  12 Aug 2023 09:50  --------------------------------------------------------  IN: 0 mL / OUT: 300 mL / NET: -300 mL          Appearance: Normal	  HEENT:   Normal oral mucosa, PERRL, EOMI	  Lymphatic: No lymphadenopathy  Cardiovascular: Normal S1 S2, No JVD  Respiratory: Lungs clear to auscultation	  Gastrointestinal:  Soft, Non-tender, + BS	  Skin: No rash, No ecchymoses	  Extremities:   foot/  dressing/  vac    LABS:                        9.3    5.65  )-----------( 272      ( 12 Aug 2023 06:57 )             29.7     08-12    139  |  99  |  27<H>  ----------------------------<  187<H>  4.7   |  27  |  1.08    Ca    9.1      12 Aug 2023 06:55    TPro  6.5  /  Alb  3.0<L>  /  TBili  0.4  /  DBili  x   /  AST  76<H>  /  ALT  47<H>  /  AlkPhos  90  08-12          Urinalysis Basic - ( 12 Aug 2023 06:55 )    Color: x / Appearance: x / SG: x / pH: x  Gluc: 187 mg/dL / Ketone: x  / Bili: x / Urobili: x   Blood: x / Protein: x / Nitrite: x   Leuk Esterase: x / RBC: x / WBC x   Sq Epi: x / Non Sq Epi: x / Bacteria: x                      Consultant(s) Notes Reviewed:      Care Discussed with Consultants/Other Providers:

## 2023-08-13 LAB
ALBUMIN SERPL ELPH-MCNC: 3.2 G/DL — LOW (ref 3.3–5)
ALP SERPL-CCNC: 101 U/L — SIGNIFICANT CHANGE UP (ref 40–120)
ALT FLD-CCNC: 43 U/L — SIGNIFICANT CHANGE UP (ref 10–45)
ANION GAP SERPL CALC-SCNC: 13 MMOL/L — SIGNIFICANT CHANGE UP (ref 5–17)
AST SERPL-CCNC: 67 U/L — HIGH (ref 10–40)
BASOPHILS # BLD AUTO: 0.07 K/UL — SIGNIFICANT CHANGE UP (ref 0–0.2)
BASOPHILS NFR BLD AUTO: 1.2 % — SIGNIFICANT CHANGE UP (ref 0–2)
BILIRUB SERPL-MCNC: 0.4 MG/DL — SIGNIFICANT CHANGE UP (ref 0.2–1.2)
BUN SERPL-MCNC: 25 MG/DL — HIGH (ref 7–23)
CALCIUM SERPL-MCNC: 9.3 MG/DL — SIGNIFICANT CHANGE UP (ref 8.4–10.5)
CHLORIDE SERPL-SCNC: 100 MMOL/L — SIGNIFICANT CHANGE UP (ref 96–108)
CO2 SERPL-SCNC: 27 MMOL/L — SIGNIFICANT CHANGE UP (ref 22–31)
CREAT SERPL-MCNC: 0.95 MG/DL — SIGNIFICANT CHANGE UP (ref 0.5–1.3)
EGFR: 90 ML/MIN/1.73M2 — SIGNIFICANT CHANGE UP
EOSINOPHIL # BLD AUTO: 0.18 K/UL — SIGNIFICANT CHANGE UP (ref 0–0.5)
EOSINOPHIL NFR BLD AUTO: 3 % — SIGNIFICANT CHANGE UP (ref 0–6)
GLUCOSE BLDC GLUCOMTR-MCNC: 153 MG/DL — HIGH (ref 70–99)
GLUCOSE BLDC GLUCOMTR-MCNC: 162 MG/DL — HIGH (ref 70–99)
GLUCOSE BLDC GLUCOMTR-MCNC: 165 MG/DL — HIGH (ref 70–99)
GLUCOSE BLDC GLUCOMTR-MCNC: 184 MG/DL — HIGH (ref 70–99)
GLUCOSE SERPL-MCNC: 155 MG/DL — HIGH (ref 70–99)
HCT VFR BLD CALC: 29.8 % — LOW (ref 39–50)
HGB BLD-MCNC: 9.2 G/DL — LOW (ref 13–17)
IMM GRANULOCYTES NFR BLD AUTO: 3.5 % — HIGH (ref 0–0.9)
LYMPHOCYTES # BLD AUTO: 1.33 K/UL — SIGNIFICANT CHANGE UP (ref 1–3.3)
LYMPHOCYTES # BLD AUTO: 22.3 % — SIGNIFICANT CHANGE UP (ref 13–44)
MCHC RBC-ENTMCNC: 27.7 PG — SIGNIFICANT CHANGE UP (ref 27–34)
MCHC RBC-ENTMCNC: 30.9 GM/DL — LOW (ref 32–36)
MCV RBC AUTO: 89.8 FL — SIGNIFICANT CHANGE UP (ref 80–100)
MONOCYTES # BLD AUTO: 0.65 K/UL — SIGNIFICANT CHANGE UP (ref 0–0.9)
MONOCYTES NFR BLD AUTO: 10.9 % — SIGNIFICANT CHANGE UP (ref 2–14)
NEUTROPHILS # BLD AUTO: 3.53 K/UL — SIGNIFICANT CHANGE UP (ref 1.8–7.4)
NEUTROPHILS NFR BLD AUTO: 59.1 % — SIGNIFICANT CHANGE UP (ref 43–77)
NRBC # BLD: 0 /100 WBCS — SIGNIFICANT CHANGE UP (ref 0–0)
PLATELET # BLD AUTO: 291 K/UL — SIGNIFICANT CHANGE UP (ref 150–400)
POTASSIUM SERPL-MCNC: 4.7 MMOL/L — SIGNIFICANT CHANGE UP (ref 3.5–5.3)
POTASSIUM SERPL-SCNC: 4.7 MMOL/L — SIGNIFICANT CHANGE UP (ref 3.5–5.3)
PROT SERPL-MCNC: 6.7 G/DL — SIGNIFICANT CHANGE UP (ref 6–8.3)
RBC # BLD: 3.32 M/UL — LOW (ref 4.2–5.8)
RBC # FLD: 16.2 % — HIGH (ref 10.3–14.5)
SODIUM SERPL-SCNC: 140 MMOL/L — SIGNIFICANT CHANGE UP (ref 135–145)
WBC # BLD: 5.97 K/UL — SIGNIFICANT CHANGE UP (ref 3.8–10.5)
WBC # FLD AUTO: 5.97 K/UL — SIGNIFICANT CHANGE UP (ref 3.8–10.5)

## 2023-08-13 RX ORDER — RIVAROXABAN 15 MG-20MG
20 KIT ORAL
Refills: 0 | Status: DISCONTINUED | OUTPATIENT
Start: 2023-08-13 | End: 2023-08-16

## 2023-08-13 RX ORDER — FUROSEMIDE 40 MG
20 TABLET ORAL DAILY
Refills: 0 | Status: DISCONTINUED | OUTPATIENT
Start: 2023-08-13 | End: 2023-08-16

## 2023-08-13 RX ADMIN — Medication 100 MILLIGRAM(S): at 21:15

## 2023-08-13 RX ADMIN — Medication 2: at 12:37

## 2023-08-13 RX ADMIN — Medication 2: at 08:34

## 2023-08-13 RX ADMIN — SPIRONOLACTONE 25 MILLIGRAM(S): 25 TABLET, FILM COATED ORAL at 05:51

## 2023-08-13 RX ADMIN — Medication 2: at 17:28

## 2023-08-13 RX ADMIN — SACUBITRIL AND VALSARTAN 1 TABLET(S): 24; 26 TABLET, FILM COATED ORAL at 05:51

## 2023-08-13 RX ADMIN — RIVAROXABAN 20 MILLIGRAM(S): KIT at 17:03

## 2023-08-13 RX ADMIN — Medication 100 MILLIGRAM(S): at 05:51

## 2023-08-13 RX ADMIN — Medication 100 MILLIGRAM(S): at 13:48

## 2023-08-13 RX ADMIN — Medication 20 MILLIGRAM(S): at 12:28

## 2023-08-13 RX ADMIN — CHLORHEXIDINE GLUCONATE 1 APPLICATION(S): 213 SOLUTION TOPICAL at 05:43

## 2023-08-13 RX ADMIN — HEPARIN SODIUM 5000 UNIT(S): 5000 INJECTION INTRAVENOUS; SUBCUTANEOUS at 06:57

## 2023-08-13 RX ADMIN — Medication 3 MILLIGRAM(S): at 22:02

## 2023-08-13 RX ADMIN — SACUBITRIL AND VALSARTAN 1 TABLET(S): 24; 26 TABLET, FILM COATED ORAL at 17:46

## 2023-08-13 RX ADMIN — ATORVASTATIN CALCIUM 80 MILLIGRAM(S): 80 TABLET, FILM COATED ORAL at 21:15

## 2023-08-13 NOTE — PROGRESS NOTE ADULT - SUBJECTIVE AND OBJECTIVE BOX
Date of Service: 08-13-23 @ 06:00           CARDIOLOGY     PROGRESS  NOTE   ________________________________________________    CHIEF COMPLAINT:Patient is a 62y old  Male who presents with a chief complaint of diabetic foot infection, possible osteo (12 Aug 2023 09:50)  doing well, no complain  	  REVIEW OF SYSTEMS:  CONSTITUTIONAL: No fever, weight loss, or fatigue  EYES: No eye pain, visual disturbances, or discharge  ENT:  No difficulty hearing, tinnitus, vertigo; No sinus or throat pain  NECK: No pain or stiffness  RESPIRATORY: No cough, wheezing, chills or hemoptysis; No Shortness of Breath  CARDIOVASCULAR: No chest pain, palpitations, passing out, dizziness, or leg swelling  GASTROINTESTINAL: No abdominal or epigastric pain. No nausea, vomiting, or hematemesis; No diarrhea or constipation. No melena or hematochezia.  GENITOURINARY: No dysuria, frequency, hematuria, or incontinence  NEUROLOGICAL: No headaches, memory loss, loss of strength, numbness, or tremors  SKIN: No itching, burning, rashes, or lesions   LYMPH Nodes: No enlarged glands  ENDOCRINE: No heat or cold intolerance; No hair loss  MUSCULOSKELETAL: No joint pain or swelling; No muscle, back, + RL extremity pain  PSYCHIATRIC: No depression, anxiety, mood swings, or difficulty sleeping  HEME/LYMPH: No easy bruising, or bleeding gums  ALLERGY AND IMMUNOLOGIC: No hives or eczema	    [x ] All others negative	  [ ] Unable to obtain    PHYSICAL EXAM:  T(C): 36.7 (08-13-23 @ 05:48), Max: 37.1 (08-12-23 @ 19:57)  HR: 65 (08-13-23 @ 05:48) (65 - 80)  BP: 110/72 (08-13-23 @ 05:48) (98/56 - 123/73)  RR: 18 (08-13-23 @ 05:48) (18 - 18)  SpO2: 96% (08-13-23 @ 05:48) (96% - 99%)  Wt(kg): --  I&O's Summary    11 Aug 2023 07:01  -  12 Aug 2023 07:00  --------------------------------------------------------  IN: 1360 mL / OUT: 1000 mL / NET: 360 mL    12 Aug 2023 07:01  -  13 Aug 2023 06:00  --------------------------------------------------------  IN: 710 mL / OUT: 1970 mL / NET: -1260 mL        Appearance: Normal	  HEENT:   Normal oral mucosa, PERRL, EOMI	  Lymphatic: No lymphadenopathy  Cardiovascular: Normal S1 S2, No JVD, + murmurs, No edema  Respiratory:rhonchi  Psychiatry: A & O x 3, Mood & affect appropriate  Gastrointestinal:  Soft, Non-tender, + BS	  Skin: No rashes, No ecchymoses, No cyanosis	  Neurologic: Non-focal  Extremities: Normal range of motion, VAC R foot  Vascular: + pvd    MEDICATIONS  (STANDING):  atorvastatin 80 milliGRAM(s) Oral at bedtime  ceFAZolin   IVPB 2000 milliGRAM(s) IV Intermittent every 8 hours  chlorhexidine 2% Cloths 1 Application(s) Topical <User Schedule>  dextrose 5%. 1000 milliLiter(s) (100 mL/Hr) IV Continuous <Continuous>  dextrose 5%. 1000 milliLiter(s) (50 mL/Hr) IV Continuous <Continuous>  dextrose 50% Injectable 25 Gram(s) IV Push once  dextrose 50% Injectable 25 Gram(s) IV Push once  dextrose 50% Injectable 12.5 Gram(s) IV Push once  glucagon  Injectable 1 milliGRAM(s) IntraMuscular once  heparin   Injectable 5000 Unit(s) SubCutaneous every 12 hours  insulin lispro (ADMELOG) corrective regimen sliding scale   SubCutaneous three times a day before meals  metoprolol succinate ER 50 milliGRAM(s) Oral daily  ondansetron Injectable 4 milliGRAM(s) IV Push once  polyethylene glycol 3350 17 Gram(s) Oral daily  sacubitril 49 mG/valsartan 51 mG 1 Tablet(s) Oral two times a day  spironolactone 25 milliGRAM(s) Oral daily      TELEMETRY: 	    ECG:  	  RADIOLOGY:  OTHER: 	  	  LABS:	 	    CARDIAC MARKERS:                                9.3    5.65  )-----------( 272      ( 12 Aug 2023 06:57 )             29.7     08-12    139  |  99  |  27<H>  ----------------------------<  187<H>  4.7   |  27  |  1.08    Ca    9.1      12 Aug 2023 06:55    TPro  6.5  /  Alb  3.0<L>  /  TBili  0.4  /  DBili  x   /  AST  76<H>  /  ALT  47<H>  /  AlkPhos  90  08-12    proBNP:   Lipid Profile: Cholesterol 75  LDL --  HDL 27      HgA1c:   TSH:         Assessment and plan  ---------------------------  Patient is a 63yo Male with past medical history of HTN, DM, defibrillator, CAD post CABG, CHF, hx of Sommers fracture, non-union many years ago, presenting with  ulceration, recent bone biopsy with Dr Foss as op , came back positive for staph, treated with Levaquin, presented to ED with acute and significant swelling to the foot as advised to go to the ER for eval.   denies recent fall or trauma.  states  over the past few days right foot has been increasing in swelling. and has been having some fevers ... states hard to walk on his foot due to pain. states had temp at home of 101-102. denies n/v/d, CP, SOB, HA, dizziness, abdominal pain, urinary symptoms, cough, leg pain, swelling.  patient evaluated by podiatry in ED for presence of gas.. MRI and vascular studies ordered already, antibiotics given..   pt with sig cardiac hx for possible vascular and podiatric surgery  will call PMD to get records  continue all cardiac meds  will adjust cardiac meds  will check AICD,about MRI compatibility  abx , fu blood cultures  doubt DVT pt on AC  will call primary cardiology to get records  echo noted with severe LV dysfunction  continue current meds, may add Farxiga 10 mg upon dc for heart failure  ID noted, s/p I&d, continue abx awaiting cultures  DVT prophylaxis  s/p  VAC to the wound  increase lft fu closely  continue all cardiac meds, hold if sbp less than 90  culture noted  decrease bp, hold lasix, decrease entresto to 49/54 mg bid  increase ambulation  bp is better, will gradually will inncrease meds  will add lasix 20 mg daily if tolerated

## 2023-08-13 NOTE — PROGRESS NOTE ADULT - ASSESSMENT
_________________________________________________________________________________________  ========>>  M E D I C A L   A T T E N D I N G    F O L L O W  U P  N O T E  <<=========  -----------------------------------------------------------------------------------------------------    - Patient seen and examined by me earlier today.   - In summary,  RYLEE HOWE is a 62y year old man admitted with diabetic foot infection   - Patient today overall doing ok, comfortable, No significant pain        patient eating, ambulating, + BM.. no new complains of.. looking forward o going home soon     ==================>> REVIEW OF SYSTEM <<=================    GEN: no fever, no chills, as above   RESP: no SOB, no cough, no sputum  CVS: no chest pain, no palpitations,  GI: no abdominal pain, no nausea,   : no dysuria, no frequency,   Neuro: no headache, no dizziness  Derm : no itching, no rash    ==================>> PHYSICAL EXAM <<=================    GEN: A&O X 3 , NAD , comfortable, pleasant, calm , resting in bed.. encouraged out of bed to chair with assistance as needed.   HEENT: NCAT, PERRL, MMM, hearing intact  Neck: supple , no JVD appreciated  CVS: S1S2 , regular , No M/R/G appreciated  PULM: CTA B/L,  no W/R/R appreciated  ABD.: soft. non tender, non distended,  bowel sounds present  Extrem: intact pulses , + Rt LE edema and erythema MUCH improved ,, wound dressed by pod team .. wound vac on   PSYCH : normal mood,  not anxious                             ( Note written / Date of service 08-13-23 )    ==================>> MEDICATIONS <<====================    atorvastatin 80 milliGRAM(s) Oral at bedtime  ceFAZolin   IVPB 2000 milliGRAM(s) IV Intermittent every 8 hours  chlorhexidine 2% Cloths 1 Application(s) Topical <User Schedule>  dextrose 5%. 1000 milliLiter(s) IV Continuous <Continuous>  dextrose 5%. 1000 milliLiter(s) IV Continuous <Continuous>  dextrose 50% Injectable 25 Gram(s) IV Push once  dextrose 50% Injectable 25 Gram(s) IV Push once  dextrose 50% Injectable 12.5 Gram(s) IV Push once  furosemide    Tablet 20 milliGRAM(s) Oral daily  glucagon  Injectable 1 milliGRAM(s) IntraMuscular once  heparin   Injectable 5000 Unit(s) SubCutaneous every 12 hours  insulin lispro (ADMELOG) corrective regimen sliding scale   SubCutaneous three times a day before meals  metoprolol succinate ER 50 milliGRAM(s) Oral daily  ondansetron Injectable 4 milliGRAM(s) IV Push once  polyethylene glycol 3350 17 Gram(s) Oral daily  sacubitril 49 mG/valsartan 51 mG 1 Tablet(s) Oral two times a day  spironolactone 25 milliGRAM(s) Oral daily    MEDICATIONS  (PRN):  acetaminophen     Tablet .. 650 milliGRAM(s) Oral every 6 hours PRN Temp greater or equal to 38C (100.4F), Mild Pain (1 - 3)  acetaminophen     Tablet .. 650 milliGRAM(s) Oral every 6 hours PRN Mild Pain (1 - 3)  aluminum hydroxide/magnesium hydroxide/simethicone Suspension 30 milliLiter(s) Oral every 4 hours PRN Dyspepsia  dextrose Oral Gel 15 Gram(s) Oral once PRN Blood Glucose LESS THAN 70 milliGRAM(s)/deciliter  HYDROmorphone  Injectable 0.5 milliGRAM(s) IV Push every 4 hours PRN Severe Pain (7 - 10)  melatonin 3 milliGRAM(s) Oral at bedtime PRN Insomnia  ondansetron Injectable 4 milliGRAM(s) IV Push every 8 hours PRN Nausea and/or Vomiting  oxycodone    5 mG/acetaminophen 325 mG 2 Tablet(s) Oral every 4 hours PRN Moderate Pain (4 - 6)    ___________  Active diet:  Diet, Regular:   Consistent Carbohydrate Evening Snack (CSTCHOSN)  DASH/TLC Sodium & Cholesterol Restricted (DASH)  ___________________    ==================>> VITAL SIGNS <<==================    Vital Signs Last 24 HrsT(C): 36.9 (08-13-23 @ 12:07)  T(F): 98.5 (08-13-23 @ 12:07), Max: 98.8 (08-12-23 @ 19:57)  HR: 83 (08-13-23 @ 12:07) (65 - 83)  BP: 104/67 (08-13-23 @ 12:07)  RR: 18 (08-13-23 @ 12:07) (18 - 18)  SpO2: 96% (08-13-23 @ 12:07) (96% - 99%)      POCT Blood Glucose.: 184 mg/dL (13 Aug 2023 12:34)  POCT Blood Glucose.: 165 mg/dL (13 Aug 2023 08:06)  POCT Blood Glucose.: 149 mg/dL (12 Aug 2023 21:38)  POCT Blood Glucose.: 136 mg/dL (12 Aug 2023 17:30)     ==================>> LAB AND IMAGING <<==================                        9.2    5.97  )-----------( 291      ( 13 Aug 2023 07:09 )             29.8        08-13    140  |  100  |  25<H>  ----------------------------<  155<H>  4.7   |  27  |  0.95    Ca    9.3      13 Aug 2023 07:07    TPro  6.7  /  Alb  3.2<L>  /  TBili  0.4  /  DBili  x   /  AST  67<H>  /  ALT  43  /  AlkPhos  101  08-13    WBC count:   5.97 <<== ,  5.65 <<== ,  6.78 <<== ,  6.77 <<== ,  7.81 <<==   Hemoglobin:   9.2 <<==,  9.3 <<==,  10.4 <<==,  9.9 <<==,  10.3 <<==  platelets:  291 <==, 272 <==, 306 <==, 240 <==, 204 <==, 147 <==, 141 <==    Creatinine:  0.95  <<==, 1.08  <<==, 0.93  <<==, 0.97  <<==, 1.18  <<==, 1.13  <<==  Sodium:   140  <==, 139  <==, 136  <==, 139  <==, 141  <==, 141  <==, 141  <==       AST:          67 <== , 76 <== , 50 <== , 65 <== , 111 <==      ALT:        43  <== , 47  <== , 41  <== , 55  <== , 83  <==      AP:        101  <=, 90  <=, 90  <=, 90  <=, 93  <=     Bili:        0.4  <=, 0.4  <=, 0.4  <=, 0.6  <=, 0.5  <=    ____________________________    M I C R O B I O L O G Y :    Culture - Tissue with Gram Stain (collected 08 Aug 2023 22:08)  Source: .Tissue Other  Gram Stain (09 Aug 2023 03:19):    No polymorphonuclear cells seen per low power field    No organisms seen per oil power field  Preliminary Report (10 Aug 2023 08:07):    No growth to date.    Culture - Surgical Swab (collected 08 Aug 2023 22:06)  Source: .Surgical Swab deep culture right foot  Final Report (11 Aug 2023 13:54):    Few Finegoldia magna "Susceptibilities not performed"    Culture - Abscess with Gram Stain (collected 07 Aug 2023 08:21)  Source: .Abscess right foot  Gram Stain (07 Aug 2023 23:24):    Rare polymorphonuclear leukocytes per low power field    No organisms seen per oil power field  Final Report (10 Aug 2023 09:38):    Rare Staphylococcus aureus    Few Finegoldia magna "Susceptibilities not performed"  Organism: Staphylococcus aureus (10 Aug 2023 09:38)  Organism: Staphylococcus aureus (10 Aug 2023 09:38)    Sensitivities:      Method Type: HEATHER      -  Ampicillin/Sulbactam: S <=8/4      -  Cefazolin: S <=4      -  Clindamycin: S 0.5      -  Erythromycin: S <=0.25      -  Gentamicin: S <=1 Should not be used as monotherapy      -  Oxacillin: S <=0.25 Oxacillin predicts susceptibility for dicloxacillin, methicillin, and nafcillin      -  Penicillin: R >8      -  Rifampin: S <=1 Should not be used as monotherapy      -  Tetracycline: S <=1      -  Trimethoprim/Sulfamethoxazole: S <=0.5/9.5      -  Vancomycin: S 2    Culture - Abscess with Gram Stain (collected 06 Aug 2023 15:42)  Source: .Abscess right foot  Gram Stain (07 Aug 2023 01:46):    Rare polymorphonuclear leukocytes seen per low power field    No organisms seen per oil power field  Final Report (08 Aug 2023 17:11):    Rare Coag Negative Staphylococcus "Susceptibilities not performed"    Few Finegoldia magna "Susceptibilities not performed"    Culture - Blood (collected 06 Aug 2023 11:30)  Source: .Blood Blood  Final Report (11 Aug 2023 17:00):    No growth at 5 days    Culture - Blood (collected 06 Aug 2023 11:15)  Source: .Blood Blood  Final Report (11 Aug 2023 17:00):    No growth at 5 days      < from: TTE W or WO Ultrasound Enhancing Agent (08.07.23 @ 15:33) >  CONCLUSIONS:   1. Severely dilated left ventricular cavity size.The left ventricular systolic function is severely decreased with an ejection fraction visually estimated at 25 to 30 %. There is global left ventricular hypokinesis.   2. Multiple segmental abnormalities exist. See findings.   3. Device lead is visualized in the right ventricle.   4. The aortic annulus and aortic root appear normal in size.  < end of copied text >    < from: Xray Foot AP + Lateral, Right (08.06.23 @ 11:40) >  IMPRESSION:  Diffuse soft tissue swelling throughout the right foot with a possible   trace amount of subcutaneous emphysema along the lateral aspect of the   right mid foot.  No definite cortical erosion seen to indicate acute osteomyelitis.  MR may be useful in further evaluation if there are no contraindications  < end of copied text >    ___________________________________________________________________________________  ===============>>  A S S E S S M E N T   A N D   P L A N <<===============  ------------------------------------------------------------------------------------------    · Assessment	  Patient is a 63yo Male with past medical history of HTN, DM, defibrillator, CAD post CABG, CHF, hx of Sommers fracture, non-union many years ago, presenting with  ulceration, recent bone biopsy with Dr Foss as op , came back positive for staph, treated with Levaquin, presented to ED with acute and significant swelling to the foot as advised to go to the ER for eval.   denies recent fall or trauma. states over the past few days right foot has been increasing in swelling. and has been having some fevers ... states hard to walk on his foot due to pain. states had temp at home of 101-102. denies n/v/d, CP, SOB, HA, dizziness, abdominal pain, urinary symptoms, cough, leg pain, swelling.  patient evaluated by podiatry in ED for presence of gas.. MRI and vascular studies ordered already, antibiotics given..         Problem/Plan - 1:  ·  Problem: Diabetic foot infection. , Osteomyelitis  appreciated podiatry management   local wound care  continue antibiotics per ID  : Likely will need long-term antibiotics >> ID to finalize recom ..      Infectious disease, podiatry, vascular follow-up appreciated   pain management as needed  supportive care   diuretics | leg elevation: helping a lot     Problem/Plan - 2:  ·  Problem: Diabetes.   ·  Plan: patient states has been overall well controlled but in past 1-2 days more elevated...   ---monitor finger sticks closely  ---Insulin regimen as ordered and monitor / adjust as needed  --- hold home medications for now   ---Diabetic diet  ---A1c. 6    May consider adding Farxiga on discharge give CHF    Problem/Plan - 3:  ·  Problem: Heart failure, unspecified HF chronicity, unspecified heart failure type.   ·  Plan: patient with likely chronic systolic CHF on entresto and lasix / Epleronon   continue home medications  Patient takes 75 mg of toprol  at home, here is a 25.  Increase to 50 for now: monitor closely  Echo as above   cardio following for optimization   monitor otherwise.    Problem/Plan - 4:  ·  Problem: HLD (hyperlipidemia).   ·  Plan: continue equivalent statin dose  lipid profile.    Problem/Plan - 5:  ·  Problem: history of Deep vein thrombosis (DVT) of right upper extremity.   ·  Plan: on Xarelto for past ~ 2.5 months ( was on Eliqius first)  for Rt upper extremity DVT post infection of Elbow !   continue for now  repeat Duplex as outpatient to decide on duration of further therapy >> recom in 2 weeks ..     -GI/DVT Prophylaxis per protocol.    --------------------------------------------  Case discussed with patient,   Education given on findings and plan of care  ___________________________  HVicky Boudreaux D.O.  Pager: [phone]

## 2023-08-14 LAB
CULTURE RESULTS: SIGNIFICANT CHANGE UP
GLUCOSE BLDC GLUCOMTR-MCNC: 146 MG/DL — HIGH (ref 70–99)
GLUCOSE BLDC GLUCOMTR-MCNC: 164 MG/DL — HIGH (ref 70–99)
GLUCOSE BLDC GLUCOMTR-MCNC: 165 MG/DL — HIGH (ref 70–99)
GLUCOSE BLDC GLUCOMTR-MCNC: 166 MG/DL — HIGH (ref 70–99)
GLUCOSE BLDC GLUCOMTR-MCNC: 169 MG/DL — HIGH (ref 70–99)
GLUCOSE BLDC GLUCOMTR-MCNC: 188 MG/DL — HIGH (ref 70–99)
SPECIMEN SOURCE: SIGNIFICANT CHANGE UP

## 2023-08-14 PROCEDURE — 36573 INSJ PICC RS&I 5 YR+: CPT

## 2023-08-14 PROCEDURE — 71045 X-RAY EXAM CHEST 1 VIEW: CPT | Mod: 26

## 2023-08-14 PROCEDURE — 99232 SBSQ HOSP IP/OBS MODERATE 35: CPT

## 2023-08-14 RX ORDER — CEFAZOLIN SODIUM 1 G
2000 VIAL (EA) INJECTION EVERY 8 HOURS
Refills: 0 | Status: DISCONTINUED | OUTPATIENT
Start: 2023-08-14 | End: 2023-08-16

## 2023-08-14 RX ORDER — SODIUM CHLORIDE 9 MG/ML
10 INJECTION INTRAMUSCULAR; INTRAVENOUS; SUBCUTANEOUS
Refills: 0 | Status: DISCONTINUED | OUTPATIENT
Start: 2023-08-14 | End: 2023-08-16

## 2023-08-14 RX ORDER — AMPICILLIN SODIUM AND SULBACTAM SODIUM 250; 125 MG/ML; MG/ML
INJECTION, POWDER, FOR SUSPENSION INTRAMUSCULAR; INTRAVENOUS
Refills: 0 | Status: DISCONTINUED | OUTPATIENT
Start: 2023-08-14 | End: 2023-08-14

## 2023-08-14 RX ORDER — AMPICILLIN SODIUM AND SULBACTAM SODIUM 250; 125 MG/ML; MG/ML
3 INJECTION, POWDER, FOR SUSPENSION INTRAMUSCULAR; INTRAVENOUS EVERY 6 HOURS
Refills: 0 | Status: DISCONTINUED | OUTPATIENT
Start: 2023-08-14 | End: 2023-08-14

## 2023-08-14 RX ORDER — AMPICILLIN SODIUM AND SULBACTAM SODIUM 250; 125 MG/ML; MG/ML
3 INJECTION, POWDER, FOR SUSPENSION INTRAMUSCULAR; INTRAVENOUS ONCE
Refills: 0 | Status: COMPLETED | OUTPATIENT
Start: 2023-08-14 | End: 2023-08-14

## 2023-08-14 RX ORDER — CHLORHEXIDINE GLUCONATE 213 G/1000ML
1 SOLUTION TOPICAL
Refills: 0 | Status: DISCONTINUED | OUTPATIENT
Start: 2023-08-14 | End: 2023-08-16

## 2023-08-14 RX ORDER — METRONIDAZOLE 500 MG
500 TABLET ORAL EVERY 12 HOURS
Refills: 0 | Status: DISCONTINUED | OUTPATIENT
Start: 2023-08-14 | End: 2023-08-16

## 2023-08-14 RX ADMIN — SACUBITRIL AND VALSARTAN 1 TABLET(S): 24; 26 TABLET, FILM COATED ORAL at 19:40

## 2023-08-14 RX ADMIN — Medication 2: at 08:48

## 2023-08-14 RX ADMIN — Medication 2: at 19:43

## 2023-08-14 RX ADMIN — SPIRONOLACTONE 25 MILLIGRAM(S): 25 TABLET, FILM COATED ORAL at 05:14

## 2023-08-14 RX ADMIN — Medication 500 MILLIGRAM(S): at 19:40

## 2023-08-14 RX ADMIN — CHLORHEXIDINE GLUCONATE 1 APPLICATION(S): 213 SOLUTION TOPICAL at 05:16

## 2023-08-14 RX ADMIN — RIVAROXABAN 20 MILLIGRAM(S): KIT at 19:39

## 2023-08-14 RX ADMIN — Medication 20 MILLIGRAM(S): at 05:17

## 2023-08-14 RX ADMIN — AMPICILLIN SODIUM AND SULBACTAM SODIUM 200 GRAM(S): 250; 125 INJECTION, POWDER, FOR SUSPENSION INTRAMUSCULAR; INTRAVENOUS at 09:55

## 2023-08-14 RX ADMIN — Medication 3 MILLIGRAM(S): at 22:05

## 2023-08-14 RX ADMIN — Medication 100 MILLIGRAM(S): at 21:34

## 2023-08-14 RX ADMIN — Medication 50 MILLIGRAM(S): at 05:15

## 2023-08-14 RX ADMIN — Medication 100 MILLIGRAM(S): at 05:15

## 2023-08-14 RX ADMIN — SACUBITRIL AND VALSARTAN 1 TABLET(S): 24; 26 TABLET, FILM COATED ORAL at 10:37

## 2023-08-14 RX ADMIN — ATORVASTATIN CALCIUM 80 MILLIGRAM(S): 80 TABLET, FILM COATED ORAL at 22:05

## 2023-08-14 NOTE — PROCEDURE NOTE - ADDITIONAL PROCEDURE DETAILS
Indication for interrogation: OR    Presenting rhythm: ST with AV sync BiV pacing 100s     Measured data WNL, normal BiV ICD function, Pt is NOT dependent    Stored data revealed no new events
.      patient presents with LUE picc line malpositioned.  unable to cross midline.    LUE picc removed.     NEW PLACEment of RUE PICC BASILIC VEIN   44cm, 4fr single lumen placed under flouro    TIP IN SVC    OK TO USE

## 2023-08-14 NOTE — PROGRESS NOTE ADULT - ASSESSMENT
62 y.o M 8/8 s/p Right Foot incision and drainage with partial 5th ray resection, open  - Pt seen and evaluated  - Afebrile, no leukocytosis   - 8/8 s/p Right Foot incision and drainage with partial 5th ray resection, open.   - High concern for residual bone infection  - High concern for viability  - OR wound culture showing finegoldia   - OR bone culture showing no growth (prelim)   - Vasc recommendations, appreciated   - Pod plan: Local wound care w/ VAC   - Discussed with attending   62 y.o M 8/8 s/p Right Foot incision and drainage with partial 5th ray resection, open  - Pt seen and evaluated  - Afebrile, no leukocytosis   - 8/8 s/p Right Foot incision and drainage with partial 5th ray resection, open.   - High concern for residual bone infection  - High concern for viability  - OR wound culture showing finegoldia   - OR bone culture showing no growth (prelim)   - Vasc recommendations, appreciated   - ID recs appreciated   - Pod plan: Local wound care w/ VAC pending final ID recs   - Discussed with attending

## 2023-08-14 NOTE — ADVANCED PRACTICE NURSE CONSULT - RECOMMEDATIONS
Post procedure verbal instructions given to the patient or patient representative. Patient or patient representative  instructed regarding signs and symptoms of infection. Patient instructed to keep dressing dry and clean. Care for catheter as per hospital protocols.

## 2023-08-14 NOTE — PROGRESS NOTE ADULT - SUBJECTIVE AND OBJECTIVE BOX
Patient is a 62y old  Male who presents with a chief complaint of diabetic foot infection, possible osteo (14 Aug 2023 12:59)    Being followed by ID for        Interval history:  No other acute events      ROS:  No cough,SOB,CP  No N/V/D  No abd pain  No urinary complaints  No HA  No joint or limb pain  No other complaints    PAST MEDICAL & SURGICAL HISTORY:    Allergies    No Known Allergies    Intolerances      Antimicrobials:    ampicillin/sulbactam  IVPB 3 Gram(s) IV Intermittent every 6 hours  ampicillin/sulbactam  IVPB        MEDICATIONS  (STANDING):  ampicillin/sulbactam  IVPB      ampicillin/sulbactam  IVPB 3 Gram(s) IV Intermittent every 6 hours  atorvastatin 80 milliGRAM(s) Oral at bedtime  chlorhexidine 2% Cloths 1 Application(s) Topical <User Schedule>  dextrose 5%. 1000 milliLiter(s) (100 mL/Hr) IV Continuous <Continuous>  dextrose 5%. 1000 milliLiter(s) (50 mL/Hr) IV Continuous <Continuous>  dextrose 50% Injectable 25 Gram(s) IV Push once  dextrose 50% Injectable 12.5 Gram(s) IV Push once  dextrose 50% Injectable 25 Gram(s) IV Push once  furosemide    Tablet 20 milliGRAM(s) Oral daily  glucagon  Injectable 1 milliGRAM(s) IntraMuscular once  insulin lispro (ADMELOG) corrective regimen sliding scale   SubCutaneous three times a day before meals  metoprolol succinate ER 50 milliGRAM(s) Oral daily  ondansetron Injectable 4 milliGRAM(s) IV Push once  polyethylene glycol 3350 17 Gram(s) Oral daily  rivaroxaban 20 milliGRAM(s) Oral with dinner  sacubitril 49 mG/valsartan 51 mG 1 Tablet(s) Oral two times a day  spironolactone 25 milliGRAM(s) Oral daily      Vital Signs Last 24 Hrs  T(C): 36.9 (08-14-23 @ 12:38), Max: 36.9 (08-14-23 @ 12:38)  T(F): 98.4 (08-14-23 @ 12:38), Max: 98.4 (08-14-23 @ 12:38)  HR: 71 (08-14-23 @ 12:38) (71 - 79)  BP: 96/64 (08-14-23 @ 12:38) (96/64 - 118/77)  BP(mean): --  RR: 18 (08-14-23 @ 12:38) (18 - 18)  SpO2: 98% (08-14-23 @ 12:38) (97% - 98%)    Physical Exam:    Constitutional well preserved,comfortable,pleasant    HEENT PERRLA EOMI,No pallor or icterus    No oral exudate or erythema    Neck supple no JVD or LN    Chest Good AE,CTA    CVS RRR S1 S2 WNl No murmur or rub or gallop    Abd soft BS normal No tenderness no masses    Ext No cyanosis clubbing or edema    IV site no erythema tenderness or discharge    Joints no swelling or LOM    CNS AAO X 3 no focal    Lab Data:                          9.2    5.97  )-----------( 291      ( 13 Aug 2023 07:09 )             29.8       08-13    140  |  100  |  25<H>  ----------------------------<  155<H>  4.7   |  27  |  0.95    Ca    9.3      13 Aug 2023 07:07    TPro  6.7  /  Alb  3.2<L>  /  TBili  0.4  /  DBili  x   /  AST  67<H>  /  ALT  43  /  AlkPhos  101  08-13      Urinalysis Basic - ( 13 Aug 2023 07:07 )    Color: x / Appearance: x / SG: x / pH: x  Gluc: 155 mg/dL / Ketone: x  / Bili: x / Urobili: x   Blood: x / Protein: x / Nitrite: x   Leuk Esterase: x / RBC: x / WBC x   Sq Epi: x / Non Sq Epi: x / Bacteria: x        .Tissue Other  08-08-23   Rare Finegoldia magna "Susceptibilities not performed"  --    No polymorphonuclear cells seen per low power field  No organisms seen per oil power field      .Surgical Swab deep culture right foot  08-08-23   Few Finegoldia magna "Susceptibilities not performed"  --  --                    WBC Count: 5.97 (08-13-23 @ 07:09)  WBC Count: 5.65 (08-12-23 @ 06:57)  WBC Count: 6.78 (08-11-23 @ 07:10)  WBC Count: 6.77 (08-10-23 @ 07:27)  WBC Count: 7.81 (08-09-23 @ 07:20)  WBC Count: 5.98 (08-08-23 @ 08:33)  WBC Count: 5.92 (08-08-23 @ 05:06)       Bilirubin Total: 0.4 mg/dL (08-13-23 @ 07:07)  Aspartate Aminotransferase (AST/SGOT): 67 U/L (08-13-23 @ 07:07)  Alanine Aminotransferase (ALT/SGPT): 43 U/L (08-13-23 @ 07:07)  Alkaline Phosphatase: 101 U/L (08-13-23 @ 07:07)  Bilirubin Total: 0.4 mg/dL (08-12-23 @ 06:55)    Aspartate Aminotransferase (AST/SGOT): 76 U/L (08-12-23 @ 06:55)  Alanine Aminotransferase (ALT/SGPT): 47 U/L (08-12-23 @ 06:55)  Alkaline Phosphatase: 90 U/L (08-12-23 @ 06:55)  Bilirubin Total: 0.4 mg/dL (08-11-23 @ 07:11)    Aspartate Aminotransferase (AST/SGOT): 50 U/L (08-11-23 @ 07:11)  Alanine Aminotransferase (ALT/SGPT): 41 U/L (08-11-23 @ 07:11)  Alkaline Phosphatase: 90 U/L (08-11-23 @ 07:11)  Bilirubin Total: 0.6 mg/dL (08-10-23 @ 07:32)    Aspartate Aminotransferase (AST/SGOT): 65 U/L (08-10-23 @ 07:32)  Alanine Aminotransferase (ALT/SGPT): 55 U/L (08-10-23 @ 07:32)  Alkaline Phosphatase: 90 U/L (08-10-23 @ 07:32)         Patient is a 62y old  Male who presents with a chief complaint of diabetic foot infection, possible osteo (14 Aug 2023 12:59)    Being followed by ID for osteomyelitis        Interval history:  reviewed abs options with pharmD for opitmal coverage of both pathogens  at first was going to use unasyn but ancef/flagyl is easier to do at home  s/p PICC - awaiting xray  No other acute events          PAST MEDICAL & SURGICAL HISTORY:    Allergies    No Known Allergies    Intolerances      Antimicrobials:    ampicillin/sulbactam  IVPB 3 Gram(s) IV Intermittent every 6 hours  ampicillin/sulbactam  IVPB        MEDICATIONS  (STANDING):  ampicillin/sulbactam  IVPB      ampicillin/sulbactam  IVPB 3 Gram(s) IV Intermittent every 6 hours  atorvastatin 80 milliGRAM(s) Oral at bedtime  chlorhexidine 2% Cloths 1 Application(s) Topical <User Schedule>  dextrose 5%. 1000 milliLiter(s) (100 mL/Hr) IV Continuous <Continuous>  dextrose 5%. 1000 milliLiter(s) (50 mL/Hr) IV Continuous <Continuous>  dextrose 50% Injectable 25 Gram(s) IV Push once  dextrose 50% Injectable 12.5 Gram(s) IV Push once  dextrose 50% Injectable 25 Gram(s) IV Push once  furosemide    Tablet 20 milliGRAM(s) Oral daily  glucagon  Injectable 1 milliGRAM(s) IntraMuscular once  insulin lispro (ADMELOG) corrective regimen sliding scale   SubCutaneous three times a day before meals  metoprolol succinate ER 50 milliGRAM(s) Oral daily  ondansetron Injectable 4 milliGRAM(s) IV Push once  polyethylene glycol 3350 17 Gram(s) Oral daily  rivaroxaban 20 milliGRAM(s) Oral with dinner  sacubitril 49 mG/valsartan 51 mG 1 Tablet(s) Oral two times a day  spironolactone 25 milliGRAM(s) Oral daily      Vital Signs Last 24 Hrs  T(C): 36.9 (08-14-23 @ 12:38), Max: 36.9 (08-14-23 @ 12:38)  T(F): 98.4 (08-14-23 @ 12:38), Max: 98.4 (08-14-23 @ 12:38)  HR: 71 (08-14-23 @ 12:38) (71 - 79)  BP: 96/64 (08-14-23 @ 12:38) (96/64 - 118/77)  BP(mean): --  RR: 18 (08-14-23 @ 12:38) (18 - 18)  SpO2: 98% (08-14-23 @ 12:38) (97% - 98%)    Physical Exam:    Constitutional well preserved,comfortable,pleasant    HEENT PERRLA EOMI,No pallor or icterus    No oral exudate or erythema    Neck supple no JVD or LN    Chest Good AE,CTA    CVS  S1 S2    Abd soft BS normal No tenderness     Ext RLE dressed    IV site no erythema tenderness or discharge    Joints no swelling or LOM    CNS AAO X 3 no focal    Lab Data:                          9.2    5.97  )-----------( 291      ( 13 Aug 2023 07:09 )             29.8       08-13    140  |  100  |  25<H>  ----------------------------<  155<H>  4.7   |  27  |  0.95    Ca    9.3      13 Aug 2023 07:07    TPro  6.7  /  Alb  3.2<L>  /  TBili  0.4  /  DBili  x   /  AST  67<H>  /  ALT  43  /  AlkPhos  101  08-13          .Tissue Other  08-08-23   Rare Finegoldia magna "Susceptibilities not performed"  --    No polymorphonuclear cells seen per low power field  No organisms seen per oil power field      .Surgical Swab deep culture right foot  08-08-23   Few Finegoldia magna "Susceptibilities not performed"  --  --                    WBC Count: 5.97 (08-13-23 @ 07:09)  WBC Count: 5.65 (08-12-23 @ 06:57)  WBC Count: 6.78 (08-11-23 @ 07:10)  WBC Count: 6.77 (08-10-23 @ 07:27)  WBC Count: 7.81 (08-09-23 @ 07:20)  WBC Count: 5.98 (08-08-23 @ 08:33)  WBC Count: 5.92 (08-08-23 @ 05:06)       Bilirubin Total: 0.4 mg/dL (08-13-23 @ 07:07)  Aspartate Aminotransferase (AST/SGOT): 67 U/L (08-13-23 @ 07:07)  Alanine Aminotransferase (ALT/SGPT): 43 U/L (08-13-23 @ 07:07)  Alkaline Phosphatase: 101 U/L (08-13-23 @ 07:07)  Bilirubin Total: 0.4 mg/dL (08-12-23 @ 06:55)    Aspartate Aminotransferase (AST/SGOT): 76 U/L (08-12-23 @ 06:55)  Alanine Aminotransferase (ALT/SGPT): 47 U/L (08-12-23 @ 06:55)  Alkaline Phosphatase: 90 U/L (08-12-23 @ 06:55)  Bilirubin Total: 0.4 mg/dL (08-11-23 @ 07:11)    Aspartate Aminotransferase (AST/SGOT): 50 U/L (08-11-23 @ 07:11)  Alanine Aminotransferase (ALT/SGPT): 41 U/L (08-11-23 @ 07:11)  Alkaline Phosphatase: 90 U/L (08-11-23 @ 07:11)  Bilirubin Total: 0.6 mg/dL (08-10-23 @ 07:32)    Aspartate Aminotransferase (AST/SGOT): 65 U/L (08-10-23 @ 07:32)  Alanine Aminotransferase (ALT/SGPT): 55 U/L (08-10-23 @ 07:32)  Alkaline Phosphatase: 90 U/L (08-10-23 @ 07:32)

## 2023-08-14 NOTE — PRE PROCEDURE NOTE - PRE PROCEDURE EVALUATION
Interventional Radiology    HPI: Patient is a 63yo Male with past medical history of HTN, DM, defibrillator, CAD post CABG, CHF, hx of Sommers fracture, non-union many years ago, presenting with  ulceration, recent bone biopsy with Dr Foss as op , came back positive for staph, treated with Levaquin, presented to ED with acute and significant swelling to the foot as advised to go to the ER for eval.   denies recent fall or trauma. states over the past few days right foot has been increasing in swelling. and has been having some fevers ... states hard to walk on his foot due to pain w/ home of 101-102. denies n/v/d, CP, SOB, HA, dizziness, abdominal pain, urinary symptoms, cough, leg pain, swelling.  MRI and vascular studies ordered already, antibiotics given.  Pt had PICC placed bedside , unable to be verified by Xray, presents for re-adjustment/new placement.         Allergies: No Known Allergies    Medications (Abx/Cardiac/Anticoagulation/Blood Products)    ampicillin/sulbactam  IVPB: 200 mL/Hr IV Intermittent ( @ 09:55)  ceFAZolin   IVPB: 100 mL/Hr IV Intermittent ( @ 05:15)  furosemide    Tablet: 20 milliGRAM(s) Oral ( @ 05:17)  heparin   Injectable: 5000 Unit(s) SubCutaneous ( @ 06:57)  metoprolol succinate ER: 50 milliGRAM(s) Oral ( @ 05:15)  rivaroxaban: 20 milliGRAM(s) Oral ( @ 17:03)  sacubitril 49 mG/valsartan 51 m Tablet(s) Oral ( @ 10:37)  spironolactone: 25 milliGRAM(s) Oral ( @ 05:14)    Data:    T(C): 36.4  HR: 76  BP: 99/57  RR: 16  SpO2: 96%    Exam  General: No acute distress  Chest: Non labored breathing  Abdomen: Non-distended  Extremities: No swelling, warm    -WBC 5.97 / HgB 9.2 / Hct 29.8 / Plt 291  -Na 140 / Cl 100 / BUN 25 / Glucose 155  -K 4.7 / CO2 27 / Cr 0.95  -ALT 43 / Alk Phos 101 / T.Bili 0.4    Imaging: reviewed    Plan: 62y Male presents re-adjustment/new placement of PICC   -Risks/Benefits/alternatives explained with the patient and/or healthcare proxy and witnessed informed consent obtained.

## 2023-08-14 NOTE — PROGRESS NOTE ADULT - ASSESSMENT
_________________________________________________________________________________________  ========>>  M E D I C A L   A T T E N D I N G    F O L L O W  U P  N O T E  <<=========  -----------------------------------------------------------------------------------------------------    - Patient seen and examined by me earlier today.   - In summary,  RYLEE HOWE is a 62y year old man admitted with diabetic foot infection   - Patient today overall doing ok, comfortable, No significant pain        patient eating, ambulating, + BM.. no new complains of.. looking forward o going home soon     ==================>> REVIEW OF SYSTEM <<=================    GEN: no fever, no chills, as above   RESP: no SOB, no cough, no sputum  CVS: no chest pain, no palpitations,  GI: no abdominal pain, no nausea,   : no dysuria, no frequency,   Neuro: no headache, no dizziness  Derm : no itching, no rash    ==================>> PHYSICAL EXAM <<=================    GEN: A&O X 3 , NAD , comfortable, pleasant, calm , resting in bed.. encouraged out of bed to chair with assistance as needed.   HEENT: NCAT, PERRL, MMM, hearing intact  Neck: supple , no JVD appreciated  CVS: S1S2 , regular , No M/R/G appreciated  PULM: CTA B/L,  no W/R/R appreciated  ABD.: soft. non tender, non distended,  bowel sounds present  Extrem: intact pulses , + Rt LE edema and erythema MUCH improved ,, wound dressed by pod team .. wound vac on   PSYCH : normal mood,  not anxious                              ( Note written / Date of service 08-14-23 )    ==================>> MEDICATIONS <<====================    ampicillin/sulbactam  IVPB 3 Gram(s) IV Intermittent every 6 hours  ampicillin/sulbactam  IVPB      atorvastatin 80 milliGRAM(s) Oral at bedtime  chlorhexidine 2% Cloths 1 Application(s) Topical <User Schedule>  dextrose 5%. 1000 milliLiter(s) IV Continuous <Continuous>  dextrose 5%. 1000 milliLiter(s) IV Continuous <Continuous>  dextrose 50% Injectable 25 Gram(s) IV Push once  dextrose 50% Injectable 25 Gram(s) IV Push once  dextrose 50% Injectable 12.5 Gram(s) IV Push once  furosemide    Tablet 20 milliGRAM(s) Oral daily  glucagon  Injectable 1 milliGRAM(s) IntraMuscular once  insulin lispro (ADMELOG) corrective regimen sliding scale   SubCutaneous three times a day before meals  metoprolol succinate ER 50 milliGRAM(s) Oral daily  ondansetron Injectable 4 milliGRAM(s) IV Push once  polyethylene glycol 3350 17 Gram(s) Oral daily  rivaroxaban 20 milliGRAM(s) Oral with dinner  sacubitril 49 mG/valsartan 51 mG 1 Tablet(s) Oral two times a day  spironolactone 25 milliGRAM(s) Oral daily    MEDICATIONS  (PRN):  acetaminophen     Tablet .. 650 milliGRAM(s) Oral every 6 hours PRN Temp greater or equal to 38C (100.4F), Mild Pain (1 - 3)  acetaminophen     Tablet .. 650 milliGRAM(s) Oral every 6 hours PRN Mild Pain (1 - 3)  aluminum hydroxide/magnesium hydroxide/simethicone Suspension 30 milliLiter(s) Oral every 4 hours PRN Dyspepsia  dextrose Oral Gel 15 Gram(s) Oral once PRN Blood Glucose LESS THAN 70 milliGRAM(s)/deciliter  HYDROmorphone  Injectable 0.5 milliGRAM(s) IV Push every 4 hours PRN Severe Pain (7 - 10)  melatonin 3 milliGRAM(s) Oral at bedtime PRN Insomnia  ondansetron Injectable 4 milliGRAM(s) IV Push every 8 hours PRN Nausea and/or Vomiting  oxycodone    5 mG/acetaminophen 325 mG 2 Tablet(s) Oral every 4 hours PRN Moderate Pain (4 - 6)    ___________  Active diet:  Diet, Regular:   Consistent Carbohydrate Evening Snack (CSTCHOSN)  DASH/TLC Sodium & Cholesterol Restricted (DASH)  ___________________    ==================>> VITAL SIGNS <<==================    Vital Signs Last 24 HrsT(C): 36.9 (08-14-23 @ 12:38)  T(F): 98.4 (08-14-23 @ 12:38), Max: 98.4 (08-14-23 @ 12:38)  HR: 71 (08-14-23 @ 12:38) (71 - 79)  BP: 96/64 (08-14-23 @ 12:38)  RR: 18 (08-14-23 @ 12:38) (18 - 18)  SpO2: 98% (08-14-23 @ 12:38) (97% - 98%)      CAPILLARY BLOOD GLUCOSE      POCT Blood Glucose.: 165 mg/dL (14 Aug 2023 12:22)  POCT Blood Glucose.: 169 mg/dL (14 Aug 2023 08:10)  POCT Blood Glucose.: 153 mg/dL (13 Aug 2023 21:14)  POCT Blood Glucose.: 162 mg/dL (13 Aug 2023 16:56)     ==================>> LAB AND IMAGING <<==================                        9.2    5.97  )-----------( 291      ( 13 Aug 2023 07:09 )             29.8        08-13    140  |  100  |  25<H>  ----------------------------<  155<H>  4.7   |  27  |  0.95    Ca    9.3      13 Aug 2023 07:07    TPro  6.7  /  Alb  3.2<L>  /  TBili  0.4  /  DBili  x   /  AST  67<H>  /  ALT  43  /  AlkPhos  101  08-13    WBC count:   5.97 <<== ,  5.65 <<== ,  6.78 <<== ,  6.77 <<==   Hemoglobin:   9.2 <<==,  9.3 <<==,  10.4 <<==,  9.9 <<==  platelets:  291 <==, 272 <==, 306 <==, 240 <==, 204 <==    Creatinine:  0.95  <<==, 1.08  <<==, 0.93  <<==, 0.97  <<==, 1.18  <<==  Sodium:   140  <==, 139  <==, 136  <==, 139  <==, 141  <==       AST:          67 <== , 76 <== , 50 <== , 65 <== , 111 <==      ALT:        43  <== , 47  <== , 41  <== , 55  <== , 83  <==      AP:        101  <=, 90  <=, 90  <=, 90  <=, 93  <=     Bili:        0.4  <=, 0.4  <=, 0.4  <=, 0.6  <=, 0.5  <=    ____________________________    M I C R O B I O L O G Y :    Culture - Tissue with Gram Stain (collected 08 Aug 2023 22:08)  Source: .Tissue Other  Gram Stain (09 Aug 2023 03:19):    No polymorphonuclear cells seen per low power field    No organisms seen per oil power field  Final Report (14 Aug 2023 12:03):    Rare Finegoldia magna "Susceptibilities not performed"    Culture - Surgical Swab (collected 08 Aug 2023 22:06)  Source: .Surgical Swab deep culture right foot  Final Report (11 Aug 2023 13:54):    Few Finegoldia magna "Susceptibilities not performed"    Culture - Abscess with Gram Stain (collected 07 Aug 2023 08:21)  Source: .Abscess right foot  Gram Stain (07 Aug 2023 23:24):    Rare polymorphonuclear leukocytes per low power field    No organisms seen per oil power field  Final Report (10 Aug 2023 09:38):    Rare Staphylococcus aureus    Few Finegoldia magna "Susceptibilities not performed"  Organism: Staphylococcus aureus (10 Aug 2023 09:38)  Organism: Staphylococcus aureus (10 Aug 2023 09:38)    Sensitivities:      Method Type: HEATHER      -  Ampicillin/Sulbactam: S <=8/4      -  Cefazolin: S <=4      -  Clindamycin: S 0.5      -  Erythromycin: S <=0.25      -  Gentamicin: S <=1 Should not be used as monotherapy      -  Oxacillin: S <=0.25 Oxacillin predicts susceptibility for dicloxacillin, methicillin, and nafcillin      -  Penicillin: R >8      -  Rifampin: S <=1 Should not be used as monotherapy      -  Tetracycline: S <=1      -  Trimethoprim/Sulfamethoxazole: S <=0.5/9.5      -  Vancomycin: S 2    Culture - Abscess with Gram Stain (collected 06 Aug 2023 15:42)  Source: .Abscess right foot  Gram Stain (07 Aug 2023 01:46):    Rare polymorphonuclear leukocytes seen per low power field    No organisms seen per oil power field  Final Report (08 Aug 2023 17:11):    Rare Coag Negative Staphylococcus "Susceptibilities not performed"    Few Finegoldia magna "Susceptibilities not performed"    Culture - Blood (collected 06 Aug 2023 11:30)  Source: .Blood Blood  Final Report (11 Aug 2023 17:00):    No growth at 5 days    Culture - Blood (collected 06 Aug 2023 11:15)  Source: .Blood Blood  Final Report (11 Aug 2023 17:00):    No growth at 5 days        < from: TTE W or WO Ultrasound Enhancing Agent (08.07.23 @ 15:33) >  CONCLUSIONS:   1. Severely dilated left ventricular cavity size.The left ventricular systolic function is severely decreased with an ejection fraction visually estimated at 25 to 30 %. There is global left ventricular hypokinesis.   2. Multiple segmental abnormalities exist. See findings.   3. Device lead is visualized in the right ventricle.   4. The aortic annulus and aortic root appear normal in size.  < end of copied text >    < from: Xray Foot AP + Lateral, Right (08.06.23 @ 11:40) >  IMPRESSION:  Diffuse soft tissue swelling throughout the right foot with a possible   trace amount of subcutaneous emphysema along the lateral aspect of the   right mid foot.  No definite cortical erosion seen to indicate acute osteomyelitis.  MR may be useful in further evaluation if there are no contraindications  < end of copied text >    ___________________________________________________________________________________  ===============>>  A S S E S S M E N T   A N D   P L A N <<===============  ------------------------------------------------------------------------------------------    · Assessment	  Patient is a 63yo Male with past medical history of HTN, DM, defibrillator, CAD post CABG, CHF, hx of Sommers fracture, non-union many years ago, presenting with  ulceration, recent bone biopsy with Dr Foss as op , came back positive for staph, treated with Levaquin, presented to ED with acute and significant swelling to the foot as advised to go to the ER for eval.   denies recent fall or trauma. states over the past few days right foot has been increasing in swelling. and has been having some fevers ... states hard to walk on his foot due to pain. states had temp at home of 101-102. denies n/v/d, CP, SOB, HA, dizziness, abdominal pain, urinary symptoms, cough, leg pain, swelling.  patient evaluated by podiatry in ED for presence of gas.. MRI and vascular studies ordered already, antibiotics given..         Problem/Plan - 1:  ·  Problem: Diabetic foot infection. , Osteomyelitis  appreciated podiatry management   local wound care  continue antibiotics per ID  : Likely will need long-term antibiotics >> ID to finalize recom ..      Infectious disease, podiatry, vascular follow-up appreciated   pain management as needed  supportive care   diuretics | leg elevation: helping a lot     Problem/Plan - 2:  ·  Problem: Diabetes.   ·  Plan: patient states has been overall well controlled but in past 1-2 days more elevated...   ---monitor finger sticks closely  ---Insulin regimen as ordered and monitor / adjust as needed  --- hold home medications for now   ---Diabetic diet  ---A1c. 6    May consider adding Farxiga on discharge give CHF    Problem/Plan - 3:  ·  Problem: Heart failure, unspecified HF chronicity, unspecified heart failure type.   ·  Plan: patient with likely chronic systolic CHF on entresto and lasix / Epleronon   continue home medications  Patient takes 75 mg of toprol  at home, here is a 25.  Increase to 50 for now: monitor closely  Echo as above   cardio following for optimization   monitor otherwise.    Problem/Plan - 4:  ·  Problem: HLD (hyperlipidemia).   ·  Plan: continue equivalent statin dose  lipid profile.    Problem/Plan - 5:  ·  Problem: history of Deep vein thrombosis (DVT) of right upper extremity.   ·  Plan: on Xarelto for past ~ 2.5 months ( was on Eliqius first)  for Rt upper extremity DVT post infection of Elbow !   continue for now  repeat Duplex as outpatient to decide on duration of further therapy >> recom in 2 weeks ..     -GI/DVT Prophylaxis per protocol.    --------------------------------------------  Case discussed with patient,   Education given on findings and plan of care  ___________________________  H. RACHAEL Boudreaux.  Pager: [phone]       _________________________________________________________________________________________  ========>>  M E D I C A L   A T T E N D I N G    F O L L O W  U P  N O T E  <<=========  -----------------------------------------------------------------------------------------------------    - Patient seen and examined by me earlier today.   - In summary,  RYLEE HOWE is a 62y year old man admitted with diabetic foot infection   - Patient today overall doing ok, comfortable, No significant pain        patient eating, ambulating, + BM.. no new complains of.. looking forward o going home soon     ==================>> REVIEW OF SYSTEM <<=================    GEN: no fever, no chills, as above   RESP: no SOB, no cough, no sputum  CVS: no chest pain, no palpitations,  GI: no abdominal pain, no nausea,   : no dysuria, no frequency,   Neuro: no headache, no dizziness  Derm : no itching, no rash    ==================>> PHYSICAL EXAM <<=================    GEN: A&O X 3 , NAD , comfortable, pleasant, calm , resting in bed.. encouraged out of bed to chair with assistance as needed.   HEENT: NCAT, PERRL, MMM, hearing intact  Neck: supple , no JVD appreciated  CVS: S1S2 , regular , No M/R/G appreciated  PULM: CTA B/L,  no W/R/R appreciated  ABD.: soft. non tender, non distended,  bowel sounds present  Extrem: intact pulses , + Rt LE edema and erythema MUCH improved ,, wound dressed by pod team .. wound vac on   PSYCH : normal mood,  not anxious                              ( Note written / Date of service 08-14-23 )    ==================>> MEDICATIONS <<====================    ampicillin/sulbactam  IVPB 3 Gram(s) IV Intermittent every 6 hours  ampicillin/sulbactam  IVPB      atorvastatin 80 milliGRAM(s) Oral at bedtime  chlorhexidine 2% Cloths 1 Application(s) Topical <User Schedule>  dextrose 5%. 1000 milliLiter(s) IV Continuous <Continuous>  dextrose 5%. 1000 milliLiter(s) IV Continuous <Continuous>  dextrose 50% Injectable 25 Gram(s) IV Push once  dextrose 50% Injectable 25 Gram(s) IV Push once  dextrose 50% Injectable 12.5 Gram(s) IV Push once  furosemide    Tablet 20 milliGRAM(s) Oral daily  glucagon  Injectable 1 milliGRAM(s) IntraMuscular once  insulin lispro (ADMELOG) corrective regimen sliding scale   SubCutaneous three times a day before meals  metoprolol succinate ER 50 milliGRAM(s) Oral daily  ondansetron Injectable 4 milliGRAM(s) IV Push once  polyethylene glycol 3350 17 Gram(s) Oral daily  rivaroxaban 20 milliGRAM(s) Oral with dinner  sacubitril 49 mG/valsartan 51 mG 1 Tablet(s) Oral two times a day  spironolactone 25 milliGRAM(s) Oral daily    MEDICATIONS  (PRN):  acetaminophen     Tablet .. 650 milliGRAM(s) Oral every 6 hours PRN Temp greater or equal to 38C (100.4F), Mild Pain (1 - 3)  acetaminophen     Tablet .. 650 milliGRAM(s) Oral every 6 hours PRN Mild Pain (1 - 3)  aluminum hydroxide/magnesium hydroxide/simethicone Suspension 30 milliLiter(s) Oral every 4 hours PRN Dyspepsia  dextrose Oral Gel 15 Gram(s) Oral once PRN Blood Glucose LESS THAN 70 milliGRAM(s)/deciliter  HYDROmorphone  Injectable 0.5 milliGRAM(s) IV Push every 4 hours PRN Severe Pain (7 - 10)  melatonin 3 milliGRAM(s) Oral at bedtime PRN Insomnia  ondansetron Injectable 4 milliGRAM(s) IV Push every 8 hours PRN Nausea and/or Vomiting  oxycodone    5 mG/acetaminophen 325 mG 2 Tablet(s) Oral every 4 hours PRN Moderate Pain (4 - 6)    ___________  Active diet:  Diet, Regular:   Consistent Carbohydrate Evening Snack (CSTCHOSN)  DASH/TLC Sodium & Cholesterol Restricted (DASH)  ___________________    ==================>> VITAL SIGNS <<==================    Vital Signs Last 24 HrsT(C): 36.9 (08-14-23 @ 12:38)  T(F): 98.4 (08-14-23 @ 12:38), Max: 98.4 (08-14-23 @ 12:38)  HR: 71 (08-14-23 @ 12:38) (71 - 79)  BP: 96/64 (08-14-23 @ 12:38)  RR: 18 (08-14-23 @ 12:38) (18 - 18)  SpO2: 98% (08-14-23 @ 12:38) (97% - 98%)      CAPILLARY BLOOD GLUCOSE  POCT Blood Glucose.: 165 mg/dL (14 Aug 2023 12:22)  POCT Blood Glucose.: 169 mg/dL (14 Aug 2023 08:10)  POCT Blood Glucose.: 153 mg/dL (13 Aug 2023 21:14)  POCT Blood Glucose.: 162 mg/dL (13 Aug 2023 16:56)     ==================>> LAB AND IMAGING <<==================                        9.2    5.97  )-----------( 291      ( 13 Aug 2023 07:09 )             29.8        08-13    140  |  100  |  25<H>  ----------------------------<  155<H>  4.7   |  27  |  0.95    Ca    9.3      13 Aug 2023 07:07    TPro  6.7  /  Alb  3.2<L>  /  TBili  0.4  /  DBili  x   /  AST  67<H>  /  ALT  43  /  AlkPhos  101  08-13    WBC count:   5.97 <<== ,  5.65 <<== ,  6.78 <<== ,  6.77 <<==   Hemoglobin:   9.2 <<==,  9.3 <<==,  10.4 <<==,  9.9 <<==  platelets:  291 <==, 272 <==, 306 <==, 240 <==, 204 <==    Creatinine:  0.95  <<==, 1.08  <<==, 0.93  <<==, 0.97  <<==, 1.18  <<==  Sodium:   140  <==, 139  <==, 136  <==, 139  <==, 141  <==       AST:          67 <== , 76 <== , 50 <== , 65 <== , 111 <==      ALT:        43  <== , 47  <== , 41  <== , 55  <== , 83  <==      AP:        101  <=, 90  <=, 90  <=, 90  <=, 93  <=     Bili:        0.4  <=, 0.4  <=, 0.4  <=, 0.6  <=, 0.5  <=    ____________________________    M I C R O B I O L O G Y :    Culture - Tissue with Gram Stain (collected 08 Aug 2023 22:08)  Source: .Tissue Other  Gram Stain (09 Aug 2023 03:19):    No polymorphonuclear cells seen per low power field    No organisms seen per oil power field  Final Report (14 Aug 2023 12:03):    Rare Finegoldia magna "Susceptibilities not performed"    Culture - Surgical Swab (collected 08 Aug 2023 22:06)  Source: .Surgical Swab deep culture right foot  Final Report (11 Aug 2023 13:54):    Few Finegoldia magna "Susceptibilities not performed"    Culture - Abscess with Gram Stain (collected 07 Aug 2023 08:21)  Source: .Abscess right foot  Gram Stain (07 Aug 2023 23:24):    Rare polymorphonuclear leukocytes per low power field    No organisms seen per oil power field  Final Report (10 Aug 2023 09:38):    Rare Staphylococcus aureus    Few Finegoldia magna "Susceptibilities not performed"  Organism: Staphylococcus aureus (10 Aug 2023 09:38)  Organism: Staphylococcus aureus (10 Aug 2023 09:38)    Sensitivities:      Method Type: HEATHER      -  Ampicillin/Sulbactam: S <=8/4      -  Cefazolin: S <=4      -  Clindamycin: S 0.5      -  Erythromycin: S <=0.25      -  Gentamicin: S <=1 Should not be used as monotherapy      -  Oxacillin: S <=0.25 Oxacillin predicts susceptibility for dicloxacillin, methicillin, and nafcillin      -  Penicillin: R >8      -  Rifampin: S <=1 Should not be used as monotherapy      -  Tetracycline: S <=1      -  Trimethoprim/Sulfamethoxazole: S <=0.5/9.5      -  Vancomycin: S 2    Culture - Abscess with Gram Stain (collected 06 Aug 2023 15:42)  Source: .Abscess right foot  Gram Stain (07 Aug 2023 01:46):    Rare polymorphonuclear leukocytes seen per low power field    No organisms seen per oil power field  Final Report (08 Aug 2023 17:11):    Rare Coag Negative Staphylococcus "Susceptibilities not performed"    Few Finegoldia magna "Susceptibilities not performed"    Culture - Blood (collected 06 Aug 2023 11:30)  Source: .Blood Blood  Final Report (11 Aug 2023 17:00):    No growth at 5 days    Culture - Blood (collected 06 Aug 2023 11:15)  Source: .Blood Blood  Final Report (11 Aug 2023 17:00):    No growth at 5 days        < from: TTE W or WO Ultrasound Enhancing Agent (08.07.23 @ 15:33) >  CONCLUSIONS:   1. Severely dilated left ventricular cavity size.The left ventricular systolic function is severely decreased with an ejection fraction visually estimated at 25 to 30 %. There is global left ventricular hypokinesis.   2. Multiple segmental abnormalities exist. See findings.   3. Device lead is visualized in the right ventricle.   4. The aortic annulus and aortic root appear normal in size.  < end of copied text >    < from: Xray Foot AP + Lateral, Right (08.06.23 @ 11:40) >  IMPRESSION:  Diffuse soft tissue swelling throughout the right foot with a possible   trace amount of subcutaneous emphysema along the lateral aspect of the   right mid foot.  No definite cortical erosion seen to indicate acute osteomyelitis.  MR may be useful in further evaluation if there are no contraindications  < end of copied text >    ___________________________________________________________________________________  ===============>>  A S S E S S M E N T   A N D   P L A N <<===============  ------------------------------------------------------------------------------------------    · Assessment	  Patient is a 61yo Male with past medical history of HTN, DM, defibrillator, CAD post CABG, CHF, hx of Sommers fracture, non-union many years ago, presenting with  ulceration, recent bone biopsy with Dr Foss as op , came back positive for staph, treated with Levaquin, presented to ED with acute and significant swelling to the foot as advised to go to the ER for eval.   denies recent fall or trauma. states over the past few days right foot has been increasing in swelling. and has been having some fevers ... states hard to walk on his foot due to pain. states had temp at home of 101-102. denies n/v/d, CP, SOB, HA, dizziness, abdominal pain, urinary symptoms, cough, leg pain, swelling.  patient evaluated by podiatry in ED for presence of gas.. MRI and vascular studies ordered already, antibiotics given..         Problem/Plan - 1:  ·  Problem: Diabetic foot infection. , Osteomyelitis  appreciated podiatry management   local wound care  continue antibiotics per ID  : Likely will need long-term antibiotics >> ID to finalize recom ..      Infectious disease, podiatry, vascular follow-up appreciated   pain management as needed  supportive care   diuretics | leg elevation: helping a lot     Problem/Plan - 2:  ·  Problem: Diabetes.   ·  Plan: patient states has been overall well controlled but in past 1-2 days more elevated...   ---monitor finger sticks closely  ---Insulin regimen as ordered and monitor / adjust as needed  --- hold home medications for now   ---Diabetic diet  ---A1c. 6    May consider adding Farxiga on discharge give CHF    Problem/Plan - 3:  ·  Problem: Heart failure, unspecified HF chronicity, unspecified heart failure type.   ·  Plan: patient with likely chronic systolic CHF on entresto and lasix / Epleronon   continue home medications  Patient takes 75 mg of toprol  at home, here is a 25.  Increase to 50 for now: monitor closely     Further adjustment as per cardiology is needed  Echo as above   cardio following for optimization   monitor otherwise.    Problem/Plan - 4:  ·  Problem: HLD (hyperlipidemia).   ·  Plan: continue equivalent statin dose  lipid profile.    Problem/Plan - 5:  ·  Problem: history of Deep vein thrombosis (DVT) of right upper extremity.   ·  Plan: on Xarelto for past ~ 2.5 months ( was on Eliqius first)  for Rt upper extremity DVT post infection of Elbow !   continue for now  repeat Duplex as outpatient to decide on duration of further therapy >> recom in 2 weeks ..     -GI/DVT Prophylaxis per protocol.    Discharge planning home with home care, antibiotics    --------------------------------------------  Case discussed with patient, NP  Education given on findings and plan of care  ___________________________  H. RACHAEL Boudraeux.  Pager: [phone]

## 2023-08-14 NOTE — PROGRESS NOTE ADULT - ASSESSMENT
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 Soft tissue wound with foci of air is seen tracking to the level of the base of the fifth metatarsal tracking up to the site of fracture. There is adjacent cortical irregularity along the fifth metatarsal base which could be secondary to prior fracture versus osteomyelitis, soft tissue swelling around the foot most significant at the   lateral aspect. No mature, drainable, or enhancing collection is seen at this time.  wound cx with MSSA and fingoldia  s/p I&D with necrotic tissue close to bone so high suspicion for residual osteo      *  Pt is growing MSSA and Finegoldia. No clear if ancef will cover the finegoldia-  switched to unasyn but now in view of abs for easier administration at d/c will switch back to ancef 2 gms IVSS q 8 hours and add po flagyl 500 mg po q 12 hours  - reviewed with pharm D   * f/u the final OR cx and path  * monitor CBC/diff and CMP    Lori Bennett M.D. ,   please reach via teams   If no answer, or after 5PM/ weekends,  then please call  [phone]    Assessment and plan discussed with the primary team .

## 2023-08-14 NOTE — PROGRESS NOTE ADULT - SUBJECTIVE AND OBJECTIVE BOX
Date of Service: 08-14-23 @ 07:32           CARDIOLOGY     PROGRESS  NOTE   ________________________________________________    CHIEF COMPLAINT:Patient is a 62y old  Male who presents with a chief complaint of diabetic foot infection, possible osteo (13 Aug 2023 13:38)  doing better tolerating cardiac meds better  	  REVIEW OF SYSTEMS:  CONSTITUTIONAL: No fever, weight loss, or fatigue  EYES: No eye pain, visual disturbances, or discharge  ENT:  No difficulty hearing, tinnitus, vertigo; No sinus or throat pain  NECK: No pain or stiffness  RESPIRATORY: No cough, wheezing, chills or hemoptysis; No Shortness of Breath  CARDIOVASCULAR: No chest pain, palpitations, passing out, dizziness, or leg swelling  GASTROINTESTINAL: No abdominal or epigastric pain. No nausea, vomiting, or hematemesis; No diarrhea or constipation. No melena or hematochezia.  GENITOURINARY: No dysuria, frequency, hematuria, or incontinence  NEUROLOGICAL: No headaches, memory loss, loss of strength, numbness, or tremors  SKIN: No itching, burning, rashes, or lesions   LYMPH Nodes: No enlarged glands  ENDOCRINE: No heat or cold intolerance; No hair loss  MUSCULOSKELETAL: No joint pain or swelling; No muscle, back, + RL extremity pain  PSYCHIATRIC: No depression, anxiety, mood swings, or difficulty sleeping  HEME/LYMPH: No easy bruising, or bleeding gums  ALLERGY AND IMMUNOLOGIC: No hives or eczema	    [x ] All others negative	  [ ] Unable to obtain    PHYSICAL EXAM:  T(C): 36.4 (08-14-23 @ 04:20), Max: 36.9 (08-13-23 @ 12:07)  HR: 73 (08-14-23 @ 04:20) (73 - 83)  BP: 118/77 (08-14-23 @ 04:20) (97/59 - 118/77)  RR: 18 (08-14-23 @ 04:20) (18 - 18)  SpO2: 97% (08-14-23 @ 04:20) (96% - 98%)  Wt(kg): --  I&O's Summary    13 Aug 2023 07:01  -  14 Aug 2023 07:00  --------------------------------------------------------  IN: 1030 mL / OUT: 1460 mL / NET: -430 mL        Appearance: Normal	  HEENT:   Normal oral mucosa, PERRL, EOMI	  Lymphatic: No lymphadenopathy  Cardiovascular: Normal S1 S2, No JVD, +murmurs, No edema  Respiratory:rhonchi  Psychiatry: A & O x 3, Mood & affect appropriate  Gastrointestinal:  Soft, Non-tender, + BS	  Skin: No rashes, No ecchymoses, No cyanosis	  Neurologic: Non-focal  Extremities: Normal range of motion, r foot decrease erythema/ bandaged wound VAC  Vascular: Peripheral pulses palpable 2+ bilaterally    MEDICATIONS  (STANDING):  atorvastatin 80 milliGRAM(s) Oral at bedtime  ceFAZolin   IVPB 2000 milliGRAM(s) IV Intermittent every 8 hours  chlorhexidine 2% Cloths 1 Application(s) Topical <User Schedule>  dextrose 5%. 1000 milliLiter(s) (100 mL/Hr) IV Continuous <Continuous>  dextrose 5%. 1000 milliLiter(s) (50 mL/Hr) IV Continuous <Continuous>  dextrose 50% Injectable 25 Gram(s) IV Push once  dextrose 50% Injectable 25 Gram(s) IV Push once  dextrose 50% Injectable 12.5 Gram(s) IV Push once  furosemide    Tablet 20 milliGRAM(s) Oral daily  glucagon  Injectable 1 milliGRAM(s) IntraMuscular once  insulin lispro (ADMELOG) corrective regimen sliding scale   SubCutaneous three times a day before meals  metoprolol succinate ER 50 milliGRAM(s) Oral daily  ondansetron Injectable 4 milliGRAM(s) IV Push once  polyethylene glycol 3350 17 Gram(s) Oral daily  rivaroxaban 20 milliGRAM(s) Oral with dinner  sacubitril 49 mG/valsartan 51 mG 1 Tablet(s) Oral two times a day  spironolactone 25 milliGRAM(s) Oral daily      TELEMETRY: 	    ECG:  	  RADIOLOGY:  OTHER: 	  	  LABS:	 	    CARDIAC MARKERS:                                9.2    5.97  )-----------( 291      ( 13 Aug 2023 07:09 )             29.8     08-13    140  |  100  |  25<H>  ----------------------------<  155<H>  4.7   |  27  |  0.95    Ca    9.3      13 Aug 2023 07:07    TPro  6.7  /  Alb  3.2<L>  /  TBili  0.4  /  DBili  x   /  AST  67<H>  /  ALT  43  /  AlkPhos  101  08-13    proBNP:   Lipid Profile: Cholesterol 75  LDL --  HDL 27      HgA1c:   TSH:         Assessment and plan  ---------------------------  Patient is a 61yo Male with past medical history of HTN, DM, defibrillator, CAD post CABG, CHF, hx of Sommers fracture, non-union many years ago, presenting with  ulceration, recent bone biopsy with Dr Foss as op , came back positive for staph, treated with Levaquin, presented to ED with acute and significant swelling to the foot as advised to go to the ER for eval.   denies recent fall or trauma.  states  over the past few days right foot has been increasing in swelling. and has been having some fevers ... states hard to walk on his foot due to pain. states had temp at home of 101-102. denies n/v/d, CP, SOB, HA, dizziness, abdominal pain, urinary symptoms, cough, leg pain, swelling.  patient evaluated by podiatry in ED for presence of gas.. MRI and vascular studies ordered already, antibiotics given..   pt with sig cardiac hx for possible vascular and podiatric surgery  continue all cardiac meds  will adjust cardiac meds  will check AICD,about MRI compatibility  doubt DVT pt on AC  echo noted with severe LV dysfunction  continue current meds, may add Farxiga 10 mg upon dc for heart failure  ID noted, s/p I&d, continue abx awaiting cultures  DVT prophylaxis  s/p  VAC to the wound  increase lft fu closely , is improving  continue all cardiac meds, hold if sbp less than 90  bp is better, will gradually will inncrease meds  will increase Lasix to 40 mg daily/ Entresto 49/51 mg bid  pt wants  to go home decision about course of abx

## 2023-08-14 NOTE — PROGRESS NOTE ADULT - SUBJECTIVE AND OBJECTIVE BOX
Patient is a 62y old  Male who presents with a chief complaint of diabetic foot infection, possible osteo (14 Aug 2023 07:32)       INTERVAL HPI/OVERNIGHT EVENTS:  Patient seen and evaluated at bedside.  Pt is resting comfortable in NAD. Denies N/V/F/C.    Allergies    No Known Allergies    Intolerances        Vital Signs Last 24 Hrs  T(C): 36.4 (14 Aug 2023 04:20), Max: 36.9 (13 Aug 2023 12:07)  T(F): 97.6 (14 Aug 2023 04:20), Max: 98.5 (13 Aug 2023 12:07)  HR: 73 (14 Aug 2023 04:20) (73 - 83)  BP: 118/77 (14 Aug 2023 04:20) (97/59 - 118/77)  BP(mean): --  RR: 18 (14 Aug 2023 04:20) (18 - 18)  SpO2: 97% (14 Aug 2023 04:20) (96% - 98%)    Parameters below as of 14 Aug 2023 04:20  Patient On (Oxygen Delivery Method): room air        LABS:                        9.2    5.97  )-----------( 291      ( 13 Aug 2023 07:09 )             29.8     08-13    140  |  100  |  25<H>  ----------------------------<  155<H>  4.7   |  27  |  0.95    Ca    9.3      13 Aug 2023 07:07    TPro  6.7  /  Alb  3.2<L>  /  TBili  0.4  /  DBili  x   /  AST  67<H>  /  ALT  43  /  AlkPhos  101  08-13      Urinalysis Basic - ( 13 Aug 2023 07:07 )    Color: x / Appearance: x / SG: x / pH: x  Gluc: 155 mg/dL / Ketone: x  / Bili: x / Urobili: x   Blood: x / Protein: x / Nitrite: x   Leuk Esterase: x / RBC: x / WBC x   Sq Epi: x / Non Sq Epi: x / Bacteria: x      CAPILLARY BLOOD GLUCOSE      POCT Blood Glucose.: 169 mg/dL (14 Aug 2023 08:10)  POCT Blood Glucose.: 153 mg/dL (13 Aug 2023 21:14)  POCT Blood Glucose.: 162 mg/dL (13 Aug 2023 16:56)  POCT Blood Glucose.: 184 mg/dL (13 Aug 2023 12:34)      Lower Extremity Physical Exam:  Vascular: DP/PT 0/4 B/L, CFT <3 sec x 10, Temp gradient warm to warm, RLE, warm to cool LLE.    Neurology: Epicritic sensation intact to level of digits, B/L  Musculoskeletal/Ortho: pain to palpation over right foot 5th digit   Skin: 8/8 s/p Right Foot incision and drainage with partial 5th ray resection, open. VAC left intact, peripheral wound noted w/ decreased edema, ecchymosis to the forefoot and midfoot noted.       RADIOLOGY & ADDITIONAL TESTS:

## 2023-08-14 NOTE — ADVANCED PRACTICE NURSE CONSULT - ASSESSMENT
Central Line Catheter Insertion Note  Patient or patient representative educated about central line associated blood stream infection prevention practices.    Catheter type: SL   : Bard  Power injectable: Yes  Procedure assisted by: Marjan Boswell RN    Informed consent obtained by covering floor team. Time out was preformed, confirming the patient's first and last name, date of birth, procedure, and correct site prior to state of procedure.    Patient was placed with HOB up 30 degrees. Patient placement site was prepped with chlorhexidine solution, then draped using maximum sterile barrier protection. The area was injected with 2 ml of 1% lidocaine. Using the ultrasound, the catheter was introduced. Strict adherence to outline aseptic technique. Upon completion of line placement, the insertion site was covered with a sterile occlusive dressing. Pt tolerated procedure well. Minimal blood loss.     All materials used for catheter insertion, including the intact guide wires, were accounted for at the end of the procedure.    Number of attempts: 1  Complications/Comments: Unable to advance catheter completely, resistance with guidewire advancement. Blood return is positive. IR consulted, per ANNE Delgadillo complete STAT CXR at this time.     Emergency Placement: No  Site: New site  Anatomical Site of insertion: R Brachial   Catheter size/length: 49 cm  US guided Bard SL Power PICC placed    Post-procedure vitals  T: 98.6  HR: 78  BP: 95/59  RR: 16  02: 100%    Post procedure verification with CXR as per orders.   Central Line Catheter Insertion Note  Patient or patient representative educated about central line associated blood stream infection prevention practices.    Catheter type: SL   : Bard  Power injectable: Yes  Procedure assisted by: Marjan Boswell RN    Informed consent obtained by covering floor team. Time out was preformed, confirming the patient's first and last name, date of birth, procedure, and correct site prior to state of procedure.    Patient was placed with HOB up 30 degrees. Patient placement site was prepped with chlorhexidine solution, then draped using maximum sterile barrier protection. The area was injected with 2 ml of 1% lidocaine. Using the ultrasound, the catheter was introduced. Strict adherence to outline aseptic technique. Upon completion of line placement, the insertion site was covered with a sterile occlusive dressing. Pt tolerated procedure well. Minimal blood loss.     All materials used for catheter insertion, including the intact guide wires, were accounted for at the end of the procedure.    Number of attempts: 1  Complications/Comments: Unable to advance catheter completely, resistance with guidewire advancement. Blood return is positive. IR consulted, per ANNE Delgadillo complete STAT CXR at this time.    Emergency Placement: No  Site: New site  Anatomical Site of insertion: L Brachial  Catheter size/length: 49 cm  US guided Bard SL Power PICC placed    Post-procedure vitals  T: 98.6  HR: 78  BP: 95/59  RR: 16  02: 100%    Post procedure verification with CXR as per orders.

## 2023-08-15 ENCOUNTER — TRANSCRIPTION ENCOUNTER (OUTPATIENT)
Age: 63
End: 2023-08-15

## 2023-08-15 LAB
ANION GAP SERPL CALC-SCNC: 12 MMOL/L — SIGNIFICANT CHANGE UP (ref 5–17)
BUN SERPL-MCNC: 19 MG/DL — SIGNIFICANT CHANGE UP (ref 7–23)
CALCIUM SERPL-MCNC: 9.2 MG/DL — SIGNIFICANT CHANGE UP (ref 8.4–10.5)
CHLORIDE SERPL-SCNC: 102 MMOL/L — SIGNIFICANT CHANGE UP (ref 96–108)
CO2 SERPL-SCNC: 25 MMOL/L — SIGNIFICANT CHANGE UP (ref 22–31)
CREAT SERPL-MCNC: 0.75 MG/DL — SIGNIFICANT CHANGE UP (ref 0.5–1.3)
CULTURE RESULTS: SIGNIFICANT CHANGE UP
EGFR: 102 ML/MIN/1.73M2 — SIGNIFICANT CHANGE UP
GLUCOSE BLDC GLUCOMTR-MCNC: 137 MG/DL — HIGH (ref 70–99)
GLUCOSE BLDC GLUCOMTR-MCNC: 143 MG/DL — HIGH (ref 70–99)
GLUCOSE BLDC GLUCOMTR-MCNC: 172 MG/DL — HIGH (ref 70–99)
GLUCOSE BLDC GLUCOMTR-MCNC: 172 MG/DL — HIGH (ref 70–99)
GLUCOSE SERPL-MCNC: 144 MG/DL — HIGH (ref 70–99)
HCT VFR BLD CALC: 29.3 % — LOW (ref 39–50)
HGB BLD-MCNC: 9.1 G/DL — LOW (ref 13–17)
MCHC RBC-ENTMCNC: 27.6 PG — SIGNIFICANT CHANGE UP (ref 27–34)
MCHC RBC-ENTMCNC: 31.1 GM/DL — LOW (ref 32–36)
MCV RBC AUTO: 88.8 FL — SIGNIFICANT CHANGE UP (ref 80–100)
NRBC # BLD: 0 /100 WBCS — SIGNIFICANT CHANGE UP (ref 0–0)
PLATELET # BLD AUTO: 242 K/UL — SIGNIFICANT CHANGE UP (ref 150–400)
POTASSIUM SERPL-MCNC: 3.9 MMOL/L — SIGNIFICANT CHANGE UP (ref 3.5–5.3)
POTASSIUM SERPL-SCNC: 3.9 MMOL/L — SIGNIFICANT CHANGE UP (ref 3.5–5.3)
RBC # BLD: 3.3 M/UL — LOW (ref 4.2–5.8)
RBC # FLD: 16.1 % — HIGH (ref 10.3–14.5)
SODIUM SERPL-SCNC: 139 MMOL/L — SIGNIFICANT CHANGE UP (ref 135–145)
WBC # BLD: 5.22 K/UL — SIGNIFICANT CHANGE UP (ref 3.8–10.5)
WBC # FLD AUTO: 5.22 K/UL — SIGNIFICANT CHANGE UP (ref 3.8–10.5)

## 2023-08-15 PROCEDURE — 99232 SBSQ HOSP IP/OBS MODERATE 35: CPT

## 2023-08-15 RX ADMIN — CHLORHEXIDINE GLUCONATE 1 APPLICATION(S): 213 SOLUTION TOPICAL at 05:30

## 2023-08-15 RX ADMIN — Medication 50 MILLIGRAM(S): at 05:11

## 2023-08-15 RX ADMIN — SACUBITRIL AND VALSARTAN 1 TABLET(S): 24; 26 TABLET, FILM COATED ORAL at 05:10

## 2023-08-15 RX ADMIN — Medication 100 MILLIGRAM(S): at 05:10

## 2023-08-15 RX ADMIN — Medication 2: at 12:39

## 2023-08-15 RX ADMIN — Medication 100 MILLIGRAM(S): at 21:31

## 2023-08-15 RX ADMIN — RIVAROXABAN 20 MILLIGRAM(S): KIT at 17:06

## 2023-08-15 RX ADMIN — Medication 500 MILLIGRAM(S): at 17:06

## 2023-08-15 RX ADMIN — CHLORHEXIDINE GLUCONATE 1 APPLICATION(S): 213 SOLUTION TOPICAL at 05:29

## 2023-08-15 RX ADMIN — SPIRONOLACTONE 25 MILLIGRAM(S): 25 TABLET, FILM COATED ORAL at 05:10

## 2023-08-15 RX ADMIN — Medication 500 MILLIGRAM(S): at 05:11

## 2023-08-15 RX ADMIN — Medication 100 MILLIGRAM(S): at 14:00

## 2023-08-15 RX ADMIN — SACUBITRIL AND VALSARTAN 1 TABLET(S): 24; 26 TABLET, FILM COATED ORAL at 17:06

## 2023-08-15 RX ADMIN — Medication 3 MILLIGRAM(S): at 21:30

## 2023-08-15 RX ADMIN — Medication 20 MILLIGRAM(S): at 05:10

## 2023-08-15 RX ADMIN — ATORVASTATIN CALCIUM 80 MILLIGRAM(S): 80 TABLET, FILM COATED ORAL at 21:30

## 2023-08-15 NOTE — PROGRESS NOTE ADULT - SUBJECTIVE AND OBJECTIVE BOX
Date of Service: 08-15-23 @ 07:17           CARDIOLOGY     PROGRESS  NOTE   ________________________________________________    CHIEF COMPLAINT:Patient is a 62y old  Male who presents with a chief complaint of diabetic foot infection, possible osteo (14 Aug 2023 15:46)  doing well  	  REVIEW OF SYSTEMS:  CONSTITUTIONAL: No fever, weight loss, or fatigue  EYES: No eye pain, visual disturbances, or discharge  ENT:  No difficulty hearing, tinnitus, vertigo; No sinus or throat pain  NECK: No pain or stiffness  RESPIRATORY: No cough, wheezing, chills or hemoptysis; No Shortness of Breath  CARDIOVASCULAR: No chest pain, palpitations, passing out, dizziness, or leg swelling  GASTROINTESTINAL: No abdominal or epigastric pain. No nausea, vomiting, or hematemesis; No diarrhea or constipation. No melena or hematochezia.  GENITOURINARY: No dysuria, frequency, hematuria, or incontinence  NEUROLOGICAL: No headaches, memory loss, loss of strength, numbness, or tremors  SKIN: No itching, burning, rashes, or lesions   LYMPH Nodes: No enlarged glands  ENDOCRINE: No heat or cold intolerance; No hair loss  MUSCULOSKELETAL: No joint pain or swelling; No muscle, back, or extremity pain  PSYCHIATRIC: No depression, anxiety, mood swings, or difficulty sleeping  HEME/LYMPH: No easy bruising, or bleeding gums  ALLERGY AND IMMUNOLOGIC: No hives or eczema	    [ x] All others negative	  [ ] Unable to obtain    PHYSICAL EXAM:  T(C): 36.9 (08-15-23 @ 03:42), Max: 36.9 (08-14-23 @ 12:38)  HR: 63 (08-15-23 @ 03:42) (63 - 79)  BP: 100/63 (08-15-23 @ 03:42) (96/64 - 109/70)  RR: 18 (08-15-23 @ 03:42) (16 - 18)  SpO2: 97% (08-15-23 @ 03:42) (96% - 98%)  Wt(kg): --  I&O's Summary    14 Aug 2023 07:01  -  15 Aug 2023 07:00  --------------------------------------------------------  IN: 900 mL / OUT: 1250 mL / NET: -350 mL        Appearance: Normal	  HEENT:   Normal oral mucosa, PERRL, EOMI	  Lymphatic: No lymphadenopathy  Cardiovascular: Normal S1 S2, No JVD, + murmurs, No edema  Respiratory: rhonchi  Psychiatry: A & O x 3, Mood & affect appropriate  Gastrointestinal:  Soft, Non-tender, + BS	  Skin: No rashes, No ecchymoses, No cyanosis	  Neurologic: Non-focal  Extremities: Normal range of motion, + r foot wound,vac  Vascular: Peripheral pulses palpable 2+ bilaterally    MEDICATIONS  (STANDING):  atorvastatin 80 milliGRAM(s) Oral at bedtime  ceFAZolin   IVPB 2000 milliGRAM(s) IV Intermittent every 8 hours  chlorhexidine 2% Cloths 1 Application(s) Topical <User Schedule>  chlorhexidine 4% Liquid 1 Application(s) Topical <User Schedule>  dextrose 5%. 1000 milliLiter(s) (100 mL/Hr) IV Continuous <Continuous>  dextrose 5%. 1000 milliLiter(s) (50 mL/Hr) IV Continuous <Continuous>  dextrose 50% Injectable 25 Gram(s) IV Push once  dextrose 50% Injectable 12.5 Gram(s) IV Push once  dextrose 50% Injectable 25 Gram(s) IV Push once  furosemide    Tablet 20 milliGRAM(s) Oral daily  glucagon  Injectable 1 milliGRAM(s) IntraMuscular once  insulin lispro (ADMELOG) corrective regimen sliding scale   SubCutaneous three times a day before meals  metoprolol succinate ER 50 milliGRAM(s) Oral daily  metroNIDAZOLE    Tablet 500 milliGRAM(s) Oral every 12 hours  ondansetron Injectable 4 milliGRAM(s) IV Push once  polyethylene glycol 3350 17 Gram(s) Oral daily  rivaroxaban 20 milliGRAM(s) Oral with dinner  sacubitril 49 mG/valsartan 51 mG 1 Tablet(s) Oral two times a day  spironolactone 25 milliGRAM(s) Oral daily      TELEMETRY: 	    ECG:  	  RADIOLOGY:  OTHER: 	  	  LABS:	 	    CARDIAC MARKERS:      proBNP:   Lipid Profile: Cholesterol 75  LDL --  HDL 27      HgA1c:   TSH:   *  Pt is growing MSSA and Finegoldia. No clear if ancef will cover the finegoldia-  switched to unasyn but now in view of abs for easier administration at d/c will switch back to ancef 2 gms IVSS q 8 hours and add po flagyl 500 mg po q 12 hours  - reviewed with pharm D   * f/u the final OR cx and path  * monitor CBC/diff and CMP    Culture - Tissue with Gram Stain (08.08.23 @ 22:08)    Gram Stain:   No polymorphonuclear cells seen per low power field  No organisms seen per oil power field   Specimen Source: .Tissue Other   Culture Results:   Rare Finegoldia magna "Susceptibilities not performed"    Assessment and plan  ---------------------------  Patient is a 61yo Male with past medical history of HTN, DM, defibrillator, CAD post CABG, CHF, hx of Sommers fracture, non-union many years ago, presenting with  ulceration, recent bone biopsy with Dr Foss as op , came back positive for staph, treated with Levaquin, presented to ED with acute and significant swelling to the foot as advised to go to the ER for eval.   denies recent fall or trauma.  states  over the past few days right foot has been increasing in swelling. and has been having some fevers ... states hard to walk on his foot due to pain. states had temp at home of 101-102. denies n/v/d, CP, SOB, HA, dizziness, abdominal pain, urinary symptoms, cough, leg pain, swelling.  patient evaluated by podiatry in ED for presence of gas.. MRI and vascular studies ordered already, antibiotics given..   pt with sig cardiac hx for possible vascular and podiatric surgery  continue all cardiac meds  will adjust cardiac meds  will check AICD,about MRI compatibility  doubt DVT pt on AC  echo noted with severe LV dysfunction  continue current meds, may add Farxiga 10 mg upon dc for heart failure  ID noted, s/p I&d, continue abx awaiting cultures  DVT prophylaxis  s/p  VAC to the wound  increase lft fu closely , is improving  bp is better, will gradually will inncrease meds  will increase Lasix to 40 mg daily/ Entresto 49/51 mg bid  awaiting ID recommendation on abx  increase ambulation

## 2023-08-15 NOTE — DISCHARGE NOTE NURSING/CASE MANAGEMENT/SOCIAL WORK - NSSCTYPOFSERV_GEN_ALL_CORE
Home infusion RN to contact patient prior to visit  services for start of care 8/16  @ 2pm dose for infusion   and   [facility] RN will call prior to visit for VAC changes   and Home Physical therapy

## 2023-08-15 NOTE — PROGRESS NOTE ADULT - SUBJECTIVE AND OBJECTIVE BOX
Patient is a 62y old  Male who presents with a chief complaint of diabetic foot infection, possible osteo (15 Aug 2023 07:17)    Being followed by ID for osteomyelitis         Interval history:  pt s/p PICC  pt for d/c tomorrow   No other acute events      PAST MEDICAL & SURGICAL HISTORY:    Allergies    No Known Allergies    Intolerances      Antimicrobials:    ceFAZolin   IVPB 2000 milliGRAM(s) IV Intermittent every 8 hours  metroNIDAZOLE    Tablet 500 milliGRAM(s) Oral every 12 hours    MEDICATIONS  (STANDING):  atorvastatin 80 milliGRAM(s) Oral at bedtime  ceFAZolin   IVPB 2000 milliGRAM(s) IV Intermittent every 8 hours  chlorhexidine 2% Cloths 1 Application(s) Topical <User Schedule>  chlorhexidine 4% Liquid 1 Application(s) Topical <User Schedule>  dextrose 5%. 1000 milliLiter(s) (100 mL/Hr) IV Continuous <Continuous>  dextrose 5%. 1000 milliLiter(s) (50 mL/Hr) IV Continuous <Continuous>  dextrose 50% Injectable 25 Gram(s) IV Push once  dextrose 50% Injectable 12.5 Gram(s) IV Push once  dextrose 50% Injectable 25 Gram(s) IV Push once  furosemide    Tablet 20 milliGRAM(s) Oral daily  glucagon  Injectable 1 milliGRAM(s) IntraMuscular once  insulin lispro (ADMELOG) corrective regimen sliding scale   SubCutaneous three times a day before meals  metoprolol succinate ER 50 milliGRAM(s) Oral daily  metroNIDAZOLE    Tablet 500 milliGRAM(s) Oral every 12 hours  ondansetron Injectable 4 milliGRAM(s) IV Push once  polyethylene glycol 3350 17 Gram(s) Oral daily  rivaroxaban 20 milliGRAM(s) Oral with dinner  sacubitril 49 mG/valsartan 51 mG 1 Tablet(s) Oral two times a day  spironolactone 25 milliGRAM(s) Oral daily      Vital Signs Last 24 Hrs  T(C): 36.6 (08-15-23 @ 12:30), Max: 36.9 (08-15-23 @ 03:42)  T(F): 97.9 (08-15-23 @ 12:30), Max: 98.5 (08-15-23 @ 03:42)  HR: 88 (08-15-23 @ 12:30) (63 - 90)  BP: 93/63 (08-15-23 @ 12:30) (93/63 - 100/63)  BP(mean): --  RR: 18 (08-15-23 @ 12:30) (16 - 18)  SpO2: 98% (08-15-23 @ 12:30) (96% - 99%)    Physical Exam:    Constitutional well preserved,comfortable,pleasant    HEENT PERRLA EOMI,No pallor or icterus    No oral exudate or erythema    Neck supple no JVD or LN    Chest Good AE,CTA    CVS  S1 S2     Abd soft BS normal No tenderness     Ext  right foot in boot and with VAC    IV site no erythema tenderness or discharge    CNS AAO X 3 no focal    Lab Data:                          9.1    5.22  )-----------( 242      ( 15 Aug 2023 07:14 )             29.3       08-15    139  |  102  |  19  ----------------------------<  144<H>  3.9   |  25  |  0.75    Ca    9.2      15 Aug 2023 07:14      Pt is s/p R foot Incision and Drainage and partial 5th met base resection   - High concern for residual bone infection: bone was hard at level of resection and appeared vitalized intra-op, however given the close proximity of the abscess and the 5th met base, bone residual infection concern remains high.   - High concern for viability: adequate intro-op bleeding, however large necrotic tissue encountered during Incision and Drainage which concerns soft tissue flap viability       .Tissue Other  08-08-23   Rare Finegoldia magna See previous culture [id number] for  susceptibility  --    No polymorphonuclear cells seen per low power field  No organisms seen per oil power field      .Surgical Swab deep culture right foot  08-08-23   Few Finegoldia magna See previous culture  [id number] for  susceptibility  --  --      WBC Count: 5.22 (08-15-23 @ 07:14)  WBC Count: 5.97 (08-13-23 @ 07:09)  WBC Count: 5.65 (08-12-23 @ 06:57)  WBC Count: 6.78 (08-11-23 @ 07:10)  WBC Count: 6.77 (08-10-23 @ 07:27)  WBC Count: 7.81 (08-09-23 @ 07:20)       Bilirubin Total: 0.4 mg/dL (08-13-23 @ 07:07)  Aspartate Aminotransferase (AST/SGOT): 67 U/L (08-13-23 @ 07:07)  Alanine Aminotransferase (ALT/SGPT): 43 U/L (08-13-23 @ 07:07)  Alkaline Phosphatase: 101 U/L (08-13-23 @ 07:07)    Bilirubin Total: 0.4 mg/dL (08-12-23 @ 06:55)  Aspartate Aminotransferase (AST/SGOT): 76 U/L (08-12-23 @ 06:55)  Alanine Aminotransferase (ALT/SGPT): 47 U/L (08-12-23 @ 06:55)  Alkaline Phosphatase: 90 U/L (08-12-23 @ 06:55)    Bilirubin Total: 0.4 mg/dL (08-11-23 @ 07:11)  Aspartate Aminotransferase (AST/SGOT): 50 U/L (08-11-23 @ 07:11)  Alanine Aminotransferase (ALT/SGPT): 41 U/L (08-11-23 @ 07:11)  Alkaline Phosphatase: 90 U/L (08-11-23 @ 07:11)    < from: US Abdomen Upper Quadrant Right (08.09.23 @ 13:25) >  IMPRESSION: Gallbladder sludge. No cholelithiasis or gallbladder wall   thickening.    Suggestion of mild hepatic steatosis.    < end of copied text >      < from: CT Foot w/ IV Cont, Right (08.08.23 @ 10:21) >  IMPRESSION:  1.  Chronic transverse fracture through the fifth metatarsal base. Soft   tissue wound with foci of air is seen tracking to the level of the base   of the fifth metatarsal tracking up to the site of fracture. There is   adjacent cortical irregularity along the fifth metatarsal base which   could be secondary to prior fracture versus osteomyelitis. Likely   correlate patient history.  2.  There is soft tissue swelling around the foot most significant at the   lateral aspect. No mature, drainable, or enhancing collection is seen at   this time.    Please note there is a delay in the signing of this report waiting for   the technologist to complete reformats.    --- End of Report ---    < end of copied text >

## 2023-08-15 NOTE — DISCHARGE NOTE NURSING/CASE MANAGEMENT/SOCIAL WORK - NSDCFUADDAPPT_GEN_ALL_CORE_FT
================================  Podiatry Discharge Instructions:  - Follow up: Please follow up with Dr. AU within 1 week of discharge from the hospital, please call [phone] for appointment and discuss that you recently were seen in the hospital.  - Wound Care: Please apply VAC at 75 mmgh to R foot wound three times a week.   - Weight bearing: Please weight bearing as tolerated to heel in a surgical shoes to R foot   - Antibiotics: Please continue as instructed.  ================================

## 2023-08-15 NOTE — PROGRESS NOTE ADULT - ASSESSMENT
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 Soft tissue wound with foci of air is seen tracking to the level of the base of the fifth metatarsal tracking up to the site of fracture. There is adjacent cortical irregularity along the fifth metatarsal base which could be secondary to prior fracture versus osteomyelitis, soft tissue swelling around the foot most significant at the   lateral aspect. No mature, drainable, or enhancing collection is seen at this time.  wound cx with MSSA and fingoldia  s/p I&D with necrotic tissue close to bone so high suspicion for residual osteo      *  Pt is growing MSSA and Finegoldia. No clear if ancef will cover the finegoldia-  switched to unasyn but now in view of abs for easier administration at d/c switched back to ancef 2 gms IVSS q 8 hours and add po flagyl 500 mg po q 12 hours  - reviewed with pharm D   * f/u the final OR cx and path  * monitor CBC/diff and CMP  - check ESR and CRP and CBC with diff and CMP weekly at discharge  follow up with me in two - three weeks once discharged     Lori Bennett M.D. ,   please reach via teams   If no answer, or after 5PM/ weekends,  then please call  [phone]    Assessment and plan discussed with the primary team .

## 2023-08-15 NOTE — PROGRESS NOTE ADULT - ASSESSMENT
_________________________________________________________________________________________  ========>>  M E D I C A L   A T T E N D I N G    F O L L O W  U P  N O T E  <<=========  -----------------------------------------------------------------------------------------------------    - Patient seen and examined by me earlier today.   - In summary,  RYLEE HOWE is a 62y year old man admitted with diabetic foot infection   - Patient today overall doing ok, comfortable, No significant pain        patient eating, ambulating, + BM.. no new complains of.. looking forward o going home soon     ==================>> REVIEW OF SYSTEM <<=================    GEN: no fever, no chills, as above   RESP: no SOB, no cough, no sputum  CVS: no chest pain, no palpitations,  GI: no abdominal pain, no nausea,   : no dysuria, no frequency,   Neuro: no headache, no dizziness  Derm : no itching, no rash    ==================>> PHYSICAL EXAM <<=================    GEN: A&O X 3 , NAD , comfortable, pleasant, calm , resting in bed.. encouraged out of bed to chair with assistance as needed.   HEENT: NCAT, PERRL, MMM, hearing intact  Neck: supple , no JVD appreciated  CVS: S1S2 , regular , No M/R/G appreciated  PULM: CTA B/L,  no W/R/R appreciated  ABD.: soft. non tender, non distended,  bowel sounds present  Extrem: intact pulses , + Rt LE edema and erythema MUCH improved ,, wound dressed by pod team .. wound vac on   PSYCH : normal mood,  not anxious                               ( Note written / Date of service 08-15-23 )    ==================>> MEDICATIONS <<====================    atorvastatin 80 milliGRAM(s) Oral at bedtime  ceFAZolin   IVPB 2000 milliGRAM(s) IV Intermittent every 8 hours  chlorhexidine 2% Cloths 1 Application(s) Topical <User Schedule>  chlorhexidine 4% Liquid 1 Application(s) Topical <User Schedule>  dextrose 5%. 1000 milliLiter(s) IV Continuous <Continuous>  dextrose 5%. 1000 milliLiter(s) IV Continuous <Continuous>  dextrose 50% Injectable 25 Gram(s) IV Push once  dextrose 50% Injectable 12.5 Gram(s) IV Push once  dextrose 50% Injectable 25 Gram(s) IV Push once  furosemide    Tablet 20 milliGRAM(s) Oral daily  glucagon  Injectable 1 milliGRAM(s) IntraMuscular once  insulin lispro (ADMELOG) corrective regimen sliding scale   SubCutaneous three times a day before meals  metoprolol succinate ER 50 milliGRAM(s) Oral daily  metroNIDAZOLE    Tablet 500 milliGRAM(s) Oral every 12 hours  ondansetron Injectable 4 milliGRAM(s) IV Push once  polyethylene glycol 3350 17 Gram(s) Oral daily  rivaroxaban 20 milliGRAM(s) Oral with dinner  sacubitril 49 mG/valsartan 51 mG 1 Tablet(s) Oral two times a day  spironolactone 25 milliGRAM(s) Oral daily    MEDICATIONS  (PRN):  acetaminophen     Tablet .. 650 milliGRAM(s) Oral every 6 hours PRN Temp greater or equal to 38C (100.4F), Mild Pain (1 - 3)  acetaminophen     Tablet .. 650 milliGRAM(s) Oral every 6 hours PRN Mild Pain (1 - 3)  aluminum hydroxide/magnesium hydroxide/simethicone Suspension 30 milliLiter(s) Oral every 4 hours PRN Dyspepsia  dextrose Oral Gel 15 Gram(s) Oral once PRN Blood Glucose LESS THAN 70 milliGRAM(s)/deciliter  HYDROmorphone  Injectable 0.5 milliGRAM(s) IV Push every 4 hours PRN Severe Pain (7 - 10)  melatonin 3 milliGRAM(s) Oral at bedtime PRN Insomnia  ondansetron Injectable 4 milliGRAM(s) IV Push every 8 hours PRN Nausea and/or Vomiting  oxycodone    5 mG/acetaminophen 325 mG 2 Tablet(s) Oral every 4 hours PRN Moderate Pain (4 - 6)  sodium chloride 0.9% lock flush 10 milliLiter(s) IV Push every 1 hour PRN Pre/post blood products, medications, blood draw, and to maintain line patency    ___________  Active diet:  Diet, Regular:   Consistent Carbohydrate Evening Snack (CSTCHOSN)  DASH/TLC Sodium & Cholesterol Restricted (DASH)  ___________________    ==================>> VITAL SIGNS <<==================    Vital Signs Last 24 HrsT(C): 36.6 (08-15-23 @ 12:30)  T(F): 97.9 (08-15-23 @ 12:30), Max: 98.5 (08-15-23 @ 03:42)  HR: 88 (08-15-23 @ 12:30) (63 - 90)  BP: 93/63 (08-15-23 @ 12:30)  RR: 18 (08-15-23 @ 12:30) (16 - 18)  SpO2: 98% (08-15-23 @ 12:30) (96% - 99%)      CAPILLARY BLOOD GLUCOSE      POCT Blood Glucose.: 137 mg/dL (15 Aug 2023 16:44)  POCT Blood Glucose.: 172 mg/dL (15 Aug 2023 12:34)  POCT Blood Glucose.: 143 mg/dL (15 Aug 2023 08:13)  POCT Blood Glucose.: 188 mg/dL (14 Aug 2023 22:05)  POCT Blood Glucose.: 166 mg/dL (14 Aug 2023 19:42)     ==================>> LAB AND IMAGING <<==================                        9.1    5.22  )-----------( 242      ( 15 Aug 2023 07:14 )             29.3        08-15    139  |  102  |  19  ----------------------------<  144<H>  3.9   |  25  |  0.75    Ca    9.2      15 Aug 2023 07:14      WBC count:   5.22 <<== ,  5.97 <<== ,  5.65 <<== ,  6.78 <<==   Hemoglobin:   9.1 <<==,  9.2 <<==,  9.3 <<==,  10.4 <<==  platelets:  242 <==, 291 <==, 272 <==, 306 <==, 240 <==    Creatinine:  0.75  <<==, 0.95  <<==, 1.08  <<==, 0.93  <<==, 0.97  <<==  Sodium:   139  <==, 140  <==, 139  <==, 136  <==, 139  <==       AST:          67 <== , 76 <== , 50 <== , 65 <== , 111 <==      ALT:        43  <== , 47  <== , 41  <== , 55  <== , 83  <==      AP:        101  <=, 90  <=, 90  <=, 90  <=, 93  <=     Bili:        0.4  <=, 0.4  <=, 0.4  <=, 0.6  <=, 0.5  <=    ____________________________    M I C R O B I O L O G Y :    Culture - Tissue with Gram Stain (collected 08 Aug 2023 22:08)  Source: .Tissue Other  Gram Stain (09 Aug 2023 03:19):    No polymorphonuclear cells seen per low power field    No organisms seen per oil power field  Final Report (15 Aug 2023 09:52):    Rare Finegoldia magna See previous culture [id number] for    susceptibility    Culture - Surgical Swab (collected 08 Aug 2023 22:06)  Source: .Surgical Swab deep culture right foot  Final Report (15 Aug 2023 13:10):    Few Finegoldia magna See previous culture  [id number] for    susceptibility    Culture - Abscess with Gram Stain (collected 07 Aug 2023 08:21)  Source: .Abscess right foot  Gram Stain (07 Aug 2023 23:24):    Rare polymorphonuclear leukocytes per low power field    No organisms seen per oil power field  Final Report (15 Aug 2023 13:15):    Rare Staphylococcus aureus    Few Finegoldia magna See previous culture [id number] for    susceptibility  Organism: Staphylococcus aureus (10 Aug 2023 09:38)  Organism: Staphylococcus aureus (10 Aug 2023 09:38)    Sensitivities:      -  Clindamycin: S 0.5      -  Oxacillin: S <=0.25 Oxacillin predicts susceptibility for dicloxacillin, methicillin, and nafcillin      -  Gentamicin: S <=1 Should not be used as monotherapy      -  Cefazolin: S <=4      -  Vancomycin: S 2      -  Tetracycline: S <=1      Method Type: HEATHER      -  Ampicillin/Sulbactam: S <=8/4      -  Penicillin: R >8      -  Rifampin: S <=1 Should not be used as monotherapy      -  Erythromycin: S <=0.25      -  Trimethoprim/Sulfamethoxazole: S <=0.5/9.5    Culture - Abscess with Gram Stain (collected 06 Aug 2023 15:42)  Source: .Abscess right foot  Gram Stain (07 Aug 2023 01:46):    Rare polymorphonuclear leukocytes seen per low power field    No organisms seen per oil power field  Final Report (15 Aug 2023 13:19):    Rare Coag Negative Staphylococcus "Susceptibilities not performed"    Few Finegoldia magna See previous culture  [id number]  for    susceptibility    Culture - Blood (collected 06 Aug 2023 11:30)  Source: .Blood Blood  Final Report (11 Aug 2023 17:00):    No growth at 5 days    Culture - Blood (collected 06 Aug 2023 11:15)  Source: .Blood Blood  Final Report (11 Aug 2023 17:00):    No growth at 5 days        < from: TTE W or WO Ultrasound Enhancing Agent (08.07.23 @ 15:33) >  CONCLUSIONS:   1. Severely dilated left ventricular cavity size.The left ventricular systolic function is severely decreased with an ejection fraction visually estimated at 25 to 30 %. There is global left ventricular hypokinesis.   2. Multiple segmental abnormalities exist. See findings.   3. Device lead is visualized in the right ventricle.   4. The aortic annulus and aortic root appear normal in size.  < end of copied text >    < from: Xray Foot AP + Lateral, Right (08.06.23 @ 11:40) >  IMPRESSION:  Diffuse soft tissue swelling throughout the right foot with a possible   trace amount of subcutaneous emphysema along the lateral aspect of the   right mid foot.  No definite cortical erosion seen to indicate acute osteomyelitis.  MR may be useful in further evaluation if there are no contraindications  < end of copied text >    ___________________________________________________________________________________  ===============>>  A S S E S S M E N T   A N D   P L A N <<===============  ------------------------------------------------------------------------------------------    · Assessment	  Patient is a 61yo Male with past medical history of HTN, DM, defibrillator, CAD post CABG, CHF, hx of Sommers fracture, non-union many years ago, presenting with  ulceration, recent bone biopsy with Dr Foss as op , came back positive for staph, treated with Levaquin, presented to ED with acute and significant swelling to the foot as advised to go to the ER for eval.   denies recent fall or trauma. states over the past few days right foot has been increasing in swelling. and has been having some fevers ... states hard to walk on his foot due to pain. states had temp at home of 101-102. denies n/v/d, CP, SOB, HA, dizziness, abdominal pain, urinary symptoms, cough, leg pain, swelling.  patient evaluated by podiatry in ED for presence of gas.. MRI and vascular studies ordered already, antibiotics given..         Problem/Plan - 1:  ·  Problem: Diabetic foot infection. , Osteomyelitis  appreciated podiatry management   local wound care  continue antibiotics per ID  : Likely will need long-term antibiotics >> ID to finalize recom ..      Infectious disease, podiatry, vascular follow-up appreciated   pain management as needed  supportive care   diuretics | leg elevation: helping a lot     Problem/Plan - 2:  ·  Problem: Diabetes.   ·  Plan: patient states has been overall well controlled but in past 1-2 days more elevated...   ---monitor finger sticks closely  ---Insulin regimen as ordered and monitor / adjust as needed  --- hold home medications for now   ---Diabetic diet  ---A1c. 6    May consider adding Farxiga on discharge give CHF    Problem/Plan - 3:  ·  Problem: Heart failure, unspecified HF chronicity, unspecified heart failure type.   ·  Plan: patient with likely chronic systolic CHF on entresto and lasix / Epleronon   continue home medications  Patient takes 75 mg of toprol  at home, here is a 25.  Increase to 50 for now: monitor closely     Further adjustment as per cardiology is needed  Echo as above   cardio following for optimization   monitor otherwise.    Problem/Plan - 4:  ·  Problem: HLD (hyperlipidemia).   ·  Plan: continue equivalent statin dose  lipid profile.    Problem/Plan - 5:  ·  Problem: history of Deep vein thrombosis (DVT) of right upper extremity.   ·  Plan: on Xarelto for past ~ 2.5 months ( was on Eliqius first)  for Rt upper extremity DVT post infection of Elbow !   continue for now  repeat Duplex as outpatient to decide on duration of further therapy >> recom in 2 weeks ..     -GI/DVT Prophylaxis per protocol.    Discharge planning home with home care, antibiotics    --------------------------------------------  Case discussed with patient, NP  Education given on findings and plan of care  ___________________________  H. RACHAEL Boudreaux.  Pager: [phone]       _________________________________________________________________________________________  ========>>  M E D I C A L   A T T E N D I N G    F O L L O W  U P  N O T E  <<=========  -----------------------------------------------------------------------------------------------------    - Patient seen and examined by me earlier today.   - In summary,  RYLEE HOWE is a 62y year old man admitted with diabetic foot infection   - Patient today overall doing ok, comfortable, No significant pain        patient eating, ambulating, + BM.. no new complains of.. looking forward to going home soon .. awaiting home care and vac set up...     ==================>> REVIEW OF SYSTEM <<=================    GEN: no fever, no chills, as above   RESP: no SOB, no cough, no sputum  CVS: no chest pain, no palpitations,  GI: no abdominal pain, no nausea,   : no dysuria, no frequency,   Neuro: no headache, no dizziness  Derm : no itching, no rash    ==================>> PHYSICAL EXAM <<=================    GEN: A&O X 3 , NAD , comfortable, pleasant, calm , resting in bed.. encouraged out of bed to chair with assistance as needed.   HEENT: NCAT, PERRL, MMM, hearing intact  Neck: supple , no JVD appreciated  CVS: S1S2 , regular , No M/R/G appreciated  PULM: CTA B/L,  no W/R/R appreciated  ABD.: soft. non tender, non distended,  bowel sounds present  Extrem: intact pulses , + Rt LE edema and erythema MUCH improved ,, wound dressed by pod team .. wound vac on   PSYCH : normal mood,  not anxious                               ( Note written / Date of service 08-15-23 )    ==================>> MEDICATIONS <<====================    atorvastatin 80 milliGRAM(s) Oral at bedtime  ceFAZolin   IVPB 2000 milliGRAM(s) IV Intermittent every 8 hours  chlorhexidine 2% Cloths 1 Application(s) Topical <User Schedule>  chlorhexidine 4% Liquid 1 Application(s) Topical <User Schedule>  dextrose 5%. 1000 milliLiter(s) IV Continuous <Continuous>  dextrose 5%. 1000 milliLiter(s) IV Continuous <Continuous>  dextrose 50% Injectable 25 Gram(s) IV Push once  dextrose 50% Injectable 12.5 Gram(s) IV Push once  dextrose 50% Injectable 25 Gram(s) IV Push once  furosemide    Tablet 20 milliGRAM(s) Oral daily  glucagon  Injectable 1 milliGRAM(s) IntraMuscular once  insulin lispro (ADMELOG) corrective regimen sliding scale   SubCutaneous three times a day before meals  metoprolol succinate ER 50 milliGRAM(s) Oral daily  metroNIDAZOLE    Tablet 500 milliGRAM(s) Oral every 12 hours  ondansetron Injectable 4 milliGRAM(s) IV Push once  polyethylene glycol 3350 17 Gram(s) Oral daily  rivaroxaban 20 milliGRAM(s) Oral with dinner  sacubitril 49 mG/valsartan 51 mG 1 Tablet(s) Oral two times a day  spironolactone 25 milliGRAM(s) Oral daily    MEDICATIONS  (PRN):  acetaminophen     Tablet .. 650 milliGRAM(s) Oral every 6 hours PRN Temp greater or equal to 38C (100.4F), Mild Pain (1 - 3)  acetaminophen     Tablet .. 650 milliGRAM(s) Oral every 6 hours PRN Mild Pain (1 - 3)  aluminum hydroxide/magnesium hydroxide/simethicone Suspension 30 milliLiter(s) Oral every 4 hours PRN Dyspepsia  dextrose Oral Gel 15 Gram(s) Oral once PRN Blood Glucose LESS THAN 70 milliGRAM(s)/deciliter  HYDROmorphone  Injectable 0.5 milliGRAM(s) IV Push every 4 hours PRN Severe Pain (7 - 10)  melatonin 3 milliGRAM(s) Oral at bedtime PRN Insomnia  ondansetron Injectable 4 milliGRAM(s) IV Push every 8 hours PRN Nausea and/or Vomiting  oxycodone    5 mG/acetaminophen 325 mG 2 Tablet(s) Oral every 4 hours PRN Moderate Pain (4 - 6)  sodium chloride 0.9% lock flush 10 milliLiter(s) IV Push every 1 hour PRN Pre/post blood products, medications, blood draw, and to maintain line patency    ___________  Active diet:  Diet, Regular:   Consistent Carbohydrate Evening Snack (CSTCHOSN)  DASH/TLC Sodium & Cholesterol Restricted (DASH)  ___________________    ==================>> VITAL SIGNS <<==================    Vital Signs Last 24 HrsT(C): 36.6 (08-15-23 @ 12:30)  T(F): 97.9 (08-15-23 @ 12:30), Max: 98.5 (08-15-23 @ 03:42)  HR: 88 (08-15-23 @ 12:30) (63 - 90)  BP: 93/63 (08-15-23 @ 12:30)  RR: 18 (08-15-23 @ 12:30) (16 - 18)  SpO2: 98% (08-15-23 @ 12:30) (96% - 99%)      CAPILLARY BLOOD GLUCOSE    POCT Blood Glucose.: 137 mg/dL (15 Aug 2023 16:44)  POCT Blood Glucose.: 172 mg/dL (15 Aug 2023 12:34)  POCT Blood Glucose.: 143 mg/dL (15 Aug 2023 08:13)  POCT Blood Glucose.: 188 mg/dL (14 Aug 2023 22:05)  POCT Blood Glucose.: 166 mg/dL (14 Aug 2023 19:42)     ==================>> LAB AND IMAGING <<==================                        9.1    5.22  )-----------( 242      ( 15 Aug 2023 07:14 )             29.3        08-15    139  |  102  |  19  ----------------------------<  144<H>  3.9   |  25  |  0.75    Ca    9.2      15 Aug 2023 07:14      WBC count:   5.22 <<== ,  5.97 <<== ,  5.65 <<== ,  6.78 <<==   Hemoglobin:   9.1 <<==,  9.2 <<==,  9.3 <<==,  10.4 <<==  platelets:  242 <==, 291 <==, 272 <==, 306 <==, 240 <==    Creatinine:  0.75  <<==, 0.95  <<==, 1.08  <<==, 0.93  <<==, 0.97  <<==  Sodium:   139  <==, 140  <==, 139  <==, 136  <==, 139  <==       AST:          67 <== , 76 <== , 50 <== , 65 <== , 111 <==      ALT:        43  <== , 47  <== , 41  <== , 55  <== , 83  <==      AP:        101  <=, 90  <=, 90  <=, 90  <=, 93  <=     Bili:        0.4  <=, 0.4  <=, 0.4  <=, 0.6  <=, 0.5  <=    ____________________________    M I C R O B I O L O G Y :    Culture - Tissue with Gram Stain (collected 08 Aug 2023 22:08)  Source: .Tissue Other  Gram Stain (09 Aug 2023 03:19):    No polymorphonuclear cells seen per low power field    No organisms seen per oil power field  Final Report (15 Aug 2023 09:52):    Rare Finegoldia magna See previous culture [id number] for    susceptibility    Culture - Surgical Swab (collected 08 Aug 2023 22:06)  Source: .Surgical Swab deep culture right foot  Final Report (15 Aug 2023 13:10):    Few Finegoldia magna See previous culture  [id number] for    susceptibility    Culture - Abscess with Gram Stain (collected 07 Aug 2023 08:21)  Source: .Abscess right foot  Gram Stain (07 Aug 2023 23:24):    Rare polymorphonuclear leukocytes per low power field    No organisms seen per oil power field  Final Report (15 Aug 2023 13:15):    Rare Staphylococcus aureus    Few Finegoldia magna See previous culture [id number] for    susceptibility  Organism: Staphylococcus aureus (10 Aug 2023 09:38)  Organism: Staphylococcus aureus (10 Aug 2023 09:38)    Sensitivities:      -  Clindamycin: S 0.5      -  Oxacillin: S <=0.25 Oxacillin predicts susceptibility for dicloxacillin, methicillin, and nafcillin      -  Gentamicin: S <=1 Should not be used as monotherapy      -  Cefazolin: S <=4      -  Vancomycin: S 2      -  Tetracycline: S <=1      Method Type: HEATHER      -  Ampicillin/Sulbactam: S <=8/4      -  Penicillin: R >8      -  Rifampin: S <=1 Should not be used as monotherapy      -  Erythromycin: S <=0.25      -  Trimethoprim/Sulfamethoxazole: S <=0.5/9.5    Culture - Abscess with Gram Stain (collected 06 Aug 2023 15:42)  Source: .Abscess right foot  Gram Stain (07 Aug 2023 01:46):    Rare polymorphonuclear leukocytes seen per low power field    No organisms seen per oil power field  Final Report (15 Aug 2023 13:19):    Rare Coag Negative Staphylococcus "Susceptibilities not performed"    Few Finegoldia magna See previous culture  [id number]  for    susceptibility    Culture - Blood (collected 06 Aug 2023 11:30)  Source: .Blood Blood  Final Report (11 Aug 2023 17:00):    No growth at 5 days    Culture - Blood (collected 06 Aug 2023 11:15)  Source: .Blood Blood  Final Report (11 Aug 2023 17:00):    No growth at 5 days        < from: TTE W or WO Ultrasound Enhancing Agent (08.07.23 @ 15:33) >  CONCLUSIONS:   1. Severely dilated left ventricular cavity size.The left ventricular systolic function is severely decreased with an ejection fraction visually estimated at 25 to 30 %. There is global left ventricular hypokinesis.   2. Multiple segmental abnormalities exist. See findings.   3. Device lead is visualized in the right ventricle.   4. The aortic annulus and aortic root appear normal in size.  < end of copied text >    < from: Xray Foot AP + Lateral, Right (08.06.23 @ 11:40) >  IMPRESSION:  Diffuse soft tissue swelling throughout the right foot with a possible   trace amount of subcutaneous emphysema along the lateral aspect of the   right mid foot.  No definite cortical erosion seen to indicate acute osteomyelitis.  MR may be useful in further evaluation if there are no contraindications  < end of copied text >    ___________________________________________________________________________________  ===============>>  A S S E S S M E N T   A N D   P L A N <<===============  ------------------------------------------------------------------------------------------    · Assessment	  Patient is a 63yo Male with past medical history of HTN, DM, defibrillator, CAD post CABG, CHF, hx of Sommers fracture, non-union many years ago, presenting with  ulceration, recent bone biopsy with Dr Foss as op , came back positive for staph, treated with Levaquin, presented to ED with acute and significant swelling to the foot as advised to go to the ER for eval.   denies recent fall or trauma. states over the past few days right foot has been increasing in swelling. and has been having some fevers ... states hard to walk on his foot due to pain. states had temp at home of 101-102. denies n/v/d, CP, SOB, HA, dizziness, abdominal pain, urinary symptoms, cough, leg pain, swelling.  patient evaluated by podiatry in ED for presence of gas.. MRI and vascular studies ordered already, antibiotics given..         Problem/Plan - 1:  ·  Problem: Diabetic foot infection. , Osteomyelitis  appreciated podiatry management   local wound care  continue antibiotics per ID  : long-term antibiotics via new picc already inserted      Infectious disease, podiatry, vascular follow-up appreciated   pain management as needed  supportive care   diuretics | leg elevation: helping a lot     Problem/Plan - 2:  ·  Problem: Diabetes.   ·  Plan: patient states has been overall well controlled but in past 1-2 days more elevated...   ---monitor finger sticks closely  ---Insulin regimen as ordered and monitor / adjust as needed  --- hold home medications for now   ---Diabetic diet  ---A1c. 6    May consider adding Farxiga on discharge give CHF    Problem/Plan - 3:  ·  Problem: Heart failure, unspecified HF chronicity, unspecified heart failure type.   ·  Plan: patient with likely chronic systolic CHF on entresto and lasix / Epleronon   continue home medications  Patient takes 75 mg of toprol  at home, here is a 25.  Increase to 50 for now: monitor closely     Further adjustment as per cardiology is needed  Echo as above   cardio following for optimization   monitor otherwise.    Problem/Plan - 4:  ·  Problem: HLD (hyperlipidemia).   ·  Plan: continue equivalent statin dose  lipid profile.    Problem/Plan - 5:  ·  Problem: history of Deep vein thrombosis (DVT) of right upper extremity.   ·  Plan: on Xarelto for past ~ 2.5 months ( was on Eliqius first)  for Rt upper extremity DVT post infection of Elbow !   continue for now  repeat Duplex as outpatient to decide on duration of further therapy >> recom in 2 weeks ..     -GI/DVT Prophylaxis per protocol.    Discharge planning home with home care, antibiotics    --------------------------------------------  Case discussed with patient, NP  Education given on findings and plan of care  ___________________________  H. RACHAEL Boudreaux.  Pager: [phone]

## 2023-08-15 NOTE — DISCHARGE NOTE NURSING/CASE MANAGEMENT/SOCIAL WORK - PATIENT PORTAL LINK FT
You can access the FollowMyHealth Patient Portal offered by [facility] by registering at the following website: [email or web address]. By joining [employer]’s FollowMyHealth portal, you will also be able to view your health information using other applications (apps) compatible with our system.

## 2023-08-16 VITALS
HEART RATE: 81 BPM | SYSTOLIC BLOOD PRESSURE: 91 MMHG | OXYGEN SATURATION: 97 % | TEMPERATURE: 98 F | RESPIRATION RATE: 18 BRPM | DIASTOLIC BLOOD PRESSURE: 63 MMHG

## 2023-08-16 LAB — GLUCOSE BLDC GLUCOMTR-MCNC: 176 MG/DL — HIGH (ref 70–99)

## 2023-08-16 PROCEDURE — 99232 SBSQ HOSP IP/OBS MODERATE 35: CPT

## 2023-08-16 RX ORDER — SACUBITRIL AND VALSARTAN 24; 26 MG/1; MG/1
1 TABLET, FILM COATED ORAL
Qty: 0 | Refills: 0 | DISCHARGE
Start: 2023-08-16

## 2023-08-16 RX ORDER — METOPROLOL TARTRATE 50 MG
1 TABLET ORAL
Qty: 0 | Refills: 0 | DISCHARGE
Start: 2023-08-16

## 2023-08-16 RX ORDER — METRONIDAZOLE 500 MG
1 TABLET ORAL
Qty: 68 | Refills: 0
Start: 2023-08-16 | End: 2023-09-18

## 2023-08-16 RX ORDER — METOPROLOL TARTRATE 50 MG
3 TABLET ORAL
Refills: 0 | DISCHARGE

## 2023-08-16 RX ORDER — SACUBITRIL AND VALSARTAN 24; 26 MG/1; MG/1
1 TABLET, FILM COATED ORAL
Refills: 0 | DISCHARGE

## 2023-08-16 RX ORDER — FUROSEMIDE 40 MG
1 TABLET ORAL
Qty: 30 | Refills: 0
Start: 2023-08-16 | End: 2023-09-14

## 2023-08-16 RX ORDER — FUROSEMIDE 40 MG
1 TABLET ORAL
Refills: 0 | DISCHARGE

## 2023-08-16 RX ORDER — POLYETHYLENE GLYCOL 3350 17 G/17G
17 POWDER, FOR SOLUTION ORAL
Qty: 0 | Refills: 0 | DISCHARGE
Start: 2023-08-16

## 2023-08-16 RX ADMIN — Medication 50 MILLIGRAM(S): at 06:06

## 2023-08-16 RX ADMIN — Medication 20 MILLIGRAM(S): at 06:06

## 2023-08-16 RX ADMIN — Medication 2: at 08:36

## 2023-08-16 RX ADMIN — SACUBITRIL AND VALSARTAN 1 TABLET(S): 24; 26 TABLET, FILM COATED ORAL at 06:06

## 2023-08-16 RX ADMIN — Medication 500 MILLIGRAM(S): at 06:06

## 2023-08-16 RX ADMIN — Medication 100 MILLIGRAM(S): at 06:09

## 2023-08-16 RX ADMIN — SPIRONOLACTONE 25 MILLIGRAM(S): 25 TABLET, FILM COATED ORAL at 06:06

## 2023-08-16 RX ADMIN — CHLORHEXIDINE GLUCONATE 1 APPLICATION(S): 213 SOLUTION TOPICAL at 06:09

## 2023-08-16 NOTE — PROGRESS NOTE ADULT - PROVIDER SPECIALTY LIST ADULT
Cardiology
Internal Medicine
Cardiology
Infectious Disease
Infectious Disease
Internal Medicine
Internal Medicine
Podiatry
Podiatry
Vascular Surgery
Anesthesia
Cardiology
Infectious Disease
Internal Medicine
Podiatry
Infectious Disease
Podiatry

## 2023-08-16 NOTE — PROGRESS NOTE ADULT - SUBJECTIVE AND OBJECTIVE BOX
Patient is a 62y old  Male who presents with a chief complaint of diabetic foot infection, possible osteo (16 Aug 2023 09:44)    Being followed by ID for        Interval history:  No other acute events      ROS:  No cough,SOB,CP  No N/V/D  No abd pain  No urinary complaints  No HA  No joint or limb pain  No other complaints    PAST MEDICAL & SURGICAL HISTORY:    Allergies    No Known Allergies    Intolerances      Antimicrobials:    ceFAZolin   IVPB 2000 milliGRAM(s) IV Intermittent every 8 hours  metroNIDAZOLE    Tablet 500 milliGRAM(s) Oral every 12 hours    MEDICATIONS  (STANDING):  atorvastatin 80 milliGRAM(s) Oral at bedtime  ceFAZolin   IVPB 2000 milliGRAM(s) IV Intermittent every 8 hours  chlorhexidine 2% Cloths 1 Application(s) Topical <User Schedule>  chlorhexidine 4% Liquid 1 Application(s) Topical <User Schedule>  dextrose 5%. 1000 milliLiter(s) (50 mL/Hr) IV Continuous <Continuous>  dextrose 5%. 1000 milliLiter(s) (100 mL/Hr) IV Continuous <Continuous>  dextrose 50% Injectable 25 Gram(s) IV Push once  dextrose 50% Injectable 12.5 Gram(s) IV Push once  dextrose 50% Injectable 25 Gram(s) IV Push once  furosemide    Tablet 20 milliGRAM(s) Oral daily  glucagon  Injectable 1 milliGRAM(s) IntraMuscular once  insulin lispro (ADMELOG) corrective regimen sliding scale   SubCutaneous three times a day before meals  metoprolol succinate ER 50 milliGRAM(s) Oral daily  metroNIDAZOLE    Tablet 500 milliGRAM(s) Oral every 12 hours  ondansetron Injectable 4 milliGRAM(s) IV Push once  polyethylene glycol 3350 17 Gram(s) Oral daily  rivaroxaban 20 milliGRAM(s) Oral with dinner  sacubitril 49 mG/valsartan 51 mG 1 Tablet(s) Oral two times a day  spironolactone 25 milliGRAM(s) Oral daily      Vital Signs Last 24 Hrs  T(C): 36.9 (08-16-23 @ 05:07), Max: 36.9 (08-16-23 @ 05:07)  T(F): 98.4 (08-16-23 @ 05:07), Max: 98.4 (08-16-23 @ 05:07)  HR: 67 (08-16-23 @ 05:07) (67 - 88)  BP: 122/78 (08-16-23 @ 05:07) (93/63 - 126/80)  BP(mean): --  RR: 18 (08-16-23 @ 05:07) (18 - 18)  SpO2: 97% (08-16-23 @ 05:07) (94% - 98%)    Physical Exam:    Constitutional well preserved,comfortable,pleasant    HEENT PERRLA EOMI,No pallor or icterus    No oral exudate or erythema    Neck supple no JVD or LN    Chest Good AE,CTA    CVS RRR S1 S2 WNl No murmur or rub or gallop    Abd soft BS normal No tenderness no masses    Ext No cyanosis clubbing or edema    IV site no erythema tenderness or discharge    Joints no swelling or LOM    CNS AAO X 3 no focal    Lab Data:                          9.1    5.22  )-----------( 242      ( 15 Aug 2023 07:14 )             29.3       08-15    139  |  102  |  19  ----------------------------<  144<H>  3.9   |  25  |  0.75    Ca    9.2      15 Aug 2023 07:14            WBC Count: 5.22 (08-15-23 @ 07:14)  WBC Count: 5.97 (08-13-23 @ 07:09)  WBC Count: 5.65 (08-12-23 @ 06:57)  WBC Count: 6.78 (08-11-23 @ 07:10)  WBC Count: 6.77 (08-10-23 @ 07:27)       Bilirubin Total: 0.4 mg/dL (08-13-23 @ 07:07)  Aspartate Aminotransferase (AST/SGOT): 67 U/L (08-13-23 @ 07:07)  Alanine Aminotransferase (ALT/SGPT): 43 U/L (08-13-23 @ 07:07)  Alkaline Phosphatase: 101 U/L (08-13-23 @ 07:07)  Bilirubin Total: 0.4 mg/dL (08-12-23 @ 06:55)  Aspartate Aminotransferase (AST/SGOT): 76 U/L (08-12-23 @ 06:55)  Alanine Aminotransferase (ALT/SGPT): 47 U/L (08-12-23 @ 06:55)  Alkaline Phosphatase: 90 U/L (08-12-23 @ 06:55)      Culture - Tissue with Gram Stain (08.08.23 @ 22:08)    Gram Stain:   No polymorphonuclear cells seen per low power field  No organisms seen per oil power field   Specimen Source: .Tissue Other   Culture Results:   Rare Finegoldia magna See previous culture [id number] for  susceptibility      Culture - Surgical Swab (08.08.23 @ 22:06)    Specimen Source: .Surgical Swab deep culture right foot   Culture Results:   Few Finegoldia magna See previous culture  [id number] for  susceptibility     Patient is a 62y old  Male who presents with a chief complaint of diabetic foot infection, possible osteo (16 Aug 2023 09:44)    Being followed by ID for osteomyelitis        Interval history:  pt seen with physical therapy  able to examine leg with vac change  No other acute events          PAST MEDICAL & SURGICAL HISTORY:    Allergies    No Known Allergies    Intolerances      Antimicrobials:    ceFAZolin   IVPB 2000 milliGRAM(s) IV Intermittent every 8 hours  metroNIDAZOLE    Tablet 500 milliGRAM(s) Oral every 12 hours    MEDICATIONS  (STANDING):  atorvastatin 80 milliGRAM(s) Oral at bedtime  ceFAZolin   IVPB 2000 milliGRAM(s) IV Intermittent every 8 hours  chlorhexidine 2% Cloths 1 Application(s) Topical <User Schedule>  chlorhexidine 4% Liquid 1 Application(s) Topical <User Schedule>  dextrose 5%. 1000 milliLiter(s) (50 mL/Hr) IV Continuous <Continuous>  dextrose 5%. 1000 milliLiter(s) (100 mL/Hr) IV Continuous <Continuous>  dextrose 50% Injectable 25 Gram(s) IV Push once  dextrose 50% Injectable 12.5 Gram(s) IV Push once  dextrose 50% Injectable 25 Gram(s) IV Push once  furosemide    Tablet 20 milliGRAM(s) Oral daily  glucagon  Injectable 1 milliGRAM(s) IntraMuscular once  insulin lispro (ADMELOG) corrective regimen sliding scale   SubCutaneous three times a day before meals  metoprolol succinate ER 50 milliGRAM(s) Oral daily  metroNIDAZOLE    Tablet 500 milliGRAM(s) Oral every 12 hours  ondansetron Injectable 4 milliGRAM(s) IV Push once  polyethylene glycol 3350 17 Gram(s) Oral daily  rivaroxaban 20 milliGRAM(s) Oral with dinner  sacubitril 49 mG/valsartan 51 mG 1 Tablet(s) Oral two times a day  spironolactone 25 milliGRAM(s) Oral daily      Vital Signs Last 24 Hrs  T(C): 36.9 (08-16-23 @ 05:07), Max: 36.9 (08-16-23 @ 05:07)  T(F): 98.4 (08-16-23 @ 05:07), Max: 98.4 (08-16-23 @ 05:07)  HR: 67 (08-16-23 @ 05:07) (67 - 88)  BP: 122/78 (08-16-23 @ 05:07) (93/63 - 126/80)  BP(mean): --  RR: 18 (08-16-23 @ 05:07) (18 - 18)  SpO2: 97% (08-16-23 @ 05:07) (94% - 98%)    Physical Exam:    Constitutional well preserved,comfortable,pleasant    HEENT PERRLA EOMI,No pallor or icterus    No oral exudate or erythema    Neck supple no JVD or LN    Chest Good AE,CTA    CVS  S1 S2     Abd soft BS normal No tenderness     Ext  right foot swollen , erythema much improved  loosely sewn shut  an excoriation on forefoot also noted- seems to be new    IV site no erythema tenderness or discharge    CNS AAO X 3 no focal    Lab Data:                          9.1    5.22  )-----------( 242      ( 15 Aug 2023 07:14 )             29.3       08-15    139  |  102  |  19  ----------------------------<  144<H>  3.9   |  25  |  0.75    Ca    9.2      15 Aug 2023 07:14            WBC Count: 5.22 (08-15-23 @ 07:14)  WBC Count: 5.97 (08-13-23 @ 07:09)  WBC Count: 5.65 (08-12-23 @ 06:57)  WBC Count: 6.78 (08-11-23 @ 07:10)  WBC Count: 6.77 (08-10-23 @ 07:27)       Bilirubin Total: 0.4 mg/dL (08-13-23 @ 07:07)  Aspartate Aminotransferase (AST/SGOT): 67 U/L (08-13-23 @ 07:07)  Alanine Aminotransferase (ALT/SGPT): 43 U/L (08-13-23 @ 07:07)  Alkaline Phosphatase: 101 U/L (08-13-23 @ 07:07)    Bilirubin Total: 0.4 mg/dL (08-12-23 @ 06:55)  Aspartate Aminotransferase (AST/SGOT): 76 U/L (08-12-23 @ 06:55)  Alanine Aminotransferase (ALT/SGPT): 47 U/L (08-12-23 @ 06:55)  Alkaline Phosphatase: 90 U/L (08-12-23 @ 06:55)      Culture - Tissue with Gram Stain (08.08.23 @ 22:08)    Gram Stain:   No polymorphonuclear cells seen per low power field  No organisms seen per oil power field   Specimen Source: .Tissue Other   Culture Results:   Rare Finegoldia magna See previous culture [id number] for  susceptibility      Culture - Surgical Swab (08.08.23 @ 22:06)    Specimen Source: .Surgical Swab deep culture right foot   Culture Results:   Few Finegoldia magna See previous culture  [id number] for  susceptibility

## 2023-08-16 NOTE — PROGRESS NOTE ADULT - SUBJECTIVE AND OBJECTIVE BOX
Date of Service: 08-16-23 @ 07:28           CARDIOLOGY     PROGRESS  NOTE   ________________________________________________    CHIEF COMPLAINT:Patient is a 62y old  Male who presents with a chief complaint of diabetic foot infection, possible osteo (15 Aug 2023 17:28)  wants to go home  	  REVIEW OF SYSTEMS:  CONSTITUTIONAL: No fever, weight loss, or fatigue  EYES: No eye pain, visual disturbances, or discharge  ENT:  No difficulty hearing, tinnitus, vertigo; No sinus or throat pain  NECK: No pain or stiffness  RESPIRATORY: No cough, wheezing, chills or hemoptysis; No Shortness of Breath  CARDIOVASCULAR: No chest pain, palpitations, passing out, dizziness, or leg swelling  GASTROINTESTINAL: No abdominal or epigastric pain. No nausea, vomiting, or hematemesis; No diarrhea or constipation. No melena or hematochezia.  GENITOURINARY: No dysuria, frequency, hematuria, or incontinence  NEUROLOGICAL: No headaches, memory loss, loss of strength, numbness, or tremors  SKIN: No itching, burning, rashes, or lesions   LYMPH Nodes: No enlarged glands  ENDOCRINE: No heat or cold intolerance; No hair loss  MUSCULOSKELETAL: No joint pain or swelling; No muscle, back, or extremity pain  PSYCHIATRIC: No depression, anxiety, mood swings, or difficulty sleeping  HEME/LYMPH: No easy bruising, or bleeding gums  ALLERGY AND IMMUNOLOGIC: No hives or eczema	    x[ ] All others negative	  [ ] Unable to obtain    PHYSICAL EXAM:  T(C): 36.9 (08-16-23 @ 05:07), Max: 36.9 (08-16-23 @ 05:07)  HR: 67 (08-16-23 @ 05:07) (67 - 90)  BP: 122/78 (08-16-23 @ 05:07) (93/63 - 126/80)  RR: 18 (08-16-23 @ 05:07) (17 - 18)  SpO2: 97% (08-16-23 @ 05:07) (94% - 99%)  Wt(kg): --  I&O's Summary    15 Aug 2023 07:01  -  16 Aug 2023 07:00  --------------------------------------------------------  IN: 530 mL / OUT: 950 mL / NET: -420 mL        Appearance: Normal	  HEENT:   Normal oral mucosa, PERRL, EOMI	  Lymphatic: No lymphadenopathy  Cardiovascular: Normal S1 S2, No JVD,+murmurs, No edema  Respiratory: Lungs clear to auscultation	  Psychiatry: A & O x 3, Mood & affect appropriate  Gastrointestinal:  Soft, Non-tender, + BS	  Skin: No rashes, No ecchymoses, No cyanosis	  Neurologic: Non-focal  Extremities: Normal range of motion, bandage/vac r foot  Vascular: + pvd    MEDICATIONS  (STANDING):  atorvastatin 80 milliGRAM(s) Oral at bedtime  ceFAZolin   IVPB 2000 milliGRAM(s) IV Intermittent every 8 hours  chlorhexidine 2% Cloths 1 Application(s) Topical <User Schedule>  chlorhexidine 4% Liquid 1 Application(s) Topical <User Schedule>  dextrose 5%. 1000 milliLiter(s) (100 mL/Hr) IV Continuous <Continuous>  dextrose 5%. 1000 milliLiter(s) (50 mL/Hr) IV Continuous <Continuous>  dextrose 50% Injectable 25 Gram(s) IV Push once  dextrose 50% Injectable 12.5 Gram(s) IV Push once  dextrose 50% Injectable 25 Gram(s) IV Push once  furosemide    Tablet 20 milliGRAM(s) Oral daily  glucagon  Injectable 1 milliGRAM(s) IntraMuscular once  insulin lispro (ADMELOG) corrective regimen sliding scale   SubCutaneous three times a day before meals  metoprolol succinate ER 50 milliGRAM(s) Oral daily  metroNIDAZOLE    Tablet 500 milliGRAM(s) Oral every 12 hours  ondansetron Injectable 4 milliGRAM(s) IV Push once  polyethylene glycol 3350 17 Gram(s) Oral daily  rivaroxaban 20 milliGRAM(s) Oral with dinner  sacubitril 49 mG/valsartan 51 mG 1 Tablet(s) Oral two times a day  spironolactone 25 milliGRAM(s) Oral daily      TELEMETRY: 	    ECG:  	  RADIOLOGY:  OTHER: 	  	  LABS:	 	    CARDIAC MARKERS:                            9.1    5.22  )-----------( 242      ( 15 Aug 2023 07:14 )             29.3     08-15    139  |  102  |  19  ----------------------------<  144<H>  3.9   |  25  |  0.75    Ca    9.2      15 Aug 2023 07:14      proBNP:   Lipid Profile: Cholesterol 75  LDL --  HDL 27      HgA1c:   TSH:       *  Pt is growing MSSA and Finegoldia. No clear if ancef will cover the finegoldia-  switched to unasyn but now in view of abs for easier administration at d/c switched back to ancef 2 gms IVSS q 8 hours and add po flagyl 500 mg po q 12 hours  - reviewed with pharm D   * f/u the final OR cx and path  * monitor CBC/diff and CMP  - check ESR and CRP and CBC with diff and CMP weekly at discharge  follow up with me in two - three weeks once discharged     Assessment and plan  ---------------------------  Patient is a 63yo Male with past medical history of HTN, DM, defibrillator, CAD post CABG, CHF, hx of Sommers fracture, non-union many years ago, presenting with  ulceration, recent bone biopsy with Dr Foss as op , came back positive for staph, treated with Levaquin, presented to ED with acute and significant swelling to the foot as advised to go to the ER for eval.   denies recent fall or trauma.  states  over the past few days right foot has been increasing in swelling. and has been having some fevers ... states hard to walk on his foot due to pain. states had temp at home of 101-102. denies n/v/d, CP, SOB, HA, dizziness, abdominal pain, urinary symptoms, cough, leg pain, swelling.  patient evaluated by podiatry in ED for presence of gas.. MRI and vascular studies ordered already, antibiotics given..   pt with sig cardiac hx for possible vascular and podiatric surgery  continue all cardiac meds  will adjust cardiac meds  will check AICD,about MRI compatibility  doubt DVT pt on AC  echo noted with severe LV dysfunction  continue current meds, may add Farxiga 10 mg upon dc for heart failure  ID noted, s/p I&d, continue abx awaiting cultures  DVT prophylaxis  s/p  VAC to the wound  increase lft fu closely , is improving  bp is better, will gradually will inncrease meds  will increase Lasix to 40 mg daily/ Entresto 49/51 mg bid   ID recommendation on abx and fu noted  increase ambulation  will increase entresto gradually to the original dose as bp improves

## 2023-08-16 NOTE — PROGRESS NOTE ADULT - SUBJECTIVE AND OBJECTIVE BOX
Patient is a 62y old  Male who presents with a chief complaint of diabetic foot infection, possible osteo (16 Aug 2023 07:28)       INTERVAL HPI/OVERNIGHT EVENTS:  Patient seen and evaluated at bedside.  Pt is resting comfortable in NAD. Denies N/V/F/C.    Allergies    No Known Allergies    Intolerances        Vital Signs Last 24 Hrs  T(C): 36.9 (16 Aug 2023 05:07), Max: 36.9 (16 Aug 2023 05:07)  T(F): 98.4 (16 Aug 2023 05:07), Max: 98.4 (16 Aug 2023 05:07)  HR: 67 (16 Aug 2023 05:07) (67 - 88)  BP: 122/78 (16 Aug 2023 05:07) (93/63 - 126/80)  BP(mean): --  RR: 18 (16 Aug 2023 05:07) (18 - 18)  SpO2: 97% (16 Aug 2023 05:07) (94% - 98%)    Parameters below as of 16 Aug 2023 05:07  Patient On (Oxygen Delivery Method): room air        LABS:                        9.1    5.22  )-----------( 242      ( 15 Aug 2023 07:14 )             29.3     08-15    139  |  102  |  19  ----------------------------<  144<H>  3.9   |  25  |  0.75    Ca    9.2      15 Aug 2023 07:14        Urinalysis Basic - ( 15 Aug 2023 07:14 )    Color: x / Appearance: x / SG: x / pH: x  Gluc: 144 mg/dL / Ketone: x  / Bili: x / Urobili: x   Blood: x / Protein: x / Nitrite: x   Leuk Esterase: x / RBC: x / WBC x   Sq Epi: x / Non Sq Epi: x / Bacteria: x      CAPILLARY BLOOD GLUCOSE      POCT Blood Glucose.: 176 mg/dL (16 Aug 2023 08:20)  POCT Blood Glucose.: 172 mg/dL (15 Aug 2023 21:47)  POCT Blood Glucose.: 137 mg/dL (15 Aug 2023 16:44)  POCT Blood Glucose.: 172 mg/dL (15 Aug 2023 12:34)      Lower Extremity Physical Exam:  Vascular: DP/PT 0/4 B/L, CFT <3 sec x 10, Temp gradient warm to warm, RLE, warm to cool LLE.    Neurology: Epicritic sensation intact to level of digits, B/L  Musculoskeletal/Ortho: pain to palpation over right foot 5th digit   Skin: 8/8 s/p right foot incision and drainage with partial 5th ray resection, open: sutures intact, granular wound bed, no drainage, no acute signs of infection.      RADIOLOGY & ADDITIONAL TESTS:

## 2023-08-16 NOTE — PROGRESS NOTE ADULT - ASSESSMENT
M E D I C A L   A T T E N D I N G    F O L L O W    U P   N O T E  (08-16-23 )                                     ------------------------------------------------------------------------------------------------    patient evaluated by me, case discussed with team, chart, medications, and physical exam reviewed, labs / tests  and vitals reviewed by me, as bellow.   Patient is stable for discharge today.  patient already has vac delivered at bedside adnd being set up to go home today   Patient to follow up with  PMD, cardio, ID, podiatry, endo.. as discussed with patient ..   See discharge document for full note.  [Greater than 35 min spent for these services. ]                             ( note written / Date of service  08-16-23 )    ==================>> MEDICATIONS <<====================    atorvastatin 80 milliGRAM(s) Oral at bedtime  ceFAZolin   IVPB 2000 milliGRAM(s) IV Intermittent every 8 hours  chlorhexidine 2% Cloths 1 Application(s) Topical <User Schedule>  chlorhexidine 4% Liquid 1 Application(s) Topical <User Schedule>  dextrose 5%. 1000 milliLiter(s) IV Continuous <Continuous>  dextrose 5%. 1000 milliLiter(s) IV Continuous <Continuous>  dextrose 50% Injectable 25 Gram(s) IV Push once  dextrose 50% Injectable 12.5 Gram(s) IV Push once  dextrose 50% Injectable 25 Gram(s) IV Push once  furosemide    Tablet 20 milliGRAM(s) Oral daily  glucagon  Injectable 1 milliGRAM(s) IntraMuscular once  insulin lispro (ADMELOG) corrective regimen sliding scale   SubCutaneous three times a day before meals  metoprolol succinate ER 50 milliGRAM(s) Oral daily  metroNIDAZOLE    Tablet 500 milliGRAM(s) Oral every 12 hours  ondansetron Injectable 4 milliGRAM(s) IV Push once  polyethylene glycol 3350 17 Gram(s) Oral daily  rivaroxaban 20 milliGRAM(s) Oral with dinner  sacubitril 49 mG/valsartan 51 mG 1 Tablet(s) Oral two times a day  spironolactone 25 milliGRAM(s) Oral daily    MEDICATIONS  (PRN):  acetaminophen     Tablet .. 650 milliGRAM(s) Oral every 6 hours PRN Temp greater or equal to 38C (100.4F), Mild Pain (1 - 3)  acetaminophen     Tablet .. 650 milliGRAM(s) Oral every 6 hours PRN Mild Pain (1 - 3)  aluminum hydroxide/magnesium hydroxide/simethicone Suspension 30 milliLiter(s) Oral every 4 hours PRN Dyspepsia  dextrose Oral Gel 15 Gram(s) Oral once PRN Blood Glucose LESS THAN 70 milliGRAM(s)/deciliter  melatonin 3 milliGRAM(s) Oral at bedtime PRN Insomnia  ondansetron Injectable 4 milliGRAM(s) IV Push every 8 hours PRN Nausea and/or Vomiting  sodium chloride 0.9% lock flush 10 milliLiter(s) IV Push every 1 hour PRN Pre/post blood products, medications, blood draw, and to maintain line patency    ___________  Active diet:  Diet, Regular:   Consistent Carbohydrate Evening Snack (CSTCHOSN)  DASH/TLC Sodium & Cholesterol Restricted (DASH)  ___________________    ==================>> VITAL SIGNS <<==================    T(C): 36.9 (08-16-23 @ 05:07), Max: 36.9 (08-16-23 @ 05:07)  HR: 67 (08-16-23 @ 05:07) (67 - 88)  BP: 122/78 (08-16-23 @ 05:07) (93/63 - 126/80)  BP(mean): --  RR: 18 (08-16-23 @ 05:07) (18 - 18)  SpO2: 97% (08-16-23 @ 05:07) (94% - 98%)     CAPILLARY BLOOD GLUCOSE  POCT Blood Glucose.: 176 mg/dL (16 Aug 2023 08:20)  POCT Blood Glucose.: 172 mg/dL (15 Aug 2023 21:47)  POCT Blood Glucose.: 137 mg/dL (15 Aug 2023 16:44)  POCT Blood Glucose.: 172 mg/dL (15 Aug 2023 12:34)    I&O's Summary    15 Aug 2023 07:01  -  16 Aug 2023 07:00  --------------------------------------------------------  IN: 530 mL / OUT: 950 mL / NET: -420 mL       ==================>> LAB AND IMAGING <<==================                        9.1    5.22  )-----------( 242      ( 15 Aug 2023 07:14 )             29.3        08-15    139  |  102  |  19  ----------------------------<  144<H>  3.9   |  25  |  0.75    Ca    9.2      15 Aug 2023 07:14       Urinalysis Basic - ( 15 Aug 2023 07:14 )  Color: x / Appearance: x / SG: x / pH: x  Gluc: 144 mg/dL / Ketone: x  / Bili: x / Urobili: x   Blood: x / Protein: x / Nitrite: x   Leuk Esterase: x / RBC: x / WBC x   Sq Epi: x / Non Sq Epi: x / Bacteria: x    Lipid profile:  (08-07-23)     Total: 75     LDL  : (p)     HDL  :27     TG   :112     HgA1C:   (08-07-23)          (08-07-23)      6.0    WBC count:   5.22 <<== ,  5.97 <<== ,  5.65 <<==   Hemoglobin:   9.1 <<==,  9.2 <<==,  9.3 <<==  platelets:  242 <==, 291 <==, 272 <==, 306 <==    Creatinine:  0.75  <<==, 0.95  <<==, 1.08  <<==, 0.93  <<==  Sodium:   139  <==, 140  <==, 139  <==, 136  <==       AST:          67 <== , 76 <== , 50 <== , 65 <==      ALT:        43  <== , 47  <== , 41  <== , 55  <==      AP:        101  <=, 90  <=, 90  <=, 90  <=     Bili:        0.4  <=, 0.4  <=, 0.4  <=, 0.6  <=

## 2023-08-16 NOTE — PROGRESS NOTE ADULT - ASSESSMENT
62M 8/8 s/p Right Foot incision and drainage with partial 5th ray resection, open.  - Pt seen and evaluated.  - Afebrile, no leukocytosis.  - 8/8 s/p right foot incision and drainage with partial 5th ray resection, open: sutures intact, granular wound bed, no drainage, no acute signs of infection.  - Intraop Findings: High concern for residual bone infection, high concern for viability  - OR wound culture: Finegoldia magna.  - OR bone culture: Finegoldia magna.  - Vasc recs, appreciated   - ID recs, appreciated.  - Pt is stable for discharge from the podiatry standpoint.  - Wound care instructions and followup information listed in discharge provider note.  - Discussed with attending.

## 2023-08-16 NOTE — PROGRESS NOTE ADULT - ASSESSMENT
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 Soft tissue wound with foci of air is seen tracking to the level of the base of the fifth metatarsal tracking up to the site of fracture. There is adjacent cortical irregularity along the fifth metatarsal base which could be secondary to prior fracture versus osteomyelitis, soft tissue swelling around the foot most significant at the   lateral aspect. No mature, drainable, or enhancing collection is seen at this time.  wound cx with MSSA and fingoldia  s/p I&D with necrotic tissue close to bone so high suspicion for residual osteo      *  Pt is growing MSSA and Finegoldia. Not clear if ancef will cover the finegoldia-  switched to unasyn but now in view of abs for easier administration at d/c switched back to ancef 2 gms IVSS q 8 hours and add po flagyl 500 mg po q 12 hours  - reviewed with pharm D   * f/u the final OR cx and path- spoke to micro lab. they set up sensitivities for the FInegoldia- will be ready tomorrow   * monitor CBC/diff and CMP  - check ESR and CRP and CBC with diff and CMP weekly at discharge  follow up with me in two - three weeks once discharged     Lori Bennett M.D. ,   please reach via teams   If no answer, or after 5PM/ weekends,  then please call  [phone]    Assessment and plan discussed with the primary team .

## 2023-08-16 NOTE — PROGRESS NOTE ADULT - REASON FOR ADMISSION
diabetic foot infection, possible osteo

## 2023-08-17 LAB
-  AMOXICILLIN/CLAVULANIC ACID: SIGNIFICANT CHANGE UP
-  CLINDAMYCIN: SIGNIFICANT CHANGE UP
-  IMIPENEM: SIGNIFICANT CHANGE UP
-  METRONIDAZOLE: SIGNIFICANT CHANGE UP
CULTURE RESULTS: SIGNIFICANT CHANGE UP
METHOD TYPE: SIGNIFICANT CHANGE UP
ORGANISM # SPEC MICROSCOPIC CNT: SIGNIFICANT CHANGE UP
ORGANISM # SPEC MICROSCOPIC CNT: SIGNIFICANT CHANGE UP

## 2023-08-22 PROBLEM — Z00.00 ENCOUNTER FOR PREVENTIVE HEALTH EXAMINATION: Status: ACTIVE | Noted: 2023-08-22

## 2023-08-22 LAB — SURGICAL PATHOLOGY STUDY: SIGNIFICANT CHANGE UP

## 2023-08-23 ENCOUNTER — LABORATORY RESULT (OUTPATIENT)
Age: 63
End: 2023-08-23

## 2023-08-24 ENCOUNTER — APPOINTMENT (OUTPATIENT)
Dept: INFECTIOUS DISEASE | Facility: CLINIC | Age: 63
End: 2023-08-24
Payer: MEDICARE

## 2023-08-24 VITALS
OXYGEN SATURATION: 98 % | DIASTOLIC BLOOD PRESSURE: 65 MMHG | WEIGHT: 255 LBS | HEIGHT: 73 IN | BODY MASS INDEX: 33.8 KG/M2 | SYSTOLIC BLOOD PRESSURE: 100 MMHG | TEMPERATURE: 97.7 F | HEART RATE: 87 BPM

## 2023-08-24 DIAGNOSIS — Z78.9 OTHER SPECIFIED HEALTH STATUS: ICD-10-CM

## 2023-08-24 DIAGNOSIS — Z80.0 FAMILY HISTORY OF MALIGNANT NEOPLASM OF DIGESTIVE ORGANS: ICD-10-CM

## 2023-08-24 DIAGNOSIS — Z86.79 PERSONAL HISTORY OF OTHER DISEASES OF THE CIRCULATORY SYSTEM: ICD-10-CM

## 2023-08-24 DIAGNOSIS — S46.012S STRAIN OF MUSCLE(S) AND TENDON(S) OF THE ROTATOR CUFF OF LEFT SHOULDER, SEQUELA: ICD-10-CM

## 2023-08-24 DIAGNOSIS — M70.31 OTHER BURSITIS OF ELBOW, RIGHT ELBOW: ICD-10-CM

## 2023-08-24 DIAGNOSIS — Z83.3 FAMILY HISTORY OF DIABETES MELLITUS: ICD-10-CM

## 2023-08-24 PROCEDURE — 99214 OFFICE O/P EST MOD 30 MIN: CPT

## 2023-08-27 ENCOUNTER — LABORATORY RESULT (OUTPATIENT)
Age: 63
End: 2023-08-27

## 2023-08-27 RX ORDER — ASPIRIN 81 MG/1
81 TABLET, CHEWABLE ORAL
Refills: 0 | Status: ACTIVE | COMMUNITY

## 2023-08-27 RX ORDER — METFORMIN HYDROCHLORIDE 1000 MG/1
1000 TABLET, COATED ORAL
Refills: 0 | Status: ACTIVE | COMMUNITY

## 2023-08-27 RX ORDER — METRONIDAZOLE 500 MG/1
500 TABLET ORAL
Refills: 0 | Status: ACTIVE | COMMUNITY

## 2023-08-27 RX ORDER — POLYETHYLENE GLYCOL 3350
POWDER (GRAM) MISCELLANEOUS
Refills: 0 | Status: ACTIVE | COMMUNITY

## 2023-08-27 RX ORDER — RIVAROXABAN 20 MG/1
20 TABLET, FILM COATED ORAL
Refills: 0 | Status: ACTIVE | COMMUNITY

## 2023-08-27 RX ORDER — CEFAZOLIN 2 G/1
2 INJECTION, POWDER, FOR SOLUTION INTRAVENOUS
Refills: 0 | Status: ACTIVE | COMMUNITY

## 2023-08-27 RX ORDER — FUROSEMIDE 20 MG/1
20 TABLET ORAL
Refills: 0 | Status: ACTIVE | COMMUNITY

## 2023-08-27 RX ORDER — SACUBITRIL AND VALSARTAN 49; 51 MG/1; MG/1
49-51 TABLET, FILM COATED ORAL
Refills: 0 | Status: ACTIVE | COMMUNITY

## 2023-08-27 RX ORDER — DAPAGLIFLOZIN 10 MG/1
10 TABLET, FILM COATED ORAL
Refills: 0 | Status: ACTIVE | COMMUNITY

## 2023-08-27 RX ORDER — ROSUVASTATIN CALCIUM 40 MG/1
40 TABLET, FILM COATED ORAL
Refills: 0 | Status: ACTIVE | COMMUNITY

## 2023-08-27 RX ORDER — SITAGLIPTIN 100 MG/1
100 TABLET, FILM COATED ORAL
Refills: 0 | Status: ACTIVE | COMMUNITY

## 2023-08-27 NOTE — HISTORY OF PRESENT ILLNESS
[FreeTextEntry1] :  Hospital Course: Discharge Date	16-Aug-2023 Admission Date	06-Aug-2023 14:42 Reason for Admission	diabetic foot infection, possible osteo Hospital Course	 63yo Male with past medical history of HTN, DM, defibrillator, CAD post CABG, CHF, hx of Sommers fracture, non-union many years ago, presenting with  ulceration, recent bone biopsy with Dr Foss as op , came back positive for staph, treated with Levaquin, presented to ED with acute and significant swelling to the foot as advised to go to the ER for eval. Pt denies recent fall or trauma. states over the past few days right foot has been increasing in swelling and has been having some fevers. States hard to walk on his foot due to pain. PT ALSO states had temp at home of 101-102. Denies n/v/d, CP, SOB, HA, dizziness, abdominal pain, urinary symptoms, cough, leg pain, swelling. patient evaluated by podiatry in ED for presence of gas.. MRI and vascular studies ordered already, antibiotics given..     Diabetic foot infection.   podiatry management -8/8 s/p Right Foot incision and drainage with partial 5th ray resection, open.  - High concern for residual bone infection - High concern for viability - OR wound culture showing finegoldia  - OR bone culture showing no growth (prelim)   - Vasc recommendations, appreciated->  R LE dsg dry. No asc infeciton. +R PT, L DP pulses. small v dz suggested by DIONI/PVRs, but overall perf appears to be adequate  - ID recs appreciated  CT: Chronic transverse fracture through the fifth metatarsal base. Soft tissue wound with foci of air is seen tracking to the level of the base of the fifth metatarsal tracking up to the site of fracture. There is adjacent cortical irregularity along the fifth metatarsal base which could be secondary to prior fracture versus osteomyelitis, soft tissue swelling around the foot most significant at the  lateral aspect. No mature, drainable, or enhancing collection is seen at this time. s/p I&D with necrotic tissue close to bone so high suspicion for residual osteo -continue antibiotics per ID  : long-term antibiotics via new picc already inserted -  Pt is growing MSSA and Finegoldia. No clear if ancef will cover the finegoldia-  switched to unasyn but now in view of abs for easier administration at d/c switched back to ancef 2 gms IVSS q 8 hours and add po flagyl 500 mg po q 12 hours  - reviewed with pharm D  - monitor CBC/diff and CMP - check ESR and CRP and CBC with diff and CMP weekly at discharge    Diabetes.    Plan: patient states has been overall well controlled but in past 1-2 days more elevated...  ---monitor finger sticks closely ---Insulin regimen as ordered and monitor / adjust as needed --- hold home medications for now  ---Diabetic diet ---A1c. 6    Heart failure, unspecified HF chronicity, unspecified heart failure type.    Plan: patient with likely chronic systolic CHF on entresto and lasix / Epleronon  continue home medications. Patient takes 75 mg of toprol  at home, here is a 25.  Increased to 50 for now: monitor closely    Further adjustment as per cardiology is needed Echo-> left ventricular systolic function is severely decreased with an ejection fraction visually estimated at 25 to 30 %. There is global left ventricular hypokinesis. cardio following for optimization - Team increased Lasix to 40 mg daily/ Entresto 49/51 mg bid     history of Deep vein thrombosis (DVT) of right upper extremity.    Plan: on Xarelto for past ~ 2.5 months ( was on Eliqius first)  for Rt upper extremity DVT post infection of Elbow !  continue for now repeat Duplex as outpatient to decide on duration of further therapy >> recom in 2 weeks ..    Patient is medically clear for discharge by Dr. Boudreaux to home with homecare. Outpatient follow up with podiatry, PCP Med recc and clearance discussed with attending   Med Reconciliation: Override IMPROVE-DD recommendations due to:	IMPROVE-DD Application Not Available Recommended Post-Discharge VTE Prophylaxis	IMPROVE-DD Application Not Available Medication Reconciliation Status	Admission Reconciliation is Completed Discharge Reconciliation is Completed Discharge Medications	aspirin 81 mg oral delayed release tablet: 1 tab(s) orally once a day eplerenone 25 mg oral tablet: 1 tab(s) orally every other day Farxiga 10 mg oral tablet: 1 tab(s) orally once a day furosemide 20 mg oral tablet: 1 tab(s) orally once a day IV Ancef 2g every 8 hours till 9/19/23: Weekly CBC, CMP, ESR, CRP emailed to [email or web address] and fax to ID Office [phone] Januvia 100 mg oral tablet: 1 tab(s) orally once a day metFORMIN 1000 mg oral tablet: 1 tab(s) orally 2 times a day metoprolol succinate 50 mg oral tablet, extended release: 1 tab(s) orally once a day metroNIDAZOLE 500 mg oral tablet: 1 tab(s) orally every 12 hours To be taken until 9/19/23 polyethylene glycol 3350 oral powder for reconstitution: 17 gram(s) orally once a day rosuvastatin 40 mg oral tablet: 1 tab(s) orally once a day sacubitril-valsartan 49 mg-51 mg oral tablet: 1 tab(s) orally 2 times a day Xarelto 20 mg oral tablet: 1 tab(s) orally once a day  pain at night -mostly at night pain is where surgery was will see Dr Foss next week.  There was problem with PICC- now fixed.  flushing with saline  no fevers chills or night sweats  leg is still bigger than other.  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  Dr Morales is cardiologist.

## 2023-08-27 NOTE — CONSULT LETTER
[Dear  ___] : Dear  [unfilled], [Consult Letter:] : I had the pleasure of evaluating your patient, [unfilled]. [Consult Closing:] : Thank you very much for allowing me to participate in the care of this patient.  If you have any questions, please do not hesitate to contact me. [Sincerely,] : Sincerely, [FreeTextEntry3] :  Lori Bennett M.D., FACP, DIPTI Chief, Division of Infectious Diseases Professor of Medicine [employer] [facility] [employer]  at [facility] [phone] (Phone) [phone] (Fax) email : [email or web address]

## 2023-08-27 NOTE — ASSESSMENT
[FreeTextEntry1] :   63yo Male with  HTN, DM, defibrillator, CAD post CABG, CHF, hx of Sommers fracture, non-union many years ago, presenting with ulceration, recent bone biopsy with Dr Foss as op , came back positive for staph, treated with Levaquin, presented to ED with acute and significant swelling to the foot as advised to go to the ER for eval. Pt denies recent fall or trauma. states over the past few days right foot has been increasing in swelling and has been having some fevers. States hard to walk on his foot due to pain. PT ALSO states had temp at home of 101-102. Denies n/v/d, CP, SOB, HA, dizziness, abdominal pain, urinary symptoms, cough, leg pain, swelling. patient evaluated by podiatry in ED for presence of gas.. MRI and vascular studies ordered already, antibiotics given..     Diabetic foot infection.   podiatry management -8/8 s/p Right Foot incision and drainage with partial 5th ray resection, open.  - High concern for residual bone infection - High concern for viability - OR wound culture showing finegoldia  - OR bone culture showing no growth (prelim)   - Vasc recommendations, appreciated->  R LE dsg dry. No asc infeciton. +R PT, L DP pulses. small v dz suggested by DIONI/PVRs, but overall perf appears to be adequate  - ID recs appreciated  CT: Chronic transverse fracture through the fifth metatarsal base. Soft tissue wound with foci of air is seen tracking to the level of the base of the fifth metatarsal tracking up to the site of fracture. There is adjacent cortical irregularity along the fifth metatarsal base which could be secondary to prior fracture versus osteomyelitis, soft tissue swelling around the foot most significant at the  lateral aspect. No mature, drainable, or enhancing collection is seen at this time. s/p I&D with necrotic tissue close to bone so high suspicion for residual osteo -continue antibiotics per ID  : long-term antibiotics via new picc already inserted -  Pt is growing MSSA and Finegoldia. No clear if ancef will cover the finegoldia-  switched to unasyn but now in view of abs for easier administration at d/c switched back to ancef 2 gms IVSS q 8 hours and add po flagyl 500 mg po q 12 hours  - reviewed with pharm D  - monitor CBC/diff and CMP - check ESR and CRP and CBC with diff and CMP weekly at discharge    Diabetes.    Plan: patient states has been overall well controlled but in past 1-2 days more elevated...  ---monitor finger sticks closely ---Insulin regimen as ordered and monitor / adjust as needed --- hold home medications for now  ---Diabetic diet ---A1c. 6    Heart failure, unspecified HF chronicity, unspecified heart failure type.    Plan: patient with likely chronic systolic CHF on entresto and lasix / Epleronon  continue home medications. Patient takes 75 mg of toprol  at home, here is a 25.  Increased to 50 for now: monitor closely    Further adjustment as per cardiology is needed Echo-> left ventricular systolic function is severely decreased with an ejection fraction visually estimated at 25 to 30 %. There is global left ventricular hypokinesis. cardio following for optimization - Team increased Lasix to 40 mg daily/ Entresto 49/51 mg bid     history of Deep vein thrombosis (DVT) of right upper extremity.    Plan: on Xarelto for past ~ 2.5 months ( was on Eliqius first)  for Rt upper extremity DVT post infection of Elbow !  continue for now repeat Duplex as outpatient to decide on duration of further therapy >> recom in 2 weeks ..    Patient is medically clear for discharge by Dr. Boudreaux to home with homecare. Outpatient follow up with podiatry, PCP Med recc and clearance discussed with attending   Med Reconciliation: Override IMPROVE-DD recommendations due to:	IMPROVE-DD Application Not Available Recommended Post-Discharge VTE Prophylaxis	IMPROVE-DD Application Not Available Medication Reconciliation Status	Admission Reconciliation is Completed Discharge Reconciliation is Completed Discharge Medications	aspirin 81 mg oral delayed release tablet: 1 tab(s) orally once a day eplerenone 25 mg oral tablet: 1 tab(s) orally every other day Farxiga 10 mg oral tablet: 1 tab(s) orally once a day furosemide 20 mg oral tablet: 1 tab(s) orally once a day IV Ancef 2g every 8 hours till 9/19/23: Weekly CBC, CMP, ESR, CRP emailed to [email or web address] and fax to ID Office [phone] Januvia 100 mg oral tablet: 1 tab(s) orally once a day metFORMIN 1000 mg oral tablet: 1 tab(s) orally 2 times a day metoprolol succinate 50 mg oral tablet, extended release: 1 tab(s) orally once a day metroNIDAZOLE 500 mg oral tablet: 1 tab(s) orally every 12 hours To be taken until 9/19/23 polyethylene glycol 3350 oral powder for reconstitution: 17 gram(s) orally once a day rosuvastatin 40 mg oral tablet: 1 tab(s) orally once a day sacubitril-valsartan 49 mg-51 mg oral tablet: 1 tab(s) orally 2 times a day Xarelto 20 mg oral tablet: 1 tab(s) orally once a day  pain at night -mostly at night pain is where surgery was will see Dr Foss next week.  There was problem with PICC- now fixed.  flushing with saline  no fevers chills or night sweats  leg is still bigger than other.  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  Dr Morales is cardiologist.   Will continue to monitor labs weekly continue IV ancef and po flagyl VAC followup with podiatry  check ESR and CRP cardiologist addressing Low EF PICC is functioning now. Site looks clean

## 2023-08-27 NOTE — PHYSICAL EXAM
[General Appearance - Alert] : alert [General Appearance - In No Acute Distress] : in no acute distress [Sclera] : the sclera and conjunctiva were normal [PERRL With Normal Accommodation] : pupils were equal in size, round, reactive to light [Extraocular Movements] : extraocular movements were intact [Outer Ear] : the ears and nose were normal in appearance [Oropharynx] : the oropharynx was normal with no thrush [Neck Appearance] : the appearance of the neck was normal [Neck Cervical Mass (___cm)] : no neck mass was observed [Jugular Venous Distention Increased] : there was no jugular-venous distention [Thyroid Diffuse Enlargement] : the thyroid was not enlarged [Auscultation Breath Sounds / Voice Sounds] : lungs were clear to auscultation bilaterally [Heart Rate And Rhythm] : heart rate was normal and rhythm regular [Heart Sounds] : normal S1 and S2 [Bowel Sounds] : normal bowel sounds [Abdomen Soft] : soft [Abdomen Tenderness] : non-tender [Abdomen Mass (___ Cm)] : no abdominal mass palpated [Costovertebral Angle Tenderness] : no CVA tenderness [No Palpable Adenopathy] : no palpable adenopathy [Musculoskeletal - Swelling] : no joint swelling [Nail Clubbing] : no clubbing  or cyanosis of the fingernails [Motor Tone] : muscle strength and tone were normal [Skin Color & Pigmentation] : normal skin color and pigmentation [] : no rash [Deep Tendon Reflexes (DTR)] : deep tendon reflexes were 2+ and symmetric [Sensation] : the sensory exam was normal to light touch and pinprick [No Focal Deficits] : no focal deficits [Oriented To Time, Place, And Person] : oriented to person, place, and time [Affect] : the affect was normal [FreeTextEntry1] : Patient showed me picture of wound with VAC off

## 2023-08-31 ENCOUNTER — EMERGENCY (EMERGENCY)
Facility: HOSPITAL | Age: 63
LOS: 1 days | Discharge: HOME | End: 2023-08-31
Attending: EMERGENCY MEDICINE
Payer: MEDICARE

## 2023-08-31 VITALS
OXYGEN SATURATION: 99 % | TEMPERATURE: 98 F | DIASTOLIC BLOOD PRESSURE: 62 MMHG | SYSTOLIC BLOOD PRESSURE: 104 MMHG | RESPIRATION RATE: 18 BRPM | HEART RATE: 76 BPM

## 2023-08-31 VITALS
HEIGHT: 73 IN | HEART RATE: 80 BPM | WEIGHT: 250 LBS | SYSTOLIC BLOOD PRESSURE: 115 MMHG | DIASTOLIC BLOOD PRESSURE: 77 MMHG | TEMPERATURE: 99 F | OXYGEN SATURATION: 97 % | RESPIRATION RATE: 20 BRPM

## 2023-08-31 PROCEDURE — 99283 EMERGENCY DEPT VISIT LOW MDM: CPT

## 2023-08-31 PROCEDURE — 99284 EMERGENCY DEPT VISIT MOD MDM: CPT

## 2023-08-31 RX ORDER — ALTEPLASE 100 MG
2 KIT INTRAVENOUS ONCE
Refills: 0 | Status: COMPLETED | OUTPATIENT
Start: 2023-08-31 | End: 2023-08-31

## 2023-08-31 RX ADMIN — ALTEPLASE 2 MILLIGRAM(S): KIT at 09:40

## 2023-08-31 NOTE — ED PROVIDER NOTE - DIFFERENTIAL DIAGNOSIS
Differential Diagnosis broken line, line infection, clot in line. osteomyolytis broken line, line infection, clot in line. osteomyelitis

## 2023-08-31 NOTE — ED PROVIDER NOTE - PATIENT PORTAL LINK FT
You can access the FollowMyHealth Patient Portal offered by [facility] by registering at the following website: [email or web address]. By joining [employer]’s FollowMyHealth portal, you will also be able to view your health information using other applications (apps) compatible with our system.

## 2023-08-31 NOTE — ED PROVIDER NOTE - NSFOLLOWUPINSTRUCTIONS_ED_ALL_ED_FT
You were seen in the emergency department for a poorly functioning PICC line.    We unclog the PICC line using alteplase.  It now flows well.    Please keep an eye on the PICC line for the next few days looking specifically for infections or problems with 3 clotting.    Continue to give yourself antibiotics and follow-up with your doctors.    If you develop any new complaints please come back to the emergency department.

## 2023-08-31 NOTE — ED PROVIDER NOTE - OBJECTIVE STATEMENT
Patient is a 62-year-old man who is currently being treated for osteomyelitis and wounds of his right foot.  Patient with drain in place and on antibiotics until the middle of September.  Patient has noticed for the last few days that his PICC line has not had return.  And now flow is being mildly obstructed as he tries to give himself the antibiotics.  There is no redness or irritation around the PICC line entry point.  There is no fevers.  Patient sugars have been under control.  Patient was sent in by his doctor to get tPA in his PICC line.

## 2023-08-31 NOTE — ED ADULT NURSE NOTE - OBJECTIVE STATEMENT
Patient  is alert and  oriented x3. Color is good and skin warm to touch.  He  is  here because  his  PICC line is  not functioning.  No c/o pain or discomfort offered.

## 2023-08-31 NOTE — ED ADULT NURSE REASSESSMENT NOTE - RESPONSE TO
Rt PICC line is flushing without any difficulty. Good  blood return from PICC noted/response to care/treatments:

## 2023-08-31 NOTE — ED PROVIDER NOTE - CLINICAL SUMMARY MEDICAL DECISION MAKING FREE TEXT BOX
Emmy: Patient is 62-year-old who presents to the emergency department with a poorly functioning PICC line.  The line seems intact there is no obvious infection would consider tPA in the lying dwelled for an hour to see if flow can be returned.

## 2023-09-05 ENCOUNTER — LABORATORY RESULT (OUTPATIENT)
Age: 63
End: 2023-09-05

## 2023-09-08 ENCOUNTER — APPOINTMENT (OUTPATIENT)
Dept: INFECTIOUS DISEASE | Facility: CLINIC | Age: 63
End: 2023-09-08
Payer: MEDICARE

## 2023-09-08 VITALS
TEMPERATURE: 97.6 F | SYSTOLIC BLOOD PRESSURE: 99 MMHG | OXYGEN SATURATION: 99 % | DIASTOLIC BLOOD PRESSURE: 65 MMHG | HEIGHT: 73 IN | HEART RATE: 81 BPM

## 2023-09-08 PROBLEM — M86.60 OTHER CHRONIC OSTEOMYELITIS, UNSPECIFIED SITE: Chronic | Status: ACTIVE | Noted: 2023-08-31

## 2023-09-08 PROBLEM — E11.9 TYPE 2 DIABETES MELLITUS WITHOUT COMPLICATIONS: Chronic | Status: ACTIVE | Noted: 2023-08-31

## 2023-09-08 PROCEDURE — 99214 OFFICE O/P EST MOD 30 MIN: CPT

## 2023-09-08 NOTE — HISTORY OF PRESENT ILLNESS
[FreeTextEntry1] : doing well  nio fevers or chills  pain is gone  swelling improving dramatically   seeing Dr Morales next week  breathing stable   not sleepig well

## 2023-09-08 NOTE — PHYSICAL EXAM
[General Appearance - Alert] : alert [General Appearance - In No Acute Distress] : in no acute distress [Sclera] : the sclera and conjunctiva were normal [PERRL With Normal Accommodation] : pupils were equal in size, round, reactive to light [Extraocular Movements] : extraocular movements were intact [Outer Ear] : the ears and nose were normal in appearance [Oropharynx] : the oropharynx was normal with no thrush [Neck Appearance] : the appearance of the neck was normal [Neck Cervical Mass (___cm)] : no neck mass was observed [Jugular Venous Distention Increased] : there was no jugular-venous distention [Thyroid Diffuse Enlargement] : the thyroid was not enlarged [Auscultation Breath Sounds / Voice Sounds] : lungs were clear to auscultation bilaterally [Heart Rate And Rhythm] : heart rate was normal and rhythm regular [Heart Sounds] : normal S1 and S2 [Heart Sounds Gallop] : no gallops [Murmurs] : no murmurs [Heart Sounds Pericardial Friction Rub] : no pericardial rub [Bowel Sounds] : normal bowel sounds [Abdomen Soft] : soft [Abdomen Tenderness] : non-tender [No Palpable Adenopathy] : no palpable adenopathy [Musculoskeletal - Swelling] : no joint swelling [Nail Clubbing] : no clubbing  or cyanosis of the fingernails [Motor Tone] : muscle strength and tone were normal [Skin Color & Pigmentation] : normal skin color and pigmentation [] : no rash [FreeTextEntry1] : right foot dressed [Deep Tendon Reflexes (DTR)] : deep tendon reflexes were 2+ and symmetric [Sensation] : the sensory exam was normal to light touch and pinprick [No Focal Deficits] : no focal deficits [Oriented To Time, Place, And Person] : oriented to person, place, and time [Affect] : the affect was normal

## 2023-09-11 ENCOUNTER — LABORATORY RESULT (OUTPATIENT)
Age: 63
End: 2023-09-11

## 2023-09-18 ENCOUNTER — LABORATORY RESULT (OUTPATIENT)
Age: 63
End: 2023-09-18

## 2023-09-29 ENCOUNTER — APPOINTMENT (OUTPATIENT)
Dept: INFECTIOUS DISEASE | Facility: CLINIC | Age: 63
End: 2023-09-29
Payer: MEDICARE

## 2023-09-29 VITALS
BODY MASS INDEX: 32.07 KG/M2 | OXYGEN SATURATION: 95 % | HEART RATE: 67 BPM | SYSTOLIC BLOOD PRESSURE: 160 MMHG | WEIGHT: 242 LBS | DIASTOLIC BLOOD PRESSURE: 60 MMHG | HEIGHT: 73 IN | TEMPERATURE: 97.3 F

## 2023-09-29 DIAGNOSIS — A49.1 STREPTOCOCCAL INFECTION, UNSPECIFIED SITE: ICD-10-CM

## 2023-09-29 DIAGNOSIS — A49.01 METHICILLIN SUSCEPTIBLE STAPHYLOCOCCUS AUREUS INFECTION, UNSPECIFIED SITE: ICD-10-CM

## 2023-09-29 PROCEDURE — 99214 OFFICE O/P EST MOD 30 MIN: CPT

## 2023-10-06 ENCOUNTER — INPATIENT (INPATIENT)
Facility: HOSPITAL | Age: 63
LOS: 11 days | Discharge: HOME | DRG: 853 | End: 2023-10-18
Attending: GENERAL PRACTICE | Admitting: GENERAL PRACTICE
Payer: MEDICARE

## 2023-10-06 VITALS
OXYGEN SATURATION: 100 % | DIASTOLIC BLOOD PRESSURE: 63 MMHG | TEMPERATURE: 99 F | SYSTOLIC BLOOD PRESSURE: 95 MMHG | HEIGHT: 73 IN | HEART RATE: 85 BPM | RESPIRATION RATE: 19 BRPM | WEIGHT: 240.08 LBS

## 2023-10-06 DIAGNOSIS — E11.9 TYPE 2 DIABETES MELLITUS WITHOUT COMPLICATIONS: ICD-10-CM

## 2023-10-06 DIAGNOSIS — M86.60 OTHER CHRONIC OSTEOMYELITIS, UNSPECIFIED SITE: ICD-10-CM

## 2023-10-06 DIAGNOSIS — I10 ESSENTIAL (PRIMARY) HYPERTENSION: ICD-10-CM

## 2023-10-06 DIAGNOSIS — A41.9 SEPSIS, UNSPECIFIED ORGANISM: ICD-10-CM

## 2023-10-06 LAB
ALBUMIN SERPL ELPH-MCNC: 4.1 G/DL — SIGNIFICANT CHANGE UP (ref 3.3–5)
ALP SERPL-CCNC: 63 U/L — SIGNIFICANT CHANGE UP (ref 40–120)
ALT FLD-CCNC: 6 U/L — LOW (ref 10–45)
ANION GAP SERPL CALC-SCNC: 15 MMOL/L — SIGNIFICANT CHANGE UP (ref 5–17)
ANION GAP SERPL CALC-SCNC: 19 MMOL/L — HIGH (ref 5–17)
APPEARANCE UR: ABNORMAL
APTT BLD: 42 SEC — HIGH (ref 24.5–35.6)
AST SERPL-CCNC: 13 U/L — SIGNIFICANT CHANGE UP (ref 10–40)
B-OH-BUTYR SERPL-SCNC: 1 MMOL/L — HIGH
BACTERIA # UR AUTO: NEGATIVE — SIGNIFICANT CHANGE UP
BASE EXCESS BLDV CALC-SCNC: -1.2 MMOL/L — SIGNIFICANT CHANGE UP (ref -2–3)
BASOPHILS # BLD AUTO: 0.03 K/UL — SIGNIFICANT CHANGE UP (ref 0–0.2)
BASOPHILS NFR BLD AUTO: 0.4 % — SIGNIFICANT CHANGE UP (ref 0–2)
BILIRUB SERPL-MCNC: 1.2 MG/DL — SIGNIFICANT CHANGE UP (ref 0.2–1.2)
BILIRUB UR-MCNC: NEGATIVE — SIGNIFICANT CHANGE UP
BUN SERPL-MCNC: 26 MG/DL — HIGH (ref 7–23)
BUN SERPL-MCNC: 26 MG/DL — HIGH (ref 7–23)
CA-I SERPL-SCNC: 1.18 MMOL/L — SIGNIFICANT CHANGE UP (ref 1.15–1.33)
CALCIUM SERPL-MCNC: 9.3 MG/DL — SIGNIFICANT CHANGE UP (ref 8.4–10.5)
CALCIUM SERPL-MCNC: 9.7 MG/DL — SIGNIFICANT CHANGE UP (ref 8.4–10.5)
CHLORIDE BLDV-SCNC: 103 MMOL/L — SIGNIFICANT CHANGE UP (ref 96–108)
CHLORIDE SERPL-SCNC: 100 MMOL/L — SIGNIFICANT CHANGE UP (ref 96–108)
CHLORIDE SERPL-SCNC: 99 MMOL/L — SIGNIFICANT CHANGE UP (ref 96–108)
CO2 BLDV-SCNC: 26 MMOL/L — SIGNIFICANT CHANGE UP (ref 22–26)
CO2 SERPL-SCNC: 18 MMOL/L — LOW (ref 22–31)
CO2 SERPL-SCNC: 23 MMOL/L — SIGNIFICANT CHANGE UP (ref 22–31)
COLOR SPEC: YELLOW — SIGNIFICANT CHANGE UP
CREAT SERPL-MCNC: 1.33 MG/DL — HIGH (ref 0.5–1.3)
CREAT SERPL-MCNC: 1.35 MG/DL — HIGH (ref 0.5–1.3)
CRP SERPL-MCNC: 137 MG/L — HIGH (ref 0–4)
DIFF PNL FLD: ABNORMAL
EGFR: 59 ML/MIN/1.73M2 — LOW
EGFR: 60 ML/MIN/1.73M2 — SIGNIFICANT CHANGE UP
EOSINOPHIL # BLD AUTO: 0.01 K/UL — SIGNIFICANT CHANGE UP (ref 0–0.5)
EOSINOPHIL NFR BLD AUTO: 0.1 % — SIGNIFICANT CHANGE UP (ref 0–6)
EPI CELLS # UR: 6 /HPF — HIGH
ERYTHROCYTE [SEDIMENTATION RATE] IN BLOOD: 62 MM/HR — HIGH (ref 0–20)
GAS PNL BLDV: 132 MMOL/L — LOW (ref 136–145)
GAS PNL BLDV: SIGNIFICANT CHANGE UP
GAS PNL BLDV: SIGNIFICANT CHANGE UP
GLUCOSE BLDC GLUCOMTR-MCNC: 124 MG/DL — HIGH (ref 70–99)
GLUCOSE BLDC GLUCOMTR-MCNC: 137 MG/DL — HIGH (ref 70–99)
GLUCOSE BLDV-MCNC: 169 MG/DL — HIGH (ref 70–99)
GLUCOSE SERPL-MCNC: 141 MG/DL — HIGH (ref 70–99)
GLUCOSE SERPL-MCNC: 165 MG/DL — HIGH (ref 70–99)
GLUCOSE UR QL: ABNORMAL
GP B STREP DNA BLD POS QL NAA+NON-PROBE: SIGNIFICANT CHANGE UP
GRAM STN FLD: SIGNIFICANT CHANGE UP
HCO3 BLDV-SCNC: 24 MMOL/L — SIGNIFICANT CHANGE UP (ref 22–29)
HCT VFR BLD CALC: 36.3 % — LOW (ref 39–50)
HCT VFR BLDA CALC: 35 % — LOW (ref 39–51)
HGB BLD CALC-MCNC: 11.6 G/DL — LOW (ref 12.6–17.4)
HGB BLD-MCNC: 11.2 G/DL — LOW (ref 13–17)
HYALINE CASTS # UR AUTO: 10 /LPF — HIGH (ref 0–2)
IMM GRANULOCYTES NFR BLD AUTO: 0.5 % — SIGNIFICANT CHANGE UP (ref 0–0.9)
INR BLD: 3.65 RATIO — HIGH (ref 0.85–1.18)
KETONES UR-MCNC: NEGATIVE — SIGNIFICANT CHANGE UP
LACTATE BLDV-MCNC: 2.5 MMOL/L — HIGH (ref 0.5–2)
LEUKOCYTE ESTERASE UR-ACNC: NEGATIVE — SIGNIFICANT CHANGE UP
LYMPHOCYTES # BLD AUTO: 0.26 K/UL — LOW (ref 1–3.3)
LYMPHOCYTES # BLD AUTO: 3.4 % — LOW (ref 13–44)
MAGNESIUM SERPL-MCNC: 1.5 MG/DL — LOW (ref 1.6–2.6)
MCHC RBC-ENTMCNC: 26.5 PG — LOW (ref 27–34)
MCHC RBC-ENTMCNC: 30.9 GM/DL — LOW (ref 32–36)
MCV RBC AUTO: 86 FL — SIGNIFICANT CHANGE UP (ref 80–100)
METHOD TYPE: SIGNIFICANT CHANGE UP
MONOCYTES # BLD AUTO: 0.68 K/UL — SIGNIFICANT CHANGE UP (ref 0–0.9)
MONOCYTES NFR BLD AUTO: 8.9 % — SIGNIFICANT CHANGE UP (ref 2–14)
MRSA PCR RESULT.: SIGNIFICANT CHANGE UP
NEUTROPHILS # BLD AUTO: 6.64 K/UL — SIGNIFICANT CHANGE UP (ref 1.8–7.4)
NEUTROPHILS NFR BLD AUTO: 86.7 % — HIGH (ref 43–77)
NITRITE UR-MCNC: NEGATIVE — SIGNIFICANT CHANGE UP
NRBC # BLD: 0 /100 WBCS — SIGNIFICANT CHANGE UP (ref 0–0)
PCO2 BLDV: 43 MMHG — SIGNIFICANT CHANGE UP (ref 42–55)
PH BLDV: 7.36 — SIGNIFICANT CHANGE UP (ref 7.32–7.43)
PH UR: 5.5 — SIGNIFICANT CHANGE UP (ref 5–8)
PHOSPHATE SERPL-MCNC: 2.8 MG/DL — SIGNIFICANT CHANGE UP (ref 2.5–4.5)
PLATELET # BLD AUTO: 142 K/UL — LOW (ref 150–400)
PO2 BLDV: 28 MMHG — SIGNIFICANT CHANGE UP (ref 25–45)
POTASSIUM BLDV-SCNC: 4.2 MMOL/L — SIGNIFICANT CHANGE UP (ref 3.5–5.1)
POTASSIUM SERPL-MCNC: 3.6 MMOL/L — SIGNIFICANT CHANGE UP (ref 3.5–5.3)
POTASSIUM SERPL-MCNC: 4.1 MMOL/L — SIGNIFICANT CHANGE UP (ref 3.5–5.3)
POTASSIUM SERPL-SCNC: 3.6 MMOL/L — SIGNIFICANT CHANGE UP (ref 3.5–5.3)
POTASSIUM SERPL-SCNC: 4.1 MMOL/L — SIGNIFICANT CHANGE UP (ref 3.5–5.3)
PROCALCITONIN SERPL-MCNC: 0.47 NG/ML — HIGH (ref 0.02–0.1)
PROT SERPL-MCNC: 7.3 G/DL — SIGNIFICANT CHANGE UP (ref 6–8.3)
PROT UR-MCNC: ABNORMAL
PROTHROM AB SERPL-ACNC: 38.7 SEC — HIGH (ref 9.5–13)
RAPID RVP RESULT: SIGNIFICANT CHANGE UP
RBC # BLD: 4.22 M/UL — SIGNIFICANT CHANGE UP (ref 4.2–5.8)
RBC # FLD: 18 % — HIGH (ref 10.3–14.5)
RBC CASTS # UR COMP ASSIST: 1 /HPF — SIGNIFICANT CHANGE UP (ref 0–4)
S AUREUS DNA NOSE QL NAA+PROBE: SIGNIFICANT CHANGE UP
SAO2 % BLDV: 35.4 % — LOW (ref 67–88)
SARS-COV-2 RNA SPEC QL NAA+PROBE: SIGNIFICANT CHANGE UP
SODIUM SERPL-SCNC: 137 MMOL/L — SIGNIFICANT CHANGE UP (ref 135–145)
SODIUM SERPL-SCNC: 137 MMOL/L — SIGNIFICANT CHANGE UP (ref 135–145)
SP GR SPEC: 1.02 — SIGNIFICANT CHANGE UP (ref 1.01–1.02)
SPECIMEN SOURCE: SIGNIFICANT CHANGE UP
UROBILINOGEN FLD QL: NEGATIVE — SIGNIFICANT CHANGE UP
WBC # BLD: 7.66 K/UL — SIGNIFICANT CHANGE UP (ref 3.8–10.5)
WBC # FLD AUTO: 7.66 K/UL — SIGNIFICANT CHANGE UP (ref 3.8–10.5)
WBC UR QL: 6 /HPF — HIGH (ref 0–5)

## 2023-10-06 PROCEDURE — 99223 1ST HOSP IP/OBS HIGH 75: CPT

## 2023-10-06 PROCEDURE — 73630 X-RAY EXAM OF FOOT: CPT | Mod: 26,RT

## 2023-10-06 PROCEDURE — 99285 EMERGENCY DEPT VISIT HI MDM: CPT | Mod: GC

## 2023-10-06 PROCEDURE — 71045 X-RAY EXAM CHEST 1 VIEW: CPT | Mod: 26

## 2023-10-06 RX ORDER — DEXTROSE 50 % IN WATER 50 %
25 SYRINGE (ML) INTRAVENOUS ONCE
Refills: 0 | Status: DISCONTINUED | OUTPATIENT
Start: 2023-10-06 | End: 2023-10-13

## 2023-10-06 RX ORDER — SODIUM BICARBONATE 1 MEQ/ML
650 SYRINGE (ML) INTRAVENOUS THREE TIMES A DAY
Refills: 0 | Status: DISCONTINUED | OUTPATIENT
Start: 2023-10-06 | End: 2023-10-13

## 2023-10-06 RX ORDER — SODIUM CHLORIDE 9 MG/ML
1000 INJECTION, SOLUTION INTRAVENOUS
Refills: 0 | Status: DISCONTINUED | OUTPATIENT
Start: 2023-10-06 | End: 2023-10-13

## 2023-10-06 RX ORDER — SODIUM CHLORIDE 9 MG/ML
500 INJECTION INTRAMUSCULAR; INTRAVENOUS; SUBCUTANEOUS ONCE
Refills: 0 | Status: COMPLETED | OUTPATIENT
Start: 2023-10-06 | End: 2023-10-06

## 2023-10-06 RX ORDER — VANCOMYCIN HCL 1 G
1000 VIAL (EA) INTRAVENOUS ONCE
Refills: 0 | Status: COMPLETED | OUTPATIENT
Start: 2023-10-06 | End: 2023-10-06

## 2023-10-06 RX ORDER — ATORVASTATIN CALCIUM 80 MG/1
80 TABLET, FILM COATED ORAL AT BEDTIME
Refills: 0 | Status: DISCONTINUED | OUTPATIENT
Start: 2023-10-06 | End: 2023-10-13

## 2023-10-06 RX ORDER — DEXTROSE 50 % IN WATER 50 %
12.5 SYRINGE (ML) INTRAVENOUS ONCE
Refills: 0 | Status: DISCONTINUED | OUTPATIENT
Start: 2023-10-06 | End: 2023-10-13

## 2023-10-06 RX ORDER — FUROSEMIDE 40 MG
20 TABLET ORAL DAILY
Refills: 0 | Status: DISCONTINUED | OUTPATIENT
Start: 2023-10-06 | End: 2023-10-06

## 2023-10-06 RX ORDER — PIPERACILLIN AND TAZOBACTAM 4; .5 G/20ML; G/20ML
3.38 INJECTION, POWDER, LYOPHILIZED, FOR SOLUTION INTRAVENOUS ONCE
Refills: 0 | Status: COMPLETED | OUTPATIENT
Start: 2023-10-06 | End: 2023-10-06

## 2023-10-06 RX ORDER — RIVAROXABAN 15 MG-20MG
20 KIT ORAL
Refills: 0 | Status: DISCONTINUED | OUTPATIENT
Start: 2023-10-06 | End: 2023-10-08

## 2023-10-06 RX ORDER — GLUCAGON INJECTION, SOLUTION 0.5 MG/.1ML
1 INJECTION, SOLUTION SUBCUTANEOUS ONCE
Refills: 0 | Status: DISCONTINUED | OUTPATIENT
Start: 2023-10-06 | End: 2023-10-13

## 2023-10-06 RX ORDER — INSULIN LISPRO 100/ML
VIAL (ML) SUBCUTANEOUS
Refills: 0 | Status: DISCONTINUED | OUTPATIENT
Start: 2023-10-06 | End: 2023-10-13

## 2023-10-06 RX ORDER — PIPERACILLIN AND TAZOBACTAM 4; .5 G/20ML; G/20ML
3.38 INJECTION, POWDER, LYOPHILIZED, FOR SOLUTION INTRAVENOUS EVERY 8 HOURS
Refills: 0 | Status: DISCONTINUED | OUTPATIENT
Start: 2023-10-06 | End: 2023-10-07

## 2023-10-06 RX ORDER — MAGNESIUM SULFATE 500 MG/ML
1 VIAL (ML) INJECTION ONCE
Refills: 0 | Status: COMPLETED | OUTPATIENT
Start: 2023-10-06 | End: 2023-10-06

## 2023-10-06 RX ORDER — DEXTROSE 50 % IN WATER 50 %
15 SYRINGE (ML) INTRAVENOUS ONCE
Refills: 0 | Status: DISCONTINUED | OUTPATIENT
Start: 2023-10-06 | End: 2023-10-13

## 2023-10-06 RX ORDER — METOPROLOL TARTRATE 50 MG
25 TABLET ORAL DAILY
Refills: 0 | Status: DISCONTINUED | OUTPATIENT
Start: 2023-10-06 | End: 2023-10-08

## 2023-10-06 RX ORDER — ACETAMINOPHEN 500 MG
650 TABLET ORAL EVERY 6 HOURS
Refills: 0 | Status: DISCONTINUED | OUTPATIENT
Start: 2023-10-06 | End: 2023-10-13

## 2023-10-06 RX ORDER — ASPIRIN/CALCIUM CARB/MAGNESIUM 324 MG
81 TABLET ORAL DAILY
Refills: 0 | Status: DISCONTINUED | OUTPATIENT
Start: 2023-10-06 | End: 2023-10-13

## 2023-10-06 RX ORDER — LANOLIN ALCOHOL/MO/W.PET/CERES
5 CREAM (GRAM) TOPICAL ONCE
Refills: 0 | Status: COMPLETED | OUTPATIENT
Start: 2023-10-06 | End: 2023-10-06

## 2023-10-06 RX ADMIN — Medication 250 MILLIGRAM(S): at 07:51

## 2023-10-06 RX ADMIN — PIPERACILLIN AND TAZOBACTAM 25 GRAM(S): 4; .5 INJECTION, POWDER, LYOPHILIZED, FOR SOLUTION INTRAVENOUS at 13:59

## 2023-10-06 RX ADMIN — PIPERACILLIN AND TAZOBACTAM 25 GRAM(S): 4; .5 INJECTION, POWDER, LYOPHILIZED, FOR SOLUTION INTRAVENOUS at 23:11

## 2023-10-06 RX ADMIN — Medication 650 MILLIGRAM(S): at 12:14

## 2023-10-06 RX ADMIN — Medication 1000 MILLIGRAM(S): at 11:07

## 2023-10-06 RX ADMIN — PIPERACILLIN AND TAZOBACTAM 200 GRAM(S): 4; .5 INJECTION, POWDER, LYOPHILIZED, FOR SOLUTION INTRAVENOUS at 06:55

## 2023-10-06 RX ADMIN — Medication 100 GRAM(S): at 11:21

## 2023-10-06 RX ADMIN — Medication 650 MILLIGRAM(S): at 20:11

## 2023-10-06 RX ADMIN — Medication 650 MILLIGRAM(S): at 23:10

## 2023-10-06 RX ADMIN — ATORVASTATIN CALCIUM 80 MILLIGRAM(S): 80 TABLET, FILM COATED ORAL at 23:11

## 2023-10-06 RX ADMIN — Medication 5 MILLIGRAM(S): at 23:45

## 2023-10-06 RX ADMIN — SODIUM CHLORIDE 500 MILLILITER(S): 9 INJECTION INTRAMUSCULAR; INTRAVENOUS; SUBCUTANEOUS at 06:55

## 2023-10-06 RX ADMIN — SODIUM CHLORIDE 500 MILLILITER(S): 9 INJECTION INTRAMUSCULAR; INTRAVENOUS; SUBCUTANEOUS at 11:07

## 2023-10-06 NOTE — ED ADULT NURSE NOTE - OBJECTIVE STATEMENT
61YO male with a pmhx of DMT2, Hyperthyroidism comes to the ED with c/o fevers. 61YO male with a pmhx of DMT2, Hyperthyroidism comes to the ED with c/o fevers. Pt states having right foot infection underwent sx for drainage/cleaning pt was placed on home antibiotics, wound vac (taken off "couple weeks ago") has a home aid come 3x a week to clean and change bandage. Pt states yesterday fever took Motrin fever subsided but returned today around 4am 101.0F, pt took advil called pediatrist and was informed to come to the ED, pt also c/o right flank pain and increase urination with chills. Pt is A&Ox4 respirations are even and nonlabored, radial and pedal pulses are equal and strong, pt has right foot redness, inflammation, foul odor coming from would/incision site (lateral), no drainage is noted. Pt states redness has worsened. Pt denies CP, SOB, nausea or vomiting. wife is at bedside. Pt placed in position of comfort. Pt educated on call bell system and provided call bell. Bed in lowest position, wheels locked, appropriate side rails raised. Pt denies needs at this time.

## 2023-10-06 NOTE — ED ADULT NURSE NOTE - NSFALLUNIVINTERV_ED_ALL_ED
Bed/Stretcher in lowest position, wheels locked, appropriate side rails in place/Call bell, personal items and telephone in reach/Instruct patient to call for assistance before getting out of bed/chair/stretcher/Non-slip footwear applied when patient is off stretcher/[place] to call system/Physically safe environment - no spills, clutter or unnecessary equipment/Purposeful proactive rounding/Room/bathroom lighting operational, light cord in reach

## 2023-10-06 NOTE — ED PROVIDER NOTE - CLINICAL SUMMARY MEDICAL DECISION MAKING FREE TEXT BOX
Attending (Zeferino Briseno D.O.):  62M hx of type 2 DM on metformin, farixga, heart failure with reduced EF on metoprolol 75mg, lasix 20mg qd, xarelto for vte ppx (had RUE vte 2/2 PICC line), removed 2 weeks prior as well as right foot wound wac (present 2/2 osteo of foot with finegoldia magna), here for 36 hrs of fever, tmax 101, took 800mg advil today, prior to arrival. + slight right lower back pain, nonradiating, mild, no assc GI sxs. + urinating more freq as of late. Still tolerating PO. Denies chest pain, shortness of breath, diaphoresis. Hypotensive to 90s/50 in triage, then 80s/60s, last bp 100/40, not tachy but in setting of bb. Appears slightly dehydrated, clear lungs, s3 gallop, no murmurs appreciable. + right foot redness, swelling, nontender, no flucutance, no crepitations, 1+ dp pulses bilateral, full rom. Nontender calves. Benign abd, no peritoneal signs, no jaundice, no cva ttp. No midline spinal ttp. RUE no signs of infx, former picc site clean, Patient meets sepsis criteria by vitals. Will eval for common source of infection (ex. osteo, urinary, bacterial pulmonary, viral uri; unlikely central neurologic such as meninigitis or encephalitis) vs metabolic derangement. Check labs, lactate, UA, CXs, CXR, foot xray, inflam markers screening EKG, hydrate-> empiric abxs, antipyretics, additional crystalloid infusion as clinically warranted. Will start with 500 cc crystalloid 2/2 heart failure but euglycemic dka.

## 2023-10-06 NOTE — CONSULT NOTE ADULT - ASSESSMENT
62yr M hx of  HTN,  CAD, HF AICD,  AFIB,  DM, with right foot  osteo    Assessment  DMT2: 62y Male with DM T2 with hyperglycemia, A1C 6% , was on oral meds at home, now on insulin with sliding scale coverage, eating meals.   Had mild BHB on labs, elevated lactate on VBG, s/p IV hydration , glucose trending stable.   CAD: on medications, stable, monitored.  Foot Osteo: Podiatry eval, wound care, on IV Zosyn.  HTN: on antihypertensive medications, monitored, asymptomatic.      Discussed plan and management wit Dr Roxanne Oliveira MD  Cell: [phone]  Office: [phone]                 hold  lasix/  farxiga/  entresto, for  now/ farxiga  not  recommended  with osteo  foot/ ha s risk  for  amputation 62yr M hx of  HTN,  CAD, HF AICD,  AFIB,  DM, with right foot  osteo    Assessment  DMT2: 62y Male with DM T2 with hyperglycemia, A1C 6% , was on oral meds at home, now on insulin with sliding scale coverage, eating meals.   Had mild BHB on labs, elevated lactate on VBG, s/p IV hydration, glucose trending stable. ?euglycemic DKA, repeat BHB and BMP  CAD: on medications, stable, monitored.  Foot Osteo: Podiatry eval, wound care, on IV Zosyn.  HTN: on antihypertensive medications, monitored, asymptomatic.      Discussed plan and management wit Dr Roxanne Oliveira MD  Cell: [phone]  Office: [phone]                 hold  lasix/  farxiga/  entresto, for  now/ farxiga  not  recommended  with osteo  foot/ ha s risk  for  amputation

## 2023-10-06 NOTE — CONSULT NOTE ADULT - SUBJECTIVE AND OBJECTIVE BOX
Patient is a 62y old  Male who presents with a chief complaint of     HPI:      PAST MEDICAL & SURGICAL HISTORY:  Diabetes      Chronic osteomyelitis      H/O heart failure      No significant past surgical history          MEDICATIONS  (STANDING):  magnesium sulfate  IVPB 1 Gram(s) IV Intermittent Once  piperacillin/tazobactam IVPB.. 3.375 Gram(s) IV Intermittent once    MEDICATIONS  (PRN):      Allergies    No Known Allergies    Intolerances        VITALS:    Vital Signs Last 24 Hrs  T(C): 37.1 (06 Oct 2023 08:02), Max: 37.1 (06 Oct 2023 06:21)  T(F): 98.8 (06 Oct 2023 08:02), Max: 98.8 (06 Oct 2023 06:21)  HR: 76 (06 Oct 2023 08:02) (67 - 85)  BP: 93/38 (06 Oct 2023 08:02) (92/52 - 95/63)  BP(mean): 51 (06 Oct 2023 08:02) (51 - 60)  RR: 18 (06 Oct 2023 08:02) (18 - 19)  SpO2: 98% (06 Oct 2023 08:02) (98% - 100%)    Parameters below as of 06 Oct 2023 08:02  Patient On (Oxygen Delivery Method): room air        LABS:                          11.2   7.66  )-----------( 142      ( 06 Oct 2023 06:54 )             36.3       10-06    137  |  100  |  26<H>  ----------------------------<  165<H>  4.1   |  18<L>  |  1.33<H>    Ca    9.7      06 Oct 2023 06:54  Phos  2.8     10-06  Mg     1.5     10-06    TPro  7.3  /  Alb  4.1  /  TBili  1.2  /  DBili  x   /  AST  13  /  ALT  6<L>  /  AlkPhos  63  10-06      CAPILLARY BLOOD GLUCOSE          PT/INR - ( 06 Oct 2023 06:54 )   PT: 38.7 sec;   INR: 3.65 ratio         PTT - ( 06 Oct 2023 06:54 )  PTT:42.0 sec    LOWER EXTREMITY PHYSICAL EXAM:  Vascular: DP/PT 0/4 B/L, CFT <3 sec x 10, Temp gradient warm to warm, RLE, warm to cool LLE.    Neurology: Epicritic sensation intact to level of digits, B/L  Musculoskeletal/Ortho: pain to palpation over right foot 5th digit, 8/8 s/p right foot I&D w/ Right foot partial 5th ray resection.   Skin: Right foot 5th met base wound to bone with cellulitis to the dorsal foot, warm to touch, no drainage, no malodor, no tracking/tunneling. Left foot with no open wounds or signs of infection     RADIOLOGY & ADDITIONAL STUDIES:

## 2023-10-06 NOTE — CONSULT NOTE ADULT - SUBJECTIVE AND OBJECTIVE BOX
Patient is a 62y old  Male who presents with a chief complaint of fevers/  foot infection (06 Oct 2023 15:10)      HPI:  62yr M      hx of  HTN,  C/C  AFIB,  DM/  right foot  osteo      completed recent course of antibiotics via PICC for rt foot infection / last month     p/w erythema and subjective fevers at home     seen  by podiatry,   in  er   denies  cp/sob/abd  pain     (06 Oct 2023 09:29)      PAST MEDICAL & SURGICAL HISTORY:  Diabetes      Chronic osteomyelitis      H/O heart failure      No significant past surgical history          Social history:   Social History:    Marital Status:   Occupation:   Lives with:     Substance Use :  Tobacco Usage:  (   ) never smoked   (   ) former smoker   (   ) current smoker  (     ) pack year  (        ) last tobacco use date  Alcohol Usage:  Travel:  Pets:          FAMILY HISTORY:      REVIEW OF SYSTEMS  General:	Denies any malaise fatigue or chills. Fevers absent    Skin:No rash  	  Ophthalmologic:Denies any visual complaints,discharge redness or photophobia  	  ENMT:No nasal discharge,headache,sinus congestion or throat pain.No dental complaints    Respiratory and Thorax:No cough,sputum or chest pain.Denies shortness of breath  	  Cardiovascular:	No chest pain,palpitaions or dizziness    Gastrointestinal:	NO nausea,abdominal pain or diarrhea.    Genitourinary:	No dysuria,frequency. No flank pain    Musculoskeletal:	No joint swelling or pain.No weakness    Neurological:No confusion,diziness.No extremity weakness.No bladder or bowel incontinence	    Psychiatric:No delusions or hallucinations	    Hematology/Lymphatics:	No LN swelling.No gum bleeding     Endocrine:	No recent weight gain or loss.No abnormal heat/cold intolerance    Allergic/Immunologic:	No hives or rash     Allergies    No Known Allergies    Intolerances        Antimicrobials:       MEDICATIONS  (prior antimicrobials ):    piperacillin/tazobactam IVPB..   25 mL/Hr IV Intermittent (10-06-23 @ 13:59)    piperacillin/tazobactam IVPB...   200 mL/Hr IV Intermittent (10-06-23 @ 06:55)    vancomycin  IVPB.   250 mL/Hr IV Intermittent (10-06-23 @ 07:51)             piperacillin/tazobactam IVPB.. 3.375 Gram(s) IV Intermittent every 8 hours      MEDICATIONS  (STANDING):  aspirin enteric coated 81 milliGRAM(s) Oral daily  atorvastatin 80 milliGRAM(s) Oral at bedtime  dextrose 5%. 1000 milliLiter(s) (100 mL/Hr) IV Continuous <Continuous>  dextrose 5%. 1000 milliLiter(s) (50 mL/Hr) IV Continuous <Continuous>  dextrose 50% Injectable 25 Gram(s) IV Push once  dextrose 50% Injectable 25 Gram(s) IV Push once  dextrose 50% Injectable 12.5 Gram(s) IV Push once  glucagon  Injectable 1 milliGRAM(s) IntraMuscular once  insulin lispro (ADMELOG) corrective regimen sliding scale   SubCutaneous three times a day before meals  metoprolol succinate ER 25 milliGRAM(s) Oral daily  piperacillin/tazobactam IVPB.. 3.375 Gram(s) IV Intermittent every 8 hours  rivaroxaban 20 milliGRAM(s) Oral with dinner  sodium bicarbonate 650 milliGRAM(s) Oral three times a day    MEDICATIONS  (PRN):  acetaminophen     Tablet .. 650 milliGRAM(s) Oral every 6 hours PRN Temp greater or equal to 38C (100.4F), Mild Pain (1 - 3)  dextrose Oral Gel 15 Gram(s) Oral once PRN Blood Glucose LESS THAN 70 milliGRAM(s)/deciliter        Vital Signs Last 24 Hrs  T(C): 37.4 (06 Oct 2023 15:30), Max: 37.7 (06 Oct 2023 11:40)  T(F): 99.3 (06 Oct 2023 15:30), Max: 99.8 (06 Oct 2023 11:40)  HR: 70 (06 Oct 2023 15:30) (67 - 97)  BP: 91/44 (06 Oct 2023 15:30) (91/44 - 111/62)  BP(mean): 58 (06 Oct 2023 15:30) (51 - 70)  RR: 18 (06 Oct 2023 15:30) (18 - 20)  SpO2: 99% (06 Oct 2023 15:30) (98% - 100%)    Parameters below as of 06 Oct 2023 15:30  Patient On (Oxygen Delivery Method): room air        PHYSICAL EXAM:Pleasant patient in no acute distress.      Constitutional:Comfortable.Awake and alert  No cachexia     Eyes:PERRL EOMI.NO discharge or conjunctival injection    ENMT:No sinus tenderness.No thrush.No pharyngeal exudate or erythema.Fair dental hygiene    Neck:Supple,No LN,no JVD      Respiratory:Good air entry bilaterally,CTA    Cardiovascular:S1 S2 wnl, No murmurs,rub or gallops    Gastrointestinal:Soft BS(+) no tenderness no masses ,No rebound or guarding    Genitourinary:No CVA tendereness     Rectal:    Extremities:No cyanosis,clubbing or edema.    Vascular:peripheral pulses felt    Neurological:AAO X 3,No grossly focal deficits    Skin:No rash     Lymph Nodes:No palpable LNs    Musculoskeletal:No joint swelling or LOM    Psychiatric:Affect normal.                                11.2   7.66  )-----------( 142      ( 06 Oct 2023 06:54 )             36.3     LIVER FUNCTIONS - ( 06 Oct 2023 06:54 )  Alb: 4.1 g/dL / Pro: 7.3 g/dL / ALK PHOS: 63 U/L / ALT: 6 U/L / AST: 13 U/L / GGT: x             10-    137  |  99  |  26<H>  ----------------------------<  141<H>  3.6   |  23  |  1.35<H>    Ca    9.3      06 Oct 2023 10:49  Phos  2.8     10-  Mg     1.5     10-    TPro  7.3  /  Alb  4.1  /  TBili  1.2  /  DBili  x   /  AST  13  /  ALT  6<L>  /  AlkPhos  63  10-06          Urinalysis Basic - ( 06 Oct 2023 16:25 )    Color: Yellow / Appearance: Slightly Turbid / S.023 / pH: x  Gluc: x / Ketone: Negative  / Bili: Negative / Urobili: Negative   Blood: x / Protein: 100 mg/dl / Nitrite: Negative   Leuk Esterase: Negative / RBC: 1 /hpf / WBC 6 /HPF   Sq Epi: x / Non Sq Epi: x / Bacteria: Negative        RECENT CULTURES:      MICROBIOLOGY:          Radiology:             Patient is a 62y old  Male who presents with a chief complaint of fevers/  foot infection (06 Oct 2023 15:10)      HPI:  62yr M      hx of  HTN,  C/C  AFIB,  DM/  right foot  osteo      completed recent course of antibiotics via PICC for rt foot infection about two weeks ago     p/w erythema and subjective fevers at home     seen  by podiatry,   in  er   denies  cp/sob/abd  pain    pt claims that went for visit to podiatrist on wednesday and all looked well  on wednesday he developed pain in the foot  on thursday he developed fevers and chills and foot turned red  pt denies diarrhea cough, abd symptoms or urinary sxs. Denies URI sxs      PAST MEDICAL & SURGICAL HISTORY:  Diabetes      Chronic osteomyelitis      H/O heart failure    2023  · Operative Findings	s/p R foot Incision and Drainage and partial 5th met base resection :   - no evidence of purulence tracking proximally, however wound tracking distally from lateral 5th base to central midfoot  - large necrotic tissue encountered        SOCIAL HISTORY:  Alcohol: None reported  Smoking: None reported           FAMILY HISTORY:    father : heart disease  father: brain cancer   otherwise negative       REVIEW OF SYSTEMS  General:	fevers    Skin:No rash  	  Ophthalmologic:Denies any visual complaints,discharge redness or photophobia  	  ENMT:No nasal discharge,headache,sinus congestion or throat pain.No dental complaints    Respiratory and Thorax:No cough,sputum or chest pain.Denies shortness of breath  	  Cardiovascular:	No chest pain,palpitaions or dizziness    Gastrointestinal:	NO nausea,abdominal pain or diarrhea.    Genitourinary:	No dysuria,frequency. No flank pain    Musculoskeletal:	  right foot wound    Neurological:No confusion,diziness.No extremity weakness.No bladder or bowel incontinence	    Psychiatric:No delusions or hallucinations	    Hematology/Lymphatics:	No LN swelling.No gum bleeding     Endocrine:	No recent weight gain or loss.No abnormal heat/cold intolerance    Allergic/Immunologic:	No hives or rash     Allergies    No Known Allergies    Intolerances        Antimicrobials:       MEDICATIONS  (prior antimicrobials ):    piperacillin/tazobactam IVPB..   25 mL/Hr IV Intermittent (10-06-23 @ 13:59)    piperacillin/tazobactam IVPB...   200 mL/Hr IV Intermittent (10-06-23 @ 06:55)    vancomycin  IVPB.   250 mL/Hr IV Intermittent (10-06-23 @ 07:51)             piperacillin/tazobactam IVPB.. 3.375 Gram(s) IV Intermittent every 8 hours      MEDICATIONS  (STANDING):  aspirin enteric coated 81 milliGRAM(s) Oral daily  atorvastatin 80 milliGRAM(s) Oral at bedtime  dextrose 5%. 1000 milliLiter(s) (100 mL/Hr) IV Continuous <Continuous>  dextrose 5%. 1000 milliLiter(s) (50 mL/Hr) IV Continuous <Continuous>  dextrose 50% Injectable 25 Gram(s) IV Push once  dextrose 50% Injectable 25 Gram(s) IV Push once  dextrose 50% Injectable 12.5 Gram(s) IV Push once  glucagon  Injectable 1 milliGRAM(s) IntraMuscular once  insulin lispro (ADMELOG) corrective regimen sliding scale   SubCutaneous three times a day before meals  metoprolol succinate ER 25 milliGRAM(s) Oral daily  piperacillin/tazobactam IVPB.. 3.375 Gram(s) IV Intermittent every 8 hours  rivaroxaban 20 milliGRAM(s) Oral with dinner  sodium bicarbonate 650 milliGRAM(s) Oral three times a day    MEDICATIONS  (PRN):  acetaminophen     Tablet .. 650 milliGRAM(s) Oral every 6 hours PRN Temp greater or equal to 38C (100.4F), Mild Pain (1 - 3)  dextrose Oral Gel 15 Gram(s) Oral once PRN Blood Glucose LESS THAN 70 milliGRAM(s)/deciliter        Vital Signs Last 24 Hrs  T(C): 37.4 (06 Oct 2023 15:30), Max: 37.7 (06 Oct 2023 11:40)  T(F): 99.3 (06 Oct 2023 15:30), Max: 99.8 (06 Oct 2023 11:40)  HR: 70 (06 Oct 2023 15:30) (67 - 97)  BP: 91/44 (06 Oct 2023 15:30) (91/44 - 111/62)  BP(mean): 58 (06 Oct 2023 15:30) (51 - 70)  RR: 18 (06 Oct 2023 15:30) (18 - 20)  SpO2: 99% (06 Oct 2023 15:30) (98% - 100%)    Parameters below as of 06 Oct 2023 15:30  Patient On (Oxygen Delivery Method): room air        PHYSICAL EXAM:Pleasant patient in no acute distress.      Constitutional:Comfortable.Awake and alert  No cachexia     Eyes:PERRL EOMI.NO discharge or conjunctival injection    ENMT:No sinus tenderness.No thrush.No pharyngeal exudate or erythema.    Neck:Supple,No LN,no JVD      Respiratory:Good air entry bilaterally,CTA    Cardiovascular:S1 S2 wnl, No murmurs,rub or gallops    Gastrointestinal:Soft BS(+) no tenderness no martinez      Extremities: right foot swelling , eythema.      Neurological:AAO X 3,No grossly focal deficits    Skin:No rash other than right foot erythema on the dorsum of the foot     Lymph Nodes:No palpable LNs    Musculoskeletal:No joint swelling or LOM    Psychiatric:Affect normal.                                11.2   7.66  )-----------( 142      ( 06 Oct 2023 06:54 )             36.3     LIVER FUNCTIONS - ( 06 Oct 2023 06:54 )  Alb: 4.1 g/dL / Pro: 7.3 g/dL / ALK PHOS: 63 U/L / ALT: 6 U/L / AST: 13 U/L / GGT: x             10    137  |  99  |  26<H>  ----------------------------<  141<H>  3.6   |  23  |  1.35<H>    Ca    9.3      06 Oct 2023 10:49  Phos  2.8     10-  Mg     1.5     10-    TPro  7.3  /  Alb  4.1  /  TBili  1.2  /  DBili  x   /  AST  13  /  ALT  6<L>  /  AlkPhos  63  10-          Urinalysis Basic - ( 06 Oct 2023 16:25 )    Color: Yellow / Appearance: Slightly Turbid / S.023 / pH: x  Gluc: x / Ketone: Negative  / Bili: Negative / Urobili: Negative   Blood: x / Protein: 100 mg/dl / Nitrite: Negative   Leuk Esterase: Negative / RBC: 1 /hpf / WBC 6 /HPF   Sq Epi: x / Non Sq Epi: x / Bacteria: Negative        RECENT CULTURES:  Respiratory Viral Panel with COVID-19 by RIMMA (10.06.23 @ 06:54)    Rapid RVP Result: NotDete   SARS-CoV-2: NotDetec: This Respiratory Panel uses polymerase chain reaction (PCR) to detect for  adenovirus; coronavirus (HKU1, NL63, 229E, OC43); human metapneumovirus  (hMPV); human enterovirus/rhinovirus (Entero/RV); influenza A; influenza  A/H1; influenza A/H3; influenza A/H1-2009; influenza B; parainfluenza  viruses 1, 2, 3, 4; respiratory syncytial virus; Mycoplasma pneumoniae;  Chlamydophila pneumoniae; and SARS-CoV-2.    Culture - Blood (10.06.23 @ 06:28)    -  Streptococcus agalactiae (Group B): Detec   Gram Stain:   Growth in aerobic and anaerobic bottles: Gram Positive Cocci in Pairs and  Chains   Specimen Source: .Blood Blood-Peripheral   Organism: Blood Culture PCR   Culture Results:   Growth in aerobic and anaerobic bottles: Gram Positive Cocci in Pairs and  Chains  Direct identification is available within approximately 3-5  hours either by Blood Panel Multiplexed PCR or Direct  MALDI-TOF. Details: [email or web address]   Organism Identification: Blood Culture PCR   Method Type: PCR        Culture - Blood (10.06.23 @ 06:10)    Gram Stain:   Growth in aerobic and anaerobic bottles: Gram Positive Cocci in Pairs and  Chains   Specimen Source: .Blood Blood-Peripheral   Culture Results:   Growth in aerobic and anaerobic bottles: Gram Positive Cocci in Pairs and  Chains      Culture - Tissue with Gram Stain (23 @ 22:08)    -  Clindamycin: S 2   Gram Stain:   No polymorphonuclear cells seen per low power field  No organisms seen per oil power field   -  Amoxicillin/Clavulanic Acid: S 0.023   -  Imipenem: S 0.012   -  Metronidazole: S 0.5   Specimen Source: .Tissue Other   Culture Results:   Rare Finegoldia magna   Organism Identification: Finegoldia magna   Organism: Finegoldia magna   Method Type: ETEST    Culture - Abscess with Gram Stain (23 @ 08:21)    -  Erythromycin: S <=0.25   -  Gentamicin: S <=1 Should not be used as monotherapy   -  Ampicillin/Sulbactam: S <=8/4   -  Cefazolin: S <=4   -  Clindamycin: S 0.5   -  Oxacillin: S <=0.25 Oxacillin predicts susceptibility for dicloxacillin, methicillin, and nafcillin   -  Penicillin: R >8   -  Rifampin: S <=1 Should not be used as monotherapy   -  Tetracycline: S <=1   -  Trimethoprim/Sulfamethoxazole: S <=0.5/9.5   -  Vancomycin: S 2   Gram Stain:   Rare polymorphonuclear leukocytes per low power field  No organisms seen per oil power field   Specimen Source: .Abscess right foot   Culture Results:   Rare Staphylococcus aureus  Few Finegoldia magna See previous culture [id number] for  susceptibility   Organism Identification: Staphylococcus aureus   Organism: Staphylococcus aureus   Method Type: HEATHER    < from: Xray Foot AP + Lateral + Oblique, Right (10.06.23 @ 07:08) >    FINDINGS/  IMPRESSION:  Redemonstrated transverse fracture defect along proximal 5th metatarsal   shaft with currently apparent bridging callus laterally and overlying   bandaging material. Chronic bony fragmentation/enthesopathic change in   medial and lateral malleolar regions. No dislocations or acute appearing   fractures.    Tarsometatarsal alignment maintained without evidence for a Lisfranc   injury. Preserved remaining visualized joint spaces.    Thick plantar and small posterior calcaneal enthesophytes.    Vascular calcifications.    --- End of Report ---    < end of copied text >          Radiology:

## 2023-10-06 NOTE — H&P ADULT - PATIENT'S PREFERRED PRONOUN
OVERNIGHT EVENTS: events noted, on 50% fm, LE DOPPLER NEG, no events overnight    Vital Signs Last 24 Hrs  T(C): 35.6 (25 Feb 2021 04:00), Max: 36 (24 Feb 2021 16:00)  T(F): 96 (25 Feb 2021 04:00), Max: 96.8 (24 Feb 2021 16:00)  HR: 86 (25 Feb 2021 07:00) (82 - 102)  BP: 121/70 (25 Feb 2021 07:00) (100/66 - 131/70)  BP(mean): 88 (25 Feb 2021 07:00) (73 - 100)  RR: 24 (25 Feb 2021 07:00) (12 - 42)  SpO2: 95% (25 Feb 2021 07:00) (84% - 98%)    PHYSICAL EXAMINATION:    GENERAL: ill looking    HEENT: Head is normocephalic and atraumatic.     NECK: Supple.    LUNGS: bl crackles    HEART: Regular rate and rhythm without murmur.    ABDOMEN: Soft, nontender, and nondistended.      EXTREMITIES: Without any cyanosis, clubbing, rash, lesions or edema.    NEUROLOGIC: Grossly intact.    SKIN: No ulceration or induration present.      LABS:                        11.7   11.20 )-----------( 147      ( 25 Feb 2021 04:16 )             36.3     02-25    138  |  94<L>  |  37<H>  ----------------------------<  158<H>  4.6   |  35<H>  |  0.9    Ca    8.2<L>      25 Feb 2021 04:16  Mg     2.1     02-25    TPro  5.3<L>  /  Alb  3.0<L>  /  TBili  0.3  /  DBili  x   /  AST  23  /  ALT  44<H>  /  AlkPhos  68  02-25                    Procalcitonin, Serum: 0.11 ng/mL (02-23-21 @ 05:02)        02-24-21 @ 07:01  -  02-25-21 @ 07:00  --------------------------------------------------------  IN: 1157 mL / OUT: 1250 mL / NET: -93 mL        MICROBIOLOGY:      MEDICATIONS  (STANDING):  aspirin enteric coated 81 milliGRAM(s) Oral daily  BACItracin   Ointment 1 Application(s) Topical two times a day  chlorhexidine 4% Liquid 1 Application(s) Topical daily  dextrose 40% Gel 15 Gram(s) Oral once  dextrose 5%. 1000 milliLiter(s) (50 mL/Hr) IV Continuous <Continuous>  dextrose 5%. 1000 milliLiter(s) (100 mL/Hr) IV Continuous <Continuous>  dextrose 50% Injectable 25 Gram(s) IV Push once  dextrose 50% Injectable 12.5 Gram(s) IV Push once  dextrose 50% Injectable 25 Gram(s) IV Push once  enoxaparin Injectable 40 milliGRAM(s) SubCutaneous daily  glucagon  Injectable 1 milliGRAM(s) IntraMuscular once  influenza   Vaccine 0.5 milliLiter(s) IntraMuscular once  insulin glargine Injectable (LANTUS) 29 Unit(s) SubCutaneous at bedtime  insulin lispro (ADMELOG) corrective regimen sliding scale   SubCutaneous three times a day before meals  insulin lispro Injectable (ADMELOG) 11 Unit(s) SubCutaneous three times a day before meals  lactulose Syrup 20 Gram(s) Oral every 8 hours  levothyroxine 50 MICROGram(s) Oral daily  methylPREDNISolone sodium succinate Injectable 60 milliGRAM(s) IV Push every 12 hours  metoprolol succinate ER 25 milliGRAM(s) Oral daily  pantoprazole    Tablet 40 milliGRAM(s) Oral every 12 hours  polyethylene glycol 3350 17 Gram(s) Oral daily  senna 2 Tablet(s) Oral at bedtime  tamsulosin 0.8 milliGRAM(s) Oral at bedtime    MEDICATIONS  (PRN):  acetaminophen   Tablet .. 650 milliGRAM(s) Oral every 6 hours PRN Temp greater or equal to 38C (100.4F), Mild Pain (1 - 3)  ALPRAZolam 0.25 milliGRAM(s) Oral daily PRN anxiety  bisacodyl Suppository 10 milliGRAM(s) Rectal daily PRN Constipation  guaifenesin/dextromethorphan  Syrup 5 milliLiter(s) Oral every 6 hours PRN Cough  ondansetron    Tablet 4 milliGRAM(s) Oral two times a day PRN Nausea and/or Vomiting      RADIOLOGY & ADDITIONAL STUDIES:     Him/He

## 2023-10-06 NOTE — H&P ADULT - NSHPPHYSICALEXAM_GEN_ALL_CORE
T(C): 37.1 (10-06-23 @ 08:02), Max: 37.1 (10-06-23 @ 06:21)  HR: 76 (10-06-23 @ 08:02) (67 - 85)  BP: 93/38 (10-06-23 @ 08:02) (92/52 - 95/63)  RR: 18 (10-06-23 @ 08:02) (18 - 19)  SpO2: 98% (10-06-23 @ 08:02) (98% - 100%)  GENERAL: NAD, lying in bed comfortably  HEAD:  Atraumatic, normocephalic  EYES: EOMI, PERRLA, conjunctiva and sclera clear  NECK: Supple, trachea midline, no JVD  HEART: Regular rate and rhythm, no murmurs, rubs, or gallops  LUNGS: Unlabored respirations.  Clear to auscultation bilaterally, no crackles, wheezing, or rhonchi  ABDOMEN: Soft, nontender, nondistended, +BS  EXTREMITIES:   foot  ilcer  NERVOUS SYSTEM:  A&Ox3, moving all extremities, no focal deficits   SKIN: No rashes or lesions

## 2023-10-06 NOTE — H&P ADULT - NSHPLABSRESULTS_GEN_ALL_CORE
LABS:                        11.2   7.66  )-----------( 142      ( 06 Oct 2023 06:54 )             36.3     10-06    137  |  100  |  26<H>  ----------------------------<  165<H>  4.1   |  18<L>  |  1.33<H>    Ca    9.7      06 Oct 2023 06:54  Phos  2.8     10-06  Mg     1.5     10-06    TPro  7.3  /  Alb  4.1  /  TBili  1.2  /  DBili  x   /  AST  13  /  ALT  6<L>  /  AlkPhos  63  10-06    PT/INR - ( 06 Oct 2023 06:54 )   PT: 38.7 sec;   INR: 3.65 ratio         PTT - ( 06 Oct 2023 06:54 )  PTT:42.0 sec      Urinalysis Basic - ( 06 Oct 2023 06:54 )    Color: x / Appearance: x / SG: x / pH: x  Gluc: 165 mg/dL / Ketone: x  / Bili: x / Urobili: x   Blood: x / Protein: x / Nitrite: x   Leuk Esterase: x / RBC: x / WBC x   Sq Epi: x / Non Sq Epi: x / Bacteria: x          10-06 @ 06:30  4.2  28

## 2023-10-06 NOTE — ED ADULT NURSE NOTE - CAS EDN DISCHARGE ASSESSMENT
Alert and oriented to person, place and time ok to go to floor off tele/Alert and oriented to person, place and time

## 2023-10-06 NOTE — ED ADULT NURSE REASSESSMENT NOTE - NS ED NURSE REASSESS COMMENT FT1
Pt observed sitting up in stretcher conversing with RN without difficulty. Breathing spontaneous and unlabored. Pt updated on plan of care awaiting  bed assignment, RTM tele. No acute distress noted. Call bell within reach.

## 2023-10-06 NOTE — CONSULT NOTE ADULT - PROBLEM SELECTOR RECOMMENDATION 9
Will continue current insulin regimen for now.   Will continue monitoring FS, log, and glucose trends, will Follow up.  Patient counseled for compliance with consistent low carb diet and exercise as tolerated outpatient.   Hold Farxiga Will continue current insulin regimen for now.   Will continue monitoring FS, log, and glucose trends, will Follow up.  Patient counseled for compliance with consistent low carb diet and exercise as tolerated outpatient.   ?euglycemic DKA, repeat BHB and BMP - was on home SGLT2   Hold Farxiga  Suggest IV hydration as tolerated

## 2023-10-06 NOTE — CONSULT NOTE ADULT - ASSESSMENT
Patient is our 62M who is consulted for a diabetic foot infection of the right, dorsal lateral foot. Past medical history of DM, defibrillator, CAD post CABG, CHF, hx of non-union Sommers fracture around 2014, right upper extremity infection in June 2023 s/p 1 month of daptomycin and ceftriaxone.     Patient noticed an ulcer on his lateral right foot for the past several weeks. Over the past few days right foot has been having increased edema, erythema and pain on ambulation. Temperature at home was 101-102. Denies recent fall or trauma. states denies n/v/d, CP, SOB, HA, dizziness, abdominal pain, urinary symptoms, cough, leg pain, swelling. Patient endorses stools have become darker.     On August 1, 2023, the patient saw podiatrist Dr. Foss for the foot infection and bone biopsy came back positive for staph. He began treatment with levaquin but his symptoms progressed and patient came to the ED for further evaluation.      ON 8/8 pt underwent  R foot Incision and Drainage and partial 5th met base resection  Pt grew mssa and finegolda  pt received 6 weeks IV abs at home    Pt was doing well at home . Tuesday pt had evaluation at podiatrist and all looked well  wednesday pt noted foot pain and by thursday pt was having fevers and chills  Pt stephen noted to have Group B strep bacteremia      A/P   #Group B strep bacteremia  concerning as grew so quickly  would change abs to unasyn- this would also cover previous cultured pathogens  chcek repeat BC q 48 hours until clears  call EPS - as patient with an AICD  cardiac echo   likely source is the foot  chack MRSA swab  hold further vancomcycin      #right foot erythema  concerned for residual osteo   PT's AICD is not MRI compatbale  ?ct  , await podiatry in put      #hypotension  h/o CHF  maybe in part form sepsis  cardiac echo  cardiology followu p    #elevated creatinine  dose meds per renal function      Lori Bennett M.D. ,   please reach via teams   If no answer, or after 5PM/ weekends,  then please call  [phone]    Assessment and plan discussed with the primary team .    ID service will be covering over the weekend. Please call for acute issues or questions. [phone]

## 2023-10-06 NOTE — CONSULT NOTE ADULT - ASSESSMENT
HPI:  62yr M      hx of  HTN,  C/C  AFIB,  DM/  right foot  osteo      completed recent course of antibiotics via PICC for rt foot infection / last month     p/w erythema and subjective fevers at home     seen  by podiatry,   in  er   denies  cp/sob/abd  pain     (06 Oct 2023 09:29)      ACUTE RENAL FAILURE:   Serum creatinine is  at     , approximating GFR at   ml/min.   There is no progression . No uremic symptoms  No evidence of anemia .  Fluid status stable.  Will continue to avoid nephrotoxic drugs.  Patient remains asymptomatic.   Continue current therapy.  hold  diuretic.  hold   ACE inhibitor.  hold   ARB.  Additional evaluation:   ECG,    echocardiogram,     CXR,  will obtained recent   renal ultrasound to evalaute kidney size and possible stones ,    BP monitoring,continue current antihypertensive meds, low salt diet,followup with PMD in 1-2 weeks  metoprolol succinate ER 25 milliGRAM(s) Oral daily    Admit for septic workup and ID evaluation,send blood and urine cx,serial lactate levels,monitor vitals closley,ivfs hydration,monitor urine output and renal profile,iv abx as per id cons  piperacillin/tazobactam IVPB.. 3.375 Gram(s) IV Intermittent every 8 hours

## 2023-10-06 NOTE — ED PROVIDER NOTE - CARE PLAN
Principal Discharge DX:	Sepsis   1 Principal Discharge DX:	Sepsis  Secondary Diagnosis:	ALEJANDRO (acute kidney injury)  Secondary Diagnosis:	Hypomagnesemia

## 2023-10-06 NOTE — CONSULT NOTE ADULT - ASSESSMENT
62 y.o M w/ Right foot 5th met base wound to bone with cellulitis to the dorsal foot   - Pt was seen and evaluated.   - Afebrile, WBC 7.66, ESR/CRP pending   - Right foot 5th met base wound to bone with cellulitis to the dorsal foot, warm to touch, no drainage, no malodor, no tracking/tunneling. Left foot with no open wounds or signs of infection   - X-Ray: no gas, no OM (prelim)   - Recommend admission through medicine.  - Recommend IV antibiotics Vanco/zosyn   - Recommend ID consult   - Right foot wound cultured  - Pod Plan: Local wound care pending cellulitis resolution   - Discussed with attending

## 2023-10-06 NOTE — ED ADULT NURSE REASSESSMENT NOTE - NS ED NURSE REASSESS COMMENT FT1
Cultures came back positive, aerobic and anaerobic, Gram Positive Cocci pairs and chains. Message sent on teams to Jaciel Quinonez

## 2023-10-06 NOTE — ED ADULT NURSE REASSESSMENT NOTE - NS ED NURSE REASSESS COMMENT FT1
Report received from JAYJAY King in pink Pt observed sitting up in stretcher conversing with RN without difficulty. Breathing spontaneous and unlabored. Pt updated on plan of care awaiting bed assignment RTM med. Right foot wound wrapped, clean dry and intact.   No acute distress noted. Call bell within reach. Report received from JAYJAY King in pink Pt observed sitting up in stretcher conversing with RN without difficulty. Breathing spontaneous and unlabored. Pt updated on plan of care awaiting bed assignment RTM tele. Maintained on cont. cardiac monitor.  Right foot wound wrapped, clean dry and intact.   No acute distress noted. Call bell within reach.

## 2023-10-06 NOTE — H&P ADULT - HISTORY OF PRESENT ILLNESS
62yr M      hx of  HTN,  C/C  AFIB,  DM/  right foot  osteo      completed recent course of antibiotics via PICC for rt foot infection / last month     p/w erythema and subjective fevers at home     seen  by podiatry,   in  er   denies  cp/sob/abd  pain

## 2023-10-06 NOTE — H&P ADULT - ASSESSMENT
62yr M      hx of  HTN,   c/c   AFIB,  DM/. right foot  osteo    cad/  s/p  cabg /  aicd. / , hx of Sommers fracture, non-union many years ago    prior CT:   c/c  transverse   fx, through the fifth metatarsal base. soft tissue wound with foci of air is seen tracking to the level of the base of the fifth metatarsal tracking up to the site of fracture. There is adjacent cortical irregularity along the fifth metatarsal base  wound cx   MSSA and fingoldia,  s/p I&D with necrotic tissue close to bone so high suspicion for residual osteo    completed   ancef 2 gms IVSS q 8 hours .  last  month     now  admitted with fevers/  right foot  wound/  and  shanda   R  5th metatarsal  wound/  foot  cellulitis     on iv  zosyn     seen by podiatry /  ID  f/p   HTN/  HLD   CAD/  c/c  Afib,  on xarelo/  known to  card   c/c  anemia     62yr M      hx of  HTN,   c/c   AFIB,  DM/. right foot  osteo    cad/  s/p  cabg /  aicd. / , hx of Sommers fracture, non-union many years ago    prior CT:   c/c  transverse   fx, through the fifth metatarsal base. soft tissue wound with foci of air is seen tracking to the level of the base of the fifth metatarsal tracking up to the site of fracture. There is adjacent cortical irregularity along the fifth metatarsal base  wound cx   MSSA and fingoldia,  s/p I&D with necrotic tissue close to bone so high suspicion for residual osteo    completed   ancef 2 gms IVSS q 8 hours .  last  month     now  admitted with fevers/  right foot  wound/  and  shanda   R  5th metatarsal  wound/  foot  cellulitis     on iv  zosyn     seen by podiatry /  ID  f/p   HTN/  HLD   CAD/  c/c  Afib,  on xarelo/  known to  card   c/c  anemia  DM,  on  farxiga. januvia. metformin     low  hco3/ +  beta  hydroxy  acetate, on po  hco3/  endo/  renal  eval   cardiomyopathy,  ef 25   ha s  c/c  low  baseline  sbp  in  90's   hold  lasix/  farxiga/  entresto, for  now         rad< from: TTE W or WO Ultrasound Enhancing Agent (08.07.23 @ 15:33) >  ONCLUSIONS:     1. Severely dilated left ventricular cavity size.The left ventricular systolic function is severely decreased with an ejection fraction visually estimated at 25 to 30 %. There is global left ventricular hypokinesis.   2. Multiple segmental abnormalities exist. See findings.   3. Device lead is visualized in the right ventricle.   4. The aortic annulus and aortic root appear normal in size.    ________________________________________________________________________________________  FINDINGS:  < end of copied text >       62yr M      hx of  HTN,   c/c   AFIB,  DM/. right foot  osteo    cad/  s/p  cabg /  aicd. / , hx of Sommers fracture, non-union many years ago    prior CT:  c/c  transverse   fx, through the fifth metatarsal base. soft tissue wound/ cortical irregularity along the fifth metatarsal base    wound cx   MSSA and fingoldia,  s/p I&D with necrotic tissue close to bone,suspicion for residual osteo    completed   ancef 2 gms IVSS q 8 hours .  last  month     now  admitted with fevers/  right foot  wound/  and  shanda   R  5th metatarsal  wound/  foot  cellulitis     on iv  zosyn     seen by podiatry /  ID  f/p   HTN/  HLD   CAD/  c/c  Afib,  on xarelo/  known to  card   c/c  anemia  DM,  on  farxiga. januvia. metformin     low  hco3/ +  beta  hydroxy  acetate, on po  hco3/  endo/  renal  eval   cardiomyopathy,  ef 25   has   c/c  low  baseline  sbp  in  90's   hold  lasix/  farxiga/  entresto, for  now         rad< from: TTE W or WO Ultrasound Enhancing Agent (08.07.23 @ 15:33) >  ONCLUSIONS:     1. Severely dilated left ventricular cavity size.The left ventricular systolic function is severely decreased with an ejection fraction visually estimated at 25 to 30 %. There is global left ventricular hypokinesis.   2. Multiple segmental abnormalities exist. See findings.   3. Device lead is visualized in the right ventricle.   4. The aortic annulus and aortic root appear normal in size.    ________________________________________________________________________________________  FINDINGS:  < end of copied text >       62yr M      hx of  HTN,   c/c   AFIB,  DM/. right foot  osteo      CAD, s/p  cabg / AICD.  , hx of Sommers fracture, non-union many years ago    prior CT:  c/c  transverse   fx, through the fifth metatarsal base. soft tissue wound/ cortical irregularity along the fifth metatarsal base    wound cx   MSSA and fingoldia,  s/p I&D with necrotic tissue close to bone,suspicion for residual osteo    completed   ancef 2 gms IVSS q 8 hours .  last  month     now  admitted with fevers/  right foot  wound/  and  shanda   R  5th metatarsal  wound/  foot  cellulitis     on iv  zosyn     seen by podiatry /  ID  f/p   HTN/  HLD   CAD/  c/c  Afib,  on xarelo/  known to  card   c/c  anemia  DM,  on  farxiga. januvia. metformin     low  hco3/ +  beta  hydro.  mej[place] s card  in  [place]   has   c/c  low  baseline  sbp  in  90's   hold  lasix/  farxiga/  entresto, for  now/ farxiga  not  recommended  with osteo  foot/ ha s risk  for  amutation         rad< from: TTE W or WO Ultrasound Enhancing Agent (08.07.23 @ 15:33) >  ONCLUSIONS:     1. Severely dilated left ventricular cavity size.The left ventricular systolic function is severely decreased with an ejection fraction visually estimated at 25 to 30 %. There is global left ventricular hypokinesis.   2. Multiple segmental abnormalities exist. See findings.   3. Device lead is visualized in the right ventricle.   4. The aortic annulus and aortic root appear normal in size.    ________________________________________________________________________________________  FINDINGS:  < end of copied text >

## 2023-10-06 NOTE — CONSULT NOTE ADULT - NSCONSULTADDITIONALINFOA_GEN_ALL_CORE
MEDICATIONS  (STANDING):  aspirin enteric coated 81 milliGRAM(s) Oral daily  atorvastatin 80 milliGRAM(s) Oral at bedtime  dextrose 5%. 1000 milliLiter(s) (100 mL/Hr) IV Continuous <Continuous>  dextrose 5%. 1000 milliLiter(s) (50 mL/Hr) IV Continuous <Continuous>  dextrose 50% Injectable 25 Gram(s) IV Push once  dextrose 50% Injectable 25 Gram(s) IV Push once  dextrose 50% Injectable 12.5 Gram(s) IV Push once  glucagon  Injectable 1 milliGRAM(s) IntraMuscular once  insulin lispro (ADMELOG) corrective regimen sliding scale   SubCutaneous three times a day before meals  metoprolol succinate ER 25 milliGRAM(s) Oral daily  piperacillin/tazobactam IVPB.. 3.375 Gram(s) IV Intermittent every 8 hours  rivaroxaban 20 milliGRAM(s) Oral with dinner  sodium bicarbonate 650 milliGRAM(s) Oral three times a day

## 2023-10-06 NOTE — ED ADULT NURSE REASSESSMENT NOTE - NS ED NURSE REASSESS COMMENT FT1
PT diastolic pressure in the 30's.  Dr Murillo is at bedside, pt has diastolic heart failure/  BP within range

## 2023-10-06 NOTE — CONSULT NOTE ADULT - SUBJECTIVE AND OBJECTIVE BOX
Date of Service, 10-06-23 @ 12:46  CHIEF COMPLAINT:Patient is a 62y old  Male who presents with a chief complaint of fevers/  foot infection (06 Oct 2023 09:29)      HPI:  62M hx of type 2 DM on metformin, farixga, heart failure with reduced EF on metoprolol 75mg, lasix 20mg qd, xarelto for vte ppx (had RUE vte 2/2 PICC line), removed 2 weeks prior as well as right foot wound wac (present 2/2 osteo of foot with finegoldia magna), here for 36 hrs of fever, tmax 101, took 800mg advil today, prior to arrival. + slight right lower back pain, nonradiating, mild, no assc GI sxs. + urinating more freq as of late. Still tolerating PO. Denies chest pain, shortness of breath, diaphoresis. Hypotensive to 90s/50 in triage, then 80s/60s, last bp 100/40, not tachy but in setting of bb. Appears slightly dehydrated, clear lungs, s3 gallop, no murmurs appreciable. + right foot redness, swelling, nontender, no flucutance, no crepitations, 1+ dp pulses bilateral, full rom. Nontender calves. Benign abd, no peritoneal signs, no jaundice, no cva ttp. No midline spinal ttp. RUE no signs of infx, former picc site clean, Patient meets sepsis criteria by vitals. Will eval for common source of infection (ex. osteo, urinary, bacterial pulmonary, viral uri; unlikely central neurologic such as meninigitis or encephalitis) vs metabolic derangement. Check labs, lactate, UA, CXs, CXR, foot xray, inflam markers screening EKG, hydrate-> empiric abxs, antipyretics, additional crystalloid infusion as clinically warranted. Will start with 500 cc crystalloid 2/2 heart failure but euglycemic [id number]      PAST MEDICAL & SURGICAL HISTORY:  Diabetes  Chronic osteomyelitis  H/O heart failure      No significant past surgical history        MEDICATIONS  (STANDING):  aspirin enteric coated 81 milliGRAM(s) Oral daily  atorvastatin 80 milliGRAM(s) Oral at bedtime  dextrose 5%. 1000 milliLiter(s) (100 mL/Hr) IV Continuous <Continuous>  dextrose 5%. 1000 milliLiter(s) (50 mL/Hr) IV Continuous <Continuous>  dextrose 50% Injectable 25 Gram(s) IV Push once  dextrose 50% Injectable 25 Gram(s) IV Push once  dextrose 50% Injectable 12.5 Gram(s) IV Push once  glucagon  Injectable 1 milliGRAM(s) IntraMuscular once  insulin lispro (ADMELOG) corrective regimen sliding scale   SubCutaneous three times a day before meals  metoprolol succinate ER 25 milliGRAM(s) Oral daily  piperacillin/tazobactam IVPB.. 3.375 Gram(s) IV Intermittent every 8 hours  piperacillin/tazobactam IVPB.. 3.375 Gram(s) IV Intermittent once  rivaroxaban 20 milliGRAM(s) Oral with dinner  sodium bicarbonate 650 milliGRAM(s) Oral three times a day    MEDICATIONS  (PRN):  acetaminophen     Tablet .. 650 milliGRAM(s) Oral every 6 hours PRN Temp greater or equal to 38C (100.4F), Mild Pain (1 - 3)  dextrose Oral Gel 15 Gram(s) Oral once PRN Blood Glucose LESS THAN 70 milliGRAM(s)/deciliter      FAMILY HISTORY:      SOCIAL HISTORY:    [x ] Non-smoker  [ ] Smoker  [ ] Alcohol    Allergies    No Known Allergies    Intolerances    	    REVIEW OF SYSTEMS:  CONSTITUTIONAL: No fever, weight loss, or fatigue  EYES: No eye pain, visual disturbances, or discharge  ENT:  No difficulty hearing, tinnitus, vertigo; No sinus or throat pain  NECK: No pain or stiffness  RESPIRATORY: No cough, wheezing, chills or hemoptysis; + Shortness of Breath  CARDIOVASCULAR: No chest pain, palpitations, passing out, dizziness, + leg swelling  GASTROINTESTINAL: No abdominal or epigastric pain. No nausea, vomiting, or hematemesis; No diarrhea or constipation. No melena or hematochezia.  GENITOURINARY: No dysuria, frequency, hematuria, or incontinence  NEUROLOGICAL: No headaches, memory loss, loss of strength, numbness, or tremors  SKIN: No itching, burning, rashes, or lesions   LYMPH Nodes: No enlarged glands  ENDOCRINE: No heat or cold intolerance; No hair loss  MUSCULOSKELETAL: No joint pain or swelling; No muscle, back, + extremity pain  PSYCHIATRIC: No depression, anxiety, mood swings, or difficulty sleeping  HEME/LYMPH: No easy bruising, or bleeding gums  ALLERGY AND IMMUNOLOGIC: No hives or eczema	    [x ] All others negative	  [ ] Unable to obtain    PHYSICAL EXAM:  T(C): 37.7 (10-06-23 @ 11:40), Max: 37.7 (10-06-23 @ 11:40)  HR: 97 (10-06-23 @ 11:40) (67 - 97)  BP: 111/62 (10-06-23 @ 11:40) (92/52 - 111/62)  RR: 20 (10-06-23 @ 11:40) (18 - 20)  SpO2: 98% (10-06-23 @ 11:40) (98% - 100%)  Wt(kg): --  I&O's Summary      Appearance: Normal	  HEENT:   Normal oral mucosa, PERRL, EOMI	  Lymphatic: No lymphadenopathy  Cardiovascular: Normal S1 S2, No JVD, + murmurs, No edema  Respiratory: Lungs clear to auscultation	  Psychiatry: A & O x 3, Mood & affect appropriate  Gastrointestinal:  Soft, Non-tender, + BS	  Skin: No rashes, No ecchymoses, No cyanosis	  Neurologic: Non-focal  Extremities: Normal range of motion, + foot infection  Vascular:  + pvd    TELEMETRY: 	    ECG:  	  RADIOLOGY:  OTHER: 	  	  LABS:	 	    CARDIAC MARKERS:                              11.2   7.66  )-----------( 142      ( 06 Oct 2023 06:54 )             36.3     10-06    137  |  99  |  26<H>  ----------------------------<  141<H>  3.6   |  23  |  1.35<H>    Ca    9.3      06 Oct 2023 10:49  Phos  2.8     10-06  Mg     1.5     10-06    TPro  7.3  /  Alb  4.1  /  TBili  1.2  /  DBili  x   /  AST  13  /  ALT  6<L>  /  AlkPhos  63  10-06    proBNP:   Lipid Profile:   HgA1c:   TSH:   PT/INR - ( 06 Oct 2023 06:54 )   PT: 38.7 sec;   INR: 3.65 ratio         PTT - ( 06 Oct 2023 06:54 )  PTT:42.0 sec    PREVIOUS DIAGNOSTIC TESTING:    < from: 12 Lead ECG (10.06.23 @ 06:47) >  Diagnosis Line Ventricular-paced rhythm WITH OCCASIONAL PREMATURE VENTRICULAR COMPLEXES  ABNORMAL ECG  NO PREVIOUS ECGS AVAILABLE    < from: TTE W or WO Ultrasound Enhancing Agent (08.07.23 @ 15:33) >   1. Severely dilated left ventricular cavity size.The left ventricular systolic function is severely decreased with an ejection fraction visually estimated at 25 to 30 %. There is global left ventricular hypokinesis.   2. Multiple segmental abnormalities exist. See findings.   3. Device lead is visualized in the right ventricle.   4. The aortic annulus and aortic root appear normal in size.    < from: Xray Chest 1 View- PORTABLE-Urgent (10.06.23 @ 07:09) >  Heart/Vascular: Heart is normal in size. Median sternotomy. Left chest   wall AICD.  Pulmonary:No focal consolidations. No pleural effusion. No pneumothorax.  Bones: No acute bony abnormalities.  Lines and tubes: None.    IMPRESSION:  Clear lungs.

## 2023-10-06 NOTE — ED PROVIDER NOTE - ATTENDING CONTRIBUTION TO CARE
Attending (Zeferino Briseno D.O.):  I have personally seen and examined this patient. I have performed a substantive portion of the visit including all aspects of the medical decision making. Resident, fellow, student, and/or ACP note reviewed. I agree on the plan of care except where noted.    see mdm

## 2023-10-06 NOTE — CHART NOTE - NSCHARTNOTEFT_GEN_A_CORE
CC: Called by RN, BC x2 returned Gram + cocci in pairs and chains in aerobic and anaerobic bottle.  Pt hemodynamically stable presently on Zosyn, no leukocytosis, but febrile.    HPI: 62M hx of type 2 DM on metformin, farixga, heart failure with reduced EF on metoprolol 75mg, lasix 20mg qd, xarelto for vte ppx (had RUE vte 2/2 PICC line), removed 2 weeks prior as well as right foot wound wac (present 2/2 osteo of foot with finegoldia magna), here for 36 hrs of fever, tmax 101, took 800mg advil today, prior to arrival. Admitted with fever and sepsis- possible source R foot cellulitis, s/p vanco x 1 in ED continued on zosyn. Blood cultures drawn on 10/6/23 returned + pending sensitivity. Pt now bacteremic continue with present antibiotics Zosyn.     Lactate, Blood: 2.5 mmol/L    Complete Blood Count + Automated Diff (10.06.23 @ 06:54)   WBC Count: 7.66 K/uLCulture - Blood (10.06.23 @ 06:28)     Gram Stain:   Growth in aerobic and anaerobic bottles: Gram Positive Cocci in Pairs and Culture - Blood (10.06.23 @ 06:10)   Gram Stain:   Growth in aerobic and anaerobic bottles: Gram Positive Cocci in Pairs and   Chains    a/p- ID pending tomorrow   continue with Zosyn till sensitivity returns  trend CBC and fever curve  f/u lactate in AM     Jaciel Quinonez, JAYY  [id number]

## 2023-10-06 NOTE — CONSULT NOTE ADULT - SUBJECTIVE AND OBJECTIVE BOX
Patient is a 62y Male whom presented to the hospital with shanda     PAST MEDICAL & SURGICAL HISTORY:  Diabetes      Chronic osteomyelitis      H/O heart failure      No significant past surgical history          MEDICATIONS  (STANDING):  aspirin enteric coated 81 milliGRAM(s) Oral daily  atorvastatin 80 milliGRAM(s) Oral at bedtime  dextrose 5%. 1000 milliLiter(s) (100 mL/Hr) IV Continuous <Continuous>  dextrose 5%. 1000 milliLiter(s) (50 mL/Hr) IV Continuous <Continuous>  dextrose 50% Injectable 25 Gram(s) IV Push once  dextrose 50% Injectable 25 Gram(s) IV Push once  dextrose 50% Injectable 12.5 Gram(s) IV Push once  glucagon  Injectable 1 milliGRAM(s) IntraMuscular once  insulin lispro (ADMELOG) corrective regimen sliding scale   SubCutaneous three times a day before meals  metoprolol succinate ER 25 milliGRAM(s) Oral daily  piperacillin/tazobactam IVPB.. 3.375 Gram(s) IV Intermittent every 8 hours  rivaroxaban 20 milliGRAM(s) Oral with dinner  sodium bicarbonate 650 milliGRAM(s) Oral three times a day      Allergies    No Known Allergies    Intolerances        SOCIAL HISTORY:  Denies ETOh,Smoking,     FAMILY HISTORY:      REVIEW OF SYSTEMS:    CONSTITUTIONAL: No weakness, fevers or chills  EYES/ENT: No visual changes;  no throat pain   NECK: No pain or stiffness  RESPIRATORY: No cough, wheezing, hemoptysis; No shortness of breath  CARDIOVASCULAR: No chest pain or palpitations  GASTROINTESTINAL: No abdominal or epigastric pain. No nausea, vomiting,     No diarrhea or constipation. No melena   GENITOURINARY: No dysuria, frequency or hematuria  NEUROLOGICAL: No numbness or weakness  SKIN: dry      VITAL:  T(C): , Max: 38.3 (10-06-23 @ 20:27)  T(F): , Max: 101 (10-06-23 @ 20:27)  HR: 65 (10-06-23 @ 20:03)  BP: 114/62 (10-06-23 @ 20:03)  BP(mean): 74 (10-06-23 @ 20:03)  RR: 16 (10-06-23 @ 20:03)  SpO2: 99% (10-06-23 @ 20:03)  Wt(kg): --    I and O's:    Height (cm): 185.4 (10-06 @ 06:09)  Weight (kg): 108.9 (10-06 @ 06:09)  BMI (kg/m2): 31.7 (10-06 @ 06:09)  BSA (m2): 2.33 (10-06 @ 06:09)    PHYSICAL EXAM:    Constitutional: NAD  HEENT: conjunctive   clear   Neck:  No JVD  Respiratory: CTAB  Cardiovascular: S1 and S2  Gastrointestinal: BS+, soft, NT/ND  Extremities: No peripheral edema  Neurological: A/O x 3, no focal deficits  Psychiatric: Normal mood, normal affect  : No Atkins  Skin: No rashes  Access: Not applicable    LABS:                        11.2   7.66  )-----------( 142      ( 06 Oct 2023 06:54 )             36.3     10-06    137  |  99  |  26<H>  ----------------------------<  141<H>  3.6   |  23  |  1.35<H>    Ca    9.3      06 Oct 2023 10:49  Phos  2.8     10-06  Mg     1.5     10-06    TPro  7.3  /  Alb  4.1  /  TBili  1.2  /  DBili  x   /  AST  13  /  ALT  6<L>  /  AlkPhos  63  10-06      Urine Studies:  Urinalysis Basic - ( 06 Oct 2023 16:25 )    Color: Yellow / Appearance: Slightly Turbid / S.023 / pH: x  Gluc: x / Ketone: Negative  / Bili: Negative / Urobili: Negative   Blood: x / Protein: 100 mg/dl / Nitrite: Negative   Leuk Esterase: Negative / RBC: 1 /hpf / WBC 6 /HPF   Sq Epi: x / Non Sq Epi: x / Bacteria: Negative            RADIOLOGY & ADDITIONAL STUDIES:

## 2023-10-06 NOTE — ED PROVIDER NOTE - PROGRESS NOTE DETAILS
received sign out from Dr Briseno pending labs, xr and admit. briefly, 62yr M hx of chf DM w recent course of antibiotics via PICC for rt foot infection p/w erythema and subjective fevers at home. awaiting lab results, lact 2.5 but no acidosis. zosyn given. DJ Isis Coronado,  PGY-3  Patient signed out to me pending lab results, XR, podiatry consult.  Podiatry has been consulted, will see patient in the ER. Isis Coronado,  PGY-3  Called to the bedside by nurse for patient's hypertension the 90s over 50s.  Per patient doing mentating well, appears well perfused, blood pressure is at baseline.  POCUS demonstrates heart with reduced ejection fraction, IVC is collapsible, lungs are A-line predominant.  500 cc fluids going slowly, will continue and continue to monitor.

## 2023-10-06 NOTE — CONSULT NOTE ADULT - ASSESSMENT
62M hx of type 2 DM on metformin, farixga, heart failure with reduced EF on metoprolol 75mg, lasix 20mg qd, xarelto for vte ppx (had RUE vte 2/2 PICC line), removed 2 weeks prior as well as right foot wound wac (present 2/2 osteo of foot with finegoldia magna), here for 36 hrs of fever, tmax 101, took 800mg advil today, prior to arrival. + slight right lower back pain, nonradiating, mild, no assc GI sxs. + urinating more freq as of late. Still tolerating PO. Denies chest pain, shortness of breath, diaphoresis. Hypotensive to 90s/50 in triage, then 80s/60s, last bp 100/40, not tachy but in setting of bb. Appears slightly dehydrated, clear lungs, s3 gallop, no murmurs appreciable. + right foot redness, swelling, nontender, no flucutance, no crepitations, 1+ dp pulses bilateral, full rom. Nontender calves. Benign abd, no peritoneal signs, no jaundice, no cva ttp. No midline spinal ttp. RUE no signs of infx, former picc site clean, Patient meets sepsis criteria by vitals. Will eval for common source of infection (ex. osteo, urinary, bacterial pulmonary, viral uri; unlikely central neurologic such as meninigitis or encephalitis) vs metabolic derangement. Check labs, lactate, UA, CXs, CXR, foot xray, inflam markers screening EKG, hydrate-> empiric abxs, antipyretics, additional crystalloid infusion as clinically warranted. Will start with 500 cc crystalloid 2/2 heart failure but euglycemic dka.  pt is well known to me with hx of htn, ashd, chf, s/p AIVCD, PVD with multiple admissions sec to foot infection  pt with sig lv dysfunction  will  adjust cardiac meds  hold Farxiga for now. may  requiring surgery  dvt prophylaxis  vascula/podiatry consult  abx

## 2023-10-06 NOTE — CONSULT NOTE ADULT - SUBJECTIVE AND OBJECTIVE BOX
per HPI:  62yr M hx of  HTN,  C/C  AFIB,  DM/  right foot  osteo   completed recent course of antibiotics via PICC for rt foot infection / last month     p/w erythema and subjective fevers at home     seen  by podiatry,   in  er   denies  cp/sob/abd  pain     (06 Oct 2023 09:29)      Patient has history of diabetes, A1Ct: 6.0 % On home Farxiga, Januvia and Metformin  Endo was consulted for glycemic control.      PAST MEDICAL & SURGICAL HISTORY:  Diabetes      Chronic osteomyelitis      H/O heart failure      No significant past surgical history          FAMILY HISTORY:      Social History:            HOME MEDICATIONS:  Home Medications:  aspirin 81 mg oral delayed release tablet: 1 tab(s) orally once a day (06 Aug 2023 16:09)  eplerenone 25 mg oral tablet: 1 tab(s) orally every other day (06 Aug 2023 16:09)  Farxiga 10 mg oral tablet: 1 tab(s) orally once a day (06 Aug 2023 16:09)  Januvia 100 mg oral tablet: 1 tab(s) orally once a day (06 Aug 2023 16:09)  metFORMIN 1000 mg oral tablet: 1 tab(s) orally 2 times a day (06 Aug 2023 16:09)  metoprolol succinate 50 mg oral tablet, extended release: 1 tab(s) orally once a day (16 Aug 2023 11:24)  polyethylene glycol 3350 oral powder for reconstitution: 17 gram(s) orally once a day (16 Aug 2023 11:24)  rosuvastatin 40 mg oral tablet: 1 tab(s) orally once a day (06 Aug 2023 16:09)  sacubitril-valsartan 49 mg-51 mg oral tablet: 1 tab(s) orally 2 times a day (16 Aug 2023 11:24)  Xarelto 20 mg oral tablet: 1 tab(s) orally once a day (06 Aug 2023 16:09)            MEDICATIONS  (STANDING):  aspirin enteric coated 81 milliGRAM(s) Oral daily  atorvastatin 80 milliGRAM(s) Oral at bedtime  dextrose 5%. 1000 milliLiter(s) (50 mL/Hr) IV Continuous <Continuous>  dextrose 5%. 1000 milliLiter(s) (100 mL/Hr) IV Continuous <Continuous>  dextrose 50% Injectable 25 Gram(s) IV Push once  dextrose 50% Injectable 25 Gram(s) IV Push once  dextrose 50% Injectable 12.5 Gram(s) IV Push once  glucagon  Injectable 1 milliGRAM(s) IntraMuscular once  insulin lispro (ADMELOG) corrective regimen sliding scale   SubCutaneous three times a day before meals  metoprolol succinate ER 25 milliGRAM(s) Oral daily  piperacillin/tazobactam IVPB.. 3.375 Gram(s) IV Intermittent every 8 hours  rivaroxaban 20 milliGRAM(s) Oral with dinner  sodium bicarbonate 650 milliGRAM(s) Oral three times a day    MEDICATIONS  (PRN):  acetaminophen     Tablet .. 650 milliGRAM(s) Oral every 6 hours PRN Temp greater or equal to 38C (100.4F), Mild Pain (1 - 3)  dextrose Oral Gel 15 Gram(s) Oral once PRN Blood Glucose LESS THAN 70 milliGRAM(s)/deciliter      Allergies    No Known Allergies    Intolerances        Review of Systems:  Neuro: No HA, no dizziness  Cardiovascular: No chest pain, no palpitations  Respiratory: no SOB, no cough  GI: No nausea, vomiting, abdominal pain  MSK: Denies joint/muscle pain      ALL OTHER SYSTEMS REVIEWED AND NEGATIVE        PHYSICAL EXAM:  VITALS: T(C): 37.7 (10-06-23 @ 11:40)  T(F): 99.8 (10-06-23 @ 11:40), Max: 99.8 (10-06-23 @ 11:40)  HR: 97 (10-06-23 @ 11:40) (67 - 97)  BP: 111/62 (10-06-23 @ 11:40) (92/52 - 111/62)  RR:  (18 - 20)  SpO2:  (98% - 100%)  Wt(kg): --  GENERAL: NAD, well-groomed, well-developed  NEURO:  alert and oriented  RESPIRATORY: Clear to auscultation bilaterally; No rales, rhonchi, wheezing  CARDIOVASCULAR: Si S2  GI: Soft, non distended, normal bowel sounds  MUSCULOSKELETAL: Moves all extremities equally       POCT Blood Glucose.: 124 mg/dL (10-06-23 @ 11:22)                            11.2   7.66  )-----------( 142      ( 06 Oct 2023 06:54 )             36.3       10-06    137  |  99  |  26<H>  ----------------------------<  141<H>  3.6   |  23  |  1.35<H>    eGFR: 59<L>    Ca    9.3      10-06  Mg     1.5     10-06  Phos  2.8     10-06    TPro  7.3  /  Alb  4.1  /  TBili  1.2  /  DBili  x   /  AST  13  /  ALT  6<L>  /  AlkPhos  63  10-06      Thyroid Function Tests:    Diet, Consistent Carbohydrate w/Evening Snack (10-06-23 @ 09:44) [Active]        08-07 Chol 75 Direct LDL -- LDL calculated 26 HDL 27<L> Trig 112  A1C with Estimated Average Glucose Result: 6.0 % (08-07-23 @ 06:07)                     per HPI:  62yr M hx of  HTN,  C/C  AFIB,  DM/  right foot  osteo   completed recent course of antibiotics via PICC for rt foot infection  / last month     p/w erythema and subjective fevers at home     seen  by podiatry,   in  er   denies  cp/sob/abd  pain     (06 Oct 2023 09:29)      Patient has history of diabetes, A1Ct: 6.0 % On home Farxiga, Januvia and Metformin  Endo was consulted for glycemic control.      PAST MEDICAL & SURGICAL HISTORY:  Diabetes      Chronic osteomyelitis      H/O heart failure      No significant past surgical history          FAMILY HISTORY:      Social History:            HOME MEDICATIONS:  Home Medications:  aspirin 81 mg oral delayed release tablet: 1 tab(s) orally once a day (06 Aug 2023 16:09)  eplerenone 25 mg oral tablet: 1 tab(s) orally every other day (06 Aug 2023 16:09)  Farxiga 10 mg oral tablet: 1 tab(s) orally once a day (06 Aug 2023 16:09)  Januvia 100 mg oral tablet: 1 tab(s) orally once a day (06 Aug 2023 16:09)  metFORMIN 1000 mg oral tablet: 1 tab(s) orally 2 times a day (06 Aug 2023 16:09)  metoprolol succinate 50 mg oral tablet, extended release: 1 tab(s) orally once a day (16 Aug 2023 11:24)  polyethylene glycol 3350 oral powder for reconstitution: 17 gram(s) orally once a day (16 Aug 2023 11:24)  rosuvastatin 40 mg oral tablet: 1 tab(s) orally once a day (06 Aug 2023 16:09)  sacubitril-valsartan 49 mg-51 mg oral tablet: 1 tab(s) orally 2 times a day (16 Aug 2023 11:24)  Xarelto 20 mg oral tablet: 1 tab(s) orally once a day (06 Aug 2023 16:09)            MEDICATIONS  (STANDING):  aspirin enteric coated 81 milliGRAM(s) Oral daily  atorvastatin 80 milliGRAM(s) Oral at bedtime  dextrose 5%. 1000 milliLiter(s) (50 mL/Hr) IV Continuous <Continuous>  dextrose 5%. 1000 milliLiter(s) (100 mL/Hr) IV Continuous <Continuous>  dextrose 50% Injectable 25 Gram(s) IV Push once  dextrose 50% Injectable 25 Gram(s) IV Push once  dextrose 50% Injectable 12.5 Gram(s) IV Push once  glucagon  Injectable 1 milliGRAM(s) IntraMuscular once  insulin lispro (ADMELOG) corrective regimen sliding scale   SubCutaneous three times a day before meals  metoprolol succinate ER 25 milliGRAM(s) Oral daily  piperacillin/tazobactam IVPB.. 3.375 Gram(s) IV Intermittent every 8 hours  rivaroxaban 20 milliGRAM(s) Oral with dinner  sodium bicarbonate 650 milliGRAM(s) Oral three times a day    MEDICATIONS  (PRN):  acetaminophen     Tablet .. 650 milliGRAM(s) Oral every 6 hours PRN Temp greater or equal to 38C (100.4F), Mild Pain (1 - 3)  dextrose Oral Gel 15 Gram(s) Oral once PRN Blood Glucose LESS THAN 70 milliGRAM(s)/deciliter      Allergies    No Known Allergies    Intolerances        Review of Systems:  Neuro: No HA, no dizziness  Cardiovascular: No chest pain, no palpitations  Respiratory: no SOB, no cough  GI: No nausea, vomiting, abdominal pain  MSK: Denies joint/muscle pain      ALL OTHER SYSTEMS REVIEWED AND NEGATIVE        PHYSICAL EXAM:  VITALS: T(C): 37.7 (10-06-23 @ 11:40)  T(F): 99.8 (10-06-23 @ 11:40), Max: 99.8 (10-06-23 @ 11:40)  HR: 97 (10-06-23 @ 11:40) (67 - 97)  BP: 111/62 (10-06-23 @ 11:40) (92/52 - 111/62)  RR:  (18 - 20)  SpO2:  (98% - 100%)  Wt(kg): --  GENERAL: NAD, well-groomed, well-developed  NEURO:  alert and oriented  RESPIRATORY: Clear to auscultation bilaterally; No rales, rhonchi, wheezing  CARDIOVASCULAR: Si S2  GI: Soft, non distended, normal bowel sounds  MUSCULOSKELETAL: Moves all extremities equally       POCT Blood Glucose.: 124 mg/dL (10-06-23 @ 11:22)                            11.2   7.66  )-----------( 142      ( 06 Oct 2023 06:54 )             36.3       10-06    137  |  99  |  26<H>  ----------------------------<  141<H>  3.6   |  23  |  1.35<H>    eGFR: 59<L>    Ca    9.3      10-06  Mg     1.5     10-06  Phos  2.8     10-06    TPro  7.3  /  Alb  4.1  /  TBili  1.2  /  DBili  x   /  AST  13  /  ALT  6<L>  /  AlkPhos  63  10-06      Thyroid Function Tests:    Diet, Consistent Carbohydrate w/Evening Snack (10-06-23 @ 09:44) [Active]        08-07 Chol 75 Direct LDL -- LDL calculated 26 HDL 27<L> Trig 112  A1C with Estimated Average Glucose Result: 6.0 % (08-07-23 @ 06:07)

## 2023-10-07 LAB
ANION GAP SERPL CALC-SCNC: 14 MMOL/L — SIGNIFICANT CHANGE UP (ref 5–17)
BUN SERPL-MCNC: 26 MG/DL — HIGH (ref 7–23)
CALCIUM SERPL-MCNC: 9.1 MG/DL — SIGNIFICANT CHANGE UP (ref 8.4–10.5)
CHLORIDE SERPL-SCNC: 102 MMOL/L — SIGNIFICANT CHANGE UP (ref 96–108)
CO2 SERPL-SCNC: 22 MMOL/L — SIGNIFICANT CHANGE UP (ref 22–31)
CREAT SERPL-MCNC: 1.5 MG/DL — HIGH (ref 0.5–1.3)
CULTURE RESULTS: SIGNIFICANT CHANGE UP
EGFR: 52 ML/MIN/1.73M2 — LOW
GLUCOSE BLDC GLUCOMTR-MCNC: 107 MG/DL — HIGH (ref 70–99)
GLUCOSE BLDC GLUCOMTR-MCNC: 137 MG/DL — HIGH (ref 70–99)
GLUCOSE BLDC GLUCOMTR-MCNC: 151 MG/DL — HIGH (ref 70–99)
GLUCOSE BLDC GLUCOMTR-MCNC: 157 MG/DL — HIGH (ref 70–99)
GLUCOSE SERPL-MCNC: 96 MG/DL — SIGNIFICANT CHANGE UP (ref 70–99)
HCT VFR BLD CALC: 31.7 % — LOW (ref 39–50)
HGB BLD-MCNC: 10 G/DL — LOW (ref 13–17)
LACTATE SERPL-SCNC: 1.3 MMOL/L — SIGNIFICANT CHANGE UP (ref 0.5–2)
MCHC RBC-ENTMCNC: 26.9 PG — LOW (ref 27–34)
MCHC RBC-ENTMCNC: 31.5 GM/DL — LOW (ref 32–36)
MCV RBC AUTO: 85.2 FL — SIGNIFICANT CHANGE UP (ref 80–100)
MRSA PCR RESULT.: SIGNIFICANT CHANGE UP
NRBC # BLD: 0 /100 WBCS — SIGNIFICANT CHANGE UP (ref 0–0)
PLATELET # BLD AUTO: 128 K/UL — LOW (ref 150–400)
POTASSIUM SERPL-MCNC: 4 MMOL/L — SIGNIFICANT CHANGE UP (ref 3.5–5.3)
POTASSIUM SERPL-SCNC: 4 MMOL/L — SIGNIFICANT CHANGE UP (ref 3.5–5.3)
RBC # BLD: 3.72 M/UL — LOW (ref 4.2–5.8)
RBC # FLD: 18.2 % — HIGH (ref 10.3–14.5)
S AUREUS DNA NOSE QL NAA+PROBE: SIGNIFICANT CHANGE UP
SODIUM SERPL-SCNC: 138 MMOL/L — SIGNIFICANT CHANGE UP (ref 135–145)
SPECIMEN SOURCE: SIGNIFICANT CHANGE UP
T4 FREE SERPL-MCNC: 1.2 NG/DL — SIGNIFICANT CHANGE UP (ref 0.9–1.8)
TSH SERPL-MCNC: 5.55 UIU/ML — HIGH (ref 0.27–4.2)
VANCOMYCIN TROUGH SERPL-MCNC: <4 UG/ML — LOW (ref 10–20)
WBC # BLD: 4.76 K/UL — SIGNIFICANT CHANGE UP (ref 3.8–10.5)
WBC # FLD AUTO: 4.76 K/UL — SIGNIFICANT CHANGE UP (ref 3.8–10.5)

## 2023-10-07 PROCEDURE — 76604 US EXAM CHEST: CPT | Mod: 26

## 2023-10-07 RX ORDER — SACUBITRIL AND VALSARTAN 24; 26 MG/1; MG/1
1 TABLET, FILM COATED ORAL
Refills: 0 | Status: DISCONTINUED | OUTPATIENT
Start: 2023-10-07 | End: 2023-10-07

## 2023-10-07 RX ORDER — AMPICILLIN SODIUM AND SULBACTAM SODIUM 250; 125 MG/ML; MG/ML
3 INJECTION, POWDER, FOR SUSPENSION INTRAMUSCULAR; INTRAVENOUS ONCE
Refills: 0 | Status: COMPLETED | OUTPATIENT
Start: 2023-10-07 | End: 2023-10-07

## 2023-10-07 RX ORDER — SACUBITRIL AND VALSARTAN 24; 26 MG/1; MG/1
1 TABLET, FILM COATED ORAL
Refills: 0 | Status: DISCONTINUED | OUTPATIENT
Start: 2023-10-07 | End: 2023-10-13

## 2023-10-07 RX ORDER — AMPICILLIN SODIUM AND SULBACTAM SODIUM 250; 125 MG/ML; MG/ML
INJECTION, POWDER, FOR SUSPENSION INTRAMUSCULAR; INTRAVENOUS
Refills: 0 | Status: DISCONTINUED | OUTPATIENT
Start: 2023-10-07 | End: 2023-10-13

## 2023-10-07 RX ORDER — AMPICILLIN SODIUM AND SULBACTAM SODIUM 250; 125 MG/ML; MG/ML
3 INJECTION, POWDER, FOR SUSPENSION INTRAMUSCULAR; INTRAVENOUS EVERY 6 HOURS
Refills: 0 | Status: DISCONTINUED | OUTPATIENT
Start: 2023-10-07 | End: 2023-10-13

## 2023-10-07 RX ORDER — LANOLIN ALCOHOL/MO/W.PET/CERES
5 CREAM (GRAM) TOPICAL AT BEDTIME
Refills: 0 | Status: DISCONTINUED | OUTPATIENT
Start: 2023-10-07 | End: 2023-10-13

## 2023-10-07 RX ADMIN — Medication 5 MILLIGRAM(S): at 22:52

## 2023-10-07 RX ADMIN — AMPICILLIN SODIUM AND SULBACTAM SODIUM 200 GRAM(S): 250; 125 INJECTION, POWDER, FOR SUSPENSION INTRAMUSCULAR; INTRAVENOUS at 23:00

## 2023-10-07 RX ADMIN — AMPICILLIN SODIUM AND SULBACTAM SODIUM 200 GRAM(S): 250; 125 INJECTION, POWDER, FOR SUSPENSION INTRAMUSCULAR; INTRAVENOUS at 06:41

## 2023-10-07 RX ADMIN — AMPICILLIN SODIUM AND SULBACTAM SODIUM 200 GRAM(S): 250; 125 INJECTION, POWDER, FOR SUSPENSION INTRAMUSCULAR; INTRAVENOUS at 12:42

## 2023-10-07 RX ADMIN — AMPICILLIN SODIUM AND SULBACTAM SODIUM 200 GRAM(S): 250; 125 INJECTION, POWDER, FOR SUSPENSION INTRAMUSCULAR; INTRAVENOUS at 01:45

## 2023-10-07 RX ADMIN — Medication 650 MILLIGRAM(S): at 21:21

## 2023-10-07 RX ADMIN — Medication 1: at 12:44

## 2023-10-07 RX ADMIN — Medication 81 MILLIGRAM(S): at 12:43

## 2023-10-07 RX ADMIN — ATORVASTATIN CALCIUM 80 MILLIGRAM(S): 80 TABLET, FILM COATED ORAL at 21:21

## 2023-10-07 RX ADMIN — Medication 650 MILLIGRAM(S): at 17:46

## 2023-10-07 RX ADMIN — Medication 25 MILLIGRAM(S): at 07:27

## 2023-10-07 RX ADMIN — Medication 650 MILLIGRAM(S): at 17:14

## 2023-10-07 RX ADMIN — Medication 650 MILLIGRAM(S): at 17:44

## 2023-10-07 RX ADMIN — SACUBITRIL AND VALSARTAN 1 TABLET(S): 24; 26 TABLET, FILM COATED ORAL at 09:20

## 2023-10-07 RX ADMIN — Medication 650 MILLIGRAM(S): at 06:36

## 2023-10-07 RX ADMIN — SACUBITRIL AND VALSARTAN 1 TABLET(S): 24; 26 TABLET, FILM COATED ORAL at 18:19

## 2023-10-07 RX ADMIN — AMPICILLIN SODIUM AND SULBACTAM SODIUM 200 GRAM(S): 250; 125 INJECTION, POWDER, FOR SUSPENSION INTRAMUSCULAR; INTRAVENOUS at 18:18

## 2023-10-07 RX ADMIN — RIVAROXABAN 20 MILLIGRAM(S): KIT at 18:22

## 2023-10-07 NOTE — PROGRESS NOTE ADULT - ASSESSMENT
62y Male with R foot lateral 5th ray wound to bone.   - Patient seen and evaluated  - Afebrile, no Leukocytosis ESR 62,   - R foot lateral 5th ray wound to bone, erythema to dorsal 3rd-5th ray, mild malodor, tracking proximally and dorsally about 1cm, no purulence, mild serosanguineous drainage, wound bed necrotic.   - R foot xray: proximal 5th met fracture  - ID recs appreciated.   - recommended EP consult to PPM MRI compatibility, if non-compatible will plan bone scan.   - R foot culture: Gram Variable Rods, prelim  - recommended Vasc consult  - Pod plan surgical debridement pending Vasc recs/ EP recs  - Please document Medical and Cardiac optimization for anesthesia for Podiatry surgery.   - Discussed with attending.

## 2023-10-07 NOTE — PROGRESS NOTE ADULT - SUBJECTIVE AND OBJECTIVE BOX
Chief complaint    Patient is a 62y old  Male who presents with a chief complaint of fevers/  foot infection (07 Oct 2023 13:10)   Review of systems  Patient appears comfortable.    Labs and Fingersticks  CAPILLARY BLOOD GLUCOSE      POCT Blood Glucose.: 157 mg/dL (07 Oct 2023 12:38)  POCT Blood Glucose.: 107 mg/dL (07 Oct 2023 09:06)  POCT Blood Glucose.: 137 mg/dL (06 Oct 2023 18:20)      Anion Gap: 14 (10-07 @ 07:08)  Anion Gap: 15 (10-06 @ 10:49)  Anion Gap: 19 *H* (10-06 @ 06:54)      Calcium: 9.1 (10-07 @ 07:08)  Calcium: 9.3 (10-06 @ 10:49)  Calcium: 9.7 (10-06 @ 06:54)  Albumin: 4.1 (10-06 @ 06:54)    Alanine Aminotransferase (ALT/SGPT): 6 *L* (10-06 @ 06:54)  Alkaline Phosphatase: 63 (10-06 @ 06:54)  Aspartate Aminotransferase (AST/SGOT): 13 (10-06 @ 06:54)        10-07    138  |  102  |  26<H>  ----------------------------<  96  4.0   |  22  |  1.50<H>    Ca    9.1      07 Oct 2023 07:08  Phos  2.8     10-06  Mg     1.5     10-06    TPro  7.3  /  Alb  4.1  /  TBili  1.2  /  DBili  x   /  AST  13  /  ALT  6<L>  /  AlkPhos  63  10-06                        10.0   4.76  )-----------( 128      ( 07 Oct 2023 07:09 )             31.7     Medications  MEDICATIONS  (STANDING):  ampicillin/sulbactam  IVPB 3 Gram(s) IV Intermittent every 6 hours  ampicillin/sulbactam  IVPB      aspirin enteric coated 81 milliGRAM(s) Oral daily  atorvastatin 80 milliGRAM(s) Oral at bedtime  dextrose 5%. 1000 milliLiter(s) (100 mL/Hr) IV Continuous <Continuous>  dextrose 5%. 1000 milliLiter(s) (50 mL/Hr) IV Continuous <Continuous>  dextrose 50% Injectable 25 Gram(s) IV Push once  dextrose 50% Injectable 25 Gram(s) IV Push once  dextrose 50% Injectable 12.5 Gram(s) IV Push once  glucagon  Injectable 1 milliGRAM(s) IntraMuscular once  insulin lispro (ADMELOG) corrective regimen sliding scale   SubCutaneous three times a day before meals  metoprolol succinate ER 25 milliGRAM(s) Oral daily  rivaroxaban 20 milliGRAM(s) Oral with dinner  sacubitril 24 mG/valsartan 26 mG 1 Tablet(s) Oral two times a day  sodium bicarbonate 650 milliGRAM(s) Oral three times a day      Physical Exam  General: Patient appears comfortable.  Vital Signs Last 12 Hrs  T(F): 99.3 (10-07-23 @ 12:03), Max: 99.3 (10-07-23 @ 12:03)  HR: 80 (10-07-23 @ 12:03) (66 - 80)  BP: 105/66 (10-07-23 @ 12:03) (103/65 - 105/66)  BP(mean): --  RR: 18 (10-07-23 @ 12:03) (18 - 18)  SpO2: 94% (10-07-23 @ 12:03) (94% - 94%)  Neck: No palpable thyroid nodules.  CVS: S1S2, No murmurs  Respiratory: No wheezing, no crepitations  GI: Abdomen soft, non tender.    Diagnostics    Free Thyroxine, Serum: AM Sched. Collection: 07-Oct-2023 06:00 (10-06 @ 13:55)  Thyroid Stimulating Hormone, Serum: AM Sched. Collection: 07-Oct-2023 06:00 (10-06 @ 13:55)      Radiology:

## 2023-10-07 NOTE — PROGRESS NOTE ADULT - ASSESSMENT
62yr M      hx of  HTN,   c/c   AFIB,  DM/. right foot  osteo      CAD, s/p  cabg / AICD,hx of Sommers fracture, non-union many years ago    prior CT:  c/c  transverse   fx, through the fifth metatarsal base. soft tissue wound/ cortical irregularity along the fifth metatarsal base    wound cx   MSSA and fingoldia,  s/p I&D with necrotic tissue close to bone,suspicion for residual osteo    completed   ancef 2 gms IVSS q 8 hours .  last  month     now  admitted with fevers/  right foot  wound/  and  shanda   R  5th metatarsal  wound/  foot  cellulitis     seen by podiatry /  ID     HTN/  HLD   CAD/    cardiomyopathy,  ef  25  c/c  Afib,  on xarelo/  known to  card   c/c  anemia  DM,  on  farxiga. januvia. metformin   has   c/c  low  baseline  sbp  in  90's   hold  lasix/  farxiga/  entresto, for  now/ farxiga  not  recommended  with osteo  foot/ ha s risk  for  amputation    bacteremia  Gp  B sStrep,  , on iv unasyn.  iD  following         rad< from: TTE W or WO Ultrasound Enhancing Agent (08.07.23 @ 15:33) >  ONCLUSIONS:     1. Severely dilated left ventricular cavity size.The left ventricular systolic function is severely decreased with an ejection fraction visually estimated at 25 to 30 %. There is global left ventricular hypokinesis.   2. Multiple segmental abnormalities exist. See findings.   3. Device lead is visualized in the right ventricle.   4. The aortic annulus and aortic root appear normal in size.    ________________________________________________________________________________________  FINDINGS:  < end of copied text >       62yr M      hx of  HTN,   c/c   AFIB,  DM/. right foot  osteo      CAD, s/p  cabg / AICD,hx of Sommers fracture, non-union many years ago    prior CT:  c/c  transverse   fx, through the fifth metatarsal base. soft tissue wound/ cortical irregularity along the fifth metatarsal base    wound cx   MSSA and fingoldia,  s/p I&D with necrotic tissue close to bone,suspicion for residual osteo    completed   ancef 2 gms IVSS q 8 hours .  last  month     now  admitted with fevers/  right foot  wound/  and  shanda   R  5th metatarsal  wound/  foot  cellulitis     seen by podiatry /  ID     HTN/  HLD   CAD/    cardiomyopathy,  ef  25  c/c  Afib,  on xarelo/  known to  card   c/c  anemia  DM,  on  farxiga. januvia. metformin   has   c/c  low  baseline  sbp  in  90's   hold  lasix/  farxiga/  entresto, for  now/ farxiga  not  recommended  with osteo  foot/ ha s risk  for  amputation    bacteremia  Gp  BsStrep,  , on iv unasyn.  iD  following         rad< from: TTE W or WO Ultrasound Enhancing Agent (08.07.23 @ 15:33) >  ONCLUSIONS:     1. Severely dilated left ventricular cavity size.The left ventricular systolic function is severely decreased with an ejection fraction visually estimated at 25 to 30 %. There is global left ventricular hypokinesis.   2. Multiple segmental abnormalities exist. See findings.   3. Device lead is visualized in the right ventricle.   4. The aortic annulus and aortic root appear normal in size.    ________________________________________________________________________________________  FINDINGS:  < end of copied text >

## 2023-10-07 NOTE — PROGRESS NOTE ADULT - ASSESSMENT
62yr M hx of  HTN,  CAD, HF AICD,  AFIB,  DM, with right foot  osteo    Assessment  DMT2: 62y Male with DM T2 with hyperglycemia, A1C 6% , was on oral meds at home, on low dose insulin, feeling better, eating meals, sugars are improving.  Had mild BHB on labs, elevated lactate on VBG, s/p IV hydration, glucose trending stable. ?euglycemic DKA, repeat BHB and BMP  CAD: on medications, stable, monitored.  Foot Osteo: Podiatry eval, wound care, on IV Zosyn.  HTN: on antihypertensive medications, monitored, asymptomatic.      Wanda Oliveira MD  Cell: [phone]  Office: [phone]                 hold  lasix/  farxiga/  entresto, for  now/ farxiga  not  recommended  with osteo  foot/ ha s risk  for  amputation

## 2023-10-07 NOTE — PROGRESS NOTE ADULT - SUBJECTIVE AND OBJECTIVE BOX
Date of Service: 10-07-23 @ 09:23           CARDIOLOGY     PROGRESS  NOTE   ________________________________________________    CHIEF COMPLAINT:Patient is a 62y old  Male who presents with a chief complaint of fevers/  foot infection (07 Oct 2023 05:49)  no complain  	  REVIEW OF SYSTEMS:  CONSTITUTIONAL: No fever, weight loss, or fatigue  EYES: No eye pain, visual disturbances, or discharge  ENT:  No difficulty hearing, tinnitus, vertigo; No sinus or throat pain  NECK: No pain or stiffness  RESPIRATORY: No cough, wheezing, chills or hemoptysis; No Shortness of Breath  CARDIOVASCULAR: No chest pain, palpitations, passing out, dizziness, or leg swelling  GASTROINTESTINAL: No abdominal or epigastric pain. No nausea, vomiting, or hematemesis; No diarrhea or constipation. No melena or hematochezia.  GENITOURINARY: No dysuria, frequency, hematuria, or incontinence  NEUROLOGICAL: No headaches, memory loss, loss of strength, numbness, or tremors  SKIN: No itching, burning, rashes, or lesions   LYMPH Nodes: No enlarged glands  ENDOCRINE: No heat or cold intolerance; No hair loss  MUSCULOSKELETAL: No joint pain or swelling; No muscle, back, or extremity pain  PSYCHIATRIC: No depression, anxiety, mood swings, or difficulty sleeping  HEME/LYMPH: No easy bruising, or bleeding gums  ALLERGY AND IMMUNOLOGIC: No hives or eczema	    [ x] All others negative	  [ ] Unable to obtain    PHYSICAL EXAM:  T(C): 36.6 (10-07-23 @ 04:55), Max: 38.3 (10-06-23 @ 20:27)  HR: 66 (10-07-23 @ 04:55) (65 - 97)  BP: 103/65 (10-07-23 @ 04:55) (83/63 - 114/62)  RR: 18 (10-07-23 @ 04:55) (16 - 20)  SpO2: 94% (10-07-23 @ 04:55) (94% - 99%)  Wt(kg): --  I&O's Summary      Appearance: Normal	  HEENT:   Normal oral mucosa, PERRL, EOMI	  Lymphatic: No lymphadenopathy  Cardiovascular: Normal S1 S2, No JVD, + murmurs, No edema  Respiratory: rhonchi  Psychiatry: A & O x 3, Mood & affect appropriate  Gastrointestinal:  Soft, Non-tender, + BS	  Skin: No rashes, No ecchymoses, No cyanosis	  Neurologic: Non-focal  Extremities: Normal range of motion, R foot bandaged  Vascular: + pvd    MEDICATIONS  (STANDING):  ampicillin/sulbactam  IVPB 3 Gram(s) IV Intermittent every 6 hours  ampicillin/sulbactam  IVPB      aspirin enteric coated 81 milliGRAM(s) Oral daily  atorvastatin 80 milliGRAM(s) Oral at bedtime  dextrose 5%. 1000 milliLiter(s) (100 mL/Hr) IV Continuous <Continuous>  dextrose 5%. 1000 milliLiter(s) (50 mL/Hr) IV Continuous <Continuous>  dextrose 50% Injectable 25 Gram(s) IV Push once  dextrose 50% Injectable 25 Gram(s) IV Push once  dextrose 50% Injectable 12.5 Gram(s) IV Push once  glucagon  Injectable 1 milliGRAM(s) IntraMuscular once  insulin lispro (ADMELOG) corrective regimen sliding scale   SubCutaneous three times a day before meals  metoprolol succinate ER 25 milliGRAM(s) Oral daily  rivaroxaban 20 milliGRAM(s) Oral with dinner  sacubitril 24 mG/valsartan 26 mG 1 Tablet(s) Oral two times a day  sodium bicarbonate 650 milliGRAM(s) Oral three times a day      TELEMETRY: 	    ECG:  	  RADIOLOGY:  OTHER: 	  	  LABS:	 	    CARDIAC MARKERS:                            10.0   4.76  )-----------( 128      ( 07 Oct 2023 07:09 )             31.7     10-07    138  |  102  |  26<H>  ----------------------------<  96  4.0   |  22  |  1.50<H>    Ca    9.1      07 Oct 2023 07:08  Phos  2.8     10-06  Mg     1.5     10-06    TPro  7.3  /  Alb  4.1  /  TBili  1.2  /  DBili  x   /  AST  13  /  ALT  6<L>  /  AlkPhos  63  10-06    proBNP:   Lipid Profile:   HgA1c:   TSH:   PT/INR - ( 06 Oct 2023 06:54 )   PT: 38.7 sec;   INR: 3.65 ratio         PTT - ( 06 Oct 2023 06:54 )  PTT:42.0 sec          Assessment and plan  ---------------------------  62M hx of type 2 DM on metformin, farixga, heart failure with reduced EF on metoprolol 75mg, lasix 20mg qd, xarelto for vte ppx (had RUE vte 2/2 PICC line), removed 2 weeks prior as well as right foot wound wac (present 2/2 osteo of foot with finegoldia magna), here for 36 hrs of fever, tmax 101, took 800mg advil today, prior to arrival. + slight right lower back pain, nonradiating, mild, no assc GI sxs. + urinating more freq as of late. Still tolerating PO. Denies chest pain, shortness of breath, diaphoresis. Hypotensive to 90s/50 in triage, then 80s/60s, last bp 100/40, not tachy but in setting of bb. Appears slightly dehydrated, clear lungs, s3 gallop, no murmurs appreciable. + right foot redness, swelling, nontender, no flucutance, no crepitations, 1+ dp pulses bilateral, full rom. Nontender calves. Benign abd, no peritoneal signs, no jaundice, no cva ttp. No midline spinal ttp. RUE no signs of infx, former picc site clean, Patient meets sepsis criteria by vitals. Will eval for common source of infection (ex. osteo, urinary, bacterial pulmonary, viral uri; unlikely central neurologic such as meninigitis or encephalitis) vs metabolic derangement. Check labs, lactate, UA, CXs, CXR, foot xray, inflam markers screening EKG, hydrate-> empiric abxs, antipyretics, additional crystalloid infusion as clinically warranted. Will start with 500 cc crystalloid 2/2 heart failure but euglycemic dka.  pt is well known to me with hx of htn, ashd, chf, s/p BIV AICD , PVD with multiple admissions sec to foot infection  pt with sig lv dysfunction/ severe ischemic cardiomyopathy  will  adjust cardiac meds  hold Farxiga for now. may  requiring surgery  dvt prophylaxis  vascula/podiatry consult  abx  pt Entresto dose has decrease sec to ?infection will gradually will increase dose  continue Metroprolol er  avoid excess fluid, may need to re start on  Lasix  and aldactone  check inr if elevated will give vitamin K on Xarelto  may need to decrease xarelto dose if increase renal function

## 2023-10-07 NOTE — PROGRESS NOTE ADULT - SUBJECTIVE AND OBJECTIVE BOX
Patient is a 62y Male whom presented to the hospital with shanda     PAST MEDICAL & SURGICAL HISTORY:  Diabetes      Chronic osteomyelitis      H/O heart failure      No significant past surgical history          MEDICATIONS  (STANDING):  aspirin enteric coated 81 milliGRAM(s) Oral daily  atorvastatin 80 milliGRAM(s) Oral at bedtime  dextrose 5%. 1000 milliLiter(s) (100 mL/Hr) IV Continuous <Continuous>  dextrose 5%. 1000 milliLiter(s) (50 mL/Hr) IV Continuous <Continuous>  dextrose 50% Injectable 25 Gram(s) IV Push once  dextrose 50% Injectable 25 Gram(s) IV Push once  dextrose 50% Injectable 12.5 Gram(s) IV Push once  glucagon  Injectable 1 milliGRAM(s) IntraMuscular once  insulin lispro (ADMELOG) corrective regimen sliding scale   SubCutaneous three times a day before meals  metoprolol succinate ER 25 milliGRAM(s) Oral daily  piperacillin/tazobactam IVPB.. 3.375 Gram(s) IV Intermittent every 8 hours  rivaroxaban 20 milliGRAM(s) Oral with dinner  sodium bicarbonate 650 milliGRAM(s) Oral three times a day      Allergies    No Known Allergies    Intolerances        SOCIAL HISTORY:  Denies ETOh,Smoking,     FAMILY HISTORY:      REVIEW OF SYSTEMS:    CONSTITUTIONAL: No weakness, fevers or chills  EYES/ENT: No visual changes;  no throat pain   NECK: No pain or stiffness  RESPIRATORY: No cough, wheezing, hemoptysis; No shortness of breath  CARDIOVASCULAR: No chest pain or palpitations  GASTROINTESTINAL: No abdominal or epigastric pain. No nausea, vomiting,     No diarrhea or constipation. No melena   GENITOURINARY: No dysuria, frequency or hematuria  NEUROLOGICAL: No numbness or weakness  SKIN: dry      MEDICATIONS  (STANDING):  ampicillin/sulbactam  IVPB 3 Gram(s) IV Intermittent every 6 hours  ampicillin/sulbactam  IVPB      aspirin enteric coated 81 milliGRAM(s) Oral daily  atorvastatin 80 milliGRAM(s) Oral at bedtime  dextrose 5%. 1000 milliLiter(s) (50 mL/Hr) IV Continuous <Continuous>  dextrose 5%. 1000 milliLiter(s) (100 mL/Hr) IV Continuous <Continuous>  dextrose 50% Injectable 25 Gram(s) IV Push once  dextrose 50% Injectable 25 Gram(s) IV Push once  dextrose 50% Injectable 12.5 Gram(s) IV Push once  glucagon  Injectable 1 milliGRAM(s) IntraMuscular once  insulin lispro (ADMELOG) corrective regimen sliding scale   SubCutaneous three times a day before meals  metoprolol succinate ER 25 milliGRAM(s) Oral daily  rivaroxaban 20 milliGRAM(s) Oral with dinner  sacubitril 24 mG/valsartan 26 mG 1 Tablet(s) Oral two times a day  sodium bicarbonate 650 milliGRAM(s) Oral three times a day                              10.0   4.76  )-----------( 128      ( 07 Oct 2023 07:09 )             31.7       CBC Full  -  ( 07 Oct 2023 07:09 )  WBC Count : 4.76 K/uL  RBC Count : 3.72 M/uL  Hemoglobin : 10.0 g/dL  Hematocrit : 31.7 %  Platelet Count - Automated : 128 K/uL  Mean Cell Volume : 85.2 fl  Mean Cell Hemoglobin : 26.9 pg  Mean Cell Hemoglobin Concentration : 31.5 gm/dL  Auto Neutrophil # : x  Auto Lymphocyte # : x  Auto Monocyte # : x  Auto Eosinophil # : x  Auto Basophil # : x  Auto Neutrophil % : x  Auto Lymphocyte % : x  Auto Monocyte % : x  Auto Eosinophil % : x  Auto Basophil % : x      1007    138  |  102  |  26<H>  ----------------------------<  96  4.0   |  22  |  1.50<H>    Ca    9.1      07 Oct 2023 07:08  Phos  2.8     10-06  Mg     1.5     10-06    TPro  7.3  /  Alb  4.1  /  TBili  1.2  /  DBili  x   /  AST  13  /  ALT  6<L>  /  AlkPhos  63  10-06      CAPILLARY BLOOD GLUCOSE      POCT Blood Glucose.: 137 mg/dL (07 Oct 2023 17:24)  POCT Blood Glucose.: 157 mg/dL (07 Oct 2023 12:38)  POCT Blood Glucose.: 107 mg/dL (07 Oct 2023 09:06)      Vital Signs Last 24 Hrs  T(C): 37.2 (07 Oct 2023 19:41), Max: 37.9 (07 Oct 2023 17:14)  T(F): 99 (07 Oct 2023 19:41), Max: 100.2 (07 Oct 2023 17:14)  HR: 53 (07 Oct 2023 19:41) (53 - 80)  BP: 97/56 (07 Oct 2023 19:41) (83/63 - 105/70)  BP(mean): 70 (06 Oct 2023 22:15) (70 - 70)  RR: 16 (07 Oct 2023 19:41) (16 - 18)  SpO2: 98% (07 Oct 2023 19:41) (94% - 98%)    Parameters below as of 07 Oct 2023 19:41  Patient On (Oxygen Delivery Method): room air        Urinalysis Basic - ( 07 Oct 2023 07:08 )    Color: x / Appearance: x / SG: x / pH: x  Gluc: 96 mg/dL / Ketone: x  / Bili: x / Urobili: x   Blood: x / Protein: x / Nitrite: x   Leuk Esterase: x / RBC: x / WBC x   Sq Epi: x / Non Sq Epi: x / Bacteria: x        PT/INR - ( 06 Oct 2023 06:54 )   PT: 38.7 sec;   INR: 3.65 ratio         PTT - ( 06 Oct 2023 06:54 )  PTT:42.0 sec    PHYSICAL EXAM:    Constitutional: NAD  HEENT: conjunctive   clear   Neck:  No JVD  Respiratory: CTAB  Cardiovascular: S1 and S2  Gastrointestinal: BS+, soft, NT/ND  Extremities: No peripheral edema  Neurological: A/O x 3, no focal deficits  Psychiatric: Normal mood, normal affect  : No Atkins  Skin: No rashes  Access: Not applicable    LABS:                        11.2   7.66  )-----------( 142      ( 06 Oct 2023 06:54 )             36.3     10-    137  |  99  |  26<H>  ----------------------------<  141<H>  3.6   |  23  |  1.35<H>    Ca    9.3      06 Oct 2023 10:49  Phos  2.8     10-  Mg     1.5     10-    TPro  7.3  /  Alb  4.1  /  TBili  1.2  /  DBili  x   /  AST  13  /  ALT  6<L>  /  AlkPhos  63  10-06      Urine Studies:  Urinalysis Basic - ( 06 Oct 2023 16:25 )    Color: Yellow / Appearance: Slightly Turbid / S.023 / pH: x  Gluc: x / Ketone: Negative  / Bili: Negative / Urobili: Negative   Blood: x / Protein: 100 mg/dl / Nitrite: Negative   Leuk Esterase: Negative / RBC: 1 /hpf / WBC 6 /HPF   Sq Epi: x / Non Sq Epi: x / Bacteria: Negative            RADIOLOGY & ADDITIONAL STUDIES:

## 2023-10-07 NOTE — PROVIDER CONTACT NOTE (CRITICAL VALUE NOTIFICATION) - BACKGROUND
Pt newly admitted for sepsis, chronic r foot diabetic ulcer, s/p vac therapy and abt, being worked up to r/o OM

## 2023-10-07 NOTE — PROGRESS NOTE ADULT - SUBJECTIVE AND OBJECTIVE BOX
RYLEE HOWE, MRN [id number], 62ymale      Patient is a 62y old  Male who presents with a chief complaint of fevers/  foot infection (07 Oct 2023 09:22)       INTERVAL HPI/OVERNIGHT EVENTS:  Patient seen and evaluated at bedside.  Pt is resting comfortable in NAD. Denies N/V/F/C.    Allergies    No Known Allergies    Intolerances        Vital Signs Last 24 Hrs  T(C): 37.4 (07 Oct 2023 12:03), Max: 38.3 (06 Oct 2023 20:27)  T(F): 99.3 (07 Oct 2023 12:03), Max: 101 (06 Oct 2023 20:27)  HR: 80 (07 Oct 2023 12:03) (65 - 80)  BP: 105/66 (07 Oct 2023 12:03) (83/63 - 114/62)  BP(mean): 70 (06 Oct 2023 22:15) (58 - 74)  RR: 18 (07 Oct 2023 12:03) (16 - 18)  SpO2: 94% (07 Oct 2023 12:03) (94% - 99%)    Parameters below as of 07 Oct 2023 12:03  Patient On (Oxygen Delivery Method): room air        LABS:                        10.0   4.76  )-----------( 128      ( 07 Oct 2023 07:09 )             31.7     10-07    138  |  102  |  26<H>  ----------------------------<  96  4.0   |  22  |  1.50<H>    Ca    9.1      07 Oct 2023 07:08  Phos  2.8     10-06  Mg     1.5     10-06    TPro  7.3  /  Alb  4.1  /  TBili  1.2  /  DBili  x   /  AST  13  /  ALT  6<L>  /  AlkPhos  63  10-06    PT/INR - ( 06 Oct 2023 06:54 )   PT: 38.7 sec;   INR: 3.65 ratio         PTT - ( 06 Oct 2023 06:54 )  PTT:42.0 sec  Urinalysis Basic - ( 07 Oct 2023 07:08 )    Color: x / Appearance: x / SG: x / pH: x  Gluc: 96 mg/dL / Ketone: x  / Bili: x / Urobili: x   Blood: x / Protein: x / Nitrite: x   Leuk Esterase: x / RBC: x / WBC x   Sq Epi: x / Non Sq Epi: x / Bacteria: x      CAPILLARY BLOOD GLUCOSE      POCT Blood Glucose.: 157 mg/dL (07 Oct 2023 12:38)  POCT Blood Glucose.: 107 mg/dL (07 Oct 2023 09:06)  POCT Blood Glucose.: 137 mg/dL (06 Oct 2023 18:20)      Lower Extremity Physical Exam:  Vascular: DP/PT 0/4 B/L, CFT <3 sec x 10, Temp gradient warm to warm, RLE, warm to cool LLE.    Neurology: Epicritic sensation intact to level of digits, B/L  Musculoskeletal/Ortho: pain to palpation over right foot 5th digit, 8/8 s/p right foot I&D w/ Right foot partial 5th ray resection, closed  Skin: R foot lateral 5th ray wound to bone, erythema to dorsal 3rd-5th ray, mild malodor, tracking proximally and dorsally about 1cm, no purulence, mild serosanguineous drainage, wound bed necrotic.       RADIOLOGY & ADDITIONAL TESTS:    ACC: [id number] EXAM:  XR FOOT COMP MIN 3 VIEWS RT   ORDERED BY:  JESSY CHAMBERS     PROCEDURE DATE:  10/06/2023          INTERPRETATION:  CLINICAL INDICATION: Right foot wound with redness    TECHNIQUE: 2 views of right foot    COMPARISON: Right footradiograph 8/8/2023    FINDINGS/  IMPRESSION:  Redemonstrated transverse fracture defect along proximal 5th metatarsal   shaft with currently apparent bridging callus laterally and overlying   bandaging material. Chronic bony fragmentation/enthesopathic change in   medial and lateral malleolar regions. No dislocations or acute appearing   fractures.    Tarsometatarsal alignment maintained without evidence for a Lisfranc   injury. Preserved remaining visualized joint spaces.    Thick plantar and small posterior calcaneal enthesophytes.    Vascular calcifications.    --- End of Report ---           ELISSA ORTA MD; Resident Radiologist  This document has been electronically signed.  PATRICIA JACKSON MD; Attending Radiologist  This document has been electronically signed. Oct  6 2023  8:42AM

## 2023-10-07 NOTE — PATIENT PROFILE ADULT - FALL HARM RISK - HARM RISK INTERVENTIONS

## 2023-10-07 NOTE — PROGRESS NOTE ADULT - SUBJECTIVE AND OBJECTIVE BOX
date of service: 10-07-23 @ 05:49  afberile  REVIEW OF SYSTEMS:  CONSTITUTIONAL: No fever,  no  weight loss  ENT:  No  tinnitus,   no   vertigo  NECK: No pain or stiffness  RESPIRATORY: No cough, wheezing, chills or hemoptysis;    No Shortness of Breath  CARDIOVASCULAR: No chest pain, palpitations, dizziness  GASTROINTESTINAL: No abdominal or epigastric pain. No nausea, vomiting, or hematemesis; No diarrhea  No melena or hematochezia.  GENITOURINARY: No dysuria, frequency, hematuria, or incontinence  NEUROLOGICAL: No headaches  SKIN: No itching,  no   rash  LYMPH Nodes: No enlarged glands  ENDOCRINE: No heat or cold intolerance  MUSCULOSKELETAL: No joint pain or swelling  PSYCHIATRIC: No depression, anxiety  HEME/LYMPH: No easy bruising, or bleeding gums  ALLERGY AND IMMUNOLOGIC: No hives or eczema	    MEDICATIONS  (STANDING):  ampicillin/sulbactam  IVPB 3 Gram(s) IV Intermittent every 6 hours  ampicillin/sulbactam  IVPB      aspirin enteric coated 81 milliGRAM(s) Oral daily  atorvastatin 80 milliGRAM(s) Oral at bedtime  dextrose 5%. 1000 milliLiter(s) (100 mL/Hr) IV Continuous <Continuous>  dextrose 5%. 1000 milliLiter(s) (50 mL/Hr) IV Continuous <Continuous>  dextrose 50% Injectable 25 Gram(s) IV Push once  dextrose 50% Injectable 12.5 Gram(s) IV Push once  dextrose 50% Injectable 25 Gram(s) IV Push once  glucagon  Injectable 1 milliGRAM(s) IntraMuscular once  insulin lispro (ADMELOG) corrective regimen sliding scale   SubCutaneous three times a day before meals  metoprolol succinate ER 25 milliGRAM(s) Oral daily  rivaroxaban 20 milliGRAM(s) Oral with dinner  sodium bicarbonate 650 milliGRAM(s) Oral three times a day    MEDICATIONS  (PRN):  acetaminophen     Tablet .. 650 milliGRAM(s) Oral every 6 hours PRN Temp greater or equal to 38C (100.4F), Mild Pain (1 - 3)  dextrose Oral Gel 15 Gram(s) Oral once PRN Blood Glucose LESS THAN 70 milliGRAM(s)/deciliter      Vital Signs Last 24 Hrs  T(C): 36.6 (07 Oct 2023 04:55), Max: 38.3 (06 Oct 2023 20:27)  T(F): 97.8 (07 Oct 2023 04:55), Max: 101 (06 Oct 2023 20:27)  HR: 66 (07 Oct 2023 04:55) (65 - 97)  BP: 103/65 (07 Oct 2023 04:55) (83/63 - 114/62)  BP(mean): 70 (06 Oct 2023 22:15) (51 - 74)  RR: 18 (07 Oct 2023 04:55) (16 - 20)  SpO2: 94% (07 Oct 2023 04:55) (94% - 100%)    Parameters below as of 07 Oct 2023 04:55  Patient On (Oxygen Delivery Method): room air      CAPILLARY BLOOD GLUCOSE      POCT Blood Glucose.: 137 mg/dL (06 Oct 2023 18:20)  POCT Blood Glucose.: 124 mg/dL (06 Oct 2023 11:22)    I&O's Summary        Appearance: Normal	  HEENT:   Normal oral mucosa, PERRL, EOMI	  Lymphatic: No lymphadenopathy  Cardiovascular: Normal S1 S2, No JVD  Respiratory: Lungs clear to auscultation	  Gastrointestinal:  Soft, Non-tender, + BS	  Skin: No rash, No ecchymoses	  Extremities:    dressing  LABS:                        11.2   7.66  )-----------( 142      ( 06 Oct 2023 06:54 )             36.3     10-06    137  |  99  |  26<H>  ----------------------------<  141<H>  3.6   |  23  |  1.35<H>    Ca    9.3      06 Oct 2023 10:49  Phos  2.8     10-06  Mg     1.5     10-06    TPro  7.3  /  Alb  4.1  /  TBili  1.2  /  DBili  x   /  AST  13  /  ALT  6<L>  /  AlkPhos  63  10-06    PT/INR - ( 06 Oct 2023 06:54 )   PT: 38.7 sec;   INR: 3.65 ratio         PTT - ( 06 Oct 2023 06:54 )  PTT:42.0 sec      Urinalysis Basic - ( 06 Oct 2023 16:25 )    Color: Yellow / Appearance: Slightly Turbid / S.023 / pH: x  Gluc: x / Ketone: Negative  / Bili: Negative / Urobili: Negative   Blood: x / Protein: 100 mg/dl / Nitrite: Negative   Leuk Esterase: Negative / RBC: 1 /hpf / WBC 6 /HPF   Sq Epi: x / Non Sq Epi: x / Bacteria: Negative      Lactate, Blood: 2.5 mmol/L (10-06 @ 10:49)      10-06 @ 06:30  4.2  28            Culture - Abscess with Gram Stain (collected 10-06-23 @ 15:12)  Source: .Abscess right foot  Gram Stain (10-06-23 @ 22:45):    Rare polymorphonuclear leukocytes per low power field    Numerous Gram Variable Rods per oil power field    Culture - Blood (collected 10-06-23 @ 06:28)  Source: .Blood Blood-Peripheral  Gram Stain (10-06-23 @ 19:46):    Growth in aerobic and anaerobic bottles: Gram Positive Cocci in Pairs and    Chains  Preliminary Report (10-06-23 @ 19:47):    Growth in aerobic and anaerobic bottles: Gram Positive Cocci in Pairs and    Chains    Direct identification is available within approximately 3-5    hours either by Blood Panel Multiplexed PCR or Direct    MALDI-TOF. Details: [email or web address]  Organism: Blood Culture PCR (10-06-23 @ 21:49)  Organism: Blood Culture PCR (10-06-23 @ 21:49)      -  Streptococcus agalactiae (Group B): Detec      Method Type: PCR    Culture - Blood (collected 10-06-23 @ 06:10)  Source: .Blood Blood-Peripheral  Gram Stain (10-06-23 @ 19:45):    Growth in aerobic and anaerobic bottles: Gram Positive Cocci in Pairs and    Chains  Preliminary Report (10-06-23 @ 19:46):    Growth in aerobic and anaerobic bottles: Gram Positive Cocci in Pairs and    Chains        Consultant(s) Notes Reviewed:      Care Discussed with Consultants/Other Providers:

## 2023-10-08 LAB
-  CEFTRIAXONE: SIGNIFICANT CHANGE UP
-  CLINDAMYCIN: SIGNIFICANT CHANGE UP
-  LEVOFLOXACIN: SIGNIFICANT CHANGE UP
-  PENICILLIN: SIGNIFICANT CHANGE UP
-  VANCOMYCIN: SIGNIFICANT CHANGE UP
ANION GAP SERPL CALC-SCNC: 15 MMOL/L — SIGNIFICANT CHANGE UP (ref 5–17)
BUN SERPL-MCNC: 22 MG/DL — SIGNIFICANT CHANGE UP (ref 7–23)
CALCIUM SERPL-MCNC: 9.3 MG/DL — SIGNIFICANT CHANGE UP (ref 8.4–10.5)
CHLORIDE SERPL-SCNC: 103 MMOL/L — SIGNIFICANT CHANGE UP (ref 96–108)
CO2 SERPL-SCNC: 20 MMOL/L — LOW (ref 22–31)
CREAT SERPL-MCNC: 1.15 MG/DL — SIGNIFICANT CHANGE UP (ref 0.5–1.3)
CULTURE RESULTS: SIGNIFICANT CHANGE UP
CULTURE RESULTS: SIGNIFICANT CHANGE UP
EGFR: 72 ML/MIN/1.73M2 — SIGNIFICANT CHANGE UP
GLUCOSE BLDC GLUCOMTR-MCNC: 120 MG/DL — HIGH (ref 70–99)
GLUCOSE BLDC GLUCOMTR-MCNC: 137 MG/DL — HIGH (ref 70–99)
GLUCOSE BLDC GLUCOMTR-MCNC: 140 MG/DL — HIGH (ref 70–99)
GLUCOSE BLDC GLUCOMTR-MCNC: 173 MG/DL — HIGH (ref 70–99)
GLUCOSE BLDC GLUCOMTR-MCNC: 173 MG/DL — HIGH (ref 70–99)
GLUCOSE SERPL-MCNC: 117 MG/DL — HIGH (ref 70–99)
INR BLD: 1.75 RATIO — HIGH (ref 0.85–1.18)
MAGNESIUM SERPL-MCNC: 2 MG/DL — SIGNIFICANT CHANGE UP (ref 1.6–2.6)
METHOD TYPE: SIGNIFICANT CHANGE UP
ORGANISM # SPEC MICROSCOPIC CNT: SIGNIFICANT CHANGE UP
PHOSPHATE SERPL-MCNC: 2.7 MG/DL — SIGNIFICANT CHANGE UP (ref 2.5–4.5)
POTASSIUM SERPL-MCNC: 3.5 MMOL/L — SIGNIFICANT CHANGE UP (ref 3.5–5.3)
POTASSIUM SERPL-SCNC: 3.5 MMOL/L — SIGNIFICANT CHANGE UP (ref 3.5–5.3)
PROTHROM AB SERPL-ACNC: 18.9 SEC — HIGH (ref 9.5–13)
SODIUM SERPL-SCNC: 138 MMOL/L — SIGNIFICANT CHANGE UP (ref 135–145)
SPECIMEN SOURCE: SIGNIFICANT CHANGE UP
SPECIMEN SOURCE: SIGNIFICANT CHANGE UP

## 2023-10-08 RX ORDER — POTASSIUM CHLORIDE 20 MEQ
40 PACKET (EA) ORAL ONCE
Refills: 0 | Status: COMPLETED | OUTPATIENT
Start: 2023-10-08 | End: 2023-10-08

## 2023-10-08 RX ORDER — METOPROLOL TARTRATE 50 MG
50 TABLET ORAL DAILY
Refills: 0 | Status: DISCONTINUED | OUTPATIENT
Start: 2023-10-08 | End: 2023-10-09

## 2023-10-08 RX ADMIN — Medication 25 MILLIGRAM(S): at 05:03

## 2023-10-08 RX ADMIN — Medication 40 MILLIEQUIVALENT(S): at 10:02

## 2023-10-08 RX ADMIN — AMPICILLIN SODIUM AND SULBACTAM SODIUM 200 GRAM(S): 250; 125 INJECTION, POWDER, FOR SUSPENSION INTRAMUSCULAR; INTRAVENOUS at 23:05

## 2023-10-08 RX ADMIN — AMPICILLIN SODIUM AND SULBACTAM SODIUM 200 GRAM(S): 250; 125 INJECTION, POWDER, FOR SUSPENSION INTRAMUSCULAR; INTRAVENOUS at 05:03

## 2023-10-08 RX ADMIN — AMPICILLIN SODIUM AND SULBACTAM SODIUM 200 GRAM(S): 250; 125 INJECTION, POWDER, FOR SUSPENSION INTRAMUSCULAR; INTRAVENOUS at 11:04

## 2023-10-08 RX ADMIN — Medication 50 MILLIGRAM(S): at 05:38

## 2023-10-08 RX ADMIN — ATORVASTATIN CALCIUM 80 MILLIGRAM(S): 80 TABLET, FILM COATED ORAL at 21:25

## 2023-10-08 RX ADMIN — Medication 650 MILLIGRAM(S): at 05:04

## 2023-10-08 RX ADMIN — SACUBITRIL AND VALSARTAN 1 TABLET(S): 24; 26 TABLET, FILM COATED ORAL at 17:12

## 2023-10-08 RX ADMIN — Medication 650 MILLIGRAM(S): at 13:21

## 2023-10-08 RX ADMIN — Medication 1: at 13:21

## 2023-10-08 RX ADMIN — AMPICILLIN SODIUM AND SULBACTAM SODIUM 200 GRAM(S): 250; 125 INJECTION, POWDER, FOR SUSPENSION INTRAMUSCULAR; INTRAVENOUS at 17:13

## 2023-10-08 RX ADMIN — RIVAROXABAN 20 MILLIGRAM(S): KIT at 17:13

## 2023-10-08 RX ADMIN — Medication 5 MILLIGRAM(S): at 23:05

## 2023-10-08 RX ADMIN — Medication 81 MILLIGRAM(S): at 10:03

## 2023-10-08 RX ADMIN — SACUBITRIL AND VALSARTAN 1 TABLET(S): 24; 26 TABLET, FILM COATED ORAL at 05:03

## 2023-10-08 RX ADMIN — Medication 650 MILLIGRAM(S): at 21:24

## 2023-10-08 NOTE — PROGRESS NOTE ADULT - SUBJECTIVE AND OBJECTIVE BOX
RYLEE HOWE, MRN [id number], 62ymale      Patient is a 62y old  Male who presents with a chief complaint of fevers/  foot infection (08 Oct 2023 08:57)       INTERVAL HPI/OVERNIGHT EVENTS:  Patient seen and evaluated at bedside.  Pt is resting comfortable in NAD. Denies N/V/F/C.    Allergies    No Known Allergies    Intolerances        Vital Signs Last 24 Hrs  T(C): 36.8 (08 Oct 2023 11:42), Max: 37.9 (07 Oct 2023 17:14)  T(F): 98.3 (08 Oct 2023 11:42), Max: 100.2 (07 Oct 2023 17:14)  HR: 86 (08 Oct 2023 11:42) (53 - 93)  BP: 95/58 (08 Oct 2023 11:42) (94/56 - 120/73)  BP(mean): --  RR: 18 (08 Oct 2023 11:42) (16 - 18)  SpO2: 96% (08 Oct 2023 11:42) (96% - 98%)    Parameters below as of 08 Oct 2023 11:42  Patient On (Oxygen Delivery Method): room air        LABS:                        10.0   4.76  )-----------( 128      ( 07 Oct 2023 07:09 )             31.7     10-08    138  |  103  |  22  ----------------------------<  117<H>  3.5   |  20<L>  |  1.15    Ca    9.3      08 Oct 2023 07:27  Phos  2.7     10-08  Mg     2.0     10-08      PT/INR - ( 08 Oct 2023 07:28 )   PT: 18.9 sec;   INR: 1.75 ratio           Urinalysis Basic - ( 08 Oct 2023 07:27 )    Color: x / Appearance: x / SG: x / pH: x  Gluc: 117 mg/dL / Ketone: x  / Bili: x / Urobili: x   Blood: x / Protein: x / Nitrite: x   Leuk Esterase: x / RBC: x / WBC x   Sq Epi: x / Non Sq Epi: x / Bacteria: x      CAPILLARY BLOOD GLUCOSE      POCT Blood Glucose.: 140 mg/dL (08 Oct 2023 09:07)  POCT Blood Glucose.: 120 mg/dL (08 Oct 2023 06:58)  POCT Blood Glucose.: 151 mg/dL (07 Oct 2023 22:38)  POCT Blood Glucose.: 137 mg/dL (07 Oct 2023 17:24)  POCT Blood Glucose.: 157 mg/dL (07 Oct 2023 12:38)      Lower Extremity Physical Exam:      Vascular: DP/PT 0/4 B/L, CFT <3 sec x 10, Temp gradient warm to warm, RLE, warm to cool LLE.    Neurology: Epicritic sensation intact to level of digits, B/L  Musculoskeletal/Ortho: pain to palpation over right foot 5th digit, 8/8 s/p right foot I&D w/ Right foot partial 5th ray resection, closed  Skin: R foot lateral 5th ray wound to bone, erythema to dorsal 3rd-5th ray, mild malodor, tracking proximally and dorsally about 1cm, no purulence, mild serosanguineous drainage, wound bed necrotic.     RADIOLOGY & ADDITIONAL TESTS:

## 2023-10-08 NOTE — PROGRESS NOTE ADULT - ASSESSMENT
_________________________________________________________________________________________  ========>>  M E D I C A L   A T T E N D I N G    F O L L O W  U P  N O T E  <<=========  -----------------------------------------------------------------------------------------------------    - Patient seen and examined by me earlier today.   - In summary,  RYLEE HOWE is a 62y year old man admitted with   - Patient today overall doing ok, comfortable, eating OK.     ==================>> REVIEW OF SYSTEM <<=================    GEN: no fever, no chills, no pain  RESP: no SOB, no cough, no sputum  CVS: no chest pain, no palpitations, no edema  GI: no abdominal pain, no nausea, no constipation, no diarrhea  : no dysuria, no frequency, no hematuria  Neuro: no headache, no dizziness  Derm : no itching, no rash    ==================>> PHYSICAL EXAM <<=================    GEN: A&O X 3 , NAD , comfortable, pleasant, calm   HEENT: NCAT, PERRL, MMM, hearing intact  Neck: supple , no JVD appreciated  CVS: S1S2 , regular , No M/R/G appreciated  PULM: CTA B/L,  no W/R/R appreciated  ABD.: soft. non tender, non distended,  bowel sounds present  Extrem: intact pulses , no edema   PSYCH : normal mood,  not anxious          ( Note written / Date of service 10-08-23 ( This is certified to be the same as "ENTERED" date above ( for billing purposes)))    ==================>> MEDICATIONS <<====================    MEDICATIONS  (STANDING):  ampicillin/sulbactam  IVPB 3 Gram(s) IV Intermittent every 6 hours  ampicillin/sulbactam  IVPB      aspirin enteric coated 81 milliGRAM(s) Oral daily  atorvastatin 80 milliGRAM(s) Oral at bedtime  dextrose 5%. 1000 milliLiter(s) (50 mL/Hr) IV Continuous <Continuous>  dextrose 5%. 1000 milliLiter(s) (100 mL/Hr) IV Continuous <Continuous>  dextrose 50% Injectable 25 Gram(s) IV Push once  dextrose 50% Injectable 12.5 Gram(s) IV Push once  dextrose 50% Injectable 25 Gram(s) IV Push once  glucagon  Injectable 1 milliGRAM(s) IntraMuscular once  insulin lispro (ADMELOG) corrective regimen sliding scale   SubCutaneous three times a day before meals  melatonin 5 milliGRAM(s) Oral at bedtime  metoprolol succinate ER 50 milliGRAM(s) Oral daily  rivaroxaban 20 milliGRAM(s) Oral with dinner  sacubitril 24 mG/valsartan 26 mG 1 Tablet(s) Oral two times a day  sodium bicarbonate 650 milliGRAM(s) Oral three times a day    MEDICATIONS  (PRN):  acetaminophen     Tablet .. 650 milliGRAM(s) Oral every 6 hours PRN Temp greater or equal to 38C (100.4F), Mild Pain (1 - 3)  dextrose Oral Gel 15 Gram(s) Oral once PRN Blood Glucose LESS THAN 70 milliGRAM(s)/deciliter    ___________  Active diet:  Diet, Consistent Carbohydrate w/Evening Snack  ___________________    ==================>> VITAL SIGNS <<==================    T(C): 37.3 (10-08-23 @ 16:18), Max: 37.9 (10-07-23 @ 17:14)  HR: 94 (10-08-23 @ 16:18) (53 - 94)  BP: 107/68 (10-08-23 @ 16:18) (94/56 - 120/73)  BP(mean): --  RR: 18 (10-08-23 @ 16:18) (16 - 18)  SpO2: 97% (10-08-23 @ 16:18) (96% - 98%)     CAPILLARY BLOOD GLUCOSE      POCT Blood Glucose.: 173 mg/dL (08 Oct 2023 12:50)  POCT Blood Glucose.: 140 mg/dL (08 Oct 2023 09:07)  POCT Blood Glucose.: 120 mg/dL (08 Oct 2023 06:58)  POCT Blood Glucose.: 151 mg/dL (07 Oct 2023 22:38)  POCT Blood Glucose.: 137 mg/dL (07 Oct 2023 17:24)    I&O's Summary    07 Oct 2023 07:01  -  08 Oct 2023 07:00  --------------------------------------------------------  IN: 0 mL / OUT: 1450 mL / NET: -1450 mL         ==================>> LAB AND IMAGING <<==================                        10.0   4.76  )-----------( 128      ( 07 Oct 2023 07:09 )             31.7        10-08    138  |  103  |  22  ----------------------------<  117<H>  3.5   |  20<L>  |  1.15    Ca    9.3      08 Oct 2023 07:27  Phos  2.7     10-08  Mg     2.0     10-08      PT/INR - ( 08 Oct 2023 07:28 )   PT: 18.9 sec;   INR: 1.75 ratio                          Urinalysis Basic - ( 08 Oct 2023 07:27 )    Color: x / Appearance: x / SG: x / pH: x  Gluc: 117 mg/dL / Ketone: x  / Bili: x / Urobili: x   Blood: x / Protein: x / Nitrite: x   Leuk Esterase: x / RBC: x / WBC x   Sq Epi: x / Non Sq Epi: x / Bacteria: x    TSH:      5.55   (10-07-23)           Lipid profile:    HgA1C:   (08-07-23)          (08-07-23)      6.0    ____________________________    M I C R O B I O L O G Y :    Culture - Urine (collected 06 Oct 2023 16:25)  Source: Clean Catch Clean Catch (Midstream)  Final Report (07 Oct 2023 15:13):    <10,000 CFU/mL Normal Urogenital Mona    Culture - Abscess with Gram Stain (collected 06 Oct 2023 15:12)  Source: .Abscess right foot  Gram Stain (06 Oct 2023 22:45):    Rare polymorphonuclear leukocytes per low power field    Numerous Gram Variable Rods per oil power field  Preliminary Report (07 Oct 2023 19:33):    Moderate Streptococcus agalactiae (Group B) isolated    Group B streptococci are susceptible to ampicillin,    penicillin and cefazolin, but may be resistant to    erythromycin and clindamycin.    Culture - Blood (collected 06 Oct 2023 06:28)  Source: .Blood Blood-Peripheral  Gram Stain (06 Oct 2023 19:46):    Growth in aerobic and anaerobic bottles: Gram Positive Cocci in Pairs and    Chains  Final Report (08 Oct 2023 11:33):    Growth in aerobic and anaerobic bottles: Streptococcus agalactiae (Group    B)    Direct identification is available within approximately 3-5    hours either by Blood Panel Multiplexed PCR or Direct    MALDI-TOF. Details: [email or web address]  Organism: Blood Culture PCR  Streptococcus agalactiae (Group B) (08 Oct 2023 11:33)  Organism: Streptococcus agalactiae (Group B) (08 Oct 2023 11:33)    Sensitivities:      -  Levofloxacin: S 1      -  Clindamycin: S <=0.06      -  Vancomycin: S 0.5      -  Ceftriaxone: S <=0.25      Method Type: HEATHER      -  Penicillin: S 0.06 Predicts results for ampicillin, amoxicillin, amoxicillin/clavulanate, ampicillin/sulbactam, 1st, 2nd and 3rd generation cephalosporins and carbapenems.  Organism: Blood Culture PCR (08 Oct 2023 11:33)    Sensitivities:      -  Streptococcus agalactiae (Group B): Detec      Method Type: PCR    Culture - Blood (collected 06 Oct 2023 06:10)  Source: .Blood Blood-Peripheral  Gram Stain (06 Oct 2023 19:45):    Growth in aerobic and anaerobic bottles: Gram Positive Cocci in Pairs and    Chains  Final Report (08 Oct 2023 11:33):    Growth in aerobic and anaerobic bottles: Streptococcus agalactiae (Group    B)    See previous culture [id number]      no MR ?   ___________________________________________________________________________________  ===============>>  A S S E S S M E N T   A N D   P L A N <<===============  ------------------------------------------------------------------------------------------    · Assessment	  62yr M      hx of  HTN,   c/c   AFIB,  DM/. right foot  osteo      CAD, s/p  cabg / AICD,hx of Sommers fracture, non-union many years ago    prior CT:  c/c  transverse   fx, through the fifth metatarsal base. soft tissue wound/ cortical irregularity along the fifth metatarsal base    wound cx   MSSA and fingoldia,  s/p I&D with necrotic tissue close to bone,suspicion for residual osteo    completed   ancef 2 gms IVSS q 8 hours .  last  month     now  admitted with fevers/  right foot  wound/  and  shanda   R  5th metatarsal  wound/  foot  cellulitis     seen by podiatry /  ID     HTN/  HLD   CAD/    cardiomyopathy,  ef  25  c/c  Afib,  on xarelo/  known to  card   c/c  anemia  DM,  on  farxiga. januvia. metformin   has   c/c  low  baseline  sbp  in  90's   hold  lasix/  farxiga/  entresto, for  now/ farxiga  not  recommended  with osteo  foot/ ha s risk  for  amputation    bacteremia  Gp  BsStre,  , on iv unasyn.  iD  following      -GI/DVT Prophylaxis per protocol.    --------------------------------------------  Case discussed with   Education given on findings and plan of care  ___________________________  HVicky Boudreaux D.O.  Pager: [phone]       _________________________________________________________________________________________  ========>>  M E D I C A L   A T T E N D I N G    F O L L O W  U P  N O T E  <<=========  -----------------------------------------------------------------------------------------------------    - Patient seen and examined by me earlier today.   - In summary,  RYLEE HOWE is a 62y year old man admitted with Diabetic foot infection  - Patient today overall doing ok, comfortable, eating OK.     ==================>> REVIEW OF SYSTEM <<=================    GEN: no fever, no chills, pain Controlled  RESP: no SOB, no cough, no sputum  CVS: no chest pain, no palpitations, no edema  GI: no abdominal pain, no nausea, no constipation, no diarrhea  : no dysuria, no frequency, no hematuria  Neuro: no headache, no dizziness  Derm : no itching, no rash    ==================>> PHYSICAL EXAM <<=================    GEN: A&O X 3 , NAD , comfortable, pleasant, calm in bed   HEENT: NCAT, PERRL, MMM, hearing intact  Neck: supple , no JVD appreciated  CVS: S1S2 , regular , No M/R/G appreciated  PULM: CTA B/L,  no W/R/R appreciated  ABD.: soft. non tender, non distended,  bowel sounds present  Extrem: intact pulses , Right foot wound dressed  PSYCH : normal mood,  not anxious          ( Note written / Date of service 10-08-23 ( This is certified to be the same as "ENTERED" date above ( for billing purposes)))    ==================>> MEDICATIONS <<====================    MEDICATIONS  (STANDING):  ampicillin/sulbactam  IVPB 3 Gram(s) IV Intermittent every 6 hours  ampicillin/sulbactam  IVPB      aspirin enteric coated 81 milliGRAM(s) Oral daily  atorvastatin 80 milliGRAM(s) Oral at bedtime  dextrose 5%. 1000 milliLiter(s) (50 mL/Hr) IV Continuous <Continuous>  dextrose 5%. 1000 milliLiter(s) (100 mL/Hr) IV Continuous <Continuous>  dextrose 50% Injectable 25 Gram(s) IV Push once  dextrose 50% Injectable 12.5 Gram(s) IV Push once  dextrose 50% Injectable 25 Gram(s) IV Push once  glucagon  Injectable 1 milliGRAM(s) IntraMuscular once  insulin lispro (ADMELOG) corrective regimen sliding scale   SubCutaneous three times a day before meals  melatonin 5 milliGRAM(s) Oral at bedtime  metoprolol succinate ER 50 milliGRAM(s) Oral daily  rivaroxaban 20 milliGRAM(s) Oral with dinner  sacubitril 24 mG/valsartan 26 mG 1 Tablet(s) Oral two times a day  sodium bicarbonate 650 milliGRAM(s) Oral three times a day    MEDICATIONS  (PRN):  acetaminophen     Tablet .. 650 milliGRAM(s) Oral every 6 hours PRN Temp greater or equal to 38C (100.4F), Mild Pain (1 - 3)  dextrose Oral Gel 15 Gram(s) Oral once PRN Blood Glucose LESS THAN 70 milliGRAM(s)/deciliter    ___________  Active diet:  Diet, Consistent Carbohydrate w/Evening Snack  ___________________    ==================>> VITAL SIGNS <<==================    T(C): 37.3 (10-08-23 @ 16:18), Max: 37.9 (10-07-23 @ 17:14)  HR: 94 (10-08-23 @ 16:18) (53 - 94)  BP: 107/68 (10-08-23 @ 16:18) (94/56 - 120/73)  BP(mean): --  RR: 18 (10-08-23 @ 16:18) (16 - 18)  SpO2: 97% (10-08-23 @ 16:18) (96% - 98%)     CAPILLARY BLOOD GLUCOSE      POCT Blood Glucose.: 173 mg/dL (08 Oct 2023 12:50)  POCT Blood Glucose.: 140 mg/dL (08 Oct 2023 09:07)  POCT Blood Glucose.: 120 mg/dL (08 Oct 2023 06:58)  POCT Blood Glucose.: 151 mg/dL (07 Oct 2023 22:38)  POCT Blood Glucose.: 137 mg/dL (07 Oct 2023 17:24)    I&O's Summary    07 Oct 2023 07:01  -  08 Oct 2023 07:00  --------------------------------------------------------  IN: 0 mL / OUT: 1450 mL / NET: -1450 mL         ==================>> LAB AND IMAGING <<==================                        10.0   4.76  )-----------( 128      ( 07 Oct 2023 07:09 )             31.7        10-08    138  |  103  |  22  ----------------------------<  117<H>  3.5   |  20<L>  |  1.15    Ca    9.3      08 Oct 2023 07:27  Phos  2.7     10-08  Mg     2.0     10-08      PT/INR - ( 08 Oct 2023 07:28 )   PT: 18.9 sec;   INR: 1.75 ratio       Urinalysis Basic - ( 08 Oct 2023 07:27 )  Color: x / Appearance: x / SG: x / pH: x  Gluc: 117 mg/dL / Ketone: x  / Bili: x / Urobili: x   Blood: x / Protein: x / Nitrite: x   Leuk Esterase: x / RBC: x / WBC x   Sq Epi: x / Non Sq Epi: x / Bacteria: x    TSH:      5.55   (10-07-23)           HgA1C:   (08-07-23)          (08-07-23)      6.0    ____________________________    M I C R O B I O L O G Y :    Culture - Urine (collected 06 Oct 2023 16:25)  Source: Clean Catch Clean Catch (Midstream)  Final Report (07 Oct 2023 15:13):    <10,000 CFU/mL Normal Urogenital Mona    Culture - Abscess with Gram Stain (collected 06 Oct 2023 15:12)  Source: .Abscess right foot  Gram Stain (06 Oct 2023 22:45):    Rare polymorphonuclear leukocytes per low power field    Numerous Gram Variable Rods per oil power field  Preliminary Report (07 Oct 2023 19:33):    Moderate Streptococcus agalactiae (Group B) isolated    Group B streptococci are susceptible to ampicillin,    penicillin and cefazolin, but may be resistant to    erythromycin and clindamycin.    Culture - Blood (collected 06 Oct 2023 06:28)  Source: .Blood Blood-Peripheral  Gram Stain (06 Oct 2023 19:46):    Growth in aerobic and anaerobic bottles: Gram Positive Cocci in Pairs and    Chains  Final Report (08 Oct 2023 11:33):    Growth in aerobic and anaerobic bottles: Streptococcus agalactiae (Group    B)    Direct identification is available within approximately 3-5    hours either by Blood Panel Multiplexed PCR or Direct    MALDI-TOF. Details: [email or web address]  Organism: Blood Culture PCR  Streptococcus agalactiae (Group B) (08 Oct 2023 11:33)  Organism: Streptococcus agalactiae (Group B) (08 Oct 2023 11:33)    Sensitivities:      -  Levofloxacin: S 1      -  Clindamycin: S <=0.06      -  Vancomycin: S 0.5      -  Ceftriaxone: S <=0.25      Method Type: HEATHER      -  Penicillin: S 0.06 Predicts results for ampicillin, amoxicillin, amoxicillin/clavulanate, ampicillin/sulbactam, 1st, 2nd and 3rd generation cephalosporins and carbapenems.  Organism: Blood Culture PCR (08 Oct 2023 11:33)    Sensitivities:      -  Streptococcus agalactiae (Group B): Detec      Method Type: PCR    Culture - Blood (collected 06 Oct 2023 06:10)  Source: .Blood Blood-Peripheral  Gram Stain (06 Oct 2023 19:45):    Growth in aerobic and anaerobic bottles: Gram Positive Cocci in Pairs and    Chains  Final Report (08 Oct 2023 11:33):    Growth in aerobic and anaerobic bottles: Streptococcus agalactiae (Group    B)    See previous culture [id number]    __________________________________________________________________________________  ===============>>  A S S E S S M E N T   A N D   P L A N <<===============  ------------------------------------------------------------------------------------------    · Assessment	  62yr M with PMH of  HTN,   AFIB,  DM/. right foot  osteo (Recently completed IV antibiotics via PICC line) ,CAD, s/p  cabg / AICD,hx of Sommers fracture, non-union many years ago,   prior CT:  c/c  transverse   fx, through the fifth metatarsal base. soft tissue wound/ cortical irregularity along the fifth metatarsal base  wound cx   MSSA and fingoldia,  s/p I&D with necrotic tissue close to bone,suspicion for residual osteo  completed   ancef 2 gms IVSS q 8 hours .  last  month     now  admitted with fevers/  right foot  wound/  and  shanda   R  5th metatarsal  wound/  foot  cellulitis >> Now with bacteremia    seen by podiatry /  ID  / Vascular   HTN/  HLD   CAD/    cardiomyopathy,  ef  25 Post AICD (as per patient not MRI compatible)  c/c  Afib,  on xarelo/  known to  card   c/c  anemia  DM,  on  farxiga. januvia. metformin   has   c/c  low  baseline  sbp  in  90's   hold  lasix/  farxiga/  entresto, for  now/ farxiga  not  recommended  with osteo  foot/ ha s risk  for  amputation    bacteremia  Gp  BsStrep,  , on iv unasyn.  iD  following  Plan for vascular angiogram by vascular surgery : Hold Xarelto  continuing the antibiotics  podiatric surgery following for amputation: continue to medically optimize patient for procedure  -GI/DVT Prophylaxis per protocol.    --------------------------------------------  Case discussed with Patient, nurse practitioner  Education given on findings and plan of care  ___________________________  CORTNEY Boudreaux D.O.  Pager: [phone]

## 2023-10-08 NOTE — CONSULT NOTE ADULT - ASSESSMENT
62y Male PMH HTN, DM, defibrillator, CAD post CABG x3 in 2020, CHF Echo 8/723 EF-25-30% , hx of Sommers fracture, non-union many years ago, presenting with right lateral foot wound and fevers at home. Concern for osteomyelitis with prodaitry planning for possible resection. On exam R DP/PT signal present bilaterally. Right 5th metatarsal wound with base at bone. Vascular surgery consulted for possible endovascular intervention.    Plan:  - Will plan for angio of RLE this admission  - Transition to Heparin gtt  - Can repeat guevara/pvrs  - Rec MRI for possible OM if patient defib compatible  - c/w unasyn   - Vascular surgery to follow    Discussed with Vascular fellow on behalf of Dr. Jauregui    Vascular Surgery  [id number]

## 2023-10-08 NOTE — PROGRESS NOTE ADULT - SUBJECTIVE AND OBJECTIVE BOX
Date of Service: 10-08-23 @ 08:57           CARDIOLOGY     PROGRESS  NOTE   ________________________________________________    CHIEF COMPLAINT:Patient is a 62y old  Male who presents with a chief complaint of fevers/  foot infection (07 Oct 2023 21:48)  no complain  	  REVIEW OF SYSTEMS:  CONSTITUTIONAL: No fever, weight loss, or fatigue  EYES: No eye pain, visual disturbances, or discharge  ENT:  No difficulty hearing, tinnitus, vertigo; No sinus or throat pain  NECK: No pain or stiffness  RESPIRATORY: No cough, wheezing, chills or hemoptysis; No Shortness of Breath  CARDIOVASCULAR: No chest pain, palpitations, passing out, dizziness, or leg swelling  GASTROINTESTINAL: No abdominal or epigastric pain. No nausea, vomiting, or hematemesis; No diarrhea or constipation. No melena or hematochezia.  GENITOURINARY: No dysuria, frequency, hematuria, or incontinence  NEUROLOGICAL: No headaches, memory loss, loss of strength, numbness, or tremors  SKIN: No itching, burning, rashes, or lesions   LYMPH Nodes: No enlarged glands  ENDOCRINE: No heat or cold intolerance; No hair loss  MUSCULOSKELETAL: No joint pain or swelling; No muscle, back, +extremity pain  PSYCHIATRIC: No depression, anxiety, mood swings, or difficulty sleeping  HEME/LYMPH: No easy bruising, or bleeding gums  ALLERGY AND IMMUNOLOGIC: No hives or eczema	    [ ] All others negative	  [ ] Unable to obtain    PHYSICAL EXAM:  T(C): 36.6 (10-08-23 @ 04:34), Max: 37.9 (10-07-23 @ 17:14)  HR: 93 (10-08-23 @ 04:34) (53 - 93)  BP: 119/69 (10-08-23 @ 04:34) (94/56 - 120/73)  RR: 18 (10-08-23 @ 04:34) (16 - 18)  SpO2: 97% (10-08-23 @ 04:34) (94% - 98%)  Wt(kg): --  I&O's Summary    07 Oct 2023 07:01  -  08 Oct 2023 07:00  --------------------------------------------------------  IN: 0 mL / OUT: 1450 mL / NET: -1450 mL        Appearance: Normal	  HEENT:   Normal oral mucosa, PERRL, EOMI	  Lymphatic: No lymphadenopathy  Cardiovascular: Normal S1 S2, No JVD, + murmurs, No edema  Respiratory: Lungs clear to auscultation	  Psychiatry: A & O x 3, Mood & affect appropriate  Gastrointestinal:  Soft, Non-tender, + BS	  Skin: No rashes, No ecchymoses, No cyanosis	  Neurologic: Non-focal  Extremities: Normal range of motion, + r foot bandaged  Vascular: Peripheral pulses palpable 2+ bilaterally    MEDICATIONS  (STANDING):  ampicillin/sulbactam  IVPB 3 Gram(s) IV Intermittent every 6 hours  ampicillin/sulbactam  IVPB      aspirin enteric coated 81 milliGRAM(s) Oral daily  atorvastatin 80 milliGRAM(s) Oral at bedtime  dextrose 5%. 1000 milliLiter(s) (50 mL/Hr) IV Continuous <Continuous>  dextrose 5%. 1000 milliLiter(s) (100 mL/Hr) IV Continuous <Continuous>  dextrose 50% Injectable 25 Gram(s) IV Push once  dextrose 50% Injectable 12.5 Gram(s) IV Push once  dextrose 50% Injectable 25 Gram(s) IV Push once  glucagon  Injectable 1 milliGRAM(s) IntraMuscular once  insulin lispro (ADMELOG) corrective regimen sliding scale   SubCutaneous three times a day before meals  melatonin 5 milliGRAM(s) Oral at bedtime  metoprolol succinate ER 50 milliGRAM(s) Oral daily  potassium chloride    Tablet ER 40 milliEquivalent(s) Oral once  rivaroxaban 20 milliGRAM(s) Oral with dinner  sacubitril 24 mG/valsartan 26 mG 1 Tablet(s) Oral two times a day  sodium bicarbonate 650 milliGRAM(s) Oral three times a day      TELEMETRY: 	    ECG:  	  RADIOLOGY:  OTHER: 	  	  LABS:	 	    CARDIAC MARKERS:                                10.0   4.76  )-----------( 128      ( 07 Oct 2023 07:09 )             31.7     10-08    138  |  103  |  22  ----------------------------<  117<H>  3.5   |  20<L>  |  1.15    Ca    9.3      08 Oct 2023 07:27  Phos  2.7     10-08  Mg     2.0     10-08      proBNP:   Lipid Profile:   HgA1c:   TSH: Thyroid Stimulating Hormone, Serum: 5.55 uIU/mL (10-07 @ 07:08)    PT/INR - ( 08 Oct 2023 07:28 )   PT: 18.9 sec;   INR: 1.75 ratio       Culture - Abscess with Gram Stain (10.06.23 @ 15:12)    Gram Stain:   Rare polymorphonuclear leukocytes per low power field  Numerous Gram Variable Rods per oil power field   Specimen Source: .Abscess right foot   Culture Results:   Moderate Streptococcus agalactiae (Group B) isolated  Group B streptococci are susceptible to ampicillin,  penicillin and cefazolin, but may be resistant to  erythromycin and clindamycin.      Culture - Blood (10.06.23 @ 06:28)    -  Streptococcus agalactiae (Group B): Detec   Gram Stain:   Growth in aerobic and anaerobic bottles: Gram Positive Cocci in Pairs and  Chains   Specimen Source: .Blood Blood-Peripheral   Organism: Blood Culture PCR   Culture Results:   Growth in aerobic and anaerobic bottles: Streptococcus agalactiae (Group  B)  Susceptibility to follow.  Direct identification is available within approximately 3-5  hours either by Blood Panel Multiplexed PCR or Direct  MALDI-TOF. Details: [email or web address]   Organism Identification: Blood Culture PCR   Method Type: PCR  Culture - Blood (10.06.23 @ 06:28)    -  Streptococcus agalactiae (Group B): Detec   Gram Stain:   Growth in aerobic and anaerobic bottles: Gram Positive Cocci in Pairs and  Chains   Specimen Source: .Blood Blood-Peripheral   Organism: Blood Culture PCR   Culture Results:   Growth in aerobic and anaerobic bottles: Streptococcus agalactiae (Group  B)  Susceptibility to follow.  Direct identification is available within approximately 3-5  hours either by Blood Panel Multiplexed PCR or Direct  MALDI-TOF. Details: [email or web address]   Organism Identification: Blood Culture PCR   Method Type: PCR        Assessment and plan  ---------------------------  62M hx of type 2 DM on metformin, farixga, heart failure with reduced EF on metoprolol 75mg, lasix 20mg qd, xarelto for vte ppx (had RUE vte 2/2 PICC line), removed 2 weeks prior as well as right foot wound wac (present 2/2 osteo of foot with finegoldia magna), here for 36 hrs of fever, tmax 101, took 800mg advil today, prior to arrival. + slight right lower back pain, nonradiating, mild, no assc GI sxs. + urinating more freq as of late. Still tolerating PO. Denies chest pain, shortness of breath, diaphoresis. Hypotensive to 90s/50 in triage, then 80s/60s, last bp 100/40, not tachy but in setting of bb. Appears slightly dehydrated, clear lungs, s3 gallop, no murmurs appreciable. + right foot redness, swelling, nontender, no flucutance, no crepitations, 1+ dp pulses bilateral, full rom. Nontender calves. Benign abd, no peritoneal signs, no jaundice, no cva ttp. No midline spinal ttp. RUE no signs of infx, former picc site clean, Patient meets sepsis criteria by vitals. Will eval for common source of infection (ex. osteo, urinary, bacterial pulmonary, viral uri; unlikely central neurologic such as meninigitis or encephalitis) vs metabolic derangement. Check labs, lactate, UA, CXs, CXR, foot xray, inflam markers screening EKG, hydrate-> empiric abxs, antipyretics, additional crystalloid infusion as clinically warranted. Will start with 500 cc crystalloid 2/2 heart failure but euglycemic dka.  pt is well known to me with hx of htn, ashd, chf, s/p BIV AICD , PVD with multiple admissions sec to foot infection  pt with sig lv dysfunction/ severe ischemic cardiomyopathy  will  adjust cardiac meds  hold Farxiga for now. may  requiring surgery  dvt prophylaxis  vascula/podiatry consult  abx  pt Entresto dose has decrease sec to ?infection will gradually will increase dose  continue Metroprolol er  avoid excess fluid, may need to re start on  Lasix  and aldactone  check inr if elevated will give vitamin K on Xarelto  may need to decrease xarelto dose if increase renal function  + BC for strep, continue iv abx, pt has hardware (biv Aicd)  will increase entresto as bp tolerates

## 2023-10-08 NOTE — CHART NOTE - NSCHARTNOTEFT_GEN_A_CORE
Spoke to Vascular. Pt to start heparin gtt at time of next Xarelto dose for planned Angio to RLE this admission. Xarelto stopped tonight at 2000 hours. Pt already received tonight's Xarelto dose at 1720 so will plan to start hep gtt tomorrow night at 6pm. Dr Wileyfar aware and will leave note for day provider.

## 2023-10-08 NOTE — PROGRESS NOTE ADULT - ASSESSMENT
62yr M hx of  HTN,  CAD, HF AICD,  AFIB,  DM, with right foot  osteo  Assessment  DMT2: 62y Male with DM T2 with hyperglycemia, A1C 6% , was on oral meds at home, on low dose insulin, feeling better, eating meals, sugars are improving.  Had mild BHB on labs, elevated lactate on VBG, s/p IV hydration, glucose trending stable.   CAD: on medications, stable, monitored.  Foot Osteo: Podiatry eval, wound care, on IV Zosyn.  HTN: on antihypertensive medications, monitored, asymptomatic.        Discussed plan and management with Dr Roxanne Lennon NP - TEAMS  Wanda Oliveira MD  Cell: [phone]  Office: [phone]                hold  lasix/  farxiga/  entresto, for  now/ farxiga  not  recommended  with osteo  foot/ ha s risk  for  amputation 62yr M hx of  HTN,  CAD, HF AICD,  AFIB,  DM, with right foot  osteo  Assessment  DMT2: 62y Male with DM T2 with hyperglycemia, A1C 6% , was on oral meds at home, on low dose insulin, feeling better, eating meals,  sugars are improving.  Had mild BHB on labs, elevated lactate on VBG, s/p IV hydration, glucose trending stable.   CAD: on medications, stable, monitored.  Foot Osteo: Podiatry eval, wound care, on IV Zosyn.  HTN: on antihypertensive medications, monitored, asymptomatic.        Discussed plan and management with Dr Roxanne Lennon NP - TEAMS  Wanda Oliveira MD  Cell: [phone]  Office: [phone]                hold  lasix/  farxiga/  entresto, for  now/ farxiga  not  recommended  with osteo  foot/ ha s risk  for  amputation

## 2023-10-08 NOTE — PROGRESS NOTE ADULT - SUBJECTIVE AND OBJECTIVE BOX
Patient is a 62y Male whom presented to the hospital with shanda     PAST MEDICAL & SURGICAL HISTORY:  Diabetes      Chronic osteomyelitis      H/O heart failure      No significant past surgical history          MEDICATIONS  (STANDING):  aspirin enteric coated 81 milliGRAM(s) Oral daily  atorvastatin 80 milliGRAM(s) Oral at bedtime  dextrose 5%. 1000 milliLiter(s) (100 mL/Hr) IV Continuous <Continuous>  dextrose 5%. 1000 milliLiter(s) (50 mL/Hr) IV Continuous <Continuous>  dextrose 50% Injectable 25 Gram(s) IV Push once  dextrose 50% Injectable 25 Gram(s) IV Push once  dextrose 50% Injectable 12.5 Gram(s) IV Push once  glucagon  Injectable 1 milliGRAM(s) IntraMuscular once  insulin lispro (ADMELOG) corrective regimen sliding scale   SubCutaneous three times a day before meals  metoprolol succinate ER 25 milliGRAM(s) Oral daily  piperacillin/tazobactam IVPB.. 3.375 Gram(s) IV Intermittent every 8 hours  rivaroxaban 20 milliGRAM(s) Oral with dinner  sodium bicarbonate 650 milliGRAM(s) Oral three times a day      Allergies    No Known Allergies    Intolerances        SOCIAL HISTORY:  Denies ETOh,Smoking,     FAMILY HISTORY:      REVIEW OF SYSTEMS:    CONSTITUTIONAL: No weakness, fevers or chills  EYES/ENT: No visual changes;  no throat pain   NECK: No pain or stiffness  RESPIRATORY: No cough, wheezing, hemoptysis; No shortness of breath  CARDIOVASCULAR: No chest pain or palpitations  GASTROINTESTINAL: No abdominal or epigastric pain. No nausea, vomiting,     No diarrhea or constipation. No melena   GENITOURINARY: No dysuria, frequency or hematuria  NEUROLOGICAL: No numbness or weakness  SKIN: dry                            10.0   4.76  )-----------( 128      ( 07 Oct 2023 07:09 )             31.7       CBC Full  -  ( 07 Oct 2023 07:09 )  WBC Count : 4.76 K/uL  RBC Count : 3.72 M/uL  Hemoglobin : 10.0 g/dL  Hematocrit : 31.7 %  Platelet Count - Automated : 128 K/uL  Mean Cell Volume : 85.2 fl  Mean Cell Hemoglobin : 26.9 pg  Mean Cell Hemoglobin Concentration : 31.5 gm/dL  Auto Neutrophil # : x  Auto Lymphocyte # : x  Auto Monocyte # : x  Auto Eosinophil # : x  Auto Basophil # : x  Auto Neutrophil % : x  Auto Lymphocyte % : x  Auto Monocyte % : x  Auto Eosinophil % : x  Auto Basophil % : x      10-08    138  |  103  |  22  ----------------------------<  117<H>  3.5   |  20<L>  |  1.15    Ca    9.3      08 Oct 2023 07:27  Phos  2.7     10-08  Mg     2.0     10-08        CAPILLARY BLOOD GLUCOSE      POCT Blood Glucose.: 173 mg/dL (08 Oct 2023 12:50)  POCT Blood Glucose.: 140 mg/dL (08 Oct 2023 09:07)  POCT Blood Glucose.: 120 mg/dL (08 Oct 2023 06:58)  POCT Blood Glucose.: 151 mg/dL (07 Oct 2023 22:38)      Vital Signs Last 24 Hrs  T(C): 37.3 (08 Oct 2023 16:18), Max: 37.3 (08 Oct 2023 16:18)  T(F): 99.1 (08 Oct 2023 16:18), Max: 99.1 (08 Oct 2023 16:18)  HR: 94 (08 Oct 2023 16:18) (53 - 94)  BP: 107/68 (08 Oct 2023 16:18) (94/56 - 120/73)  BP(mean): --  RR: 18 (08 Oct 2023 16:18) (16 - 18)  SpO2: 97% (08 Oct 2023 16:18) (96% - 98%)    Parameters below as of 08 Oct 2023 16:18  Patient On (Oxygen Delivery Method): room air        Urinalysis Basic - ( 08 Oct 2023 07:27 )    Color: x / Appearance: x / SG: x / pH: x  Gluc: 117 mg/dL / Ketone: x  / Bili: x / Urobili: x   Blood: x / Protein: x / Nitrite: x   Leuk Esterase: x / RBC: x / WBC x   Sq Epi: x / Non Sq Epi: x / Bacteria: x        PT/INR - ( 08 Oct 2023 07:28 )   PT: 18.9 sec;   INR: 1.75 ratio                        PHYSICAL EXAM:    Constitutional: NAD  HEENT: conjunctive   clear   Neck:  No JVD  Respiratory: CTAB  Cardiovascular: S1 and S2  Gastrointestinal: BS+, soft, NT/ND  Extremities: No peripheral edema  Neurological: A/O x 3, no focal deficits  Psychiatric: Normal mood, normal affect  : No Atkins  Skin: No rashes  Access: Not applicable

## 2023-10-08 NOTE — PROGRESS NOTE ADULT - SUBJECTIVE AND OBJECTIVE BOX
Chief complaint  Patient is a 62y old  Male who presents with a chief complaint of fevers/  foot infection (08 Oct 2023 12:05)         Labs and Fingersticks  CAPILLARY BLOOD GLUCOSE      POCT Blood Glucose.: 173 mg/dL (08 Oct 2023 12:50)  POCT Blood Glucose.: 140 mg/dL (08 Oct 2023 09:07)  POCT Blood Glucose.: 120 mg/dL (08 Oct 2023 06:58)  POCT Blood Glucose.: 151 mg/dL (07 Oct 2023 22:38)  POCT Blood Glucose.: 137 mg/dL (07 Oct 2023 17:24)      Anion Gap: 15 (10-08 @ 07:27)  Anion Gap: 14 (10-07 @ 07:08)      Calcium: 9.3 (10-08 @ 07:27)  Calcium: 9.1 (10-07 @ 07:08)          10-08    138  |  103  |  22  ----------------------------<  117<H>  3.5   |  20<L>  |  1.15    Ca    9.3      08 Oct 2023 07:27  Phos  2.7     10-08  Mg     2.0     10-08                          10.0   4.76  )-----------( 128      ( 07 Oct 2023 07:09 )             31.7     Medications  MEDICATIONS  (STANDING):  ampicillin/sulbactam  IVPB 3 Gram(s) IV Intermittent every 6 hours  ampicillin/sulbactam  IVPB      aspirin enteric coated 81 milliGRAM(s) Oral daily  atorvastatin 80 milliGRAM(s) Oral at bedtime  dextrose 5%. 1000 milliLiter(s) (50 mL/Hr) IV Continuous <Continuous>  dextrose 5%. 1000 milliLiter(s) (100 mL/Hr) IV Continuous <Continuous>  dextrose 50% Injectable 25 Gram(s) IV Push once  dextrose 50% Injectable 12.5 Gram(s) IV Push once  dextrose 50% Injectable 25 Gram(s) IV Push once  glucagon  Injectable 1 milliGRAM(s) IntraMuscular once  insulin lispro (ADMELOG) corrective regimen sliding scale   SubCutaneous three times a day before meals  melatonin 5 milliGRAM(s) Oral at bedtime  metoprolol succinate ER 50 milliGRAM(s) Oral daily  rivaroxaban 20 milliGRAM(s) Oral with dinner  sacubitril 24 mG/valsartan 26 mG 1 Tablet(s) Oral two times a day  sodium bicarbonate 650 milliGRAM(s) Oral three times a day      Physical Exam  General: Patient comfortable in bed   Vital Signs Last 12 Hrs  T(F): 98.3 (10-08-23 @ 11:42), Max: 98.3 (10-08-23 @ 11:42)  HR: 86 (10-08-23 @ 11:42) (86 - 93)  BP: 95/58 (10-08-23 @ 11:42) (95/58 - 119/69)  BP(mean): --  RR: 18 (10-08-23 @ 11:42) (18 - 18)  SpO2: 96% (10-08-23 @ 11:42) (96% - 97%)    CVS: S1S2   Respiratory: No wheezing, no crepitations  GI: Abdomen soft, bowel sounds positive  Musculoskeletal:  moves all extremities  : Voiding          Chief complaint  Patient is a 62y old  Male who presents with a chief complaint of fevers/  foot infection (08 Oct 2023 12:05)     Labs and Fingersticks  CAPILLARY BLOOD GLUCOSE      POCT Blood Glucose.: 173 mg/dL (08 Oct 2023 12:50)  POCT Blood Glucose.: 140 mg/dL (08 Oct 2023 09:07)  POCT Blood Glucose.: 120 mg/dL (08 Oct 2023 06:58)  POCT Blood Glucose.: 151 mg/dL (07 Oct 2023 22:38)  POCT Blood Glucose.: 137 mg/dL (07 Oct 2023 17:24)      Anion Gap: 15 (10-08 @ 07:27)  Anion Gap: 14 (10-07 @ 07:08)      Calcium: 9.3 (10-08 @ 07:27)  Calcium: 9.1 (10-07 @ 07:08)          10-08    138  |  103  |  22  ----------------------------<  117<H>  3.5   |  20<L>  |  1.15    Ca    9.3      08 Oct 2023 07:27  Phos  2.7     10-08  Mg     2.0     10-08                          10.0   4.76  )-----------( 128      ( 07 Oct 2023 07:09 )             31.7     Medications  MEDICATIONS  (STANDING):  ampicillin/sulbactam  IVPB 3 Gram(s) IV Intermittent every 6 hours  ampicillin/sulbactam  IVPB      aspirin enteric coated 81 milliGRAM(s) Oral daily  atorvastatin 80 milliGRAM(s) Oral at bedtime  dextrose 5%. 1000 milliLiter(s) (50 mL/Hr) IV Continuous <Continuous>  dextrose 5%. 1000 milliLiter(s) (100 mL/Hr) IV Continuous <Continuous>  dextrose 50% Injectable 25 Gram(s) IV Push once  dextrose 50% Injectable 12.5 Gram(s) IV Push once  dextrose 50% Injectable 25 Gram(s) IV Push once  glucagon  Injectable 1 milliGRAM(s) IntraMuscular once  insulin lispro (ADMELOG) corrective regimen sliding scale   SubCutaneous three times a day before meals  melatonin 5 milliGRAM(s) Oral at bedtime  metoprolol succinate ER 50 milliGRAM(s) Oral daily  rivaroxaban 20 milliGRAM(s) Oral with dinner  sacubitril 24 mG/valsartan 26 mG 1 Tablet(s) Oral two times a day  sodium bicarbonate 650 milliGRAM(s) Oral three times a day      Physical Exam  General: Patient comfortable in bed   Vital Signs Last 12 Hrs  T(F): 98.3 (10-08-23 @ 11:42), Max: 98.3 (10-08-23 @ 11:42)  HR: 86 (10-08-23 @ 11:42) (86 - 93)  BP: 95/58 (10-08-23 @ 11:42) (95/58 - 119/69)  BP(mean): --  RR: 18 (10-08-23 @ 11:42) (18 - 18)  SpO2: 96% (10-08-23 @ 11:42) (96% - 97%)    CVS: S1S2   Respiratory: No wheezing, no crepitations  GI: Abdomen soft, bowel sounds positive  Musculoskeletal:  moves all extremities  : Voiding

## 2023-10-08 NOTE — PROGRESS NOTE ADULT - ASSESSMENT
62y Male with R foot lateral 5th ray wound to bone.   - Patient seen and evaluated  - Afebrile, no Leukocytosis ESR 62,   - R foot lateral 5th ray wound to bone, erythema to dorsal 3rd-5th ray, mild malodor, tracking proximally and dorsally about 1cm, no purulence, mild serosanguineous drainage, wound bed necrotic.   - R foot xray: proximal 5th met fracture  - R foot culture:  Streptococcus agalactiae  - ID recs appreciated.   - recommended EP consult to PPM MRI compatibility, if non-compatible will plan bone scan.   - recommended Vasc consult  - Pod plan surgical debridement pending Vasc recs/ EP recs  - Please document Medical and Cardiac optimization for anesthesia for Podiatry surgery.   - Discussed with attending.         62y Male with R foot lateral 5th ray wound to bone.   - Patient seen and evaluated  - Afebrile, , no Leukocytosis   - R foot lateral 5th ray wound to bone, erythema to dorsal 3rd-5th ray, mild malodor, tracking proximally and dorsally about 1cm, no purulence, mild serosanguineous drainage, wound bed necrotic.   - R foot xray: proximal 5th met fracture  - R foot culture:  Streptococcus agalactiae  - ID recs appreciated.   - recommended EP consult to PPM MRI compatibility, if non-compatible will plan bone scan.   - recommended Vasc consult  - Pod plan surgical debridement pending Vasc recs/ EP recs  - Please document Medical and Cardiac optimization for anesthesia for Podiatry surgery.   - Discussed with attending.         62y Male with R foot lateral 5th ray wound to bone.   - Patient seen and evaluated  - Afebrile, , no Leukocytosis   - R foot lateral 5th ray wound to bone, erythema to dorsal 3rd-5th ray, mild malodor, tracking proximally and dorsally about 1cm, no purulence, mild serosanguineous drainage, wound bed necrotic.   - R foot xray: proximal 5th met fracture  - R foot culture:  Streptococcus agalactiae  - ID recs appreciated.   - Vasc recs appreciated.   - Pod plan R foot 5th met base resection with PT tenotomy, tentatively Wednesday to follow 1030am.   - Please document Medical and Cardiac optimization for anesthesia for Podiatry surgery.   - Discussed with attending.

## 2023-10-08 NOTE — CONSULT NOTE ADULT - SUBJECTIVE AND OBJECTIVE BOX
Vascular Surgery Consult Note  Attending: Shania  Service: Vascular Surgery  [id number]      HPI: 62y Male PMH HTN, DM, defibrillator, CAD post CABG x3 in 2020, CHF Echo 8/723 EF-25-30% , hx of Sommers fracture, non-union many years ago, presenting with right lateral foot wound and fevers at home. Patient was previous discahrged in early august after previous right foot resection with podiatry and application of wound vac. Sent home with PICC line and IV abx for 6 weeks. Was seen by vascular surgery during this admission, DIONI PVRs with Right foot pressure 57. No angio planned at that time. Now, patient had this wound for a few weeks but attempted local wound care. Sent in by his podiatrist after he had fevers at home and erythema around the wound. Podiatry evaluated patient and found wound necrotic in nature and base is at the bone. Denies any claudication symptoms, nonsmoker, no alcohol use, no further fevers since admission. Patient HDS on evaluation. Vascular surgery consulted for possible endovascular intervention prior to podiatry procedure.      PAST MEDICAL HISTORY:  PAST MEDICAL & SURGICAL HISTORY:  Diabetes      Chronic osteomyelitis      H/O heart failure      No significant past surgical history          ALLERGIES:  Allergies    No Known Allergies    Intolerances        SOCIAL HISTORY: Negative for tobacco, etoh, or drug use    FAMILY HISTORY:  FAMILY HISTORY:      PHYSICAL EXAM:  General: NAD, resting comfortably  HEENT: NC/AT, EOMI, normal hearing, no oral lesions, no LAD, neck supple  Pulmonary: normal resp effort, CTA-B  Cardiovascular: NSR, no murmurs  Abdominal: soft, ND/NT, no organomegaly, no guarding or rebound tenderness  Extremities: Right 5th metatarsal wound with erythema and necrotic base at the bone.  Neuro: A/O x 3, CNs II-XII grossly intact, normal sensation, no focal deficits    Vascular Pulse Exam:  Right Femoral:  Palpable       Left Femoral:  Palpable  Right Popliteal:  Palpable      Left Popliteal:  Palpable  Right DP: biphasic signal              Left DP:  biphasic signal  Right PT:  biphasic signal               Left PT:  biphasic signal    VITAL SIGNS:  Vital Signs Last 24 Hrs  T(C): 37.3 (08 Oct 2023 16:18), Max: 37.3 (08 Oct 2023 16:18)  T(F): 99.1 (08 Oct 2023 16:18), Max: 99.1 (08 Oct 2023 16:18)  HR: 94 (08 Oct 2023 16:18) (53 - 94)  BP: 107/68 (08 Oct 2023 16:18) (94/56 - 120/73)  BP(mean): --  RR: 18 (08 Oct 2023 16:18) (16 - 18)  SpO2: 97% (08 Oct 2023 16:18) (96% - 98%)    Parameters below as of 08 Oct 2023 16:18  Patient On (Oxygen Delivery Method): room air        I&O's Summary    07 Oct 2023 07:01  -  08 Oct 2023 07:00  --------------------------------------------------------  IN: 0 mL / OUT: 1450 mL / NET: -1450 mL        LABS:                        10.0   4.76  )-----------( 128      ( 07 Oct 2023 07:09 )             31.7     10-08    138  |  103  |  22  ----------------------------<  117<H>  3.5   |  20<L>  |  1.15    Ca    9.3      08 Oct 2023 07:27  Phos  2.7     10-08  Mg     2.0     10-08      PT/INR - ( 08 Oct 2023 07:28 )   PT: 18.9 sec;   INR: 1.75 ratio           Urinalysis Basic - ( 08 Oct 2023 07:27 )    Color: x / Appearance: x / SG: x / pH: x  Gluc: 117 mg/dL / Ketone: x  / Bili: x / Urobili: x   Blood: x / Protein: x / Nitrite: x   Leuk Esterase: x / RBC: x / WBC x   Sq Epi: x / Non Sq Epi: x / Bacteria: x      CAPILLARY BLOOD GLUCOSE      POCT Blood Glucose.: 137 mg/dL (08 Oct 2023 17:20)  POCT Blood Glucose.: 173 mg/dL (08 Oct 2023 12:50)  POCT Blood Glucose.: 140 mg/dL (08 Oct 2023 09:07)  POCT Blood Glucose.: 120 mg/dL (08 Oct 2023 06:58)  POCT Blood Glucose.: 151 mg/dL (07 Oct 2023 22:38)        CULTURES:      RADIOLOGY & ADDITIONAL STUDIES:    < from: Xray Chest 1 View- PORTABLE-Urgent (10.06.23 @ 07:09) >    ACC: [id number] EXAM:  XR CHEST PORTABLE URGENT 1V   ORDERED BY:  JESSY CHAMBERS     PROCEDURE DATE:  10/06/2023          INTERPRETATION:  EXAMINATION: XR CHEST URGENT    CLINICAL INDICATION: Sepsis    TECHNIQUE: Single frontal, portable view of the chest was obtained.    COMPARISON: Chest x-ray 8/14/2023.    FINDINGS:  Heart/Vascular: Heart is normal in size. Median sternotomy. Left chest   wall AICD.  Pulmonary:No focal consolidations. No pleural effusion. No pneumothorax.  Bones: No acute bony abnormalities.  Lines and tubes: None.    IMPRESSION:  Clear lungs.    --- End of Report ---    < end of copied text >  ACC: [id number] EXAM:  XR FOOT COMP MIN 3 VIEWS RT   ORDERED BY:  JESSY CHAMBERS     PROCEDURE DATE:  10/06/2023          INTERPRETATION:  CLINICAL INDICATION: Right foot wound with redness    TECHNIQUE: 2 views of right foot    COMPARISON: Right footradiograph 8/8/2023    FINDINGS/  IMPRESSION:  Redemonstrated transverse fracture defect along proximal 5th metatarsal   shaft with currently apparent bridging callus laterally and overlying   bandaging material. Chronic bony fragmentation/enthesopathic change in   medial and lateral malleolar regions. No dislocations or acute appearing   fractures.    Tarsometatarsal alignment maintained without evidence for a Lisfranc   injury. Preserved remaining visualized joint spaces.    Thick plantar and small posterior calcaneal enthesophytes.    Vascular calcifications.    --- End of Report ---               Vascular Surgery Consult Note  Attending: Shania  Service: Vascular Surgery  [id number]      HPI: 62y Male PMH HTN, DM, afib on xarelto, defibrillator, CAD post CABG x3 in 2020, CHF Echo 8/723 EF-25-30% , hx of Sommers fracture, non-union many years ago, presenting with right lateral foot wound and fevers at home. Patient was previous discharged in early august after previous right foot resection with podiatry and application of wound vac. Sent home with PICC line and IV abx for 6 weeks. Was seen by vascular surgery during this admission, DIONI PVRs with Right foot pressure 57. No angio planned at that time. Now, patient had this wound for a few weeks but attempted local wound care. Sent in by his podiatrist after he had fevers at home and erythema around the wound. Podiatry evaluated patient and found wound necrotic in nature and base is at the bone. Denies any claudication symptoms, nonsmoker, no alcohol use, no further fevers since admission. Patient HDS on evaluation. Vascular surgery consulted for possible endovascular intervention prior to podiatry procedure.      PAST MEDICAL HISTORY:  PAST MEDICAL & SURGICAL HISTORY:  Diabetes      Chronic osteomyelitis      H/O heart failure      No significant past surgical history          ALLERGIES:  Allergies    No Known Allergies    Intolerances        SOCIAL HISTORY: Negative for tobacco, etoh, or drug use    FAMILY HISTORY:  FAMILY HISTORY:      PHYSICAL EXAM:  General: NAD, resting comfortably  HEENT: NC/AT, EOMI, normal hearing, no oral lesions, no LAD, neck supple  Pulmonary: normal resp effort, CTA-B  Cardiovascular: NSR, no murmurs  Abdominal: soft, ND/NT, no organomegaly, no guarding or rebound tenderness  Extremities: Right 5th metatarsal wound with erythema and necrotic base at the bone.  Neuro: A/O x 3, CNs II-XII grossly intact, normal sensation, no focal deficits    Vascular Pulse Exam:  Right Femoral:  Palpable       Left Femoral:  Palpable  Right Popliteal:  Palpable      Left Popliteal:  Palpable  Right DP: biphasic signal              Left DP:  biphasic signal  Right PT:  biphasic signal               Left PT:  biphasic signal    VITAL SIGNS:  Vital Signs Last 24 Hrs  T(C): 37.3 (08 Oct 2023 16:18), Max: 37.3 (08 Oct 2023 16:18)  T(F): 99.1 (08 Oct 2023 16:18), Max: 99.1 (08 Oct 2023 16:18)  HR: 94 (08 Oct 2023 16:18) (53 - 94)  BP: 107/68 (08 Oct 2023 16:18) (94/56 - 120/73)  BP(mean): --  RR: 18 (08 Oct 2023 16:18) (16 - 18)  SpO2: 97% (08 Oct 2023 16:18) (96% - 98%)    Parameters below as of 08 Oct 2023 16:18  Patient On (Oxygen Delivery Method): room air        I&O's Summary    07 Oct 2023 07:01  -  08 Oct 2023 07:00  --------------------------------------------------------  IN: 0 mL / OUT: 1450 mL / NET: -1450 mL        LABS:                        10.0   4.76  )-----------( 128      ( 07 Oct 2023 07:09 )             31.7     10-08    138  |  103  |  22  ----------------------------<  117<H>  3.5   |  20<L>  |  1.15    Ca    9.3      08 Oct 2023 07:27  Phos  2.7     10-08  Mg     2.0     10-08      PT/INR - ( 08 Oct 2023 07:28 )   PT: 18.9 sec;   INR: 1.75 ratio           Urinalysis Basic - ( 08 Oct 2023 07:27 )    Color: x / Appearance: x / SG: x / pH: x  Gluc: 117 mg/dL / Ketone: x  / Bili: x / Urobili: x   Blood: x / Protein: x / Nitrite: x   Leuk Esterase: x / RBC: x / WBC x   Sq Epi: x / Non Sq Epi: x / Bacteria: x      CAPILLARY BLOOD GLUCOSE      POCT Blood Glucose.: 137 mg/dL (08 Oct 2023 17:20)  POCT Blood Glucose.: 173 mg/dL (08 Oct 2023 12:50)  POCT Blood Glucose.: 140 mg/dL (08 Oct 2023 09:07)  POCT Blood Glucose.: 120 mg/dL (08 Oct 2023 06:58)  POCT Blood Glucose.: 151 mg/dL (07 Oct 2023 22:38)        CULTURES:      RADIOLOGY & ADDITIONAL STUDIES:    < from: Xray Chest 1 View- PORTABLE-Urgent (10.06.23 @ 07:09) >    ACC: [id number] EXAM:  XR CHEST PORTABLE URGENT 1V   ORDERED BY:  JESSY CHAMBERS     PROCEDURE DATE:  10/06/2023          INTERPRETATION:  EXAMINATION: XR CHEST URGENT    CLINICAL INDICATION: Sepsis    TECHNIQUE: Single frontal, portable view of the chest was obtained.    COMPARISON: Chest x-ray 8/14/2023.    FINDINGS:  Heart/Vascular: Heart is normal in size. Median sternotomy. Left chest   wall AICD.  Pulmonary:No focal consolidations. No pleural effusion. No pneumothorax.  Bones: No acute bony abnormalities.  Lines and tubes: None.    IMPRESSION:  Clear lungs.    --- End of Report ---    < end of copied text >  ACC: [id number] EXAM:  XR FOOT COMP MIN 3 VIEWS RT   ORDERED BY:  JESSY CHAMBERS     PROCEDURE DATE:  10/06/2023          INTERPRETATION:  CLINICAL INDICATION: Right foot wound with redness    TECHNIQUE: 2 views of right foot    COMPARISON: Right footradiograph 8/8/2023    FINDINGS/  IMPRESSION:  Redemonstrated transverse fracture defect along proximal 5th metatarsal   shaft with currently apparent bridging callus laterally and overlying   bandaging material. Chronic bony fragmentation/enthesopathic change in   medial and lateral malleolar regions. No dislocations or acute appearing   fractures.    Tarsometatarsal alignment maintained without evidence for a Lisfranc   injury. Preserved remaining visualized joint spaces.    Thick plantar and small posterior calcaneal enthesophytes.    Vascular calcifications.    --- End of Report ---

## 2023-10-08 NOTE — PROGRESS NOTE ADULT - PROBLEM SELECTOR PLAN 1
Will continue current insulin regimen for now. Will continue monitoring  blood sugars, will Follow up.  Patient counseled for compliance with consistent low carb diet.  Stop Home Farxiga

## 2023-10-09 LAB
APTT BLD: 35.4 SEC — SIGNIFICANT CHANGE UP (ref 24.5–35.6)
GLUCOSE BLDC GLUCOMTR-MCNC: 118 MG/DL — HIGH (ref 70–99)
GLUCOSE BLDC GLUCOMTR-MCNC: 140 MG/DL — HIGH (ref 70–99)
GLUCOSE BLDC GLUCOMTR-MCNC: 146 MG/DL — HIGH (ref 70–99)
GLUCOSE BLDC GLUCOMTR-MCNC: 157 MG/DL — HIGH (ref 70–99)
INR BLD: 1.53 RATIO — HIGH (ref 0.85–1.18)
PROTHROM AB SERPL-ACNC: 16.6 SEC — HIGH (ref 9.5–13)

## 2023-10-09 PROCEDURE — 93289 INTERROG DEVICE EVAL HEART: CPT | Mod: 26

## 2023-10-09 PROCEDURE — 99232 SBSQ HOSP IP/OBS MODERATE 35: CPT

## 2023-10-09 PROCEDURE — 93321 DOPPLER ECHO F-UP/LMTD STD: CPT | Mod: 26

## 2023-10-09 PROCEDURE — 93308 TTE F-UP OR LMTD: CPT | Mod: 26

## 2023-10-09 RX ORDER — HEPARIN SODIUM 5000 [USP'U]/ML
1000 INJECTION INTRAVENOUS; SUBCUTANEOUS
Qty: 25000 | Refills: 0 | Status: DISCONTINUED | OUTPATIENT
Start: 2023-10-09 | End: 2023-10-10

## 2023-10-09 RX ORDER — HEPARIN SODIUM 5000 [USP'U]/ML
4000 INJECTION INTRAVENOUS; SUBCUTANEOUS EVERY 6 HOURS
Refills: 0 | Status: DISCONTINUED | OUTPATIENT
Start: 2023-10-09 | End: 2023-10-11

## 2023-10-09 RX ORDER — METOPROLOL TARTRATE 50 MG
75 TABLET ORAL DAILY
Refills: 0 | Status: DISCONTINUED | OUTPATIENT
Start: 2023-10-10 | End: 2023-10-10

## 2023-10-09 RX ORDER — FUROSEMIDE 40 MG
20 TABLET ORAL DAILY
Refills: 0 | Status: DISCONTINUED | OUTPATIENT
Start: 2023-10-09 | End: 2023-10-12

## 2023-10-09 RX ORDER — METOPROLOL TARTRATE 50 MG
25 TABLET ORAL ONCE
Refills: 0 | Status: COMPLETED | OUTPATIENT
Start: 2023-10-09 | End: 2023-10-09

## 2023-10-09 RX ORDER — HEPARIN SODIUM 5000 [USP'U]/ML
9000 INJECTION INTRAVENOUS; SUBCUTANEOUS EVERY 6 HOURS
Refills: 0 | Status: DISCONTINUED | OUTPATIENT
Start: 2023-10-09 | End: 2023-10-11

## 2023-10-09 RX ORDER — SPIRONOLACTONE 25 MG/1
25 TABLET, FILM COATED ORAL DAILY
Refills: 0 | Status: DISCONTINUED | OUTPATIENT
Start: 2023-10-09 | End: 2023-10-13

## 2023-10-09 RX ORDER — POLYETHYLENE GLYCOL 3350 17 G/17G
17 POWDER, FOR SOLUTION ORAL ONCE
Refills: 0 | Status: COMPLETED | OUTPATIENT
Start: 2023-10-09 | End: 2023-10-09

## 2023-10-09 RX ADMIN — Medication 25 MILLIGRAM(S): at 12:40

## 2023-10-09 RX ADMIN — SACUBITRIL AND VALSARTAN 1 TABLET(S): 24; 26 TABLET, FILM COATED ORAL at 05:04

## 2023-10-09 RX ADMIN — AMPICILLIN SODIUM AND SULBACTAM SODIUM 200 GRAM(S): 250; 125 INJECTION, POWDER, FOR SUSPENSION INTRAMUSCULAR; INTRAVENOUS at 05:05

## 2023-10-09 RX ADMIN — Medication 650 MILLIGRAM(S): at 21:28

## 2023-10-09 RX ADMIN — SPIRONOLACTONE 25 MILLIGRAM(S): 25 TABLET, FILM COATED ORAL at 09:43

## 2023-10-09 RX ADMIN — AMPICILLIN SODIUM AND SULBACTAM SODIUM 200 GRAM(S): 250; 125 INJECTION, POWDER, FOR SUSPENSION INTRAMUSCULAR; INTRAVENOUS at 16:58

## 2023-10-09 RX ADMIN — Medication 81 MILLIGRAM(S): at 11:45

## 2023-10-09 RX ADMIN — HEPARIN SODIUM 1000 UNIT(S)/HR: 5000 INJECTION INTRAVENOUS; SUBCUTANEOUS at 18:09

## 2023-10-09 RX ADMIN — Medication 650 MILLIGRAM(S): at 05:04

## 2023-10-09 RX ADMIN — Medication 650 MILLIGRAM(S): at 12:40

## 2023-10-09 RX ADMIN — Medication 20 MILLIGRAM(S): at 09:43

## 2023-10-09 RX ADMIN — POLYETHYLENE GLYCOL 3350 17 GRAM(S): 17 POWDER, FOR SOLUTION ORAL at 00:43

## 2023-10-09 RX ADMIN — SACUBITRIL AND VALSARTAN 1 TABLET(S): 24; 26 TABLET, FILM COATED ORAL at 17:09

## 2023-10-09 RX ADMIN — Medication 50 MILLIGRAM(S): at 05:04

## 2023-10-09 RX ADMIN — AMPICILLIN SODIUM AND SULBACTAM SODIUM 200 GRAM(S): 250; 125 INJECTION, POWDER, FOR SUSPENSION INTRAMUSCULAR; INTRAVENOUS at 11:41

## 2023-10-09 RX ADMIN — ATORVASTATIN CALCIUM 80 MILLIGRAM(S): 80 TABLET, FILM COATED ORAL at 21:28

## 2023-10-09 NOTE — PROGRESS NOTE ADULT - SUBJECTIVE AND OBJECTIVE BOX
Chief complaint  Patient is a 62y old  Male who presents with a chief complaint of fevers/  foot infection (09 Oct 2023 11:25)         Labs and Fingersticks  CAPILLARY BLOOD GLUCOSE      POCT Blood Glucose.: 118 mg/dL (09 Oct 2023 12:51)  POCT Blood Glucose.: 140 mg/dL (09 Oct 2023 09:33)  POCT Blood Glucose.: 173 mg/dL (08 Oct 2023 21:11)  POCT Blood Glucose.: 137 mg/dL (08 Oct 2023 17:20)      Anion Gap: 15 (10-08 @ 07:27)      Calcium: 9.3 (10-08 @ 07:27)          10-08    138  |  103  |  22  ----------------------------<  117<H>  3.5   |  20<L>  |  1.15    Ca    9.3      08 Oct 2023 07:27  Phos  2.7     10-08  Mg     2.0     10-08      Medications  MEDICATIONS  (STANDING):  ampicillin/sulbactam  IVPB 3 Gram(s) IV Intermittent every 6 hours  ampicillin/sulbactam  IVPB      aspirin enteric coated 81 milliGRAM(s) Oral daily  atorvastatin 80 milliGRAM(s) Oral at bedtime  dextrose 5%. 1000 milliLiter(s) (100 mL/Hr) IV Continuous <Continuous>  dextrose 5%. 1000 milliLiter(s) (50 mL/Hr) IV Continuous <Continuous>  dextrose 50% Injectable 25 Gram(s) IV Push once  dextrose 50% Injectable 12.5 Gram(s) IV Push once  dextrose 50% Injectable 25 Gram(s) IV Push once  furosemide    Tablet 20 milliGRAM(s) Oral daily  glucagon  Injectable 1 milliGRAM(s) IntraMuscular once  heparin  Infusion. 1000 Unit(s)/Hr (10 mL/Hr) IV Continuous <Continuous>  insulin lispro (ADMELOG) corrective regimen sliding scale   SubCutaneous three times a day before meals  melatonin 5 milliGRAM(s) Oral at bedtime  sacubitril 24 mG/valsartan 26 mG 1 Tablet(s) Oral two times a day  sodium bicarbonate 650 milliGRAM(s) Oral three times a day  spironolactone 25 milliGRAM(s) Oral daily      Physical Exam  General: Patient comfortable in bed   Vital Signs Last 12 Hrs  T(F): 98.5 (10-09-23 @ 12:32), Max: 98.5 (10-09-23 @ 12:32)  HR: 74 (10-09-23 @ 12:32) (74 - 90)  BP: 103/73 (10-09-23 @ 12:32) (103/73 - 110/67)  BP(mean): --  RR: 18 (10-09-23 @ 12:32) (18 - 18)  SpO2: 96% (10-09-23 @ 12:32) (96% - 97%)    CVS: S1S2   Respiratory: No wheezing, no crepitations  GI: Abdomen soft, bowel sounds positive  Musculoskeletal:  moves all extremities  : Voiding      Chief complaint  Patient is a 62y old  Male who presents with a chief complaint of fevers/  foot infection (09 Oct 2023 11:25)         Labs and Fingersticks  CAPILLARY BLOOD GLUCOSE      POCT Blood Glucose.: 118 mg/dL (09 Oct 2023 12:51)  POCT Blood Glucose.: 140 mg/dL (09 Oct 2023 09:33)  POCT Blood Glucose.: 173 mg/dL (08 Oct 2023 21:11)  POCT Blood Glucose.: 137 mg/dL (08 Oct 2023 17:20)      Anion Gap: 15 (10-08 @ 07:27)      Calcium: 9.3 (10-08 @ 07:27)          10-08    138  |  103  |  22  ----------------------------<  117<H>  3.5   |  20<L>  |  1.15    Ca    9.3      08 Oct 2023 07:27  Phos  2.7     10-08  Mg     2.0     10-08      Medications  MEDICATIONS  (STANDING):  ampicillin/sulbactam  IVPB 3 Gram(s) IV Intermittent every 6 hours  ampicillin/sulbactam  IVPB      aspirin enteric coated 81 milliGRAM(s) Oral daily  atorvastatin 80 milliGRAM(s) Oral at bedtime  dextrose 5%. 1000 milliLiter(s) (100 mL/Hr) IV Continuous <Continuous>  dextrose 5%. 1000 milliLiter(s) (50 mL/Hr) IV Continuous <Continuous>  dextrose 50% Injectable 25 Gram(s) IV Push once  dextrose 50% Injectable 12.5 Gram(s) IV Push once  dextrose 50% Injectable 25 Gram(s) IV Push once  furosemide    Tablet 20 milliGRAM(s) Oral daily  glucagon  Injectable 1 milliGRAM(s) IntraMuscular once  heparin  Infusion. 1000 Unit(s)/Hr (10 mL/Hr) IV Continuous <Continuous>  insulin lispro (ADMELOG) corrective regimen sliding scale   SubCutaneous three times a day before meals  melatonin 5 milliGRAM(s) Oral at bedtime  sacubitril 24 mG/valsartan 26 mG 1 Tablet(s) Oral two times a day  sodium bicarbonate 650 milliGRAM(s) Oral three times a day  spironolactone 25 milliGRAM(s) Oral daily      Physical Exam  General: Patient comfortable in bed   Vital Signs Last 12 Hrs  T(F): 98.5 (10-09-23 @ 12:32), Max: 98.5 (10-09-23 @ 12:32)  HR: 74 (10-09-23 @ 12:32) (74 - 90)  BP: 103/73 (10-09-23 @ 12:32) (103/73 - 110/67)  BP(mean): --  RR: 18 (10-09-23 @ 12:32) (18 - 18)  SpO2: 96% (10-09-23 @ 12:32) (96% - 97%)    CVS: S1S2   Respiratory: No wheezing, no crepitations  GI: Abdomen soft, bowel sounds positive  Musculoskeletal:  moves all extremities  : Voiding

## 2023-10-09 NOTE — PROGRESS NOTE ADULT - ASSESSMENT
_________________________________________________________________________________________  ========>>  M E D I C A L   A T T E N D I N G    F O L L O W  U P  N O T E  <<=========  -----------------------------------------------------------------------------------------------------    - Patient seen and examined by me earlier today.   - In summary,  RYLEE HOWE is a 62y year old man admitted with Diabetic foot infection  - Patient today overall doing ok, comfortable, eating OK. .. a little uneasy about needing amputation >> patient reassured, emotional support provided, all questions answered...     ==================>> REVIEW OF SYSTEM <<=================    GEN: no fever, no chills, pain Controlled  RESP: no SOB, no cough, no sputum  CVS: no chest pain, no palpitations, no edema  GI: no abdominal pain, no nausea, a little constipation ( took Miralax already)   : no dysuria, no frequency,  Neuro: no headache, no dizziness  Derm : no itching, no rash    ==================>> PHYSICAL EXAM <<=================    GEN: A&O X 3 , NAD , comfortable, pleasant, calm in bed .. encouraged out of bed to chair with assistance as needed.   HEENT: NCAT, PERRL, MMM, hearing intact  Neck: supple , no JVD appreciated  CVS: S1S2 , regular , No M/R/G appreciated  PULM: CTA B/L,  no W/R/R appreciated  ABD.: soft. non tender, non distended,  bowel sounds present  Extrem: intact pulses , Right foot wound dressed  PSYCH : normal mood,  not anxious       ( Note written / Date of service 10-09-23 ( This is certified to be the same as "ENTERED" date above ( for billing purposes)))    ==================>> MEDICATIONS <<====================    ampicillin/sulbactam  IVPB 3 Gram(s) IV Intermittent every 6 hours  ampicillin/sulbactam  IVPB      aspirin enteric coated 81 milliGRAM(s) Oral daily  atorvastatin 80 milliGRAM(s) Oral at bedtime  dextrose 5%. 1000 milliLiter(s) IV Continuous <Continuous>  dextrose 5%. 1000 milliLiter(s) IV Continuous <Continuous>  dextrose 50% Injectable 25 Gram(s) IV Push once  dextrose 50% Injectable 25 Gram(s) IV Push once  dextrose 50% Injectable 12.5 Gram(s) IV Push once  furosemide    Tablet 20 milliGRAM(s) Oral daily  glucagon  Injectable 1 milliGRAM(s) IntraMuscular once  heparin  Infusion. 1000 Unit(s)/Hr IV Continuous <Continuous>  insulin lispro (ADMELOG) corrective regimen sliding scale   SubCutaneous three times a day before meals  melatonin 5 milliGRAM(s) Oral at bedtime  sacubitril 24 mG/valsartan 26 mG 1 Tablet(s) Oral two times a day  sodium bicarbonate 650 milliGRAM(s) Oral three times a day  spironolactone 25 milliGRAM(s) Oral daily    MEDICATIONS  (PRN):  acetaminophen     Tablet .. 650 milliGRAM(s) Oral every 6 hours PRN Temp greater or equal to 38C (100.4F), Mild Pain (1 - 3)  dextrose Oral Gel 15 Gram(s) Oral once PRN Blood Glucose LESS THAN 70 milliGRAM(s)/deciliter  heparin   Injectable 9000 Unit(s) IV Push every 6 hours PRN For aPTT less than 40  heparin   Injectable 4000 Unit(s) IV Push every 6 hours PRN For aPTT between 40 - 57    ___________  Active diet:  Diet, NPO after Midnight:      NPO Start Date: 09-Oct-2023,   NPO Start Time: 23:59  Diet, Consistent Carbohydrate w/Evening Snack  ___________________    ==================>> VITAL SIGNS <<==================    Weight (kg): 109.2  Vital Signs Last 24 HrsT(C): 36.9 (10-09-23 @ 12:32)  T(F): 98.5 (10-09-23 @ 12:32), Max: 99.1 (10-08-23 @ 16:18)  HR: 74 (10-09-23 @ 12:32) (74 - 94)  BP: 103/73 (10-09-23 @ 12:32)  RR: 18 (10-09-23 @ 12:32) (18 - 18)  SpO2: 96% (10-09-23 @ 12:32) (96% - 97%)      CAPILLARY BLOOD GLUCOSE  POCT Blood Glucose.: 118 mg/dL (09 Oct 2023 12:51)  POCT Blood Glucose.: 140 mg/dL (09 Oct 2023 09:33)  POCT Blood Glucose.: 173 mg/dL (08 Oct 2023 21:11)  POCT Blood Glucose.: 137 mg/dL (08 Oct 2023 17:20)     ==================>> LAB AND IMAGING <<==================       10-08    138  |  103  |  22  ----------------------------<  117<H>  3.5   |  20<L>  |  1.15    Ca    9.3      08 Oct 2023 07:27  Phos  2.7     10-08  Mg     2.0     10-08    WBC count:   4.76 <<== ,  7.66 <<==   Hemoglobin:   10.0 <<==,  11.2 <<==  platelets:  128 <==, 142 <==    Creatinine:  1.15  <<==, 1.50  <<==, 1.35  <<==, 1.33  <<==  Sodium:   138  <==, 138  <==, 137  <==, 137  <==    ____________________________    M I C R O B I O L O G Y :    Culture - Blood (collected 08 Oct 2023 07:01)  Source: .Blood Blood-Peripheral  Preliminary Report (09 Oct 2023 10:01):    No growth at 24 hours    Culture - Blood (collected 08 Oct 2023 07:01)  Source: .Blood Blood-Peripheral  Preliminary Report (09 Oct 2023 10:01):    No growth at 24 hours    Culture - Urine (collected 06 Oct 2023 16:25)  Source: Clean Catch Clean Catch (Midstream)  Final Report (07 Oct 2023 15:13):    <10,000 CFU/mL Normal Urogenital Mona    Culture - Abscess with Gram Stain (collected 06 Oct 2023 15:12)  Source: .Abscess right foot  Gram Stain (06 Oct 2023 22:45):    Rare polymorphonuclear leukocytes per low power field    Numerous Gram Variable Rods per oil power field  Preliminary Report (08 Oct 2023 21:44):    Moderate Gram Positive Rods Most closely resembling Arcanobacterium    haemolyticum "Susceptibilities not performed"    Moderate Streptococcus agalactiae (Group B) isolated    Group B streptococci are susceptible to ampicillin,    penicillin and cefazolin,but may be resistant to    erythromycin and clindamycin.    Culture - Blood (collected 06 Oct 2023 06:28)  Source: .Blood Blood-Peripheral  Gram Stain (06 Oct 2023 19:46):    Growth in aerobic and anaerobic bottles: Gram Positive Cocci in Pairs and    Chains  Final Report (08 Oct 2023 11:33):    Growth in aerobic and anaerobic bottles: Streptococcus agalactiae (Group    B)    Direct identification is available within approximately 3-5    hours either by Blood Panel Multiplexed PCR or Direct    MALDI-TOF. Details: [email or web address]  Organism: Blood Culture PCR  Streptococcus agalactiae (Group B) (08 Oct 2023 11:33)  Organism: Streptococcus agalactiae (Group B) (08 Oct 2023 11:33)    Sensitivities:      Method Type: HEATHER      -  Ceftriaxone: S <=0.25      -  Clindamycin: S <=0.06      -  Levofloxacin: S 1      -  Penicillin: S 0.06 Predicts results for ampicillin, amoxicillin, amoxicillin/clavulanate, ampicillin/sulbactam, 1st, 2nd and 3rd generation cephalosporins and carbapenems.      -  Vancomycin: S 0.5  Organism: Blood Culture PCR (08 Oct 2023 11:33)    Sensitivities:      Method Type: PCR      -  Streptococcus agalactiae (Group B): Detec    Culture - Blood (collected 06 Oct 2023 06:10)  Source: .Blood Blood-Peripheral  Gram Stain (06 Oct 2023 19:45):    Growth in aerobic and anaerobic bottles: Gram Positive Cocci in Pairs and    Chains  Final Report (08 Oct 2023 11:33):    Growth in aerobic and anaerobic bottles: Streptococcus agalactiae (Group    B)    See previous culture [id number]    SARS-CoV-2: NotDetec (10-06-23 @ 06:54)    __________________________________________________________________________________  ===============>>  A S S E S S M E N T   A N D   P L A N <<===============  ------------------------------------------------------------------------------------------    · Assessment	  62yr M with PMH of  HTN,   AFIB,  DM/. right foot  osteo (Recently completed IV antibiotics via PICC line) ,CAD, s/p  cabg / AICD,hx of Sommers fracture, non-union many years ago,   prior CT:  c/c  transverse   fx, through the fifth metatarsal base. soft tissue wound/ cortical irregularity along the fifth metatarsal base  wound cx   MSSA and fingoldia,  s/p I&D with necrotic tissue close to bone,suspicion for residual osteo  completed   ancef 2 gms IVSS q 8 hours .  last  month     now  admitted with fevers/  right foot  wound/  and  shanda   Rt  5th metatarsal  wound/  foot  cellulitis >> Now with bacteremia    seen by podiatry /  ID  / Vascular   HTN/  HLD   CAD/    cardiomyopathy,  ef  25 Post AICD (as per patient not MRI compatible)  c/c  Afib,  on xarelo/  known to  card   c/c  anemia  DM,  on  farxiga. januvia. metformin   has   c/c  low  baseline  sbp  in  90's   hold  lasix/  farxiga/  entresto, for  now/ farxiga  not  recommended  with osteo  foot/ ha s risk  for  amputation    bacteremia  Gp  BsStrep,  , on iv unasyn.  iD  following  Plan for vascular angiogram by vascular surgery : Hold Xarelto  Plan for amputation by podiatry   >> medically stable for procedures..      cardiology following >> medications adjusted given PVC and NSVT seen on AICD interrogation   continuing the antibiotics per ID   -GI/DVT Prophylaxis per protocol.    --------------------------------------------  Case discussed with Patient, nurse practitioner, vascuar team, cardio..   Education given on findings and plan of care  ___________________________  H. RACHAEL Boudreaux.  Pager: [phone]

## 2023-10-09 NOTE — PROGRESS NOTE ADULT - SUBJECTIVE AND OBJECTIVE BOX
Date of Service: 10-09-23 @ 08:28           CARDIOLOGY     PROGRESS  NOTE   ________________________________________________    CHIEF COMPLAINT:Patient is a 62y old  Male who presents with a chief complaint of fevers/  foot infection (08 Oct 2023 19:17)  doing better  	  REVIEW OF SYSTEMS:  CONSTITUTIONAL: No fever, weight loss, or fatigue  EYES: No eye pain, visual disturbances, or discharge  ENT:  No difficulty hearing, tinnitus, vertigo; No sinus or throat pain  NECK: No pain or stiffness  RESPIRATORY: No cough, wheezing, chills or hemoptysis; No Shortness of Breath  CARDIOVASCULAR: No chest pain, palpitations, passing out, dizziness, or leg swelling  GASTROINTESTINAL: No abdominal or epigastric pain. No nausea, vomiting, or hematemesis; No diarrhea or constipation. No melena or hematochezia.  GENITOURINARY: No dysuria, frequency, hematuria, or incontinence  NEUROLOGICAL: No headaches, memory loss, loss of strength, numbness, or tremors  SKIN: No itching, burning, rashes, or lesions   LYMPH Nodes: No enlarged glands  ENDOCRINE: No heat or cold intolerance; No hair loss  MUSCULOSKELETAL: No joint pain or swelling; No muscle, back, R extremity pain  PSYCHIATRIC: No depression, anxiety, mood swings, or difficulty sleeping  HEME/LYMPH: No easy bruising, or bleeding gums  ALLERGY AND IMMUNOLOGIC: No hives or eczema	    [ ] All others negative	  [ ] Unable to obtain    PHYSICAL EXAM:  T(C): 36.7 (10-09-23 @ 04:31), Max: 37.3 (10-08-23 @ 16:18)  HR: 90 (10-09-23 @ 04:31) (86 - 94)  BP: 110/67 (10-09-23 @ 04:31) (95/58 - 110/72)  RR: 18 (10-09-23 @ 04:31) (18 - 18)  SpO2: 97% (10-09-23 @ 04:31) (96% - 97%)  Wt(kg): --  I&O's Summary    08 Oct 2023 07:01  -  09 Oct 2023 07:00  --------------------------------------------------------  IN: 0 mL / OUT: 200 mL / NET: -200 mL        Appearance: Normal	  HEENT:   Normal oral mucosa, PERRL, EOMI	  Lymphatic: No lymphadenopathy  Cardiovascular: Normal S1 S2, No JVD, No murmurs, No edema  Respiratory: Lungs clear to auscultation	  Psychiatry: A & O x 3, Mood & affect appropriate  Gastrointestinal:  Soft, Non-tender, + BS	  Skin: No rashes, No ecchymoses, No cyanosis	  Neurologic: Non-focal  Extremities: Normal range of motion, No clubbing, cyanosis or edema  Vascular: Peripheral pulses palpable 2+ bilaterally    MEDICATIONS  (STANDING):  ampicillin/sulbactam  IVPB 3 Gram(s) IV Intermittent every 6 hours  ampicillin/sulbactam  IVPB      aspirin enteric coated 81 milliGRAM(s) Oral daily  atorvastatin 80 milliGRAM(s) Oral at bedtime  dextrose 5%. 1000 milliLiter(s) (100 mL/Hr) IV Continuous <Continuous>  dextrose 5%. 1000 milliLiter(s) (50 mL/Hr) IV Continuous <Continuous>  dextrose 50% Injectable 25 Gram(s) IV Push once  dextrose 50% Injectable 12.5 Gram(s) IV Push once  dextrose 50% Injectable 25 Gram(s) IV Push once  glucagon  Injectable 1 milliGRAM(s) IntraMuscular once  insulin lispro (ADMELOG) corrective regimen sliding scale   SubCutaneous three times a day before meals  melatonin 5 milliGRAM(s) Oral at bedtime  metoprolol succinate ER 50 milliGRAM(s) Oral daily  sacubitril 24 mG/valsartan 26 mG 1 Tablet(s) Oral two times a day  sodium bicarbonate 650 milliGRAM(s) Oral three times a day      TELEMETRY: 	    ECG:  	  RADIOLOGY:  OTHER: 	  	  LABS:	 	    CARDIAC MARKERS:            10-08    138  |  103  |  22  ----------------------------<  117<H>  3.5   |  20<L>  |  1.15    Ca    9.3      08 Oct 2023 07:27  Phos  2.7     10-08  Mg     2.0     10-08      proBNP:   Lipid Profile:   HgA1c:   TSH: Thyroid Stimulating Hormone, Serum: 5.55 uIU/mL (10-07 @ 07:08)    PT/INR - ( 09 Oct 2023 07:13 )   PT: 16.6 sec;   INR: 1.53 ratio         PTT - ( 09 Oct 2023 07:13 )  PTT:35.4 sec    Culture - Abscess with Gram Stain (10.06.23 @ 15:12)    Gram Stain:   Rare polymorphonuclear leukocytes per low power field  Numerous Gram Variable Rods per oil power field   Specimen Source: .Abscess right foot   Culture Results:   Moderate Gram Positive Rods Most closely resembling Arcanobacterium  haemolyticum "Susceptibilities not performed"  Moderate Streptococcus agalactiae (Group B) isolated  Group B streptococci are susceptible to ampicillin,  penicillin and cefazolin,but may be resistant to  erythromycin and clindamycin.    - Patient seen and evaluated  - Afebrile, , no Leukocytosis   - R foot lateral 5th ray wound to bone, erythema to dorsal 3rd-5th ray, mild malodor, tracking proximally and dorsally about 1cm, no purulence, mild serosanguineous drainage, wound bed necrotic.   - R foot xray: proximal 5th met fracture  - R foot culture:  Streptococcus agalactiae  - ID recs appreciated.   - Vasc recs appreciated.   - Pod plan R foot 5th met base resection with PT tenotomy, tentatively Wednesday to follow 1030am.   - Please document Medical and Cardiac optimization for anesthesia for Podiatry surgery.         Assessment and plan  ---------------------------  62M hx of type 2 DM on metformin, farixga, heart failure with reduced EF on metoprolol 75mg, lasix 20mg qd, xarelto for vte ppx (had RUE vte 2/2 PICC line), removed 2 weeks prior as well as right foot wound wac (present 2/2 osteo of foot with finegoldia magna), here for 36 hrs of fever, tmax 101, took 800mg advil today, prior to arrival. + slight right lower back pain, nonradiating, mild, no assc GI sxs. + urinating more freq as of late. Still tolerating PO. Denies chest pain, shortness of breath, diaphoresis. Hypotensive to 90s/50 in triage, then 80s/60s, last bp 100/40, not tachy but in setting of bb. Appears slightly dehydrated, clear lungs, s3 gallop, no murmurs appreciable. + right foot redness, swelling, nontender, no flucutance, no crepitations, 1+ dp pulses bilateral, full rom. Nontender calves. Benign abd, no peritoneal signs, no jaundice, no cva ttp. No midline spinal ttp. RUE no signs of infx, former picc site clean, Patient meets sepsis criteria by vitals. Will eval for common source of infection (ex. osteo, urinary, bacterial pulmonary, viral uri; unlikely central neurologic such as meninigitis or encephalitis) vs metabolic derangement. Check labs, lactate, UA, CXs, CXR, foot xray, inflam markers screening EKG, hydrate-> empiric abxs, antipyretics, additional crystalloid infusion as clinically warranted. Will start with 500 cc crystalloid 2/2 heart failure but euglycemic dka.  pt is well known to me with hx of htn, ashd, chf, s/p BIV AICD , PVD with multiple admissions sec to foot infection  pt with sig lv dysfunction/ severe ischemic cardiomyopathy  will  adjust cardiac meds  hold Farxiga for now. may  requiring surgery  dvt prophylaxis  vascula/podiatry consult  abx  pt Entresto dose has decrease sec to ?infection will gradually will increase dose  continue Metroprolol er  avoid excess fluid, may need to re start on  Lasix  and aldactone  check inr if elevated will give vitamin K on Xarelto  may need to decrease xarelto dose if increase renal function  + BC for strep, continue iv abx, pt has hardware (biv Aicd)  will increase Entresto as bp tolerates  will check AICD/ ECHO  re start on aldactone 25 mg daily    	         Date of Service: 10-09-23 @ 08:28           CARDIOLOGY     PROGRESS  NOTE   ________________________________________________    CHIEF COMPLAINT:Patient is a 62y old  Male who presents with a chief complaint of fevers/  foot infection (08 Oct 2023 19:17)  doing better  	  REVIEW OF SYSTEMS:  CONSTITUTIONAL: No fever, weight loss, or fatigue  EYES: No eye pain, visual disturbances, or discharge  ENT:  No difficulty hearing, tinnitus, vertigo; No sinus or throat pain  NECK: No pain or stiffness  RESPIRATORY: No cough, wheezing, chills or hemoptysis; No Shortness of Breath  CARDIOVASCULAR: No chest pain, palpitations, passing out, dizziness, or leg swelling  GASTROINTESTINAL: No abdominal or epigastric pain. No nausea, vomiting, or hematemesis; No diarrhea or constipation. No melena or hematochezia.  GENITOURINARY: No dysuria, frequency, hematuria, or incontinence  NEUROLOGICAL: No headaches, memory loss, loss of strength, numbness, or tremors  SKIN: No itching, burning, rashes, or lesions   LYMPH Nodes: No enlarged glands  ENDOCRINE: No heat or cold intolerance; No hair loss  MUSCULOSKELETAL: No joint pain or swelling; No muscle, back, R extremity pain  PSYCHIATRIC: No depression, anxiety, mood swings, or difficulty sleeping  HEME/LYMPH: No easy bruising, or bleeding gums  ALLERGY AND IMMUNOLOGIC: No hives or eczema	    [ ] All others negative	  [ ] Unable to obtain    PHYSICAL EXAM:  T(C): 36.7 (10-09-23 @ 04:31), Max: 37.3 (10-08-23 @ 16:18)  HR: 90 (10-09-23 @ 04:31) (86 - 94)  BP: 110/67 (10-09-23 @ 04:31) (95/58 - 110/72)  RR: 18 (10-09-23 @ 04:31) (18 - 18)  SpO2: 97% (10-09-23 @ 04:31) (96% - 97%)  Wt(kg): --  I&O's Summary    08 Oct 2023 07:01  -  09 Oct 2023 07:00  --------------------------------------------------------  IN: 0 mL / OUT: 200 mL / NET: -200 mL        Appearance: Normal	  HEENT:   Normal oral mucosa, PERRL, EOMI	  Lymphatic: No lymphadenopathy  Cardiovascular: Normal S1 S2, No JVD, No murmurs, No edema  Respiratory: Lungs clear to auscultation	  Psychiatry: A & O x 3, Mood & affect appropriate  Gastrointestinal:  Soft, Non-tender, + BS	  Skin: No rashes, No ecchymoses, No cyanosis	  Neurologic: Non-focal  Extremities: Normal range of motion, No clubbing, cyanosis or edema  Vascular: Peripheral pulses palpable 2+ bilaterally    MEDICATIONS  (STANDING):  ampicillin/sulbactam  IVPB 3 Gram(s) IV Intermittent every 6 hours  ampicillin/sulbactam  IVPB      aspirin enteric coated 81 milliGRAM(s) Oral daily  atorvastatin 80 milliGRAM(s) Oral at bedtime  dextrose 5%. 1000 milliLiter(s) (100 mL/Hr) IV Continuous <Continuous>  dextrose 5%. 1000 milliLiter(s) (50 mL/Hr) IV Continuous <Continuous>  dextrose 50% Injectable 25 Gram(s) IV Push once  dextrose 50% Injectable 12.5 Gram(s) IV Push once  dextrose 50% Injectable 25 Gram(s) IV Push once  glucagon  Injectable 1 milliGRAM(s) IntraMuscular once  insulin lispro (ADMELOG) corrective regimen sliding scale   SubCutaneous three times a day before meals  melatonin 5 milliGRAM(s) Oral at bedtime  metoprolol succinate ER 50 milliGRAM(s) Oral daily  sacubitril 24 mG/valsartan 26 mG 1 Tablet(s) Oral two times a day  sodium bicarbonate 650 milliGRAM(s) Oral three times a day      TELEMETRY: 	    ECG:  	  RADIOLOGY:  OTHER: 	  	  LABS:	 	    CARDIAC MARKERS:            10-08    138  |  103  |  22  ----------------------------<  117<H>  3.5   |  20<L>  |  1.15    Ca    9.3      08 Oct 2023 07:27  Phos  2.7     10-08  Mg     2.0     10-08      proBNP:   Lipid Profile:   HgA1c:   TSH: Thyroid Stimulating Hormone, Serum: 5.55 uIU/mL (10-07 @ 07:08)    PT/INR - ( 09 Oct 2023 07:13 )   PT: 16.6 sec;   INR: 1.53 ratio         PTT - ( 09 Oct 2023 07:13 )  PTT:35.4 sec    Culture - Abscess with Gram Stain (10.06.23 @ 15:12)    Gram Stain:   Rare polymorphonuclear leukocytes per low power field  Numerous Gram Variable Rods per oil power field   Specimen Source: .Abscess right foot   Culture Results:   Moderate Gram Positive Rods Most closely resembling Arcanobacterium  haemolyticum "Susceptibilities not performed"  Moderate Streptococcus agalactiae (Group B) isolated  Group B streptococci are susceptible to ampicillin,  penicillin and cefazolin,but may be resistant to  erythromycin and clindamycin.    - Patient seen and evaluated  - Afebrile, , no Leukocytosis   - R foot lateral 5th ray wound to bone, erythema to dorsal 3rd-5th ray, mild malodor, tracking proximally and dorsally about 1cm, no purulence, mild serosanguineous drainage, wound bed necrotic.   - R foot xray: proximal 5th met fracture  - R foot culture:  Streptococcus agalactiae  - ID recs appreciated.   - Vasc recs appreciated.   - Pod plan R foot 5th met base resection with PT tenotomy, tentatively Wednesday to follow 1030am.   - Please document Medical and Cardiac optimization for anesthesia for Podiatry surgery.         Assessment and plan  ---------------------------  62M hx of type 2 DM on metformin, farixga, heart failure with reduced EF on metoprolol 75mg, lasix 20mg qd, xarelto for vte ppx (had RUE vte 2/2 PICC line), removed 2 weeks prior as well as right foot wound wac (present 2/2 osteo of foot with finegoldia magna), here for 36 hrs of fever, tmax 101, took 800mg advil today, prior to arrival. + slight right lower back pain, nonradiating, mild, no assc GI sxs. + urinating more freq as of late. Still tolerating PO. Denies chest pain, shortness of breath, diaphoresis. Hypotensive to 90s/50 in triage, then 80s/60s, last bp 100/40, not tachy but in setting of bb. Appears slightly dehydrated, clear lungs, s3 gallop, no murmurs appreciable. + right foot redness, swelling, nontender, no flucutance, no crepitations, 1+ dp pulses bilateral, full rom. Nontender calves. Benign abd, no peritoneal signs, no jaundice, no cva ttp. No midline spinal ttp. RUE no signs of infx, former picc site clean, Patient meets sepsis criteria by vitals. Will eval for common source of infection (ex. osteo, urinary, bacterial pulmonary, viral uri; unlikely central neurologic such as meninigitis or encephalitis) vs metabolic derangement. Check labs, lactate, UA, CXs, CXR, foot xray, inflam markers screening EKG, hydrate-> empiric abxs, antipyretics, additional crystalloid infusion as clinically warranted. Will start with 500 cc crystalloid 2/2 heart failure but euglycemic dka.  pt is well known to me with hx of htn, ashd, chf, s/p BIV AICD , PVD with multiple admissions sec to foot infection  pt with sig lv dysfunction/ severe ischemic cardiomyopathy  will  adjust cardiac meds  hold Farxiga for now. may  requiring surgery  dvt prophylaxis  vascula/podiatry consult  abx  pt Entresto dose has decrease sec to ?infection will gradually will increase dose  continue Metroprolol er  avoid excess fluid, may need to re start on  Lasix  and aldactone  check inr if elevated will give vitamin K on Xarelto  may need to decrease xarelto dose if increase renal function  + BC for strep, continue iv abx, pt has hardware (biv Aicd)  will increase Entresto as bp tolerates  will check AICD/ ECHO  re start on aldactone 25 mg daily  start iv heparin as is off Xarelto

## 2023-10-09 NOTE — PROGRESS NOTE ADULT - SUBJECTIVE AND OBJECTIVE BOX
Patient is a 62y Male whom presented to the hospital with shanda     PAST MEDICAL & SURGICAL HISTORY:  Diabetes      Chronic osteomyelitis      H/O heart failure      No significant past surgical history          MEDICATIONS  (STANDING):  aspirin enteric coated 81 milliGRAM(s) Oral daily  atorvastatin 80 milliGRAM(s) Oral at bedtime  dextrose 5%. 1000 milliLiter(s) (100 mL/Hr) IV Continuous <Continuous>  dextrose 5%. 1000 milliLiter(s) (50 mL/Hr) IV Continuous <Continuous>  dextrose 50% Injectable 25 Gram(s) IV Push once  dextrose 50% Injectable 25 Gram(s) IV Push once  dextrose 50% Injectable 12.5 Gram(s) IV Push once  glucagon  Injectable 1 milliGRAM(s) IntraMuscular once  insulin lispro (ADMELOG) corrective regimen sliding scale   SubCutaneous three times a day before meals  metoprolol succinate ER 25 milliGRAM(s) Oral daily  piperacillin/tazobactam IVPB.. 3.375 Gram(s) IV Intermittent every 8 hours  rivaroxaban 20 milliGRAM(s) Oral with dinner  sodium bicarbonate 650 milliGRAM(s) Oral three times a day      Allergies    No Known Allergies    Intolerances        SOCIAL HISTORY:  Denies ETOh,Smoking,     FAMILY HISTORY:      REVIEW OF SYSTEMS:    CONSTITUTIONAL: No weakness, fevers or chills  EYES/ENT: No visual changes;  no throat pain   NECK: No pain or stiffness  RESPIRATORY: No cough, wheezing, hemoptysis; No shortness of breath  CARDIOVASCULAR: No chest pain or palpitations  GASTROINTESTINAL: No abdominal or epigastric pain. No nausea, vomiting,     No diarrhea or constipation. No melena   GENITOURINARY: No dysuria, frequency or hematuria  NEUROLOGICAL: No numbness or weakness  SKIN: dry                                 10-08    138  |  103  |  22  ----------------------------<  117<H>  3.5   |  20<L>  |  1.15    Ca    9.3      08 Oct 2023 07:27  Phos  2.7     10-08  Mg     2.0     10-08        CAPILLARY BLOOD GLUCOSE      POCT Blood Glucose.: 146 mg/dL (09 Oct 2023 17:31)  POCT Blood Glucose.: 118 mg/dL (09 Oct 2023 12:51)  POCT Blood Glucose.: 140 mg/dL (09 Oct 2023 09:33)  POCT Blood Glucose.: 173 mg/dL (08 Oct 2023 21:11)      Vital Signs Last 24 Hrs  T(C): 36.9 (09 Oct 2023 12:32), Max: 37.1 (08 Oct 2023 20:07)  T(F): 98.5 (09 Oct 2023 12:32), Max: 98.8 (08 Oct 2023 20:07)  HR: 74 (09 Oct 2023 12:32) (74 - 92)  BP: 103/73 (09 Oct 2023 12:32) (103/73 - 110/72)  BP(mean): --  RR: 18 (09 Oct 2023 12:32) (18 - 18)  SpO2: 96% (09 Oct 2023 12:32) (96% - 97%)    Parameters below as of 09 Oct 2023 12:32  Patient On (Oxygen Delivery Method): room air        Urinalysis Basic - ( 08 Oct 2023 07:27 )    Color: x / Appearance: x / SG: x / pH: x  Gluc: 117 mg/dL / Ketone: x  / Bili: x / Urobili: x   Blood: x / Protein: x / Nitrite: x   Leuk Esterase: x / RBC: x / WBC x   Sq Epi: x / Non Sq Epi: x / Bacteria: x        PT/INR - ( 09 Oct 2023 07:13 )   PT: 16.6 sec;   INR: 1.53 ratio         PTT - ( 09 Oct 2023 07:13 )  PTT:35.4 sec                  PHYSICAL EXAM:    Constitutional: NAD  HEENT: conjunctive   clear   Neck:  No JVD  Respiratory: CTAB  Cardiovascular: S1 and S2  Gastrointestinal: BS+, soft, NT/ND  Extremities: No peripheral edema  Neurological: A/O x 3, no focal deficits  Psychiatric: Normal mood, normal affect  : No Atkins  Skin: No rashes  Access: Not applicable

## 2023-10-09 NOTE — PROGRESS NOTE ADULT - SUBJECTIVE AND OBJECTIVE BOX
VASCULAR SURGERY DAILY PROGRESS NOTE    Overnight Events: No acute events overnight    SUBJECTIVE: Patient seen and evaluated on AM rounds. Pt is resting comfortably in bed with no complaints. Denies fevers/chills, chest pain, dyspnea, abdominal pain, nausea, vomiting, diarrhea, leg pain, or claudication symptoms    ------------------------------------------------------------------------------------------------------------  OBJECTIVE:  Vital Signs Last 24 Hrs  T(C): 36.7 (09 Oct 2023 04:31), Max: 37.3 (08 Oct 2023 16:18)  T(F): 98.1 (09 Oct 2023 04:31), Max: 99.1 (08 Oct 2023 16:18)  HR: 90 (09 Oct 2023 04:31) (86 - 94)  BP: 110/67 (09 Oct 2023 04:31) (95/58 - 110/72)  BP(mean): --  RR: 18 (09 Oct 2023 04:31) (18 - 18)  SpO2: 97% (09 Oct 2023 04:31) (96% - 97%)    Parameters below as of 09 Oct 2023 04:31  Patient On (Oxygen Delivery Method): room air      I&O's Detail    08 Oct 2023 07:01  -  09 Oct 2023 07:00  --------------------------------------------------------  IN:  Total IN: 0 mL    OUT:    Voided (mL): 200 mL  Total OUT: 200 mL    Total NET: -200 mL        Daily     Daily   MEDICATIONS  (STANDING):  ampicillin/sulbactam  IVPB 3 Gram(s) IV Intermittent every 6 hours  ampicillin/sulbactam  IVPB      aspirin enteric coated 81 milliGRAM(s) Oral daily  atorvastatin 80 milliGRAM(s) Oral at bedtime  dextrose 5%. 1000 milliLiter(s) (100 mL/Hr) IV Continuous <Continuous>  dextrose 5%. 1000 milliLiter(s) (50 mL/Hr) IV Continuous <Continuous>  dextrose 50% Injectable 25 Gram(s) IV Push once  dextrose 50% Injectable 25 Gram(s) IV Push once  dextrose 50% Injectable 12.5 Gram(s) IV Push once  furosemide    Tablet 20 milliGRAM(s) Oral daily  glucagon  Injectable 1 milliGRAM(s) IntraMuscular once  heparin  Infusion. 1000 Unit(s)/Hr (10 mL/Hr) IV Continuous <Continuous>  insulin lispro (ADMELOG) corrective regimen sliding scale   SubCutaneous three times a day before meals  melatonin 5 milliGRAM(s) Oral at bedtime  metoprolol succinate ER 50 milliGRAM(s) Oral daily  sacubitril 24 mG/valsartan 26 mG 1 Tablet(s) Oral two times a day  sodium bicarbonate 650 milliGRAM(s) Oral three times a day  spironolactone 25 milliGRAM(s) Oral daily    MEDICATIONS  (PRN):  acetaminophen     Tablet .. 650 milliGRAM(s) Oral every 6 hours PRN Temp greater or equal to 38C (100.4F), Mild Pain (1 - 3)  dextrose Oral Gel 15 Gram(s) Oral once PRN Blood Glucose LESS THAN 70 milliGRAM(s)/deciliter  heparin   Injectable 9000 Unit(s) IV Push every 6 hours PRN For aPTT less than 40  heparin   Injectable 4000 Unit(s) IV Push every 6 hours PRN For aPTT between 40 - 57      LABS:    10-08    138  |  103  |  22  ----------------------------<  117<H>  3.5   |  20<L>  |  1.15    Ca    9.3      08 Oct 2023 07:27  Phos  2.7     10-08  Mg     2.0     10-08    PT/INR - ( 09 Oct 2023 07:13 )   PT: 16.6 sec;   INR: 1.53 ratio     PTT - ( 09 Oct 2023 07:13 )  PTT:35.4 sec    Urinalysis Basic - ( 08 Oct 2023 07:27 )  Color: x / Appearance: x / SG: x / pH: x  Gluc: 117 mg/dL / Ketone: x  / Bili: x / Urobili: x   Blood: x / Protein: x / Nitrite: x   Leuk Esterase: x / RBC: x / WBC x   Sq Epi: x / Non Sq Epi: x / Bacteria: x      PHYSICAL EXAM:  Constitutional: Well developed, well nourished, NAD  Pulmonary: Symmetric chest rise bilaterally, no increased WOB  Gastrointestinal: Abdomen soft, nontender, nondistended  Extremities: (+) b/l femoral pulses. (+) b/l DP/PT signals. Warm to touch, sensory and motor intact, no clubbing/cyanosis/hematoma      - Right: s/p Right 5th toe resection; TTP, erythema, and mild SS drainage. (+) Right foot Kerlix dressing c/d/i.

## 2023-10-09 NOTE — PROGRESS NOTE ADULT - SUBJECTIVE AND OBJECTIVE BOX
Patient is a 62y old  Male who presents with a chief complaint of fevers/  foot infection (09 Oct 2023 10:55)    Being followed by ID for        Interval history:  No other acute events      ROS:  No cough,SOB,CP  No N/V/D  No abd pain  No urinary complaints  No HA  No joint or limb pain  No other complaints    PAST MEDICAL & SURGICAL HISTORY:  Diabetes      Chronic osteomyelitis      H/O heart failure      No significant past surgical history        Allergies    No Known Allergies    Intolerances      Antimicrobials:    ampicillin/sulbactam  IVPB 3 Gram(s) IV Intermittent every 6 hours  ampicillin/sulbactam  IVPB        MEDICATIONS  (STANDING):  ampicillin/sulbactam  IVPB 3 Gram(s) IV Intermittent every 6 hours  ampicillin/sulbactam  IVPB      aspirin enteric coated 81 milliGRAM(s) Oral daily  atorvastatin 80 milliGRAM(s) Oral at bedtime  dextrose 5%. 1000 milliLiter(s) (100 mL/Hr) IV Continuous <Continuous>  dextrose 5%. 1000 milliLiter(s) (50 mL/Hr) IV Continuous <Continuous>  dextrose 50% Injectable 25 Gram(s) IV Push once  dextrose 50% Injectable 25 Gram(s) IV Push once  dextrose 50% Injectable 12.5 Gram(s) IV Push once  furosemide    Tablet 20 milliGRAM(s) Oral daily  glucagon  Injectable 1 milliGRAM(s) IntraMuscular once  heparin  Infusion. 1000 Unit(s)/Hr (10 mL/Hr) IV Continuous <Continuous>  insulin lispro (ADMELOG) corrective regimen sliding scale   SubCutaneous three times a day before meals  melatonin 5 milliGRAM(s) Oral at bedtime  metoprolol succinate ER 50 milliGRAM(s) Oral daily  sacubitril 24 mG/valsartan 26 mG 1 Tablet(s) Oral two times a day  sodium bicarbonate 650 milliGRAM(s) Oral three times a day  spironolactone 25 milliGRAM(s) Oral daily      Vital Signs Last 24 Hrs  T(C): 36.7 (10-09-23 @ 04:31), Max: 37.3 (10-08-23 @ 16:18)  T(F): 98.1 (10-09-23 @ 04:31), Max: 99.1 (10-08-23 @ 16:18)  HR: 90 (10-09-23 @ 04:31) (86 - 94)  BP: 110/67 (10-09-23 @ 04:31) (95/58 - 110/72)  BP(mean): --  RR: 18 (10-09-23 @ 04:31) (18 - 18)  SpO2: 97% (10-09-23 @ 04:31) (96% - 97%)    Physical Exam:    Constitutional well preserved,comfortable,pleasant    HEENT PERRLA EOMI,No pallor or icterus    No oral exudate or erythema    Neck supple no JVD or LN    Chest Good AE,CTA    CVS RRR S1 S2 WNl No murmur or rub or gallop    Abd soft BS normal No tenderness no masses    Ext No cyanosis clubbing or edema    IV site no erythema tenderness or discharge    Joints no swelling or LOM    CNS AAO X 3 no focal    Lab Data:        10-08    138  |  103  |  22  ----------------------------<  117<H>  3.5   |  20<L>  |  1.15    Ca    9.3      08 Oct 2023 07:27  Phos  2.7     10-08  Mg     2.0     10-08        Urinalysis Basic - ( 08 Oct 2023 07:27 )    Color: x / Appearance: x / SG: x / pH: x  Gluc: 117 mg/dL / Ketone: x  / Bili: x / Urobili: x   Blood: x / Protein: x / Nitrite: x   Leuk Esterase: x / RBC: x / WBC x   Sq Epi: x / Non Sq Epi: x / Bacteria: x        .Blood Blood-Peripheral  10-08-23   No growth at 24 hours  --  --      Clean Catch Clean Catch (Midstream)  10-06-23   <10,000 CFU/mL Normal Urogenital Mona  --  --      .Abscess right foot  10-06-23   Moderate Gram Positive Rods Most closely resembling Arcanobacterium  haemolyticum "Susceptibilities not performed"  Moderate Streptococcus agalactiae (Group B) isolated  Group B streptococci are susceptible to ampicillin,  penicillin and cefazolin,but may be resistant to  erythromycin and clindamycin.  --    Rare polymorphonuclear leukocytes per low power field  Numerous Gram Variable Rods per oil power field      .Blood Blood-Peripheral  10-06-23   Growth in aerobic and anaerobic bottles: Streptococcus agalactiae (Group  B)  Direct identification is available within approximately 3-5  hours either by Blood Panel Multiplexed PCR or Direct  MALDI-TOF. Details: [email or web address]  --  Blood Culture PCR  Streptococcus agalactiae (Group B)      .Blood Blood-Peripheral  10-06-23   Growth in aerobic and anaerobic bottles: Streptococcus agalactiae (Group  B)  See previous culture [id number]  --    Growth in aerobic and anaerobic bottles: Gram Positive Cocci in Pairs and  Chains                    WBC Count: 4.76 (10-07-23 @ 07:09)  WBC Count: 7.66 (10-06-23 @ 06:54)       Bilirubin Total: 1.2 mg/dL (10-06-23 @ 06:54)  Aspartate Aminotransferase (AST/SGOT): 13 U/L (10-06-23 @ 06:54)  Alanine Aminotransferase (ALT/SGPT): 6 U/L (10-06-23 @ 06:54)  Alkaline Phosphatase: 63 U/L (10-06-23 @ 06:54)         Patient is a 62y old  Male who presents with a chief complaint of fevers/  foot infection (09 Oct 2023 10:55)    Being followed by ID for group b strep bacteremia        Interval history:  pt resting quietly  had echo earlier  for tentaive procedure on wednesday  No other acute events      PAST MEDICAL & SURGICAL HISTORY:  Diabetes      Chronic osteomyelitis      H/O heart failure      No significant past surgical history        Allergies    No Known Allergies    Intolerances      Antimicrobials:    ampicillin/sulbactam  IVPB 3 Gram(s) IV Intermittent every 6 hours  ampicillin/sulbactam  IVPB        MEDICATIONS  (STANDING):  ampicillin/sulbactam  IVPB 3 Gram(s) IV Intermittent every 6 hours  ampicillin/sulbactam  IVPB      aspirin enteric coated 81 milliGRAM(s) Oral daily  atorvastatin 80 milliGRAM(s) Oral at bedtime  dextrose 5%. 1000 milliLiter(s) (100 mL/Hr) IV Continuous <Continuous>  dextrose 5%. 1000 milliLiter(s) (50 mL/Hr) IV Continuous <Continuous>  dextrose 50% Injectable 25 Gram(s) IV Push once  dextrose 50% Injectable 25 Gram(s) IV Push once  dextrose 50% Injectable 12.5 Gram(s) IV Push once  furosemide    Tablet 20 milliGRAM(s) Oral daily  glucagon  Injectable 1 milliGRAM(s) IntraMuscular once  heparin  Infusion. 1000 Unit(s)/Hr (10 mL/Hr) IV Continuous <Continuous>  insulin lispro (ADMELOG) corrective regimen sliding scale   SubCutaneous three times a day before meals  melatonin 5 milliGRAM(s) Oral at bedtime  metoprolol succinate ER 50 milliGRAM(s) Oral daily  sacubitril 24 mG/valsartan 26 mG 1 Tablet(s) Oral two times a day  sodium bicarbonate 650 milliGRAM(s) Oral three times a day  spironolactone 25 milliGRAM(s) Oral daily      Vital Signs Last 24 Hrs  T(C): 36.7 (10-09-23 @ 04:31), Max: 37.3 (10-08-23 @ 16:18)  T(F): 98.1 (10-09-23 @ 04:31), Max: 99.1 (10-08-23 @ 16:18)  HR: 90 (10-09-23 @ 04:31) (86 - 94)  BP: 110/67 (10-09-23 @ 04:31) (95/58 - 110/72)  BP(mean): --  RR: 18 (10-09-23 @ 04:31) (18 - 18)  SpO2: 97% (10-09-23 @ 04:31) (96% - 97%)    Physical Exam:    Constitutional well preserved,comfortable,pleasant    HEENT PERRLA EOMI,No pallor or icterus    No oral exudate or erythema    Neck supple no JVD or LN    Chest Good AE,CTA    CVS  S1 S2     Abd soft BS normal No tenderness     Ext right foot erythema    IV site no erythema tenderness or discharge      CNS AAO X 3 no focal    Lab Data:        10-08    138  |  103  |  22  ----------------------------<  117<H>  3.5   |  20<L>  |  1.15    Ca    9.3      08 Oct 2023 07:27  Phos  2.7     10-08  Mg     2.0     10-08        Urinalysis Basic - ( 08 Oct 2023 07:27 )    Color: x / Appearance: x / SG: x / pH: x  Gluc: 117 mg/dL / Ketone: x  / Bili: x / Urobili: x   Blood: x / Protein: x / Nitrite: x   Leuk Esterase: x / RBC: x / WBC x   Sq Epi: x / Non Sq Epi: x / Bacteria: x        .Blood Blood-Peripheral  10-08-23   No growth at 24 hours  --  --      Clean Catch Clean Catch (Midstream)  10-06-23   <10,000 CFU/mL Normal Urogenital Mona  --  --      .Abscess right foot  10-06-23   Moderate Gram Positive Rods Most closely resembling Arcanobacterium  haemolyticum "Susceptibilities not performed"  Moderate Streptococcus agalactiae (Group B) isolated  Group B streptococci are susceptible to ampicillin,  penicillin and cefazolin,but may be resistant to  erythromycin and clindamycin.  --    Rare polymorphonuclear leukocytes per low power field  Numerous Gram Variable Rods per oil power field      .Blood Blood-Peripheral  10-06-23   Growth in aerobic and anaerobic bottles: Streptococcus agalactiae (Group  B)  Direct identification is available within approximately 3-5  hours either by Blood Panel Multiplexed PCR or Direct  MALDI-TOF. Details: [email or web address]  --  Blood Culture PCR  Streptococcus agalactiae (Group B)      .Blood Blood-Peripheral  10-06-23   Growth in aerobic and anaerobic bottles: Streptococcus agalactiae (Group  B)  See previous culture [id number]  --    Growth in aerobic and anaerobic bottles: Gram Positive Cocci in Pairs and  Chains      < from: TTE Limited W or WO Ultrasound Enhancing Agent (10.09.23 @ 10:56) >      1. Left ventricular systolic function is severely decreased with an ejection fraction of 22 % by Galvan's method of disks. Global left ventricular hypokinesis.   2. Multiple segmental abnormalities exist. See findings.   3. There is no evidence of a left ventricular thrombus.   4. Compared to the transthoracic echocardiogram performed on 8/7/2023 there have been no significant interval changes.    < end of copied text >      WBC Count: 4.76 (10-07-23 @ 07:09)  WBC Count: 7.66 (10-06-23 @ 06:54)       Bilirubin Total: 1.2 mg/dL (10-06-23 @ 06:54)  Aspartate Aminotransferase (AST/SGOT): 13 U/L (10-06-23 @ 06:54)  Alanine Aminotransferase (ALT/SGPT): 6 U/L (10-06-23 @ 06:54)  Alkaline Phosphatase: 63 U/L (10-06-23 @ 06:54)      < from: Xray Foot AP + Lateral + Oblique, Right (10.06.23 @ 07:08) >  IMPRESSION:  Redemonstrated transverse fracture defect along proximal 5th metatarsal   shaft with currently apparent bridging callus laterally and overlying   bandaging material. Chronic bony fragmentation/enthesopathic change in   medial and lateral malleolar regions. No dislocations or acute appearing   fractures.    Tarsometatarsal alignment maintained without evidence for a Lisfranc   injury. Preserved remaining visualized joint spaces.    Thick plantar and small posterior calcaneal enthesophytes.    Vascular calcifications.    --- End of Report ---      < end of copied text >

## 2023-10-09 NOTE — PROGRESS NOTE ADULT - SUBJECTIVE AND OBJECTIVE BOX
RYLEE HOWE, MRN [id number], 62ymale      Patient is a 62y old  Male who presents with a chief complaint of fevers/  foot infection (09 Oct 2023 11:21)       INTERVAL HPI/OVERNIGHT EVENTS:  Patient seen and evaluated at bedside.  Pt is resting comfortable in NAD. Denies N/V/F/C.    Allergies    No Known Allergies    Intolerances        Vital Signs Last 24 Hrs  T(C): 36.7 (09 Oct 2023 04:31), Max: 37.3 (08 Oct 2023 16:18)  T(F): 98.1 (09 Oct 2023 04:31), Max: 99.1 (08 Oct 2023 16:18)  HR: 90 (09 Oct 2023 04:31) (86 - 94)  BP: 110/67 (09 Oct 2023 04:31) (95/58 - 110/72)  BP(mean): --  RR: 18 (09 Oct 2023 04:31) (18 - 18)  SpO2: 97% (09 Oct 2023 04:31) (96% - 97%)    Parameters below as of 09 Oct 2023 04:31  Patient On (Oxygen Delivery Method): room air        LABS:    10-08    138  |  103  |  22  ----------------------------<  117<H>  3.5   |  20<L>  |  1.15    Ca    9.3      08 Oct 2023 07:27  Phos  2.7     10-08  Mg     2.0     10-08      PT/INR - ( 09 Oct 2023 07:13 )   PT: 16.6 sec;   INR: 1.53 ratio         PTT - ( 09 Oct 2023 07:13 )  PTT:35.4 sec  Urinalysis Basic - ( 08 Oct 2023 07:27 )    Color: x / Appearance: x / SG: x / pH: x  Gluc: 117 mg/dL / Ketone: x  / Bili: x / Urobili: x   Blood: x / Protein: x / Nitrite: x   Leuk Esterase: x / RBC: x / WBC x   Sq Epi: x / Non Sq Epi: x / Bacteria: x      CAPILLARY BLOOD GLUCOSE      POCT Blood Glucose.: 140 mg/dL (09 Oct 2023 09:33)  POCT Blood Glucose.: 173 mg/dL (08 Oct 2023 21:11)  POCT Blood Glucose.: 137 mg/dL (08 Oct 2023 17:20)  POCT Blood Glucose.: 173 mg/dL (08 Oct 2023 12:50)      Lower Extremity Physical Exam:      Vascular: DP/PT 0/4 B/L, CFT <3 sec x 10, Temp gradient warm to warm, RLE, warm to cool LLE.    Neurology: Epicritic sensation intact to level of digits, B/L  Musculoskeletal/Ortho: pain to palpation over right foot 5th digit, 8/8 s/p right foot I&D w/ Right foot partial 5th ray resection, closed  Skin: R foot lateral 5th ray wound to bone, erythema to dorsal 3rd-5th ray, mild malodor, tracking proximally and dorsally about 1cm, no purulence, mild serosanguineous drainage, wound bed necrotic.       RADIOLOGY & ADDITIONAL TESTS:

## 2023-10-09 NOTE — PROCEDURE NOTE - INTERROGATION NOTE: THRESHOLD AT (MS)
January 7, 2020     Patient: Vannessa Fernandez   YOB: 1985   Date of Visit: 1/7/2020       To Whom it May Concern:    Vannessa Fernandez was seen in my clinic on 1/7/2020 at 8:30 am.    She was seen today and may return to work on 1/9/2020.      Sincerely,         Lindsay Charles MD    Medical information is confidential and cannot be disclosed without the written consent of the patient or her representative.      
0.5

## 2023-10-09 NOTE — PROCEDURE NOTE - ADDITIONAL PROCEDURE DETAILS
Reason for interrogation: VT  Date of implant: 5/20/20  Lead info:   A lead - SJM Tendril STS 2088TC DOI: 4/19/12  RV lead - Medtronic 6935 DOI: 4/19/12  RV lead - SJM Quartet 1458Q DOI: 4/19/12  Following physician: Seth Goldberg [employer]  Battery: 3.9 years  Presenting rhythm: AsVp  Underlying rhythm: Sinus rhythm in the 70-80s with PVCs  Ap: 1.7% : 87%  Events: from 10/5-10/7, 6 NSVT episodes and 4 VT episodes up to 203 bpm with ATP delivered. 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

## 2023-10-09 NOTE — PRE PROCEDURE NOTE - PRE PROCEDURE EVALUATION
Surgery Pre-Procedure Note    62y    Male    Procedure: Angiogram of the right lower extremity     Diagnosis/Indication: Patient is a 62y old  Male who presents with a chief complaint of fevers/  foot infection, hx of waters fracture, non-union many years ago, presenting with right lateral foot wound and fevers, concern for osteomyelitits with pod planning for possible resection following vascular surgery endovascular intervention.       PAST MEDICAL & SURGICAL HISTORY:  Diabetes      Chronic osteomyelitis      H/O heart failure      No significant past surgical history      10-08    138  |  103  |  22  ----------------------------<  117<H>  3.5   |  20<L>  |  1.15    Ca    9.3      08 Oct 2023 07:27  Phos  2.7     10-08  Mg     2.0     10-08      PT/INR - ( 09 Oct 2023 07:13 )   PT: 16.6 sec;   INR: 1.53 ratio         PTT - ( 09 Oct 2023 07:13 )  PTT:35.4 sec     Attending Surgeon: Dr. Jauregui.

## 2023-10-09 NOTE — CHART NOTE - NSCHARTNOTEFT_GEN_A_CORE
Surgery Pre-Procedure Note    62y    Male    Procedure: Angiogram of the right lower extremity     Diagnosis/Indication: Patient is a 62y old  Male who presents with a chief complaint of fevers/  foot infection, hx of waters fracture, non-union many years ago, presenting with right lateral foot wound and fevers, concern for osteomyelitits with pod planning for possible resection following vascular surgery endovascular intervention.       PAST MEDICAL & SURGICAL HISTORY:  Diabetes      Chronic osteomyelitis      H/O heart failure      No significant past surgical history      10-08    138  |  103  |  22  ----------------------------<  117<H>  3.5   |  20<L>  |  1.15    Ca    9.3      08 Oct 2023 07:27  Phos  2.7     10-08  Mg     2.0     10-08      PT/INR - ( 09 Oct 2023 07:13 )   PT: 16.6 sec;   INR: 1.53 ratio         PTT - ( 09 Oct 2023 07:13 )  PTT:35.4 sec     Attending Surgeon: Dr. Jauregui

## 2023-10-09 NOTE — PROGRESS NOTE ADULT - ASSESSMENT
62yr M hx of  HTN,  CAD, HF AICD,  AFIB,  DM, with right foot  osteo  Assessment  DMT2: 62y Male with DM T2 with hyperglycemia, A1C 6% , was on oral meds at home, on low dose insulin, feeling better, eating meals,  sugars are improving.  Had mild BHB on labs, elevated lactate on VBG, s/p IV hydration, glucose trending stable.   CAD: on medications, stable, monitored.  Foot Osteo: Podiatry eval, wound care, on IV Zosyn.  HTN: on antihypertensive medications, monitored, asymptomatic.        Discussed plan and management with Dr Roxanne Lennon NP - TEAMS  Wanda Oliveira MD  Cell: [phone]  Office: [phone]                hold  lasix/  farxiga/  entresto, for  now/ farxiga  not  recommended  with osteo  foot/ ha s risk  for  amputation 62yr M hx of  HTN,  CAD, HF AICD,  AFIB,  DM, with right foot  osteo  Assessment  DMT2: 62y Male with DM T2 with hyperglycemia, A1C 6% , was on oral meds at home, on low dose insulin, feeling better, eating meals,   sugars are improving.  Had mild BHB on labs, elevated lactate on VBG, s/p IV hydration, glucose trending stable.   CAD: on medications, stable, monitored.  Foot Osteo: Podiatry eval, wound care, on IV Zosyn.  HTN: on antihypertensive medications, monitored, asymptomatic.        Discussed plan and management with Dr Roxanne Lennon NP - TEAMS  Wanda Oliveira MD  Cell: [phone]  Office: [phone]                hold  lasix/  farxiga/  entresto, for  now/ farxiga  not  recommended  with osteo  foot/ ha s risk  for  amputation

## 2023-10-09 NOTE — PROGRESS NOTE ADULT - ASSESSMENT
62y Male with R foot lateral 5th ray wound to bone.   - Patient seen and evaluated  - Afebrile, CBC pending  - R foot lateral 5th ray wound to bone, erythema to dorsal 3rd-5th ray, mild malodor, tracking proximally and dorsally about 1cm, no purulence, mild serosanguineous drainage, wound bed necrotic.   - R foot xray: proximal 5th met fracture  - R foot culture:  Streptococcus agalactiae  - ID recs appreciated.   - EP recs appreciated.   - Vasc recs appreciated: RLE angio tomorrow (10/10)  - Pod plan R foot 5th met base resection with PT tenotomy, tentatively Wednesday 1230   - Please document Medical and Cardiac optimization for anesthesia for Podiatry surgery.   - Discussed with attending.

## 2023-10-09 NOTE — PROGRESS NOTE ADULT - ASSESSMENT
Patient is our 62M who is consulted for a diabetic foot infection of the right, dorsal lateral foot. Past medical history of DM, defibrillator, CAD post CABG, CHF, hx of non-union Sommers fracture around 2014, right upper extremity infection in June 2023 s/p 1 month of daptomycin and ceftriaxone.     Patient noticed an ulcer on his lateral right foot for the past several weeks. Over the past few days right foot has been having increased edema, erythema and pain on ambulation. Temperature at home was 101-102. Denies recent fall or trauma. states denies n/v/d, CP, SOB, HA, dizziness, abdominal pain, urinary symptoms, cough, leg pain, swelling. Patient endorses stools have become darker.     On August 1, 2023, the patient saw podiatrist Dr. Foss for the foot infection and bone biopsy came back positive for staph. He began treatment with levaquin but his symptoms progressed and patient came to the ED for further evaluation.      ON 8/8 pt underwent  R foot Incision and Drainage and partial 5th met base resection  Pt grew mssa and finegolda  pt received 6 weeks IV abs at home    Pt was doing well at home . Tuesday pt had evaluation at podiatrist and all looked well  wednesday pt noted foot pain and by thursday pt was having fevers and chills  Pt stephen noted to have Group B strep bacteremia      A/P   #Group B strep bacteremia  concerning as grew so quickly   changed abs to unasyn- this would also cover previous cultured pathogens  Check repeat BC q 48 hours until clears  cardiology following - as patient with an AICD  cardiac echo noted  likely source is the foot  check MRSA swab  hold further vancomcycin      #right foot erythema  concerned for residual osteo   PT's AICD is not MRI compatbale  ?ct  , await podiatry in put      #hypotension  h/o CHF  maybe in part form sepsis  cardiac echo noted  cardiology follow up appreciated     #elevated creatinine  dose meds per renal function      Lori Bennett M.D. ,   please reach via teams   If no answer, or after 5PM/ weekends,  then please call  [phone]    Assessment and plan discussed with the primary team .

## 2023-10-09 NOTE — PROGRESS NOTE ADULT - ASSESSMENT
62y Male PMH HTN, DM, defibrillator, CAD post CABG x3 in 2020, CHF Echo 8/723 EF-25-30% , hx of Sommers fracture, non-union many years ago, presenting with right lateral foot wound and fevers at home. Concern for osteomyelitis with prodaitry planning for possible resection. On exam R DP/PT signal present bilaterally. Right 5th metatarsal wound with base at bone. Vascular surgery consulted for possible endovascular intervention.    Plan:  - Plan for RLE angio tomorrow (10/10)      - Please document medical clearance      - Please document cardiac clearance  - Rec MRI for possible OM if patient defib compatible  - Abx: c/w unasyn   - Appreciate podiatry recs: 5th metatarsal base resection on Wed, 10/11  - VTE ppx: Heparin gtt (will hold on the way to OR)  - Vascular surgery to follow  - Care as per primary team      Vascular Surgery  [id number] 62y Male PMH HTN, DM, defibrillator, CAD post CABG x3 in 2020, CHF Echo 8/723 EF-25-30% , hx of Sommers fracture, non-union many years ago, presenting with right lateral foot wound and fevers at home. Concern for osteomyelitis with prodaitry planning for possible resection. On exam R DP/PT signal present bilaterally. Right 5th metatarsal wound with base at bone. Vascular surgery consulted for possible endovascular intervention.    Plan:  - Plan for RLE angio tomorrow (10/10)      - Please document medical clearance      - Please document cardiac clearance      - Pre-op labs ordered 04:00AM on 10/10: CBC, BMP, mag, phos, PT/PTT/INR, T&S      - NPO at midnight except meds  - Rec MRI for possible OM if patient defib compatible  - Abx: c/w unasyn   - Appreciate podiatry recs: 5th metatarsal base resection on Wed, 10/11  - VTE ppx: Heparin gtt (can hold on the way to OR)  - Vascular surgery to follow  - Care as per primary team      Vascular Surgery  p90

## 2023-10-10 LAB
ANION GAP SERPL CALC-SCNC: 17 MMOL/L — SIGNIFICANT CHANGE UP (ref 5–17)
APTT BLD: 28.3 SEC — SIGNIFICANT CHANGE UP (ref 24.5–35.6)
APTT BLD: 34.3 SEC — SIGNIFICANT CHANGE UP (ref 24.5–35.6)
APTT BLD: 50.5 SEC — HIGH (ref 24.5–35.6)
APTT BLD: 70.1 SEC — HIGH (ref 24.5–35.6)
APTT BLD: 70.4 SEC — HIGH (ref 24.5–35.6)
APTT BLD: 70.4 SEC — HIGH (ref 24.5–35.6)
BLD GP AB SCN SERPL QL: NEGATIVE — SIGNIFICANT CHANGE UP
BUN SERPL-MCNC: 19 MG/DL — SIGNIFICANT CHANGE UP (ref 7–23)
CALCIUM SERPL-MCNC: 9 MG/DL — SIGNIFICANT CHANGE UP (ref 8.4–10.5)
CHLORIDE SERPL-SCNC: 103 MMOL/L — SIGNIFICANT CHANGE UP (ref 96–108)
CO2 SERPL-SCNC: 23 MMOL/L — SIGNIFICANT CHANGE UP (ref 22–31)
CREAT SERPL-MCNC: 0.87 MG/DL — SIGNIFICANT CHANGE UP (ref 0.5–1.3)
EGFR: 98 ML/MIN/1.73M2 — SIGNIFICANT CHANGE UP
GLUCOSE BLDC GLUCOMTR-MCNC: 142 MG/DL — HIGH (ref 70–99)
GLUCOSE BLDC GLUCOMTR-MCNC: 142 MG/DL — HIGH (ref 70–99)
GLUCOSE BLDC GLUCOMTR-MCNC: 153 MG/DL — HIGH (ref 70–99)
GLUCOSE BLDC GLUCOMTR-MCNC: 164 MG/DL — HIGH (ref 70–99)
GLUCOSE BLDC GLUCOMTR-MCNC: 167 MG/DL — HIGH (ref 70–99)
GLUCOSE SERPL-MCNC: 131 MG/DL — HIGH (ref 70–99)
HCT VFR BLD CALC: 32.2 % — LOW (ref 39–50)
HCT VFR BLD CALC: 33 % — LOW (ref 39–50)
HCT VFR BLD CALC: 33.6 % — LOW (ref 39–50)
HGB BLD-MCNC: 10.4 G/DL — LOW (ref 13–17)
HGB BLD-MCNC: 10.4 G/DL — LOW (ref 13–17)
HGB BLD-MCNC: 10.7 G/DL — LOW (ref 13–17)
INR BLD: 1.47 RATIO — HIGH (ref 0.85–1.18)
MAGNESIUM SERPL-MCNC: 1.8 MG/DL — SIGNIFICANT CHANGE UP (ref 1.6–2.6)
MCHC RBC-ENTMCNC: 26.3 PG — LOW (ref 27–34)
MCHC RBC-ENTMCNC: 26.4 PG — LOW (ref 27–34)
MCHC RBC-ENTMCNC: 26.7 PG — LOW (ref 27–34)
MCHC RBC-ENTMCNC: 31.5 GM/DL — LOW (ref 32–36)
MCHC RBC-ENTMCNC: 31.8 GM/DL — LOW (ref 32–36)
MCHC RBC-ENTMCNC: 32.3 GM/DL — SIGNIFICANT CHANGE UP (ref 32–36)
MCV RBC AUTO: 82.8 FL — SIGNIFICANT CHANGE UP (ref 80–100)
MCV RBC AUTO: 83 FL — SIGNIFICANT CHANGE UP (ref 80–100)
MCV RBC AUTO: 83.3 FL — SIGNIFICANT CHANGE UP (ref 80–100)
NRBC # BLD: 0 /100 WBCS — SIGNIFICANT CHANGE UP (ref 0–0)
PHOSPHATE SERPL-MCNC: 3 MG/DL — SIGNIFICANT CHANGE UP (ref 2.5–4.5)
PLATELET # BLD AUTO: 144 K/UL — LOW (ref 150–400)
PLATELET # BLD AUTO: 147 K/UL — LOW (ref 150–400)
PLATELET # BLD AUTO: 157 K/UL — SIGNIFICANT CHANGE UP (ref 150–400)
POTASSIUM SERPL-MCNC: 3.3 MMOL/L — LOW (ref 3.5–5.3)
POTASSIUM SERPL-SCNC: 3.3 MMOL/L — LOW (ref 3.5–5.3)
PROTHROM AB SERPL-ACNC: 15.3 SEC — HIGH (ref 9.5–13)
RBC # BLD: 3.89 M/UL — LOW (ref 4.2–5.8)
RBC # BLD: 3.96 M/UL — LOW (ref 4.2–5.8)
RBC # BLD: 4.05 M/UL — LOW (ref 4.2–5.8)
RBC # FLD: 18.1 % — HIGH (ref 10.3–14.5)
RBC # FLD: 18.2 % — HIGH (ref 10.3–14.5)
RBC # FLD: 18.3 % — HIGH (ref 10.3–14.5)
RH IG SCN BLD-IMP: POSITIVE — SIGNIFICANT CHANGE UP
SODIUM SERPL-SCNC: 143 MMOL/L — SIGNIFICANT CHANGE UP (ref 135–145)
WBC # BLD: 4.23 K/UL — SIGNIFICANT CHANGE UP (ref 3.8–10.5)
WBC # BLD: 4.54 K/UL — SIGNIFICANT CHANGE UP (ref 3.8–10.5)
WBC # BLD: 4.75 K/UL — SIGNIFICANT CHANGE UP (ref 3.8–10.5)
WBC # FLD AUTO: 4.23 K/UL — SIGNIFICANT CHANGE UP (ref 3.8–10.5)
WBC # FLD AUTO: 4.54 K/UL — SIGNIFICANT CHANGE UP (ref 3.8–10.5)
WBC # FLD AUTO: 4.75 K/UL — SIGNIFICANT CHANGE UP (ref 3.8–10.5)

## 2023-10-10 PROCEDURE — 99231 SBSQ HOSP IP/OBS SF/LOW 25: CPT

## 2023-10-10 PROCEDURE — 99223 1ST HOSP IP/OBS HIGH 75: CPT

## 2023-10-10 RX ORDER — METOPROLOL TARTRATE 50 MG
25 TABLET ORAL ONCE
Refills: 0 | Status: COMPLETED | OUTPATIENT
Start: 2023-10-10 | End: 2023-10-10

## 2023-10-10 RX ORDER — METOPROLOL TARTRATE 50 MG
100 TABLET ORAL DAILY
Refills: 0 | Status: DISCONTINUED | OUTPATIENT
Start: 2023-10-11 | End: 2023-10-13

## 2023-10-10 RX ORDER — SENNA PLUS 8.6 MG/1
1 TABLET ORAL AT BEDTIME
Refills: 0 | Status: COMPLETED | OUTPATIENT
Start: 2023-10-10 | End: 2023-10-10

## 2023-10-10 RX ORDER — POTASSIUM CHLORIDE 20 MEQ
40 PACKET (EA) ORAL EVERY 4 HOURS
Refills: 0 | Status: COMPLETED | OUTPATIENT
Start: 2023-10-10 | End: 2023-10-10

## 2023-10-10 RX ORDER — POLYETHYLENE GLYCOL 3350 17 G/17G
17 POWDER, FOR SOLUTION ORAL DAILY
Refills: 0 | Status: DISCONTINUED | OUTPATIENT
Start: 2023-10-10 | End: 2023-10-13

## 2023-10-10 RX ORDER — HEPARIN SODIUM 5000 [USP'U]/ML
INJECTION INTRAVENOUS; SUBCUTANEOUS
Qty: 25000 | Refills: 0 | Status: ACTIVE | OUTPATIENT
Start: 2023-10-10 | End: 2024-09-07

## 2023-10-10 RX ORDER — MAGNESIUM SULFATE 500 MG/ML
1 VIAL (ML) INJECTION ONCE
Refills: 0 | Status: COMPLETED | OUTPATIENT
Start: 2023-10-10 | End: 2023-10-10

## 2023-10-10 RX ADMIN — Medication 1: at 12:56

## 2023-10-10 RX ADMIN — HEPARIN SODIUM 1900 UNIT(S)/HR: 5000 INJECTION INTRAVENOUS; SUBCUTANEOUS at 08:47

## 2023-10-10 RX ADMIN — Medication 81 MILLIGRAM(S): at 12:02

## 2023-10-10 RX ADMIN — AMPICILLIN SODIUM AND SULBACTAM SODIUM 200 GRAM(S): 250; 125 INJECTION, POWDER, FOR SUSPENSION INTRAMUSCULAR; INTRAVENOUS at 18:10

## 2023-10-10 RX ADMIN — SACUBITRIL AND VALSARTAN 1 TABLET(S): 24; 26 TABLET, FILM COATED ORAL at 05:28

## 2023-10-10 RX ADMIN — AMPICILLIN SODIUM AND SULBACTAM SODIUM 200 GRAM(S): 250; 125 INJECTION, POWDER, FOR SUSPENSION INTRAMUSCULAR; INTRAVENOUS at 05:28

## 2023-10-10 RX ADMIN — POLYETHYLENE GLYCOL 3350 17 GRAM(S): 17 POWDER, FOR SOLUTION ORAL at 18:11

## 2023-10-10 RX ADMIN — Medication 1: at 08:48

## 2023-10-10 RX ADMIN — Medication 25 MILLIGRAM(S): at 12:56

## 2023-10-10 RX ADMIN — Medication 5 MILLIGRAM(S): at 00:04

## 2023-10-10 RX ADMIN — Medication 40 MILLIEQUIVALENT(S): at 13:46

## 2023-10-10 RX ADMIN — Medication 1: at 18:09

## 2023-10-10 RX ADMIN — Medication 650 MILLIGRAM(S): at 13:46

## 2023-10-10 RX ADMIN — AMPICILLIN SODIUM AND SULBACTAM SODIUM 200 GRAM(S): 250; 125 INJECTION, POWDER, FOR SUSPENSION INTRAMUSCULAR; INTRAVENOUS at 12:02

## 2023-10-10 RX ADMIN — Medication 20 MILLIGRAM(S): at 05:28

## 2023-10-10 RX ADMIN — SACUBITRIL AND VALSARTAN 1 TABLET(S): 24; 26 TABLET, FILM COATED ORAL at 18:10

## 2023-10-10 RX ADMIN — Medication 40 MILLIEQUIVALENT(S): at 18:11

## 2023-10-10 RX ADMIN — HEPARIN SODIUM 2400 UNIT(S)/HR: 5000 INJECTION INTRAVENOUS; SUBCUTANEOUS at 16:32

## 2023-10-10 RX ADMIN — HEPARIN SODIUM 1900 UNIT(S)/HR: 5000 INJECTION INTRAVENOUS; SUBCUTANEOUS at 12:02

## 2023-10-10 RX ADMIN — SPIRONOLACTONE 25 MILLIGRAM(S): 25 TABLET, FILM COATED ORAL at 05:28

## 2023-10-10 RX ADMIN — HEPARIN SODIUM 9000 UNIT(S): 5000 INJECTION INTRAVENOUS; SUBCUTANEOUS at 01:22

## 2023-10-10 RX ADMIN — AMPICILLIN SODIUM AND SULBACTAM SODIUM 200 GRAM(S): 250; 125 INJECTION, POWDER, FOR SUSPENSION INTRAMUSCULAR; INTRAVENOUS at 00:30

## 2023-10-10 RX ADMIN — SENNA PLUS 1 TABLET(S): 8.6 TABLET ORAL at 22:01

## 2023-10-10 RX ADMIN — Medication 75 MILLIGRAM(S): at 05:33

## 2023-10-10 RX ADMIN — HEPARIN SODIUM 1900 UNIT(S)/HR: 5000 INJECTION INTRAVENOUS; SUBCUTANEOUS at 02:00

## 2023-10-10 RX ADMIN — Medication 100 GRAM(S): at 12:56

## 2023-10-10 RX ADMIN — Medication 650 MILLIGRAM(S): at 05:29

## 2023-10-10 NOTE — CHART NOTE - NSCHARTNOTEFT_GEN_A_CORE
Patient with bounding pedal pulses.  RLE Angio cancelled   Please re-consult for further questions/concerns      Vascular Surgery  [id number] Patient with bounding pedal pulses.  RLE Angio cancelled   Notified primary team  Please re-consult for further questions/concerns      Vascular Surgery  [phone]

## 2023-10-10 NOTE — PROGRESS NOTE ADULT - ASSESSMENT
62y Male with R foot lateral 5th ray wound to bone.   - Patient seen and evaluated  -Afebrile, no leukocytosis  - R foot lateral 5th ray wound to bone, erythema to dorsal 3rd-5th ray, mild malodor, tracking proximally and dorsally about 1cm, no purulence, mild serosanguineous drainage, wound bed necrotic.   - R foot xray: proximal 5th met fracture  - R foot culture:  Arcanobacterium haemolyticum, Streptococcus agalactiae (Group B)  - ID recs appreciated.   - EP recs appreciated.   - Vasc recs appreciated.   - Pod plan R foot 5th met base resection with PT tenotomy 1230 10/11  - Documented Medical and Cardiac optimization for anesthesia for Podiatry surgery, appreciated   - Discussed with attending.

## 2023-10-10 NOTE — PROGRESS NOTE ADULT - ASSESSMENT
HPI:  62yr M      hx of  HTN,  C/C  AFIB,  DM/  right foot  osteo      completed recent course of antibiotics via PICC for rt foot infection / last month     p/w erythema and subjective fevers at home     seen  by podiatry,   in  er   denies  cp/sob/abd  pain     (06 Oct 2023 09:29)      ACUTE RENAL FAILURE: spironolactone 25 milliGRAM(s) Oral daily  Serum creatinine is  improved   There is no progression . No uremic symptoms  No evidence of anemia .  Fluid status stable.  Will continue to avoid nephrotoxic drugs.  Patient remains asymptomatic.   Continue current therapy.  hold  diuretic.  hold   ACE inhibitor.  hold   ARB.  Additional evaluation:   ECG,    echocardiogram,     CXR,  will obtained recent   renal ultrasound to evalaute kidney size and possible stones ,    BP monitoring,continue current antihypertensive meds, low salt diet,followup with PMD in 1-2 weeks  metoprolol succinate ER 25 milliGRAM(s) Oral daily    Admit for septic workup and ID evaluation,send blood and urine cx,serial lactate levels,monitor vitals closley,ivfs hydration,monitor urine output and renal profile,iv abx as per id cons  piperacillin/tazobactam IVPB.. 3.375 Gram(s) IV Intermittent every 8 hours

## 2023-10-10 NOTE — PROGRESS NOTE ADULT - SUBJECTIVE AND OBJECTIVE BOX
Date of Service: 10-10-23 @ 09:05           CARDIOLOGY     PROGRESS  NOTE   ________________________________________________    CHIEF COMPLAINT:Patient is a 62y old  Male who presents with a chief complaint of fevers/  foot infection (10 Oct 2023 08:12)  no complain  	  REVIEW OF SYSTEMS:  CONSTITUTIONAL: No fever, weight loss, or fatigue  EYES: No eye pain, visual disturbances, or discharge  ENT:  No difficulty hearing, tinnitus, vertigo; No sinus or throat pain  NECK: No pain or stiffness  RESPIRATORY: No cough, wheezing, chills or hemoptysis; + Shortness of Breath  CARDIOVASCULAR: No chest pain, palpitations, passing out, dizziness, or leg swelling  GASTROINTESTINAL: No abdominal or epigastric pain. No nausea, vomiting, or hematemesis; No diarrhea or constipation. No melena or hematochezia.  GENITOURINARY: No dysuria, frequency, hematuria, or incontinence  NEUROLOGICAL: No headaches, memory loss, loss of strength, numbness, or tremors  SKIN: No itching, burning, rashes, or lesions   LYMPH Nodes: No enlarged glands  ENDOCRINE: No heat or cold intolerance; No hair loss  MUSCULOSKELETAL: No joint pain or swelling; No muscle, back, or extremity pain  PSYCHIATRIC: No depression, anxiety, mood swings, or difficulty sleeping  HEME/LYMPH: No easy bruising, or bleeding gums  ALLERGY AND IMMUNOLOGIC: No hives or eczema	    [x ] All others negative	  [ ] Unable to obtain    PHYSICAL EXAM:  T(C): 36.7 (10-10-23 @ 04:40), Max: 36.9 (10-09-23 @ 12:32)  HR: 100 (10-10-23 @ 04:40) (74 - 100)  BP: 115/77 (10-10-23 @ 04:40) (103/73 - 115/77)  RR: 18 (10-10-23 @ 04:40) (18 - 18)  SpO2: 95% (10-10-23 @ 04:40) (95% - 96%)  Wt(kg): --  I&O's Summary    09 Oct 2023 07:01  -  10 Oct 2023 07:00  --------------------------------------------------------  IN: 184 mL / OUT: 350 mL / NET: -166 mL      Appearance: Normal	  HEENT:   Normal oral mucosa, PERRL, EOMI	  Lymphatic: No lymphadenopathy  Cardiovascular: Normal S1 S2, No JVD, +murmurs, No edema  Respiratory:rhonchi  Psychiatry: A & O x 3, Mood & affect appropriate  Gastrointestinal:  Soft, Non-tender, + BS	  Skin: No rashes, No ecchymoses, No cyanosis	  Extremities: Normal range of motion, No clubbing, cyanosis or edema  Vascular: Peripheral pulses palpable 2+ bilaterally    MEDICATIONS  (STANDING):  ampicillin/sulbactam  IVPB 3 Gram(s) IV Intermittent every 6 hours  ampicillin/sulbactam  IVPB      aspirin enteric coated 81 milliGRAM(s) Oral daily  atorvastatin 80 milliGRAM(s) Oral at bedtime  dextrose 5%. 1000 milliLiter(s) (50 mL/Hr) IV Continuous <Continuous>  dextrose 5%. 1000 milliLiter(s) (100 mL/Hr) IV Continuous <Continuous>  dextrose 50% Injectable 25 Gram(s) IV Push once  dextrose 50% Injectable 25 Gram(s) IV Push once  dextrose 50% Injectable 12.5 Gram(s) IV Push once  furosemide    Tablet 20 milliGRAM(s) Oral daily  glucagon  Injectable 1 milliGRAM(s) IntraMuscular once  heparin  Infusion.  Unit(s)/Hr (19 mL/Hr) IV Continuous <Continuous>  insulin lispro (ADMELOG) corrective regimen sliding scale   SubCutaneous three times a day before meals  melatonin 5 milliGRAM(s) Oral at bedtime  metoprolol succinate ER 75 milliGRAM(s) Oral daily  sacubitril 24 mG/valsartan 26 mG 1 Tablet(s) Oral two times a day  sodium bicarbonate 650 milliGRAM(s) Oral three times a day  spironolactone 25 milliGRAM(s) Oral daily      TELEMETRY: 	    ECG:  	  RADIOLOGY:  OTHER: 	  	  LABS:	 	    CARDIAC MARKERS:                          10.4   4.54  )-----------( 144      ( 10 Oct 2023 07:43 )             32.2     10-10    143  |  103  |  19  ----------------------------<  131<H>  3.3<L>   |  23  |  0.87    Ca    9.0      10 Oct 2023 07:43  Phos  3.0     10-10  Mg     1.8     10-10      proBNP:   Lipid Profile:   HgA1c:   TSH: Thyroid Stimulating Hormone, Serum: 5.55 uIU/mL (10-07 @ 07:08)    PT/INR - ( 10 Oct 2023 07:43 )   PT: 15.3 sec;   INR: 1.47 ratio         PTT - ( 10 Oct 2023 07:43 )  PTT:70.1 sec  < from: TTE Limited W or WO Ultrasound Enhancing Agent (10.09.23 @ 10:56) >   1. Left ventricular systolic function is severely decreased with an ejection fraction of 22 % by Galvan's method of disks. Global left ventricular hypokinesis.   2. Multiple segmental abnormalities exist. See findings.   3. There is no evidence of a left ventricular thrombus.   4. Compared to the transthoracic echocardiogram performed on 8/7/2023 there have been no significant interval changes.    Culture - Blood in AM (10.08.23 @ 07:01)    Specimen Source: .Blood Blood-Peripheral   Culture Results:   No growth at 24 hours      Assessment and plan  ---------------------------  62M hx of type 2 DM on metformin, farixga, heart failure with reduced EF on metoprolol 75mg, lasix 20mg qd, xarelto for vte ppx (had RUE vte 2/2 PICC line), removed 2 weeks prior as well as right foot wound wac (present 2/2 osteo of foot with finegoldia magna), here for 36 hrs of fever, tmax 101, took 800mg advil today, prior to arrival. + slight right lower back pain, nonradiating, mild, no assc GI sxs. + urinating more freq as of late. Still tolerating PO. Denies chest pain, shortness of breath, diaphoresis. Hypotensive to 90s/50 in triage, then 80s/60s, last bp 100/40, not tachy but in setting of bb. Appears slightly dehydrated, clear lungs, s3 gallop, no murmurs appreciable. + right foot redness, swelling, nontender, no flucutance, no crepitations, 1+ dp pulses bilateral, full rom. Nontender calves. Benign abd, no peritoneal signs, no jaundice, no cva ttp. No midline spinal ttp. RUE no signs of infx, former picc site clean, Patient meets sepsis criteria by vitals. Will eval for common source of infection (ex. osteo, urinary, bacterial pulmonary, viral uri; unlikely central neurologic such as meninigitis or encephalitis) vs metabolic derangement. Check labs, lactate, UA, CXs, CXR, foot xray, inflam markers screening EKG, hydrate-> empiric abxs, antipyretics, additional crystalloid infusion as clinically warranted. Will start with 500 cc crystalloid 2/2 heart failure but euglycemic dka.  pt is well known to me with hx of htn, ashd, chf, s/p BIV AICD , PVD with multiple admissions sec to foot infection  pt with sig lv dysfunction/ severe ischemic cardiomyopathy  will  adjust cardiac meds  hold Farxiga for now. may  requiring surgery  dvt prophylaxis  vascula/podiatry consult  abx  pt Entresto dose has decrease sec to ?infection will gradually will increase dose  continue Metroprolol er  avoid excess fluid, may need to re start on  Lasix  and aldactone  check inr if elevated will give vitamin K on Xarelto  may need to decrease xarelto dose if increase renal function  + BC for strep, continue iv abx, pt has hardware (biv Aicd)  will increase Entresto as bp tolerates  will check AICD/ ECHO  re start on aldactone 25 mg daily  start iv heparin as is off Xarelto, awaiting surgery today, pt is clear for procedure with high risk for complication  AICD  was recently checked, report in the chart  continue metoprolol er to 75 mg daily

## 2023-10-10 NOTE — CONSULT NOTE ADULT - REASON FOR ADMISSION
fevers/  foot infection

## 2023-10-10 NOTE — PROGRESS NOTE ADULT - SUBJECTIVE AND OBJECTIVE BOX
Patient is a 62y Male whom presented to the hospital with shanda     PAST MEDICAL & SURGICAL HISTORY:  Diabetes      Chronic osteomyelitis      H/O heart failure      No significant past surgical history          MEDICATIONS  (STANDING):  aspirin enteric coated 81 milliGRAM(s) Oral daily  atorvastatin 80 milliGRAM(s) Oral at bedtime  dextrose 5%. 1000 milliLiter(s) (100 mL/Hr) IV Continuous <Continuous>  dextrose 5%. 1000 milliLiter(s) (50 mL/Hr) IV Continuous <Continuous>  dextrose 50% Injectable 25 Gram(s) IV Push once  dextrose 50% Injectable 25 Gram(s) IV Push once  dextrose 50% Injectable 12.5 Gram(s) IV Push once  glucagon  Injectable 1 milliGRAM(s) IntraMuscular once  insulin lispro (ADMELOG) corrective regimen sliding scale   SubCutaneous three times a day before meals  metoprolol succinate ER 25 milliGRAM(s) Oral daily  piperacillin/tazobactam IVPB.. 3.375 Gram(s) IV Intermittent every 8 hours  rivaroxaban 20 milliGRAM(s) Oral with dinner  sodium bicarbonate 650 milliGRAM(s) Oral three times a day      Allergies    No Known Allergies    Intolerances        SOCIAL HISTORY:  Denies ETOh,Smoking,     FAMILY HISTORY:      REVIEW OF SYSTEMS:    CONSTITUTIONAL: No weakness, fevers or chills  EYES/ENT: No visual changes;  no throat pain   NECK: No pain or stiffness  RESPIRATORY: No cough, wheezing, hemoptysis; No shortness of breath  CARDIOVASCULAR: No chest pain or palpitations  GASTROINTESTINAL: No abdominal or epigastric pain. No nausea, vomiting,     No diarrhea or constipation. No melena   GENITOURINARY: No dysuria, frequency or hematuria  NEUROLOGICAL: No numbness or weakness  SKIN: dry                                                 10.4   4.54  )-----------( 144      ( 10 Oct 2023 07:43 )             32.2       CBC Full  -  ( 10 Oct 2023 07:43 )  WBC Count : 4.54 K/uL  RBC Count : 3.89 M/uL  Hemoglobin : 10.4 g/dL  Hematocrit : 32.2 %  Platelet Count - Automated : 144 K/uL  Mean Cell Volume : 82.8 fl  Mean Cell Hemoglobin : 26.7 pg  Mean Cell Hemoglobin Concentration : 32.3 gm/dL  Auto Neutrophil # : x  Auto Lymphocyte # : x  Auto Monocyte # : x  Auto Eosinophil # : x  Auto Basophil # : x  Auto Neutrophil % : x  Auto Lymphocyte % : x  Auto Monocyte % : x  Auto Eosinophil % : x  Auto Basophil % : x      10-10    143  |  103  |  19  ----------------------------<  131<H>  3.3<L>   |  23  |  0.87    Ca    9.0      10 Oct 2023 07:43  Phos  3.0     10-10  Mg     1.8     10-10        CAPILLARY BLOOD GLUCOSE      POCT Blood Glucose.: 164 mg/dL (10 Oct 2023 08:46)  POCT Blood Glucose.: 157 mg/dL (09 Oct 2023 21:11)  POCT Blood Glucose.: 146 mg/dL (09 Oct 2023 17:31)  POCT Blood Glucose.: 118 mg/dL (09 Oct 2023 12:51)      Vital Signs Last 24 Hrs  T(C): 36.8 (10 Oct 2023 12:16), Max: 36.9 (09 Oct 2023 12:32)  T(F): 98.3 (10 Oct 2023 12:16), Max: 98.5 (09 Oct 2023 12:32)  HR: 86 (10 Oct 2023 12:16) (74 - 100)  BP: 98/67 (10 Oct 2023 12:16) (98/67 - 115/77)  BP(mean): --  RR: 18 (10 Oct 2023 12:16) (18 - 18)  SpO2: 98% (10 Oct 2023 12:16) (95% - 98%)    Parameters below as of 10 Oct 2023 12:16  Patient On (Oxygen Delivery Method): room air        Urinalysis Basic - ( 10 Oct 2023 07:43 )    Color: x / Appearance: x / SG: x / pH: x  Gluc: 131 mg/dL / Ketone: x  / Bili: x / Urobili: x   Blood: x / Protein: x / Nitrite: x   Leuk Esterase: x / RBC: x / WBC x   Sq Epi: x / Non Sq Epi: x / Bacteria: x        PT/INR - ( 10 Oct 2023 07:43 )   PT: 15.3 sec;   INR: 1.47 ratio         PTT - ( 10 Oct 2023 07:43 )  PTT:70.1 sec      PHYSICAL EXAM:    Constitutional: NAD  HEENT: conjunctive   clear   Neck:  No JVD  Respiratory: CTAB  Cardiovascular: S1 and S2  Gastrointestinal: BS+, soft, NT/ND  Extremities: No peripheral edema  Neurological: A/O x 3, no focal deficits  Psychiatric: Normal mood, normal affect  : No Atkins  Skin: No rashes  Access: Not applicable

## 2023-10-10 NOTE — PROGRESS NOTE ADULT - SUBJECTIVE AND OBJECTIVE BOX
FU athero/R foot ulcer/indfection.  Reeval at bedside along w podiatry and vasc teams.  R lat foot wound/erythema.  No pus.  R: +fem, pop, PT pulse (+2).  L: +fem, pop, DP pulses.    A/P:   Athero, cv comorbidities, R foot infection/ulcer.  Art perf optimized w palp R PT pulse.  No benefit for angio/intervention.  FU podiatry plans for local control.  WIll need vasc surveillance.

## 2023-10-10 NOTE — PROGRESS NOTE ADULT - SUBJECTIVE AND OBJECTIVE BOX
Chief complaint  Patient is a 62y old  Male who presents with a chief complaint of fevers/  foot infection (10 Oct 2023 13:08)         Labs and Fingersticks  CAPILLARY BLOOD GLUCOSE      POCT Blood Glucose.: 167 mg/dL (10 Oct 2023 12:36)  POCT Blood Glucose.: 164 mg/dL (10 Oct 2023 08:46)  POCT Blood Glucose.: 157 mg/dL (09 Oct 2023 21:11)  POCT Blood Glucose.: 146 mg/dL (09 Oct 2023 17:31)      Anion Gap: 17 (10-10 @ 07:43)      Calcium: 9.0 (10-10 @ 07:43)          10-10    143  |  103  |  19  ----------------------------<  131<H>  3.3<L>   |  23  |  0.87    Ca    9.0      10 Oct 2023 07:43  Phos  3.0     10-10  Mg     1.8     10-10                          10.4   4.75  )-----------( 147      ( 10 Oct 2023 12:34 )             33.0     Medications  MEDICATIONS  (STANDING):  ampicillin/sulbactam  IVPB 3 Gram(s) IV Intermittent every 6 hours  ampicillin/sulbactam  IVPB      aspirin enteric coated 81 milliGRAM(s) Oral daily  atorvastatin 80 milliGRAM(s) Oral at bedtime  dextrose 5%. 1000 milliLiter(s) (50 mL/Hr) IV Continuous <Continuous>  dextrose 5%. 1000 milliLiter(s) (100 mL/Hr) IV Continuous <Continuous>  dextrose 50% Injectable 25 Gram(s) IV Push once  dextrose 50% Injectable 25 Gram(s) IV Push once  dextrose 50% Injectable 12.5 Gram(s) IV Push once  furosemide    Tablet 20 milliGRAM(s) Oral daily  glucagon  Injectable 1 milliGRAM(s) IntraMuscular once  heparin  Infusion.  Unit(s)/Hr (19 mL/Hr) IV Continuous <Continuous>  insulin lispro (ADMELOG) corrective regimen sliding scale   SubCutaneous three times a day before meals  melatonin 5 milliGRAM(s) Oral at bedtime  polyethylene glycol 3350 17 Gram(s) Oral daily  potassium chloride    Tablet ER 40 milliEquivalent(s) Oral every 4 hours  sacubitril 24 mG/valsartan 26 mG 1 Tablet(s) Oral two times a day  sodium bicarbonate 650 milliGRAM(s) Oral three times a day  spironolactone 25 milliGRAM(s) Oral daily      Physical Exam  General: Patient comfortable in bed   Vital Signs Last 12 Hrs  T(F): 97.8 (10-10-23 @ 12:44), Max: 98.3 (10-10-23 @ 12:16)  HR: 90 (10-10-23 @ 12:44) (86 - 100)  BP: 126/86 (10-10-23 @ 12:44) (98/67 - 126/86)  BP(mean): --  RR: 18 (10-10-23 @ 12:44) (18 - 18)  SpO2: 97% (10-10-23 @ 12:44) (95% - 98%)    CVS: S1S2   Respiratory: No wheezing, no crepitations  GI: Abdomen soft, bowel sounds positive  Musculoskeletal:  moves all extremities  : Voiding      Chief complaint  Patient is a 62y old  Male who presents with a chief complaint of fevers/  foot infection (10 Oct 2023 13:08)         Labs and Fingersticks  CAPILLARY BLOOD GLUCOSE      POCT Blood Glucose.: 167 mg/dL (10 Oct 2023 12:36)  POCT Blood Glucose.: 164 mg/dL (10 Oct 2023 08:46)  POCT Blood Glucose.: 157 mg/dL (09 Oct 2023 21:11)  POCT Blood Glucose.: 146 mg/dL (09 Oct 2023 17:31)      Anion Gap: 17 (10-10 @ 07:43)      Calcium: 9.0 (10-10 @ 07:43)          10-10    143  |  103  |  19  ----------------------------<  131<H>  3.3<L>   |  23  |  0.87    Ca    9.0      10 Oct 2023 07:43  Phos  3.0     10-10  Mg     1.8     10-10                          10.4   4.75  )-----------( 147      ( 10 Oct 2023 12:34 )             33.0     Medications  MEDICATIONS  (STANDING):  ampicillin/sulbactam  IVPB 3 Gram(s) IV Intermittent every 6 hours  ampicillin/sulbactam  IVPB      aspirin enteric coated 81 milliGRAM(s) Oral daily  atorvastatin 80 milliGRAM(s) Oral at bedtime  dextrose 5%. 1000 milliLiter(s) (50 mL/Hr) IV Continuous <Continuous>  dextrose 5%. 1000 milliLiter(s) (100 mL/Hr) IV Continuous <Continuous>  dextrose 50% Injectable 25 Gram(s) IV Push once  dextrose 50% Injectable 25 Gram(s) IV Push once  dextrose 50% Injectable 12.5 Gram(s) IV Push once  furosemide    Tablet 20 milliGRAM(s) Oral daily  glucagon  Injectable 1 milliGRAM(s) IntraMuscular once  heparin  Infusion.  Unit(s)/Hr (19 mL/Hr) IV Continuous <Continuous>  insulin lispro (ADMELOG) corrective regimen sliding scale   SubCutaneous three times a day before meals  melatonin 5 milliGRAM(s) Oral at bedtime  polyethylene glycol 3350 17 Gram(s) Oral daily  potassium chloride    Tablet ER 40 milliEquivalent(s) Oral every 4 hours  sacubitril 24 mG/valsartan 26 mG 1 Tablet(s) Oral two times a day  sodium bicarbonate 650 milliGRAM(s) Oral three times a day  spironolactone 25 milliGRAM(s) Oral daily      Physical Exam  General: Patient comfortable in bed   Vital Signs Last 12 Hrs  T(F): 97.8 (10-10-23 @ 12:44), Max: 98.3 (10-10-23 @ 12:16)  HR: 90 (10-10-23 @ 12:44) (86 - 100)  BP: 126/86 (10-10-23 @ 12:44) (98/67 - 126/86)  BP(mean): --  RR: 18 (10-10-23 @ 12:44) (18 - 18)  SpO2: 97% (10-10-23 @ 12:44) (95% - 98%)    CVS: S1S2   Respiratory: No wheezing, no crepitations  GI: Abdomen soft, bowel sounds positive  Musculoskeletal:  moves all extremities  : Voiding

## 2023-10-10 NOTE — PROGRESS NOTE ADULT - ASSESSMENT
_________________________________________________________________________________________  ========>>  M E D I C A L   A T T E N D I N G    F O L L O W  U P  N O T E  <<=========  -----------------------------------------------------------------------------------------------------    - Patient seen and examined by me earlier today.   - In summary,  RYLEE HOWE is a 62y year old man admitted with Diabetic foot infection  - Patient today overall doing ok, comfortable, eating OK. pain controlled     ==================>> REVIEW OF SYSTEM <<=================    GEN: no fever, no chills, pain Controlled  RESP: no SOB, no cough, no sputum  CVS: no chest pain, no palpitations, no edema  GI: no abdominal pain, no nausea, a little constipation ( took Miralax already)   : no dysuria, no frequency,  Neuro: no headache, no dizziness  Derm : no itching, no rash    ==================>> PHYSICAL EXAM <<=================    GEN: A&O X 3 , NAD , comfortable, pleasant, calm in bed .. encouraged out of bed to chair with assistance as needed.   HEENT: NCAT, PERRL, MMM, hearing intact  Neck: supple , no JVD appreciated  CVS: S1S2 , regular , No M/R/G appreciated  PULM: CTA B/L,  no W/R/R appreciated  ABD.: soft. non tender, non distended,  bowel sounds present  Extrem: intact pulses , Right foot wound dressed  PSYCH : normal mood,  not anxious       ( Note written / Date of service 10-10-23 ( This is certified to be the same as "ENTERED" date above ( for billing purposes)))    ==================>> MEDICATIONS <<====================    ampicillin/sulbactam  IVPB 3 Gram(s) IV Intermittent every 6 hours  ampicillin/sulbactam  IVPB      aspirin enteric coated 81 milliGRAM(s) Oral daily  atorvastatin 80 milliGRAM(s) Oral at bedtime  dextrose 5%. 1000 milliLiter(s) IV Continuous <Continuous>  dextrose 5%. 1000 milliLiter(s) IV Continuous <Continuous>  dextrose 50% Injectable 25 Gram(s) IV Push once  dextrose 50% Injectable 12.5 Gram(s) IV Push once  dextrose 50% Injectable 25 Gram(s) IV Push once  furosemide    Tablet 20 milliGRAM(s) Oral daily  glucagon  Injectable 1 milliGRAM(s) IntraMuscular once  heparin  Infusion.  Unit(s)/Hr IV Continuous <Continuous>  insulin lispro (ADMELOG) corrective regimen sliding scale   SubCutaneous three times a day before meals  melatonin 5 milliGRAM(s) Oral at bedtime  metoprolol succinate ER 75 milliGRAM(s) Oral daily  polyethylene glycol 3350 17 Gram(s) Oral daily  sacubitril 24 mG/valsartan 26 mG 1 Tablet(s) Oral two times a day  sodium bicarbonate 650 milliGRAM(s) Oral three times a day  spironolactone 25 milliGRAM(s) Oral daily    MEDICATIONS  (PRN):  acetaminophen     Tablet .. 650 milliGRAM(s) Oral every 6 hours PRN Temp greater or equal to 38C (100.4F), Mild Pain (1 - 3)  dextrose Oral Gel 15 Gram(s) Oral once PRN Blood Glucose LESS THAN 70 milliGRAM(s)/deciliter  heparin   Injectable 9000 Unit(s) IV Push every 6 hours PRN For aPTT less than 40  heparin   Injectable 4000 Unit(s) IV Push every 6 hours PRN For aPTT between 40 - 57    ___________  Active diet:  Diet, NPO after Midnight:      NPO Start Date: 10-Oct-2023,   NPO Start Time: 23:59  Except Medications  With Ice Chips/Sips of Water  Diet, Consistent Carbohydrate w/Evening Snack  ___________________    ==================>> VITAL SIGNS <<==================    Weight (kg): 108.5  Vital Signs Last 24 HrsT(C): 36.8 (10-10-23 @ 12:16)  T(F): 98.3 (10-10-23 @ 12:16), Max: 98.5 (10-09-23 @ 12:32)  HR: 86 (10-10-23 @ 12:16) (74 - 100)  BP: 98/67 (10-10-23 @ 12:16)  RR: 18 (10-10-23 @ 12:16) (18 - 18)  SpO2: 98% (10-10-23 @ 12:16) (95% - 98%)      CAPILLARY BLOOD GLUCOSE  POCT Blood Glucose.: 164 mg/dL (10 Oct 2023 08:46)  POCT Blood Glucose.: 157 mg/dL (09 Oct 2023 21:11)  POCT Blood Glucose.: 146 mg/dL (09 Oct 2023 17:31)  POCT Blood Glucose.: 118 mg/dL (09 Oct 2023 12:51)     ==================>> LAB AND IMAGING <<==================                        10.4   4.54  )-----------( 144      ( 10 Oct 2023 07:43 )             32.2        10-10    143  |  103  |  19  ----------------------------<  131<H>  3.3<L>   |  23  |  0.87    Ca    9.0      10 Oct 2023 07:43  Phos  3.0     10-10  Mg     1.8     10-10      WBC count:   4.54 <<== ,  4.23 <<== ,  4.76 <<== ,  7.66 <<==   Hemoglobin:   10.4 <<==,  10.7 <<==,  10.0 <<==,  11.2 <<==  platelets:  144 <==, 157 <==, 128 <==, 142 <==    Creatinine:  0.87  <<==, 1.15  <<==, 1.50  <<==, 1.35  <<==, 1.33  <<==  Sodium:   143  <==, 138  <==, 138  <==, 137  <==, 137  <==    ____________________________    M I C R O B I O L O G Y :    Culture - Blood (collected 08 Oct 2023 07:01)  Source: .Blood Blood-Peripheral  Preliminary Report (10 Oct 2023 10:01):    No growth at 48 Hours    Culture - Blood (collected 08 Oct 2023 07:01)  Source: .Blood Blood-Peripheral  Preliminary Report (10 Oct 2023 10:01):    No growth at 48 Hours    Culture - Urine (collected 06 Oct 2023 16:25)  Source: Clean Catch Clean Catch (Midstream)  Final Report (07 Oct 2023 15:13):    <10,000 CFU/mL Normal Urogenital Mona    Culture - Abscess with Gram Stain (collected 06 Oct 2023 15:12)  Source: .Abscess right foot  Gram Stain (06 Oct 2023 22:45):    Rare polymorphonuclear leukocytes per low power field    Numerous Gram Variable Rods per oil power field  Preliminary Report (08 Oct 2023 21:44):    Moderate Gram Positive Rods Most closely resembling Arcanobacterium    haemolyticum "Susceptibilities not performed"    Moderate Streptococcus agalactiae (Group B) isolated    Group B streptococci are susceptible to ampicillin,    penicillin and cefazolin,but may be resistant to    erythromycin and clindamycin.    Culture - Blood (collected 06 Oct 2023 06:28)  Source: .Blood Blood-Peripheral  Gram Stain (06 Oct 2023 19:46):    Growth in aerobic and anaerobic bottles: Gram Positive Cocci in Pairs and    Chains  Final Report (08 Oct 2023 11:33):    Growth in aerobic and anaerobic bottles: Streptococcus agalactiae (Group    B)    Direct identification is available within approximately 3-5    hours either by Blood Panel Multiplexed PCR or Direct    MALDI-TOF. Details: [email or web address]  Organism: Blood Culture PCR  Streptococcus agalactiae (Group B) (08 Oct 2023 11:33)  Organism: Streptococcus agalactiae (Group B) (08 Oct 2023 11:33)    Sensitivities:      Method Type: HEATHER      -  Ceftriaxone: S <=0.25      -  Clindamycin: S <=0.06      -  Levofloxacin: S 1      -  Penicillin: S 0.06 Predicts results for ampicillin, amoxicillin, amoxicillin/clavulanate, ampicillin/sulbactam, 1st, 2nd and 3rd generation cephalosporins and carbapenems.      -  Vancomycin: S 0.5  Organism: Blood Culture PCR (08 Oct 2023 11:33)    Sensitivities:      Method Type: PCR      -  Streptococcus agalactiae (Group B): Detec    Culture - Blood (collected 06 Oct 2023 06:10)  Source: .Blood Blood-Peripheral  Gram Stain (06 Oct 2023 19:45):    Growth in aerobic and anaerobic bottles: Gram Positive Cocci in Pairs and    Chains  Final Report (08 Oct 2023 11:33):    Growth in aerobic and anaerobic bottles: Streptococcus agalactiae (Group  B)    See previous culture [id number]      __________________________________________________________________________________  ===============>>  A S S E S S M E N T   A N D   P L A N <<===============  ------------------------------------------------------------------------------------------    · Assessment	  62yr M with PMH of  HTN,   AFIB,  DM/. right foot  osteo (Recently completed IV antibiotics via PICC line) ,CAD, s/p  cabg / AICD,hx of Sommers fracture, non-union many years ago,   prior CT:  c/c  transverse   fx, through the fifth metatarsal base. soft tissue wound/ cortical irregularity along the fifth metatarsal base  wound cx   MSSA and fingoldia,  s/p I&D with necrotic tissue close to bone,suspicion for residual osteo  completed   ancef 2 gms IVSS q 8 hours .  last  month     now  admitted with fevers/  right foot  wound/  and  shanda   Rt  5th metatarsal  wound/  foot  cellulitis >> Now with bacteremia    seen by podiatry /  ID  / Vascular   HTN/  HLD   CAD/    cardiomyopathy,  ef  25 Post AICD (as per patient not MRI compatible)  c/c  Afib,  on xarelo/  known to  card   c/c  anemia  DM,  on  farxiga. januvia. metformin   has   c/c  low  baseline  sbp  in  90's   hold  lasix/  farxiga/  entresto, for  now/ farxiga  not  recommended  with osteo  foot/ ha s risk  for  amputation    bacteremia  Gp  BsStrep,  , on iv unasyn.  iD  following  vascular angiogram not needed per vascular team  Plan for amputation by podiatry tomorrow ( xarelto on hold / on heparin drip)   >> medically stable for procedures..      cardiology following >> medications adjusted given PVC and NSVT seen on AICD interrogation     otherwise medically stable for podiatric surgery  continuing the antibiotics per ID   -GI/DVT Prophylaxis per protocol.    --------------------------------------------  Case discussed with Patient, nurse practitioner,  Education given on findings and plan of care  ___________________________  H. RACHAEL Boudreaxu.  Pager: [phone]

## 2023-10-10 NOTE — CHART NOTE - NSCHARTNOTEFT_GEN_A_CORE
notified by RN of AICD firing. pt acknowledges the event. No c/o CP/SOB/palpitations      Vital Signs Last 24 Hrs  T(C): 36.6 (10 Oct 2023 12:44), Max: 36.8 (09 Oct 2023 20:10)  T(F): 97.8 (10 Oct 2023 12:44), Max: 98.3 (09 Oct 2023 20:10)  HR: 90 (10 Oct 2023 12:44) (80 - 100)  BP: 126/86 (10 Oct 2023 12:44) (98/67 - 126/86)  BP(mean): --  RR: 18 (10 Oct 2023 12:44) (18 - 18)  SpO2: 97% (10 Oct 2023 12:44) (95% - 98%)    Parameters below as of 10 Oct 2023 12:44  Patient On (Oxygen Delivery Method): room air        Physical Exam:  General: WN/WD NAD  Neurology: A&Ox3, nonfocal, LORENZO x 4  Head:  Normocephalic, atraumatic  Respiratory: CTA B/L  CV: RRR, S1S2, no murmur  Abdominal: Soft, NT, ND no palpable mass  MSK:+ edema, + peripheral pulses, FROM all 4 extremity  Labs:                          10.4   4.54  )-----------( 144      ( 10 Oct 2023 07:43 )             32.2     10-10    143  |  103  |  19  ----------------------------<  131<H>  3.3<L>   |  23  |  0.87    Ca    9.0      10 Oct 2023 07:43  Phos  3.0     10-10  Mg     1.8     10-10      Radiology:    Assessment & Plan:  62yr M    hx of  HTN,  C/C  AFIB,  DM/  right foot  osteo   completed recent course of antibiotics via PICC for rt foot infection / last month.   p/w erythema and subjective fevers at home. Admitted with  Rt  5th metatarsal  wound/  foot  cellulitis >> Now with group b strep bacteremia > on Unasyn> Pt with  cardiomyopathy ef  25 Post AICD.  Now with episode of AICD firing for VT of 9 sec at a rate of 200/mt.        > Lopressor 25 mg PO x 1 dose stat  > Increase Lopressor XL to 100 mg PO daily starting 10/11  > K and Mg supplemented  > Cardiology and Attending notified    Fallon Bach ANP-BC  #[id number]

## 2023-10-10 NOTE — CONSULT NOTE ADULT - ASSESSMENT
62 M with PMHx T2DM, HFrEF (22%), Recent VTE associated with PICC line, R foot OM presents with sepsis secondary to foot ulcer, found to have Group B Strep bacteremia (4/4 bottles on 10/6). EP consulted for device extraction eval.    1. Bacteremia with CRT-D. Pt is not dependent on pacing, has native rhythm, however has received multiple therapies for VT within the past few days (most recent ICD shock 10/10)  2. ICD shock for VT    Recommendations:  - Will need to assess device leads and valves, please order CHLOE and make patient NPO at midnight  - Would recommend increased beta blocker (had been reduced for BP), would increase metoprolol succinate to 100mg daily.  - Please maintain K>4, Mg>2  - If continues to have VT, may need to start anti-arrhythmic like amiodarone    ***Note not finalized until co-signed by attending***     Salvatore Paul MD  Cardiology Fellow  All Cardiology service information can be found 24/7 on [email or web address], password: [email or web address]

## 2023-10-10 NOTE — CONSULT NOTE ADULT - ATTENDING COMMENTS
seen and agree  strep bacteremia most likely from foot  cleared quickly AND PATIENT HAS RECENT THERAPY FROM DEVICE (including today)  Plan for CHLOE to look for vegetations    ? start amiodarone for VT

## 2023-10-10 NOTE — PROGRESS NOTE ADULT - ASSESSMENT
62yr M hx of  HTN,  CAD, HF AICD,  AFIB,  DM, with right foot  osteo  Assessment  DMT2: 62y Male with DM T2 with hyperglycemia, A1C 6% , was on oral meds at home, on low dose insulin, feeling better, eating meals,   sugars are trending within stable ranges.  Had mild BHB on labs initially, elevated lactate on VBG, s/p IV hydration, glucose trending stable.   CAD: on medications, stable, monitored.  Foot Osteo: Podiatry eval, wound care, on IV Zosyn.  HTN: on antihypertensive medications, monitored, asymptomatic.        Discussed plan and management with Dr Roxanne Lennon NP - TEAMS  Wanda Oliveira MD  Cell: [phone]  Office: [phone]                hold  lasix/  farxiga/  entresto, for  now/ farxiga  not  recommended  with osteo  foot/ ha s risk  for  amputation 62yr M hx of  HTN,  CAD, HF AICD,  AFIB,  DM, with right foot  osteo  Assessment  DMT2: 62y Male with DM T2 with hyperglycemia, A1C 6% , was on oral meds at home, on low dose insulin, feeling better, eating meals,  sugars are trending within stable ranges.  Had mild BHB on labs initially, elevated lactate on VBG, s/p IV hydration, glucose trending stable.   CAD: on medications, stable, monitored.  Foot Osteo: Podiatry eval, wound care, on IV Zosyn.  HTN: on antihypertensive medications, monitored, asymptomatic.        Discussed plan and management with Dr Roxanne Lennon NP - TEAMS  Wanda Oliveira MD  Cell: [phone]  Office: [phone]                hold  lasix/  farxiga/  entresto, for  now/ farxiga  not  recommended  with osteo  foot/ ha s risk  for  amputation

## 2023-10-10 NOTE — PROGRESS NOTE ADULT - SUBJECTIVE AND OBJECTIVE BOX
RYLEE HOWE, MRN [id number], 62ymale      Patient is a 62y old  Male who presents with a chief complaint of fevers/  foot infection (10 Oct 2023 09:04)       INTERVAL HPI/OVERNIGHT EVENTS:  Patient seen and evaluated at bedside.  Pt is resting comfortable in NAD. Denies N/V/F/C.    Allergies    No Known Allergies    Intolerances        Vital Signs Last 24 Hrs  T(C): 36.7 (10 Oct 2023 04:40), Max: 36.9 (09 Oct 2023 12:32)  T(F): 98 (10 Oct 2023 04:40), Max: 98.5 (09 Oct 2023 12:32)  HR: 100 (10 Oct 2023 04:40) (74 - 100)  BP: 115/77 (10 Oct 2023 04:40) (103/73 - 115/77)  BP(mean): --  RR: 18 (10 Oct 2023 04:40) (18 - 18)  SpO2: 95% (10 Oct 2023 04:40) (95% - 96%)    Parameters below as of 10 Oct 2023 04:40  Patient On (Oxygen Delivery Method): room air        LABS:                        10.4   4.54  )-----------( 144      ( 10 Oct 2023 07:43 )             32.2     10-10    143  |  103  |  19  ----------------------------<  131<H>  3.3<L>   |  23  |  0.87    Ca    9.0      10 Oct 2023 07:43  Phos  3.0     10-10  Mg     1.8     10-10      PT/INR - ( 10 Oct 2023 07:43 )   PT: 15.3 sec;   INR: 1.47 ratio         PTT - ( 10 Oct 2023 07:43 )  PTT:70.1 sec  Urinalysis Basic - ( 10 Oct 2023 07:43 )    Color: x / Appearance: x / SG: x / pH: x  Gluc: 131 mg/dL / Ketone: x  / Bili: x / Urobili: x   Blood: x / Protein: x / Nitrite: x   Leuk Esterase: x / RBC: x / WBC x   Sq Epi: x / Non Sq Epi: x / Bacteria: x      CAPILLARY BLOOD GLUCOSE      POCT Blood Glucose.: 164 mg/dL (10 Oct 2023 08:46)  POCT Blood Glucose.: 157 mg/dL (09 Oct 2023 21:11)  POCT Blood Glucose.: 146 mg/dL (09 Oct 2023 17:31)  POCT Blood Glucose.: 118 mg/dL (09 Oct 2023 12:51)  POCT Blood Glucose.: 140 mg/dL (09 Oct 2023 09:33)      Lower Extremity Physical Exam:      Vascular: DP/PT 0/4 B/L, CFT <3 sec x 10, Temp gradient warm to warm, RLE, warm to cool LLE.    Neurology: Epicritic sensation intact to level of digits, B/L  Musculoskeletal/Ortho: pain to palpation over right foot 5th digit, 8/8 s/p right foot I&D w/ Right foot partial 5th ray resection, closed  Skin: R foot lateral 5th ray wound to bone, erythema to dorsal 3rd-5th ray, mild malodor, tracking proximally and dorsally about 1cm, no purulence, mild serosanguineous drainage, wound bed necrotic.       RADIOLOGY & ADDITIONAL TESTS:

## 2023-10-10 NOTE — CONSULT NOTE ADULT - SUBJECTIVE AND OBJECTIVE BOX
Salvatore Paul MD  Cardiology Fellow  All Cardiology service information can be found 24/7 on [email or web address], password: [email or web address]    Patient seen and evaluated at bedside    Chief Complaint: Sepsis    HPI:  62yr M hx of  HTN, hx AFIB, T2DM, HFrEF (22%) right foot osteo, completed recent course of antibiotics via PICC for rt foot infection last month, c/b VTE. Initially presented with fevers and chills and foot pain at home. Since, has been found to be Group B strep bacteremic in 4/4 bottles on 10/6. Currently on Unasyn with negative 48 hour BCx drawn on 10/8. Underwent foot abscess drainage in ED growing arcanobacterium haemolyticum and strep agalactiae.     CRT-D interrogted 10/9, not dependent, and showed 4 episodes of VT from 10/5-10/7 resolved with ATP. Today, 10/10, had episode of VT to 200 BPM for which he received 1 shock. Planning R foot 5th met base resection with PT tenotomy 10/11.    EP consulted for consideration of device extraction in the setting of bacteremia. CRT-D placed 2012 with Dr. Seth Goldberg at [facility].       PMHx:   Diabetes    Chronic osteomyelitis    H/O heart failure        PSHx:   No significant past surgical history        Allergies:  No Known Allergies      Home Medications:  aspirin 81 mg oral delayed release tablet: 1 tab(s) orally once a day (06 Aug 2023 16:09)  eplerenone 25 mg oral tablet: 1 tab(s) orally every other day (06 Aug 2023 16:09)  Farxiga 10 mg oral tablet: 1 tab(s) orally once a day (06 Aug 2023 16:09)  Januvia 100 mg oral tablet: 1 tab(s) orally once a day (06 Aug 2023 16:09)  metFORMIN 1000 mg oral tablet: 1 tab(s) orally 2 times a day (06 Aug 2023 16:09)  metoprolol succinate 50 mg oral tablet, extended release: 1 tab(s) orally once a day (16 Aug 2023 11:24)  polyethylene glycol 3350 oral powder for reconstitution: 17 gram(s) orally once a day (16 Aug 2023 11:24)  rosuvastatin 40 mg oral tablet: 1 tab(s) orally once a day (06 Aug 2023 16:09)  sacubitril-valsartan 49 mg-51 mg oral tablet: 1 tab(s) orally 2 times a day (16 Aug 2023 11:24)  Xarelto 20 mg oral tablet: 1 tab(s) orally once a day (06 Aug 2023 16:09)      Current Medications:   acetaminophen     Tablet .. 650 milliGRAM(s) Oral every 6 hours PRN  ampicillin/sulbactam  IVPB 3 Gram(s) IV Intermittent every 6 hours  ampicillin/sulbactam  IVPB      aspirin enteric coated 81 milliGRAM(s) Oral daily  atorvastatin 80 milliGRAM(s) Oral at bedtime  dextrose 5%. 1000 milliLiter(s) IV Continuous <Continuous>  dextrose 5%. 1000 milliLiter(s) IV Continuous <Continuous>  dextrose 50% Injectable 25 Gram(s) IV Push once  dextrose 50% Injectable 25 Gram(s) IV Push once  dextrose 50% Injectable 12.5 Gram(s) IV Push once  dextrose Oral Gel 15 Gram(s) Oral once PRN  furosemide    Tablet 20 milliGRAM(s) Oral daily  glucagon  Injectable 1 milliGRAM(s) IntraMuscular once  heparin   Injectable 9000 Unit(s) IV Push every 6 hours PRN  heparin   Injectable 4000 Unit(s) IV Push every 6 hours PRN  heparin  Infusion.  Unit(s)/Hr IV Continuous <Continuous>  insulin lispro (ADMELOG) corrective regimen sliding scale   SubCutaneous three times a day before meals  melatonin 5 milliGRAM(s) Oral at bedtime  polyethylene glycol 3350 17 Gram(s) Oral daily  potassium chloride    Tablet ER 40 milliEquivalent(s) Oral every 4 hours  sacubitril 24 mG/valsartan 26 mG 1 Tablet(s) Oral two times a day  sodium bicarbonate 650 milliGRAM(s) Oral three times a day  spironolactone 25 milliGRAM(s) Oral daily      FAMILY HISTORY:      Social History:  Smoking History: Denies  Alcohol Use: Former  Drug Use: Denies    REVIEW OF SYSTEMS:  CONSTITUTIONAL: +fevers  RESPIRATORY: No cough, wheezing, hemoptysis; No shortness of breath  CARDIOVASCULAR: No chest pain or palpitations; No lower extremity edema  NEUROLOGICAL: No numbness or weakness. Foot pain  SKIN: No itching, burning, rashes, or lesions   All other review of systems is negative unless indicated above.    Physical Exam:  T(F): 97.8 (10-10), Max: 98.3 (10-09)  HR: 90 (10-10) (80 - 100)  BP: 126/86 (10-10) (98/67 - 126/86)  RR: 18 (10-10)  SpO2: 97% (10-10)  GENERAL: No acute distress, well-developed  HEAD:  Atraumatic, Normocephalic  ENT: EOMI, PERRLA, conjunctiva and sclera clear  CHEST/LUNG: Clear to auscultation bilaterally; No wheeze, equal breath sounds bilaterally   HEART: Irregular rhythm; No murmurs, rubs, or gallops  ABDOMEN: Soft, Nontender, Nondistended  EXTREMITIES:  No clubbing, cyanosis, or edema  PSYCH: Nl behavior, nl affect  NEUROLOGY: AAOx3  SKIN: Normal color, No rashes or lesions. R foot wrapped  LINES:    Cardiovascular Diagnostic Testing:    ECG: Personally reviewed:  V-paced rhythm with PVCs    Telemetry: V-paced with PVCs    Echo: Personally reviewed:  < from: TTE Limited W or WO Ultrasound Enhancing Agent (10.09.23 @ 10:56) >  CONCLUSIONS:      1. Left ventricular systolic function is severely decreased with an ejection fraction of 22 % by Galvan's method of disks. Global left ventricular hypokinesis.   2. Multiple segmental abnormalities exist. See findings.   3. There is no evidence of a left ventricular thrombus.   4. Compared to the transthoracic echocardiogram performed on 8/7/2023 there have been no significant interval changes.    < end of copied text >      Stress Testing:    Cath:    Imaging:    CXR: Personally reviewed    Labs: Personally reviewed                        10.4   4.54  )-----------( 144      ( 10 Oct 2023 07:43 )             32.2     10-10    143  |  103  |  19  ----------------------------<  131<H>  3.3<L>   |  23  |  0.87    Ca    9.0      10 Oct 2023 07:43  Phos  3.0     10-10  Mg     1.8     10-10      PT/INR - ( 10 Oct 2023 07:43 )   PT: 15.3 sec;   INR: 1.47 ratio         PTT - ( 10 Oct 2023 12:34 )  PTT:50.5 sec      Thyroid Stimulating Hormone, Serum: 5.55 uIU/mL (10-07 @ 07:08)

## 2023-10-11 LAB
ANION GAP SERPL CALC-SCNC: 11 MMOL/L — SIGNIFICANT CHANGE UP (ref 5–17)
APTT BLD: 30.9 SEC — SIGNIFICANT CHANGE UP (ref 24.5–35.6)
APTT BLD: 57.8 SEC — HIGH (ref 24.5–35.6)
BLD GP AB SCN SERPL QL: NEGATIVE — SIGNIFICANT CHANGE UP
BUN SERPL-MCNC: 17 MG/DL — SIGNIFICANT CHANGE UP (ref 7–23)
CALCIUM SERPL-MCNC: 9.2 MG/DL — SIGNIFICANT CHANGE UP (ref 8.4–10.5)
CHLORIDE SERPL-SCNC: 103 MMOL/L — SIGNIFICANT CHANGE UP (ref 96–108)
CO2 SERPL-SCNC: 24 MMOL/L — SIGNIFICANT CHANGE UP (ref 22–31)
CREAT SERPL-MCNC: 0.89 MG/DL — SIGNIFICANT CHANGE UP (ref 0.5–1.3)
CULTURE RESULTS: SIGNIFICANT CHANGE UP
EGFR: 97 ML/MIN/1.73M2 — SIGNIFICANT CHANGE UP
GLUCOSE BLDC GLUCOMTR-MCNC: 135 MG/DL — HIGH (ref 70–99)
GLUCOSE BLDC GLUCOMTR-MCNC: 143 MG/DL — HIGH (ref 70–99)
GLUCOSE BLDC GLUCOMTR-MCNC: 151 MG/DL — HIGH (ref 70–99)
GLUCOSE BLDC GLUCOMTR-MCNC: 184 MG/DL — HIGH (ref 70–99)
GLUCOSE SERPL-MCNC: 124 MG/DL — HIGH (ref 70–99)
HCT VFR BLD CALC: 31.8 % — LOW (ref 39–50)
HCT VFR BLD CALC: 36.2 % — LOW (ref 39–50)
HGB BLD-MCNC: 11.3 G/DL — LOW (ref 13–17)
HGB BLD-MCNC: 9.9 G/DL — LOW (ref 13–17)
INR BLD: 1.29 RATIO — HIGH (ref 0.85–1.18)
MAGNESIUM SERPL-MCNC: 1.8 MG/DL — SIGNIFICANT CHANGE UP (ref 1.6–2.6)
MCHC RBC-ENTMCNC: 25.9 PG — LOW (ref 27–34)
MCHC RBC-ENTMCNC: 26.2 PG — LOW (ref 27–34)
MCHC RBC-ENTMCNC: 31.1 GM/DL — LOW (ref 32–36)
MCHC RBC-ENTMCNC: 31.2 GM/DL — LOW (ref 32–36)
MCV RBC AUTO: 83.2 FL — SIGNIFICANT CHANGE UP (ref 80–100)
MCV RBC AUTO: 83.8 FL — SIGNIFICANT CHANGE UP (ref 80–100)
NRBC # BLD: 0 /100 WBCS — SIGNIFICANT CHANGE UP (ref 0–0)
NRBC # BLD: 0 /100 WBCS — SIGNIFICANT CHANGE UP (ref 0–0)
PLATELET # BLD AUTO: 174 K/UL — SIGNIFICANT CHANGE UP (ref 150–400)
PLATELET # BLD AUTO: 221 K/UL — SIGNIFICANT CHANGE UP (ref 150–400)
POTASSIUM SERPL-MCNC: 3.6 MMOL/L — SIGNIFICANT CHANGE UP (ref 3.5–5.3)
POTASSIUM SERPL-SCNC: 3.6 MMOL/L — SIGNIFICANT CHANGE UP (ref 3.5–5.3)
PROTHROM AB SERPL-ACNC: 14.1 SEC — HIGH (ref 9.5–13)
RBC # BLD: 3.82 M/UL — LOW (ref 4.2–5.8)
RBC # BLD: 4.32 M/UL — SIGNIFICANT CHANGE UP (ref 4.2–5.8)
RBC # FLD: 18.2 % — HIGH (ref 10.3–14.5)
RBC # FLD: 18.2 % — HIGH (ref 10.3–14.5)
RH IG SCN BLD-IMP: POSITIVE — SIGNIFICANT CHANGE UP
SODIUM SERPL-SCNC: 138 MMOL/L — SIGNIFICANT CHANGE UP (ref 135–145)
SPECIMEN SOURCE: SIGNIFICANT CHANGE UP
WBC # BLD: 4.46 K/UL — SIGNIFICANT CHANGE UP (ref 3.8–10.5)
WBC # BLD: 5.43 K/UL — SIGNIFICANT CHANGE UP (ref 3.8–10.5)
WBC # FLD AUTO: 4.46 K/UL — SIGNIFICANT CHANGE UP (ref 3.8–10.5)
WBC # FLD AUTO: 5.43 K/UL — SIGNIFICANT CHANGE UP (ref 3.8–10.5)

## 2023-10-11 PROCEDURE — 93459 L HRT ART/GRFT ANGIO: CPT | Mod: 26

## 2023-10-11 PROCEDURE — 99232 SBSQ HOSP IP/OBS MODERATE 35: CPT

## 2023-10-11 PROCEDURE — 99152 MOD SED SAME PHYS/QHP 5/>YRS: CPT

## 2023-10-11 PROCEDURE — 99233 SBSQ HOSP IP/OBS HIGH 50: CPT

## 2023-10-11 PROCEDURE — 93567 NJX CAR CTH SPRVLV AORTGRPHY: CPT

## 2023-10-11 PROCEDURE — 93010 ELECTROCARDIOGRAM REPORT: CPT

## 2023-10-11 RX ORDER — HEPARIN SODIUM 5000 [USP'U]/ML
9000 INJECTION INTRAVENOUS; SUBCUTANEOUS EVERY 6 HOURS
Refills: 0 | Status: DISCONTINUED | OUTPATIENT
Start: 2023-10-11 | End: 2023-10-13

## 2023-10-11 RX ORDER — POTASSIUM CHLORIDE 20 MEQ
40 PACKET (EA) ORAL ONCE
Refills: 0 | Status: COMPLETED | OUTPATIENT
Start: 2023-10-11 | End: 2023-10-11

## 2023-10-11 RX ORDER — ACETAMINOPHEN 500 MG
1000 TABLET ORAL ONCE
Refills: 0 | Status: COMPLETED | OUTPATIENT
Start: 2023-10-11 | End: 2023-10-11

## 2023-10-11 RX ORDER — HEPARIN SODIUM 5000 [USP'U]/ML
4000 INJECTION INTRAVENOUS; SUBCUTANEOUS EVERY 6 HOURS
Refills: 0 | Status: DISCONTINUED | OUTPATIENT
Start: 2023-10-11 | End: 2023-10-11

## 2023-10-11 RX ORDER — MAGNESIUM SULFATE 500 MG/ML
1 VIAL (ML) INJECTION ONCE
Refills: 0 | Status: COMPLETED | OUTPATIENT
Start: 2023-10-11 | End: 2023-10-11

## 2023-10-11 RX ORDER — SENNA PLUS 8.6 MG/1
2 TABLET ORAL AT BEDTIME
Refills: 0 | Status: DISCONTINUED | OUTPATIENT
Start: 2023-10-11 | End: 2023-10-13

## 2023-10-11 RX ORDER — HEPARIN SODIUM 5000 [USP'U]/ML
INJECTION INTRAVENOUS; SUBCUTANEOUS
Qty: 25000 | Refills: 0 | Status: DISCONTINUED | OUTPATIENT
Start: 2023-10-11 | End: 2023-10-11

## 2023-10-11 RX ORDER — ONDANSETRON 8 MG/1
4 TABLET, FILM COATED ORAL ONCE
Refills: 0 | Status: COMPLETED | OUTPATIENT
Start: 2023-10-11 | End: 2023-10-11

## 2023-10-11 RX ORDER — HEPARIN SODIUM 5000 [USP'U]/ML
4000 INJECTION INTRAVENOUS; SUBCUTANEOUS EVERY 6 HOURS
Refills: 0 | Status: DISCONTINUED | OUTPATIENT
Start: 2023-10-11 | End: 2023-10-13

## 2023-10-11 RX ORDER — HEPARIN SODIUM 5000 [USP'U]/ML
2400 INJECTION INTRAVENOUS; SUBCUTANEOUS
Qty: 25000 | Refills: 0 | Status: DISCONTINUED | OUTPATIENT
Start: 2023-10-11 | End: 2023-10-13

## 2023-10-11 RX ORDER — HEPARIN SODIUM 5000 [USP'U]/ML
2400 INJECTION INTRAVENOUS; SUBCUTANEOUS
Qty: 25000 | Refills: 0 | Status: DISCONTINUED | OUTPATIENT
Start: 2023-10-11 | End: 2023-10-11

## 2023-10-11 RX ORDER — HEPARIN SODIUM 5000 [USP'U]/ML
9000 INJECTION INTRAVENOUS; SUBCUTANEOUS EVERY 6 HOURS
Refills: 0 | Status: DISCONTINUED | OUTPATIENT
Start: 2023-10-11 | End: 2023-10-11

## 2023-10-11 RX ADMIN — SENNA PLUS 2 TABLET(S): 8.6 TABLET ORAL at 23:04

## 2023-10-11 RX ADMIN — AMPICILLIN SODIUM AND SULBACTAM SODIUM 200 GRAM(S): 250; 125 INJECTION, POWDER, FOR SUSPENSION INTRAMUSCULAR; INTRAVENOUS at 05:50

## 2023-10-11 RX ADMIN — Medication 400 MILLIGRAM(S): at 12:28

## 2023-10-11 RX ADMIN — Medication 1: at 17:25

## 2023-10-11 RX ADMIN — AMPICILLIN SODIUM AND SULBACTAM SODIUM 200 GRAM(S): 250; 125 INJECTION, POWDER, FOR SUSPENSION INTRAMUSCULAR; INTRAVENOUS at 17:25

## 2023-10-11 RX ADMIN — Medication 40 MILLIEQUIVALENT(S): at 09:15

## 2023-10-11 RX ADMIN — Medication 1000 MILLIGRAM(S): at 12:45

## 2023-10-11 RX ADMIN — Medication 100 MILLIGRAM(S): at 05:50

## 2023-10-11 RX ADMIN — SACUBITRIL AND VALSARTAN 1 TABLET(S): 24; 26 TABLET, FILM COATED ORAL at 17:25

## 2023-10-11 RX ADMIN — Medication 650 MILLIGRAM(S): at 05:50

## 2023-10-11 RX ADMIN — HEPARIN SODIUM 2400 UNIT(S)/HR: 5000 INJECTION INTRAVENOUS; SUBCUTANEOUS at 19:02

## 2023-10-11 RX ADMIN — Medication 5 MILLIGRAM(S): at 01:43

## 2023-10-11 RX ADMIN — ONDANSETRON 4 MILLIGRAM(S): 8 TABLET, FILM COATED ORAL at 12:28

## 2023-10-11 RX ADMIN — Medication 5 MILLIGRAM(S): at 23:05

## 2023-10-11 RX ADMIN — SACUBITRIL AND VALSARTAN 1 TABLET(S): 24; 26 TABLET, FILM COATED ORAL at 05:50

## 2023-10-11 RX ADMIN — ATORVASTATIN CALCIUM 80 MILLIGRAM(S): 80 TABLET, FILM COATED ORAL at 23:04

## 2023-10-11 RX ADMIN — HEPARIN SODIUM 2400 UNIT(S)/HR: 5000 INJECTION INTRAVENOUS; SUBCUTANEOUS at 19:24

## 2023-10-11 RX ADMIN — HEPARIN SODIUM 1900 UNIT(S)/HR: 5000 INJECTION INTRAVENOUS; SUBCUTANEOUS at 00:42

## 2023-10-11 RX ADMIN — HEPARIN SODIUM 2400 UNIT(S)/HR: 5000 INJECTION INTRAVENOUS; SUBCUTANEOUS at 10:32

## 2023-10-11 RX ADMIN — SPIRONOLACTONE 25 MILLIGRAM(S): 25 TABLET, FILM COATED ORAL at 05:51

## 2023-10-11 RX ADMIN — Medication 650 MILLIGRAM(S): at 23:04

## 2023-10-11 RX ADMIN — AMPICILLIN SODIUM AND SULBACTAM SODIUM 200 GRAM(S): 250; 125 INJECTION, POWDER, FOR SUSPENSION INTRAMUSCULAR; INTRAVENOUS at 23:05

## 2023-10-11 RX ADMIN — Medication 81 MILLIGRAM(S): at 14:44

## 2023-10-11 RX ADMIN — HEPARIN SODIUM 1900 UNIT(S)/HR: 5000 INJECTION INTRAVENOUS; SUBCUTANEOUS at 08:52

## 2023-10-11 RX ADMIN — Medication 100 GRAM(S): at 09:15

## 2023-10-11 RX ADMIN — Medication 20 MILLIGRAM(S): at 05:50

## 2023-10-11 NOTE — PROGRESS NOTE ADULT - PROBLEM SELECTOR PLAN 1
Will continue current insulin regimen for now. Will continue monitoring  blood sugars, will Follow up.  Patient counseled for compliance with consistent low carb diet.  Stop Home Farxiga  Discharge on low carb diet.

## 2023-10-11 NOTE — PROGRESS NOTE ADULT - SUBJECTIVE AND OBJECTIVE BOX
Patient is a 62y old  Male who presents with a chief complaint of fevers/  foot infection (11 Oct 2023 11:38)    Being followed by ID for bacteremia        Interval history:  pt seen in the [facility]  s/p cath  plan is for surgery on friday   No other acute events      PAST MEDICAL & SURGICAL HISTORY:  Diabetes      Chronic osteomyelitis      H/O heart failure      No significant past surgical history        Allergies    No Known Allergies    Intolerances      Antimicrobials:    ampicillin/sulbactam  IVPB 3 Gram(s) IV Intermittent every 6 hours  ampicillin/sulbactam  IVPB        MEDICATIONS  (STANDING):  ampicillin/sulbactam  IVPB 3 Gram(s) IV Intermittent every 6 hours  ampicillin/sulbactam  IVPB      aspirin enteric coated 81 milliGRAM(s) Oral daily  atorvastatin 80 milliGRAM(s) Oral at bedtime  dextrose 5%. 1000 milliLiter(s) (100 mL/Hr) IV Continuous <Continuous>  dextrose 5%. 1000 milliLiter(s) (50 mL/Hr) IV Continuous <Continuous>  dextrose 50% Injectable 25 Gram(s) IV Push once  dextrose 50% Injectable 25 Gram(s) IV Push once  dextrose 50% Injectable 12.5 Gram(s) IV Push once  furosemide    Tablet 20 milliGRAM(s) Oral daily  glucagon  Injectable 1 milliGRAM(s) IntraMuscular once  heparin  Infusion. 2400 Unit(s)/Hr (24 mL/Hr) IV Continuous <Continuous>  insulin lispro (ADMELOG) corrective regimen sliding scale   SubCutaneous three times a day before meals  melatonin 5 milliGRAM(s) Oral at bedtime  metoprolol succinate  milliGRAM(s) Oral daily  polyethylene glycol 3350 17 Gram(s) Oral daily  sacubitril 24 mG/valsartan 26 mG 1 Tablet(s) Oral two times a day  sodium bicarbonate 650 milliGRAM(s) Oral three times a day  spironolactone 25 milliGRAM(s) Oral daily      Vital Signs Last 24 Hrs  T(C): 36.4 (10-11-23 @ 11:55), Max: 36.8 (10-10-23 @ 19:55)  T(F): 97.5 (10-11-23 @ 11:55), Max: 98.2 (10-10-23 @ 19:55)  HR: 73 (10-11-23 @ 12:55) (70 - 84)  BP: 111/65 (10-11-23 @ 12:55) (107/71 - 131/76)  BP(mean): 76 (10-11-23 @ 12:55) (76 - 99)  RR: 17 (10-11-23 @ 12:55) (16 - 18)  SpO2: 93% (10-11-23 @ 12:55) (93% - 99%)    Physical Exam:    Constitutional well preserved,comfortable,pleasant    HEENT PERRLA EOMI,No pallor or icterus    No oral exudate or erythema    Neck supple no JVD or LN    Chest Good AE,CTA    CVS  S1 S2     Abd soft BS normal No tenderness     Ext foot dressed     IV site no erythema tenderness or discharge    Joints no swelling or LOM    CNS AAO X 3 no focal    Lab Data:                          9.9    4.46  )-----------( 174      ( 11 Oct 2023 07:25 )             31.8       10-11    138  |  103  |  17  ----------------------------<  124<H>  3.6   |  24  |  0.89    Ca    9.2      11 Oct 2023 07:25  Phos  3.0     10-10  Mg     1.8     10-11          .Blood Blood-Peripheral  10-10-23   No growth at 24 hours  --  --      .Blood Blood-Peripheral  10-08-23   No growth at 72 Hours  --  --      Clean Catch Clean Catch (Midstream)  10-06-23   <10,000 CFU/mL Normal Urogenital Mona  --  --      .Abscess right foot  10-06-23   Moderate Gram Positive Rods Most closely resembling Arcanobacterium  haemolyticum "Susceptibilities not performed"  Moderate Streptococcus agalactiae (Group B) isolated  Group B streptococci are susceptible to ampicillin,  penicillin and cefazolin,but may be resistant to  erythromycin and clindamycin.  --    Rare polymorphonuclear leukocytes per low power field  Numerous Gram Variable Rods per oil power field      .Blood Blood-Peripheral  10-06-23   Growth in aerobic and anaerobic bottles: Streptococcus agalactiae (Group  B)  Direct identification is available within approximately 3-5  hours either by Blood Panel Multiplexed PCR or Direct  MALDI-TOF. Details: [email or web address]  --  Blood Culture PCR  Streptococcus agalactiae (Group B)      .Blood Blood-Peripheral  10-06-23   Growth in aerobic and anaerobic bottles: Streptococcus agalactiae (Group  B)  See previous culture [id number]  --    Growth in aerobic and anaerobic bottles: Gram Positive Cocci in Pairs and  Chains        WBC Count: 4.46 (10-11-23 @ 07:25)  WBC Count: 4.75 (10-10-23 @ 12:34)  WBC Count: 4.54 (10-10-23 @ 07:43)  WBC Count: 4.23 (10-10-23 @ 00:43)  WBC Count: 4.76 (10-07-23 @ 07:09)  WBC Count: 7.66 (10-06-23 @ 06:54)     < from: POCUS ED Chest (10.07.23 @ 09:56) >  INTERPRETATION:  A focused bedside ultrasound of the IVC, heart and lung   fields were performed.    Impression:  A severely diminished LVEF was noted (10-15% by visual estimate)  RV appears grossly enlarged , RV/LV ratio    1  No significant pericardial effusion was noted  IVC appears to have moderate resp variation  pleural sliding present throughout. Occasional b lines at bases, apeces   appear to have no b lines.    --- End of Report ---      < end of copied text >

## 2023-10-11 NOTE — PROGRESS NOTE ADULT - ASSESSMENT
Patient is our 62M who is consulted for a diabetic foot infection of the right, dorsal lateral foot. Past medical history of DM, defibrillator, CAD post CABG, CHF, hx of non-union Sommers fracture around 2014, right upper extremity infection in June 2023 s/p 1 month of daptomycin and ceftriaxone.     Patient noticed an ulcer on his lateral right foot for the past several weeks. Over the past few days right foot has been having increased edema, erythema and pain on ambulation. Temperature at home was 101-102. Denies recent fall or trauma. states denies n/v/d, CP, SOB, HA, dizziness, abdominal pain, urinary symptoms, cough, leg pain, swelling. Patient endorses stools have become darker.     On August 1, 2023, the patient saw podiatrist Dr. Foss for the foot infection and bone biopsy came back positive for staph. He began treatment with levaquin but his symptoms progressed and patient came to the ED for further evaluation.      ON 8/8 pt underwent  R foot Incision and Drainage and partial 5th met base resection  Pt grew mssa and finegolda  pt received 6 weeks IV abs at home    Pt was doing well at home . Tuesday pt had evaluation at podiatrist and all looked well  wednesday pt noted foot pain and by thursday pt was having fevers and chills  Pt stephen noted to have Group B strep bacteremia      A/P   #Group B strep bacteremia  concerning as grew so quickly   changed abs to unasyn- this would also cover previous cultured pathogens  Check repeat BC q 48 hours until clears  cardiology following - as patient with an AICD  cardiac echo noted  likely source is the foot  check MRSA swab  hold further vancomycin      #right foot erythema  concerned for residual osteo   PT's AICD is not MRI compatible  ?ct  ,  appreciate podiatry in put      #hypotension  h/o CHF  maybe in part form sepsis  cardiac echo noted  cardiology follow up appreciated     #elevated creatinine  dose meds per renal function      Lori Bennett M.D. ,   please reach via teams   If no answer, or after 5PM/ weekends,  then please call  [phone]    Assessment and plan discussed with the primary team .    I will be away tomorrow. My associates will be covering. Please call [phone] with acute issues, questions.

## 2023-10-11 NOTE — PROGRESS NOTE ADULT - ASSESSMENT
62yr M hx of  HTN,  CAD, HF AICD,  AFIB,  DM, with right foot  osteo  Assessment  DMT2: 62y Male with DM T2 with hyperglycemia, A1C 6% , was on oral meds at home, now on low dose insulin, feeling better, eating meals,  sugars are trending within stable ranges.  Had mild BHB on labs initially, elevated lactate on VBG, s/p IV hydration, glucose trending stable.   CAD: on medications, stable, monitored. s/p angiogram  Foot Osteo: Podiatry eval, wound care, on IV Zosyn.  HTN: on antihypertensive medications, monitored, asymptomatic.        Discussed plan and management with Dr Roxanne Lennon NP - TEAMS  Wanda Oliveira MD  Cell: [phone]  Office: [phone]      62yr M hx of  HTN,  CAD, HF AICD,  AFIB,  DM, with right foot  osteo  Assessment  DMT2: 62y Male with DM T2 with hyperglycemia, A1C 6% , was on oral meds at home, now on low dose insulin, feeling better, eating meals, sugars are trending within stable ranges.  Had mild BHB on labs initially, elevated lactate on VBG, s/p IV hydration, glucose trending stable.   CAD: on medications, stable, monitored. s/p angiogram  Foot Osteo: Podiatry eval, wound care, on IV Zosyn.  HTN: on antihypertensive medications, monitored, asymptomatic.        Discussed plan and management with Dr Roxanne Lennon NP - TEAMS  Wanda Oliveira MD  Cell: [phone]  Office: [phone]

## 2023-10-11 NOTE — CHART NOTE - NSCHARTNOTEFT_GEN_A_CORE
- Pod sx cancel 2/2 cardiac intervention  - Cards recs appreciated.   - Once stable, Will need Medical and Cardiac optimization documented for anesthesia for Podiatry surgery now tentatively scheduled 10/13 1230.

## 2023-10-11 NOTE — PROGRESS NOTE ADULT - SUBJECTIVE AND OBJECTIVE BOX
Electrophysiology Progress Note    Patient seen and examined at bedside.    Overnight Events: Yesterday afternoon, received defibrillator shock x1 for VT. Metoprolol succ inccreased to 100mg. Continues to have PVCs on telemetry. Reports he felt shaken afterwards but no more ICD events. Plan for [facility] today per cardiology.     Review Of Systems: No chest pain, shortness of breath, or palpitations.    Current Meds:  acetaminophen     Tablet .. 650 milliGRAM(s) Oral every 6 hours PRN  ampicillin/sulbactam  IVPB 3 Gram(s) IV Intermittent every 6 hours  ampicillin/sulbactam  IVPB      aspirin enteric coated 81 milliGRAM(s) Oral daily  atorvastatin 80 milliGRAM(s) Oral at bedtime  dextrose 5%. 1000 milliLiter(s) IV Continuous <Continuous>  dextrose 5%. 1000 milliLiter(s) IV Continuous <Continuous>  dextrose 50% Injectable 25 Gram(s) IV Push once  dextrose 50% Injectable 25 Gram(s) IV Push once  dextrose 50% Injectable 12.5 Gram(s) IV Push once  dextrose Oral Gel 15 Gram(s) Oral once PRN  furosemide    Tablet 20 milliGRAM(s) Oral daily  glucagon  Injectable 1 milliGRAM(s) IntraMuscular once  heparin   Injectable 9000 Unit(s) IV Push every 6 hours PRN  heparin   Injectable 4000 Unit(s) IV Push every 6 hours PRN  heparin  Infusion. 2400 Unit(s)/Hr IV Continuous <Continuous>  insulin lispro (ADMELOG) corrective regimen sliding scale   SubCutaneous three times a day before meals  melatonin 5 milliGRAM(s) Oral at bedtime  polyethylene glycol 3350 17 Gram(s) Oral daily  sacubitril 24 mG/valsartan 26 mG 1 Tablet(s) Oral two times a day  sodium bicarbonate 650 milliGRAM(s) Oral three times a day  spironolactone 25 milliGRAM(s) Oral daily      Vitals:  T(F): 97.5 (10-11), Max: 98.3 (10-10)  HR: 70 (10-11) (70 - 90)  BP: 117/67 (10-11) (98/67 - 126/86)  RR: 17 (10-11)  SpO2: 99% (10-11)  I&O's Summary    10 Oct 2023 07:01  -  11 Oct 2023 07:00  --------------------------------------------------------  IN: 0 mL / OUT: 900 mL / NET: -900 mL    11 Oct 2023 07:01  -  11 Oct 2023 11:38  --------------------------------------------------------  IN: 0 mL / OUT: 350 mL / NET: -350 mL        Physical Exam:  GENERAL: No acute distress, well-developed  HEAD:  Atraumatic, Normocephalic  ENT: EOMI, PERRLA, conjunctiva and sclera clear  CHEST/LUNG: Clear to auscultation bilaterally; No wheeze, equal breath sounds bilaterally   HEART: Irregular rhythm; No murmurs, rubs, or gallops  ABDOMEN: Soft, Nontender, Nondistended  EXTREMITIES:  No clubbing, cyanosis, or edema  PSYCH: Nl behavior, nl affect  NEUROLOGY: AAOx3  SKIN: Normal color, No rashes or lesions. R foot wrapped                          9.9    4.46  )-----------( 174      ( 11 Oct 2023 07:25 )             31.8     10-11    138  |  103  |  17  ----------------------------<  124<H>  3.6   |  24  |  0.89    Ca    9.2      11 Oct 2023 07:25  Phos  3.0     10-10  Mg     1.8     10-11      PT/INR - ( 11 Oct 2023 07:27 )   PT: 14.1 sec;   INR: 1.29 ratio         PTT - ( 11 Oct 2023 07:27 )  PTT:57.8 sec    New ECG(s): Personally reviewed    Echo:    Stress Testing:     Cath:    Imaging:    Interpretation of Telemetry:  V-paced 70s-90s with PVCs  VT event yesterday appears to be PVC triggered monomorphic VT.

## 2023-10-11 NOTE — PROGRESS NOTE ADULT - ASSESSMENT
_________________________________________________________________________________________  ========>>  M E D I C A L   A T T E N D I N G    F O L L O W  U P  N O T E  <<=========  -----------------------------------------------------------------------------------------------------    - Patient seen and examined by me earlier today.   - In summary,  RYLEE HOWE is a 62y year old man admitted with Diabetic foot infection  - Patient today overall doing ok, comfortable, eating OK. pain controlled     ==================>> REVIEW OF SYSTEM <<=================    GEN: no fever, no chills, pain Controlled  RESP: no SOB, no cough, no sputum  CVS: no chest pain, no palpitations, no edema  GI: no abdominal pain, no nausea, a little constipation ( took Miralax already)   : no dysuria, no frequency,  Neuro: no headache, no dizziness  Derm : no itching, no rash    ==================>> PHYSICAL EXAM <<=================    GEN: A&O X 3 , NAD , comfortable, pleasant, calm in bed .. encouraged out of bed to chair with assistance as needed.   HEENT: NCAT, PERRL, MMM, hearing intact  Neck: supple , no JVD appreciated  CVS: S1S2 , regular , No M/R/G appreciated  PULM: CTA B/L,  no W/R/R appreciated  ABD.: soft. non tender, non distended,  bowel sounds present  Extrem: intact pulses , Right foot wound dressed  PSYCH : normal mood,  not anxious         ( Note written / Date of service 10-11-23 ( This is certified to be the same as "ENTERED" date above ( for billing purposes)))    ==================>> MEDICATIONS <<====================    ampicillin/sulbactam  IVPB 3 Gram(s) IV Intermittent every 6 hours  ampicillin/sulbactam  IVPB      aspirin enteric coated 81 milliGRAM(s) Oral daily  atorvastatin 80 milliGRAM(s) Oral at bedtime  dextrose 5%. 1000 milliLiter(s) IV Continuous <Continuous>  dextrose 5%. 1000 milliLiter(s) IV Continuous <Continuous>  dextrose 50% Injectable 25 Gram(s) IV Push once  dextrose 50% Injectable 12.5 Gram(s) IV Push once  dextrose 50% Injectable 25 Gram(s) IV Push once  furosemide    Tablet 20 milliGRAM(s) Oral daily  glucagon  Injectable 1 milliGRAM(s) IntraMuscular once  insulin lispro (ADMELOG) corrective regimen sliding scale   SubCutaneous three times a day before meals  melatonin 5 milliGRAM(s) Oral at bedtime  metoprolol succinate  milliGRAM(s) Oral daily  polyethylene glycol 3350 17 Gram(s) Oral daily  sacubitril 24 mG/valsartan 26 mG 1 Tablet(s) Oral two times a day  senna 2 Tablet(s) Oral at bedtime  sodium bicarbonate 650 milliGRAM(s) Oral three times a day  spironolactone 25 milliGRAM(s) Oral daily    MEDICATIONS  (PRN):  acetaminophen     Tablet .. 650 milliGRAM(s) Oral every 6 hours PRN Temp greater or equal to 38C (100.4F), Mild Pain (1 - 3)  dextrose Oral Gel 15 Gram(s) Oral once PRN Blood Glucose LESS THAN 70 milliGRAM(s)/deciliter    ___________  Active diet:  Diet, NPO after Midnight:      NPO Start Date: 11-Oct-2023,   NPO Start Time: 23:59  Except Medications  Diet, Consistent Carbohydrate w/Evening Snack  ___________________    ==================>> VITAL SIGNS <<==================    Weight (kg): 108.5  Vital Signs Last 24 HrsT(C): 36.4 (10-11-23 @ 11:55)  T(F): 97.5 (10-11-23 @ 11:55), Max: 98.2 (10-10-23 @ 19:55)  HR: 77 (10-11-23 @ 13:53) (70 - 84)  BP: 118/76 (10-11-23 @ 13:53)  RR: 18 (10-11-23 @ 13:53) (16 - 18)  SpO2: 95% (10-11-23 @ 13:53) (93% - 99%)      CAPILLARY BLOOD GLUCOSE      POCT Blood Glucose.: 151 mg/dL (11 Oct 2023 12:57)  POCT Blood Glucose.: 135 mg/dL (11 Oct 2023 08:46)  POCT Blood Glucose.: 142 mg/dL (10 Oct 2023 21:21)  POCT Blood Glucose.: 153 mg/dL (10 Oct 2023 17:27)     ==================>> LAB AND IMAGING <<==================                        9.9    4.46  )-----------( 174      ( 11 Oct 2023 07:25 )             31.8        10-11    138  |  103  |  17  ----------------------------<  124<H>  3.6   |  24  |  0.89    Ca    9.2      11 Oct 2023 07:25  Phos  3.0     10-10  Mg     1.8     10-11      WBC count:   4.46 <<== ,  4.75 <<== ,  4.54 <<== ,  4.23 <<== ,  4.76 <<==   Hemoglobin:   9.9 <<==,  10.4 <<==,  10.4 <<==,  10.7 <<==,  10.0 <<==  platelets:  174 <==, 147 <==, 144 <==, 157 <==, 128 <==, 142 <==    Creatinine:  0.89  <<==, 0.87  <<==, 1.15  <<==, 1.50  <<==, 1.35  <<==, 1.33  <<==  Sodium:   138  <==, 143  <==, 138  <==, 138  <==, 137  <==, 137  <==       AST:          13 <==      ALT:        6  <==      AP:        63  <=     Bili:        1.2  <=    ____________________________    M I C R O B I O L O G Y :    Culture - Blood (collected 10 Oct 2023 07:40)  Source: .Blood Blood-Peripheral  Preliminary Report (11 Oct 2023 10:01):    No growth at 24 hours    Culture - Blood (collected 10 Oct 2023 07:40)  Source: .Blood Blood-Peripheral  Preliminary Report (11 Oct 2023 10:01):    No growth at 24 hours    Culture - Blood (collected 08 Oct 2023 07:01)  Source: .Blood Blood-Peripheral  Preliminary Report (11 Oct 2023 10:01):    No growth at 72 Hours    Culture - Blood (collected 08 Oct 2023 07:01)  Source: .Blood Blood-Peripheral  Preliminary Report (11 Oct 2023 10:01):    No growth at 72 Hours    Culture - Urine (collected 06 Oct 2023 16:25)  Source: Clean Catch Clean Catch (Midstream)  Final Report (07 Oct 2023 15:13):    <10,000 CFU/mL Normal Urogenital Mona    Culture - Abscess with Gram Stain (collected 06 Oct 2023 15:12)  Source: .Abscess right foot  Gram Stain (06 Oct 2023 22:45):    Rare polymorphonuclear leukocytes per low power field    Numerous Gram Variable Rods per oil power field  Preliminary Report (08 Oct 2023 21:44):    Moderate Gram Positive Rods Most closely resembling Arcanobacterium    haemolyticum "Susceptibilities not performed"    Moderate Streptococcus agalactiae (Group B) isolated    Group B streptococci are susceptible to ampicillin,    penicillin and cefazolin,but may be resistant to    erythromycin and clindamycin.    Culture - Blood (collected 06 Oct 2023 06:28)  Source: .Blood Blood-Peripheral  Gram Stain (06 Oct 2023 19:46):    Growth in aerobic and anaerobic bottles: Gram Positive Cocci in Pairs and    Chains  Final Report (08 Oct 2023 11:33):    Growth in aerobic and anaerobic bottles: Streptococcus agalactiae (Group    B)    Direct identification is available within approximately 3-5    hours either by Blood Panel Multiplexed PCR or Direct    MALDI-TOF. Details: [email or web address]  Organism: Blood Culture PCR  Streptococcus agalactiae (Group B) (08 Oct 2023 11:33)  Organism: Streptococcus agalactiae (Group B) (08 Oct 2023 11:33)    Sensitivities:      Method Type: HEATHER      -  Ceftriaxone: S <=0.25      -  Clindamycin: S <=0.06      -  Levofloxacin: S 1      -  Penicillin: S 0.06 Predicts results for ampicillin, amoxicillin, amoxicillin/clavulanate, ampicillin/sulbactam, 1st, 2nd and 3rd generation cephalosporins and carbapenems.      -  Vancomycin: S 0.5  Organism: Blood Culture PCR (08 Oct 2023 11:33)    Sensitivities:      Method Type: PCR      -  Streptococcus agalactiae (Group B): Detec        SARS-CoV-2: [place] (10-06-23 @ 06:54)      __________________________________________________________________________________  ===============>>  A S S E S S M E N T   A N D   P L A N <<===============  ------------------------------------------------------------------------------------------    · Assessment	  62yr M with PMH of  HTN,   AFIB,  DM/. right foot  osteo (Recently completed IV antibiotics via PICC line) ,CAD, s/p  cabg / AICD,hx of Sommers fracture, non-union many years ago,   prior CT:  c/c  transverse   fx, through the fifth metatarsal base. soft tissue wound/ cortical irregularity along the fifth metatarsal base  wound cx   MSSA and fingoldia,  s/p I&D with necrotic tissue close to bone,suspicion for residual osteo  completed   ancef 2 gms IVSS q 8 hours .  last  month     now  admitted with fevers/  right foot  wound/  and  shanda   Rt  5th metatarsal  wound/  foot  cellulitis >> Now with bacteremia    seen by podiatry /  ID  / Vascular   HTN/  HLD   CAD/    cardiomyopathy,  ef  25 Post AICD (as per patient not MRI compatible)  c/c  Afib,  on xarelo/  known to  card   c/c  anemia  DM,  on  farxiga. januvia. metformin   has   c/c  low  baseline  sbp  in  90's   hold  lasix/  farxiga/  entresto, for  now/ farxiga  not  recommended  with osteo  foot/ ha s risk  for  amputation    bacteremia  Gp  BsStrep,  , on iv unasyn.  iD  following  vascular angiogram not needed per vascular team  Plan for amputation by podiatry tomorrow ( xarelto on hold / on heparin drip)   >> medically stable for procedures..      cardiology following >> medications adjusted given PVC and NSVT seen on AICD interrogation     otherwise medically stable for podiatric surgery  continuing the antibiotics per ID   -GI/DVT Prophylaxis per protocol.    --------------------------------------------  Case discussed with Patient, nurse practitioner,  Education given on findings and plan of care  ___________________________  HVicky Boudreaux D.O.  Pager: [phone]       _________________________________________________________________________________________  ========>>  M E D I C A L   A T T E N D I N G    F O L L O W  U P  N O T E  <<=========  -----------------------------------------------------------------------------------------------------    - Patient seen and examined by me earlier today.   - In summary,  RYLEE HOWE is a 62y year old man admitted with Diabetic foot infection  - Patient today overall doing ok, comfortable, eating OK. pain controlled     Events noted: patients to this post ventricular tachycardia and AICD shock yesterday.  Patient status post cardiac catheterization today    ==================>> REVIEW OF SYSTEM <<=================    GEN: no fever, no chills, pain Controlled  RESP: no SOB, no cough, no sputum  CVS: no chest pain, no palpitations, no edema  GI: no abdominal pain, no nausea,   : no dysuria, no frequency,  Neuro: no headache, no dizziness  Derm : no itching, no rash    ==================>> PHYSICAL EXAM <<=================    GEN: A&O X 3 , NAD , comfortable, pleasant, calm in bed .. encouraged out of bed to chair with assistance as needed.   HEENT: NCAT, PERRL, MMM, hearing intact  Neck: supple , no JVD appreciated  CVS: S1S2 , regular , No M/R/G appreciated  PULM: CTA B/L,  no W/R/R appreciated  ABD.: soft. non tender, non distended,  bowel sounds present  Extrem: intact pulses , Right foot wound dressed  PSYCH : normal mood,  not anxious         ( Note written / Date of service 10-11-23 ( This is certified to be the same as "ENTERED" date above ( for billing purposes)))    ==================>> MEDICATIONS <<====================    ampicillin/sulbactam  IVPB 3 Gram(s) IV Intermittent every 6 hours  ampicillin/sulbactam  IVPB      aspirin enteric coated 81 milliGRAM(s) Oral daily  atorvastatin 80 milliGRAM(s) Oral at bedtime  dextrose 5%. 1000 milliLiter(s) IV Continuous <Continuous>  dextrose 5%. 1000 milliLiter(s) IV Continuous <Continuous>  dextrose 50% Injectable 25 Gram(s) IV Push once  dextrose 50% Injectable 12.5 Gram(s) IV Push once  dextrose 50% Injectable 25 Gram(s) IV Push once  furosemide    Tablet 20 milliGRAM(s) Oral daily  glucagon  Injectable 1 milliGRAM(s) IntraMuscular once  insulin lispro (ADMELOG) corrective regimen sliding scale   SubCutaneous three times a day before meals  melatonin 5 milliGRAM(s) Oral at bedtime  metoprolol succinate  milliGRAM(s) Oral daily  polyethylene glycol 3350 17 Gram(s) Oral daily  sacubitril 24 mG/valsartan 26 mG 1 Tablet(s) Oral two times a day  senna 2 Tablet(s) Oral at bedtime  sodium bicarbonate 650 milliGRAM(s) Oral three times a day  spironolactone 25 milliGRAM(s) Oral daily    MEDICATIONS  (PRN):  acetaminophen     Tablet .. 650 milliGRAM(s) Oral every 6 hours PRN Temp greater or equal to 38C (100.4F), Mild Pain (1 - 3)  dextrose Oral Gel 15 Gram(s) Oral once PRN Blood Glucose LESS THAN 70 milliGRAM(s)/deciliter    ___________  Active diet:  Diet, NPO after Midnight:      NPO Start Date: 11-Oct-2023,   NPO Start Time: 23:59  Except Medications  Diet, Consistent Carbohydrate w/Evening Snack  ___________________    ==================>> VITAL SIGNS <<==================    Weight (kg): 108.5  Vital Signs Last 24 HrsT(C): 36.4 (10-11-23 @ 11:55)  T(F): 97.5 (10-11-23 @ 11:55), Max: 98.2 (10-10-23 @ 19:55)  HR: 77 (10-11-23 @ 13:53) (70 - 84)  BP: 118/76 (10-11-23 @ 13:53)  RR: 18 (10-11-23 @ 13:53) (16 - 18)  SpO2: 95% (10-11-23 @ 13:53) (93% - 99%)      CAPILLARY BLOOD GLUCOSE  POCT Blood Glucose.: 151 mg/dL (11 Oct 2023 12:57)  POCT Blood Glucose.: 135 mg/dL (11 Oct 2023 08:46)  POCT Blood Glucose.: 142 mg/dL (10 Oct 2023 21:21)  POCT Blood Glucose.: 153 mg/dL (10 Oct 2023 17:27)     ==================>> LAB AND IMAGING <<==================                        9.9    4.46  )-----------( 174      ( 11 Oct 2023 07:25 )             31.8        10-11    138  |  103  |  17  ----------------------------<  124<H>  3.6   |  24  |  0.89    Ca    9.2      11 Oct 2023 07:25  Phos  3.0     10-10  Mg     1.8     10-11      WBC count:   4.46 <<== ,  4.75 <<== ,  4.54 <<== ,  4.23 <<== ,  4.76 <<==   Hemoglobin:   9.9 <<==,  10.4 <<==,  10.4 <<==,  10.7 <<==,  10.0 <<==  platelets:  174 <==, 147 <==, 144 <==, 157 <==, 128 <==, 142 <==    Creatinine:  0.89  <<==, 0.87  <<==, 1.15  <<==, 1.50  <<==, 1.35  <<==, 1.33  <<==  Sodium:   138  <==, 143  <==, 138  <==, 138  <==, 137  <==, 137  <==      ____________________________    M I C R O B I O L O G Y :    Culture - Blood (collected 10 Oct 2023 07:40)  Source: .Blood Blood-Peripheral  Preliminary Report (11 Oct 2023 10:01):    No growth at 24 hours    Culture - Blood (collected 10 Oct 2023 07:40)  Source: .Blood Blood-Peripheral  Preliminary Report (11 Oct 2023 10:01):    No growth at 24 hours    Culture - Blood (collected 08 Oct 2023 07:01)  Source: .Blood Blood-Peripheral  Preliminary Report (11 Oct 2023 10:01):    No growth at 72 Hours    Culture - Blood (collected 08 Oct 2023 07:01)  Source: .Blood Blood-Peripheral  Preliminary Report (11 Oct 2023 10:01):    No growth at 72 Hours    Culture - Urine (collected 06 Oct 2023 16:25)  Source: Clean Catch Clean Catch (Midstream)  Final Report (07 Oct 2023 15:13):    <10,000 CFU/mL Normal Urogenital Mona    Culture - Abscess with Gram Stain (collected 06 Oct 2023 15:12)  Source: .Abscess right foot  Gram Stain (06 Oct 2023 22:45):    Rare polymorphonuclear leukocytes per low power field    Numerous Gram Variable Rods per oil power field  Preliminary Report (08 Oct 2023 21:44):    Moderate Gram Positive Rods Most closely resembling Arcanobacterium    haemolyticum "Susceptibilities not performed"    Moderate Streptococcus agalactiae (Group B) isolated    Group B streptococci are susceptible to ampicillin,    penicillin and cefazolin,but may be resistant to    erythromycin and clindamycin.    Culture - Blood (collected 06 Oct 2023 06:28)  Source: .Blood Blood-Peripheral  Gram Stain (06 Oct 2023 19:46):    Growth in aerobic and anaerobic bottles: Gram Positive Cocci in Pairs and    Chains  Final Report (08 Oct 2023 11:33):    Growth in aerobic and anaerobic bottles: Streptococcus agalactiae (Group    B)    Direct identification is available within approximately 3-5    hours either by Blood Panel Multiplexed PCR or Direct    MALDI-TOF. Details: [email or web address]  Organism: Blood Culture PCR  Streptococcus agalactiae (Group B) (08 Oct 2023 11:33)  Organism: Streptococcus agalactiae (Group B) (08 Oct 2023 11:33)    Sensitivities:      Method Type: HEATHER      -  Ceftriaxone: S <=0.25      -  Clindamycin: S <=0.06      -  Levofloxacin: S 1      -  Penicillin: S 0.06 Predicts results for ampicillin, amoxicillin, amoxicillin/clavulanate, ampicillin/sulbactam, 1st, 2nd and 3rd generation cephalosporins and carbapenems.      -  Vancomycin: S 0.5  Organism: Blood Culture PCR (08 Oct 2023 11:33)    Sensitivities:      Method Type: PCR      -  Streptococcus agalactiae (Group B): Detec      < from: Cardiac Catheterization (10.11.23 @ 11:23) >  Procedures:                 1.    Ultrasound Guided Access   2.    Arterial Access - Left Femoral   3.    Diagnostic Coronary Angiography   4.    Diagnostic Graft Angiography   5.    Left Heart Cath   6.    Aortogram     Indications:                Preoperative evaluation for extracardiac  surgery  Non-sustained ventricular tachycardia     Lab Visit Indication:       ExCardEv, NonSVT     Diagnostic Conclusions:   Severe three vessel obstructive coronary artery disease   Chronic total occlusion of the right coronary artery   --Left to right, Rentrop grade 1-2 filling of the posterior descending  artery/posterolateral artery  Patent LIMA to the left anterior descending artery   Patent SVG to the obtuse marginal artery   Patent SVG to the first diagonal artery   Normal left main coronary artery   Right dominant system   LVEDP = 22mmHg   No gradient across the aortic valve    Recommendations:   Keep left leg straight for 4 hours following removal of sheath   Continue aggressive medical management of coronary artery disease and  associated risk factors  Gentle IV hydration as per protocol   < end of copied text >    __________________________________________________________________________________  ===============>>  A S S E S S M E N T   A N D   P L A N <<===============  ------------------------------------------------------------------------------------------    · Assessment	  62yr M with PMH of  HTN,   AFIB,  DM/. right foot  osteo (Recently completed IV antibiotics via PICC line) ,CAD, s/p  cabg / AICD,hx of Sommers fracture, non-union many years ago,   prior CT:  c/c  transverse   fx, through the fifth metatarsal base. soft tissue wound/ cortical irregularity along the fifth metatarsal base  wound cx   MSSA and fingoldia,  s/p I&D with necrotic tissue close to bone,suspicion for residual osteo  completed   ancef 2 gms IVSS q 8 hours .  last  month     now  admitted with fevers/  right foot  wound/  and  shanda   Rt  5th metatarsal  wound/  foot  cellulitis >> Now with bacteremia    seen by podiatry /  ID  / Vascular   HTN/  HLD   CAD/    cardiomyopathy,  ef  25 Post AICD (as per patient not MRI compatible), VT  c/c  Afib,  on xarelo/  known to  card   c/c  anemia  DM,  on  farxiga. januvia. metformin   has   c/c  low  baseline  sbp  in  90's   hold  lasix/  farxiga/  entresto, for  now/ farxiga  not  recommended  with osteo  foot/ ha s risk  for  amputation    bacteremia  Gp  BsStrep,  , on IV antibiotics iD  following  vascular angiogram not needed per vascular team  Plan for amputation by podiatry tomorrow ( xarelto on hold / on heparin drip)   >> medically stable for procedures..      cardiology following >> medications adjusted given PVC and NSVT seen on AICD interrogation     otherwise medically stable for podiatric surgery  continuing the antibiotics per ID   -GI/DVT Prophylaxis per protocol.    --------------------------------------------  Case discussed with Patient, nurse practitioner,  Education given on findings and plan of care  ___________________________  H. RACHAEL Boudreaux.  Pager: [phone]

## 2023-10-11 NOTE — PROGRESS NOTE ADULT - SUBJECTIVE AND OBJECTIVE BOX
Patient is a 62y Male whom presented to the hospital with shanda     PAST MEDICAL & SURGICAL HISTORY:  Diabetes      Chronic osteomyelitis      H/O heart failure      No significant past surgical history          MEDICATIONS  (STANDING):  aspirin enteric coated 81 milliGRAM(s) Oral daily  atorvastatin 80 milliGRAM(s) Oral at bedtime  dextrose 5%. 1000 milliLiter(s) (100 mL/Hr) IV Continuous <Continuous>  dextrose 5%. 1000 milliLiter(s) (50 mL/Hr) IV Continuous <Continuous>  dextrose 50% Injectable 25 Gram(s) IV Push once  dextrose 50% Injectable 25 Gram(s) IV Push once  dextrose 50% Injectable 12.5 Gram(s) IV Push once  glucagon  Injectable 1 milliGRAM(s) IntraMuscular once  insulin lispro (ADMELOG) corrective regimen sliding scale   SubCutaneous three times a day before meals  metoprolol succinate ER 25 milliGRAM(s) Oral daily  piperacillin/tazobactam IVPB.. 3.375 Gram(s) IV Intermittent every 8 hours  rivaroxaban 20 milliGRAM(s) Oral with dinner  sodium bicarbonate 650 milliGRAM(s) Oral three times a day      Allergies    No Known Allergies    Intolerances        SOCIAL HISTORY:  Denies ETOh,Smoking,     FAMILY HISTORY:      REVIEW OF SYSTEMS:    CONSTITUTIONAL: No weakness, fevers or chills  EYES/ENT: No visual changes;  no throat pain   NECK: No pain or stiffness  RESPIRATORY: No cough, wheezing, hemoptysis; No shortness of breath  CARDIOVASCULAR: No chest pain or palpitations  GASTROINTESTINAL: No abdominal or epigastric pain. No nausea, vomiting,                              11.3   5.43  )-----------( 221      ( 11 Oct 2023 16:41 )             36.2       CBC Full  -  ( 11 Oct 2023 16:41 )  WBC Count : 5.43 K/uL  RBC Count : 4.32 M/uL  Hemoglobin : 11.3 g/dL  Hematocrit : 36.2 %  Platelet Count - Automated : 221 K/uL  Mean Cell Volume : 83.8 fl  Mean Cell Hemoglobin : 26.2 pg  Mean Cell Hemoglobin Concentration : 31.2 gm/dL  Auto Neutrophil # : x  Auto Lymphocyte # : x  Auto Monocyte # : x  Auto Eosinophil # : x  Auto Basophil # : x  Auto Neutrophil % : x  Auto Lymphocyte % : x  Auto Monocyte % : x  Auto Eosinophil % : x  Auto Basophil % : x      10-11    138  |  103  |  17  ----------------------------<  124<H>  3.6   |  24  |  0.89    Ca    9.2      11 Oct 2023 07:25  Phos  3.0     10-10  Mg     1.8     10-11        CAPILLARY BLOOD GLUCOSE      POCT Blood Glucose.: 143 mg/dL (11 Oct 2023 21:01)  POCT Blood Glucose.: 184 mg/dL (11 Oct 2023 17:19)  POCT Blood Glucose.: 151 mg/dL (11 Oct 2023 12:57)  POCT Blood Glucose.: 135 mg/dL (11 Oct 2023 08:46)      Vital Signs Last 24 Hrs  T(C): 36.4 (11 Oct 2023 11:55), Max: 36.7 (11 Oct 2023 04:39)  T(F): 97.5 (11 Oct 2023 11:55), Max: 98.1 (11 Oct 2023 04:39)  HR: 71 (11 Oct 2023 20:52) (70 - 91)  BP: 98/62 (11 Oct 2023 20:52) (98/62 - 131/76)  BP(mean): 81 (11 Oct 2023 13:25) (76 - 99)  RR: 18 (11 Oct 2023 20:52) (16 - 19)  SpO2: 97% (11 Oct 2023 20:52) (93% - 99%)    Parameters below as of 11 Oct 2023 20:52  Patient On (Oxygen Delivery Method): room air        Urinalysis Basic - ( 11 Oct 2023 07:25 )    Color: x / Appearance: x / SG: x / pH: x  Gluc: 124 mg/dL / Ketone: x  / Bili: x / Urobili: x   Blood: x / Protein: x / Nitrite: x   Leuk Esterase: x / RBC: x / WBC x   Sq Epi: x / Non Sq Epi: x / Bacteria: x        PT/INR - ( 11 Oct 2023 07:27 )   PT: 14.1 sec;   INR: 1.29 ratio         PTT - ( 11 Oct 2023 16:41 )  PTT:30.9 sec                                                  PHYSICAL EXAM:    Constitutional: NAD  HEENT: conjunctive   clear   Neck:  No JVD  Respiratory: CTAB  Cardiovascular: S1 and S2  Gastrointestinal: BS+, soft, NT/ND  Extremities: No peripheral edema  Neurological: A/O x 3, no focal deficits  Psychiatric: Normal mood, normal affect  : No Atkins  Skin: No rashes  Access: Not applicable

## 2023-10-11 NOTE — PROGRESS NOTE ADULT - SUBJECTIVE AND OBJECTIVE BOX
Date of Service: 10-11-23 @ 08:06           CARDIOLOGY     PROGRESS  NOTE   ________________________________________________    CHIEF COMPLAINT:Patient is a 62y old  Male who presents with a chief complaint of fevers/  foot infection (10 Oct 2023 13:35)  events noted yesterday discussed with ACP  	  REVIEW OF SYSTEMS:  CONSTITUTIONAL: No fever, weight loss, or fatigue  EYES: No eye pain, visual disturbances, or discharge  ENT:  No difficulty hearing, tinnitus, vertigo; No sinus or throat pain  NECK: No pain or stiffness  RESPIRATORY: No cough, wheezing, chills or hemoptysis; No Shortness of Breath  CARDIOVASCULAR: No chest pain, palpitations, passing out, dizziness, or leg swelling  GASTROINTESTINAL: No abdominal or epigastric pain. No nausea, vomiting, or hematemesis; No diarrhea or constipation. No melena or hematochezia.  GENITOURINARY: No dysuria, frequency, hematuria, or incontinence  NEUROLOGICAL: No headaches, memory loss, loss of strength, numbness, or tremors  SKIN: No itching, burning, rashes, or lesions   LYMPH Nodes: No enlarged glands  ENDOCRINE: No heat or cold intolerance; No hair loss  MUSCULOSKELETAL: No joint pain or swelling; No muscle, back, +extremity pain  PSYCHIATRIC: No depression, anxiety, mood swings, or difficulty sleeping  HEME/LYMPH: No easy bruising, or bleeding gums  ALLERGY AND IMMUNOLOGIC: No hives or eczema	    [x ] All others negative	  [ ] Unable to obtain    PHYSICAL EXAM:  T(C): 36.7 (10-11-23 @ 04:39), Max: 36.8 (10-10-23 @ 12:16)  HR: 84 (10-11-23 @ 04:39) (80 - 90)  BP: 107/71 (10-11-23 @ 04:39) (98/67 - 126/86)  RR: 18 (10-11-23 @ 04:39) (18 - 18)  SpO2: 98% (10-11-23 @ 04:39) (97% - 98%)  Wt(kg): --  I&O's Summary    10 Oct 2023 07:01  -  11 Oct 2023 07:00  --------------------------------------------------------  IN: 0 mL / OUT: 900 mL / NET: -900 mL        Appearance: Normal	  HEENT:   Normal oral mucosa, PERRL, EOMI	  Lymphatic: No lymphadenopathy  Cardiovascular: Normal S1 S2, No JVD, + murmurs, No edema  Respiratory:rhonchi  Psychiatry: A & O x 3, Mood & affect appropriate  Gastrointestinal:  Soft, Non-tender, + BS	  Skin: No rashes, No ecchymoses, No cyanosis	  Neurologic: Non-focal  Extremities: Normal range of motion+ r foot bandaged  Vascular: + pvd    MEDICATIONS  (STANDING):  ampicillin/sulbactam  IVPB 3 Gram(s) IV Intermittent every 6 hours  ampicillin/sulbactam  IVPB      aspirin enteric coated 81 milliGRAM(s) Oral daily  atorvastatin 80 milliGRAM(s) Oral at bedtime  dextrose 5%. 1000 milliLiter(s) (50 mL/Hr) IV Continuous <Continuous>  dextrose 5%. 1000 milliLiter(s) (100 mL/Hr) IV Continuous <Continuous>  dextrose 50% Injectable 25 Gram(s) IV Push once  dextrose 50% Injectable 25 Gram(s) IV Push once  dextrose 50% Injectable 12.5 Gram(s) IV Push once  furosemide    Tablet 20 milliGRAM(s) Oral daily  glucagon  Injectable 1 milliGRAM(s) IntraMuscular once  heparin  Infusion.  Unit(s)/Hr (19 mL/Hr) IV Continuous <Continuous>  insulin lispro (ADMELOG) corrective regimen sliding scale   SubCutaneous three times a day before meals  melatonin 5 milliGRAM(s) Oral at bedtime  polyethylene glycol 3350 17 Gram(s) Oral daily  sacubitril 24 mG/valsartan 26 mG 1 Tablet(s) Oral two times a day  sodium bicarbonate 650 milliGRAM(s) Oral three times a day  spironolactone 25 milliGRAM(s) Oral daily      TELEMETRY: 	    ECG:  	  RADIOLOGY:  OTHER: 	  	  LABS:	 	    CARDIAC MARKERS:                                9.9    4.46  )-----------( 174      ( 11 Oct 2023 07:25 )             31.8     10-10    143  |  103  |  19  ----------------------------<  131<H>  3.3<L>   |  23  |  0.87    Ca    9.0      10 Oct 2023 07:43  Phos  3.0     10-10  Mg     1.8     10-10      proBNP:   Lipid Profile:   HgA1c:   TSH: Thyroid Stimulating Hormone, Serum: 5.55 uIU/mL (10-07 @ 07:08)    PT/INR - ( 10 Oct 2023 07:43 )   PT: 15.3 sec;   INR: 1.47 ratio         PTT - ( 10 Oct 2023 22:37 )  PTT:70.4 sec    Culture - Blood in AM (10.08.23 @ 07:01)    Specimen Source: .Blood Blood-Peripheral   Culture Results:   No growth at 48 Hours      Assessment and plan  ---------------------------  62M hx of type 2 DM on metformin, farixga, heart failure with reduced EF on metoprolol 75mg, lasix 20mg qd, xarelto for vte ppx (had RUE vte 2/2 PICC line), removed 2 weeks prior as well as right foot wound wac (present 2/2 osteo of foot with finegoldia magna), here for 36 hrs of fever, tmax 101, took 800mg advil today, prior to arrival. + slight right lower back pain, nonradiating, mild, no assc GI sxs. + urinating more freq as of late. Still tolerating PO. Denies chest pain, shortness of breath, diaphoresis. Hypotensive to 90s/50 in triage, then 80s/60s, last bp 100/40, not tachy but in setting of bb. Appears slightly dehydrated, clear lungs, s3 gallop, no murmurs appreciable. + right foot redness, swelling, nontender, no flucutance, no crepitations, 1+ dp pulses bilateral, full rom. Nontender calves. Benign abd, no peritoneal signs, no jaundice, no cva ttp. No midline spinal ttp. RUE no signs of infx, former picc site clean, Patient meets sepsis criteria by vitals. Will eval for common source of infection (ex. osteo, urinary, bacterial pulmonary, viral uri; unlikely central neurologic such as meninigitis or encephalitis) vs metabolic derangement. Check labs, lactate, UA, CXs, CXR, foot xray, inflam markers screening EKG, hydrate-> empiric abxs, antipyretics, additional crystalloid infusion as clinically warranted. Will start with 500 cc crystalloid 2/2 heart failure but euglycemic dka.  pt is well known to me with hx of htn, ashd, chf, s/p BIV AICD , PVD with multiple admissions sec to foot infection  pt with sig lv dysfunction/ severe ischemic cardiomyopathy  will  adjust cardiac meds  hold Farxiga for now. may  requiring surgery  dvt prophylaxis  vascula/podiatry consult  abx  pt Entresto dose has decrease sec to ?infection will gradually will increase dose  continue Metroprolol er  avoid excess fluid, may need to re start on  Lasix  and aldactone  check inr if elevated will give vitamin K on Xarelto  may need to decrease xarelto dose if increase renal function  + BC for strep, continue iv abx, pt has hardware (biv Aicd)  will increase Entresto as bp tolerates  will check AICD/ ECHO  re start on aldactone 25 mg daily  start iv heparin as is off Xarelto, awaiting surgery today, pt is clear for procedure with high risk for complication  AICD  was recently checked, report in the chart  s/p AICD shock yesterday , beta blocker increased to 100 mg daily , pt can not tolerate AMIO was tried before  cardiac cath today will cancel surgery for today  continue iv heparin plan will discuss with ID

## 2023-10-11 NOTE — PROVIDER CONTACT NOTE (MEDICATION) - ACTION/TREATMENT ORDERED:
Fallon Bach informed and aware that patient is on a hep gtt infusing at 24mL/hr, with nomogram stating to reduce to 19mL/hr. Ordered to continue hep gtt at 24mL/hr. Orders will be placed for provider to RN to keep at 24mL/hr.

## 2023-10-11 NOTE — PROGRESS NOTE ADULT - ASSESSMENT
62 M with PMHx T2DM, HFrEF (22%), Recent VTE associated with PICC line, R foot OM presents with sepsis secondary to foot ulcer, found to have Group B Strep bacteremia (4/4 bottles on 10/6). EP consulted for device extraction eval.    1. Bacteremia with CRT-D. Pt is not dependent on pacing, has native rhythm, however has received multiple therapies for VT within the past few days (most recent ICD shock 10/10)  2. ICD shock for PVC-triggerd mVT 10/11.    Recommendations:  - Will need to assess device leads and valves, please order CHLOE and make patient NPO at midnight. Receiving LHC today so likely tomorrow.   - Would recommend increased beta blocker as tolerated. Currently on metoprolol succinate to 100mg daily.  - Please maintain K>4, Mg>2  - If continues to have VT, may need to start anti-arrhythmic like amiodarone vs Sotalol    ***Note not finalized until co-signed by attending***     Salvatore Paul MD  Cardiology Fellow  All Cardiology service information can be found 24/7 on [email or web address], password: [employer]

## 2023-10-11 NOTE — PROGRESS NOTE ADULT - SUBJECTIVE AND OBJECTIVE BOX
Chief complaint  Patient is a 62y old  Male who presents with a chief complaint of fevers/  foot infection (11 Oct 2023 13:06)         Labs and Fingersticks  CAPILLARY BLOOD GLUCOSE      POCT Blood Glucose.: 151 mg/dL (11 Oct 2023 12:57)  POCT Blood Glucose.: 135 mg/dL (11 Oct 2023 08:46)  POCT Blood Glucose.: 142 mg/dL (10 Oct 2023 21:21)  POCT Blood Glucose.: 153 mg/dL (10 Oct 2023 17:27)      Anion Gap: 11 (10-11 @ 07:25)  Anion Gap: 17 (10-10 @ 07:43)      Calcium: 9.2 (10-11 @ 07:25)  Calcium: 9.0 (10-10 @ 07:43)          10-11    138  |  103  |  17  ----------------------------<  124<H>  3.6   |  24  |  0.89    Ca    9.2      11 Oct 2023 07:25  Phos  3.0     10-10  Mg     1.8     10-11                          9.9    4.46  )-----------( 174      ( 11 Oct 2023 07:25 )             31.8     Medications  MEDICATIONS  (STANDING):  ampicillin/sulbactam  IVPB      ampicillin/sulbactam  IVPB 3 Gram(s) IV Intermittent every 6 hours  aspirin enteric coated 81 milliGRAM(s) Oral daily  atorvastatin 80 milliGRAM(s) Oral at bedtime  dextrose 5%. 1000 milliLiter(s) (50 mL/Hr) IV Continuous <Continuous>  dextrose 5%. 1000 milliLiter(s) (100 mL/Hr) IV Continuous <Continuous>  dextrose 50% Injectable 25 Gram(s) IV Push once  dextrose 50% Injectable 25 Gram(s) IV Push once  dextrose 50% Injectable 12.5 Gram(s) IV Push once  furosemide    Tablet 20 milliGRAM(s) Oral daily  glucagon  Injectable 1 milliGRAM(s) IntraMuscular once  heparin  Infusion. 2400 Unit(s)/Hr (24 mL/Hr) IV Continuous <Continuous>  insulin lispro (ADMELOG) corrective regimen sliding scale   SubCutaneous three times a day before meals  melatonin 5 milliGRAM(s) Oral at bedtime  metoprolol succinate  milliGRAM(s) Oral daily  polyethylene glycol 3350 17 Gram(s) Oral daily  sacubitril 24 mG/valsartan 26 mG 1 Tablet(s) Oral two times a day  sodium bicarbonate 650 milliGRAM(s) Oral three times a day  spironolactone 25 milliGRAM(s) Oral daily      Physical Exam  General: Patient comfortable in bed  Vital Signs Last 12 Hrs  T(F): 97.5 (10-11-23 @ 11:55), Max: 98.1 (10-11-23 @ 04:39)  HR: 82 (10-11-23 @ 13:25) (70 - 84)  BP: 111/71 (10-11-23 @ 13:25) (107/71 - 131/76)  BP(mean): 81 (10-11-23 @ 13:25) (76 - 99)  RR: 17 (10-11-23 @ 13:25) (16 - 18)  SpO2: 94% (10-11-23 @ 13:25) (93% - 99%)    CVS: S1S2   Respiratory: No wheezing, no crepitations  GI: Abdomen soft, bowel sounds positive  Musculoskeletal:  moves all extremities  : Voiding        Chief complaint  Patient is a 62y old  Male who presents with a chief complaint of fevers/  foot infection (11 Oct 2023 13:06)         Labs and Fingersticks  CAPILLARY BLOOD GLUCOSE      POCT Blood Glucose.: 151 mg/dL (11 Oct 2023 12:57)  POCT Blood Glucose.: 135 mg/dL (11 Oct 2023 08:46)  POCT Blood Glucose.: 142 mg/dL (10 Oct 2023 21:21)  POCT Blood Glucose.: 153 mg/dL (10 Oct 2023 17:27)      Anion Gap: 11 (10-11 @ 07:25)  Anion Gap: 17 (10-10 @ 07:43)      Calcium: 9.2 (10-11 @ 07:25)  Calcium: 9.0 (10-10 @ 07:43)          10-11    138  |  103  |  17  ----------------------------<  124<H>  3.6   |  24  |  0.89    Ca    9.2      11 Oct 2023 07:25  Phos  3.0     10-10  Mg     1.8     10-11                          9.9    4.46  )-----------( 174      ( 11 Oct 2023 07:25 )             31.8     Medications  MEDICATIONS  (STANDING):  ampicillin/sulbactam  IVPB      ampicillin/sulbactam  IVPB 3 Gram(s) IV Intermittent every 6 hours  aspirin enteric coated 81 milliGRAM(s) Oral daily  atorvastatin 80 milliGRAM(s) Oral at bedtime  dextrose 5%. 1000 milliLiter(s) (50 mL/Hr) IV Continuous <Continuous>  dextrose 5%. 1000 milliLiter(s) (100 mL/Hr) IV Continuous <Continuous>  dextrose 50% Injectable 25 Gram(s) IV Push once  dextrose 50% Injectable 25 Gram(s) IV Push once  dextrose 50% Injectable 12.5 Gram(s) IV Push once  furosemide    Tablet 20 milliGRAM(s) Oral daily  glucagon  Injectable 1 milliGRAM(s) IntraMuscular once  heparin  Infusion. 2400 Unit(s)/Hr (24 mL/Hr) IV Continuous <Continuous>  insulin lispro (ADMELOG) corrective regimen sliding scale   SubCutaneous three times a day before meals  melatonin 5 milliGRAM(s) Oral at bedtime  metoprolol succinate  milliGRAM(s) Oral daily  polyethylene glycol 3350 17 Gram(s) Oral daily  sacubitril 24 mG/valsartan 26 mG 1 Tablet(s) Oral two times a day  sodium bicarbonate 650 milliGRAM(s) Oral three times a day  spironolactone 25 milliGRAM(s) Oral daily      Physical Exam  General: Patient comfortable in bed  Vital Signs Last 12 Hrs  T(F): 97.5 (10-11-23 @ 11:55), Max: 98.1 (10-11-23 @ 04:39)  HR: 82 (10-11-23 @ 13:25) (70 - 84)  BP: 111/71 (10-11-23 @ 13:25) (107/71 - 131/76)  BP(mean): 81 (10-11-23 @ 13:25) (76 - 99)  RR: 17 (10-11-23 @ 13:25) (16 - 18)  SpO2: 94% (10-11-23 @ 13:25) (93% - 99%)    CVS: S1S2   Respiratory: No wheezing, no crepitations  GI: Abdomen soft, bowel sounds positive  Musculoskeletal:  moves all extremities  : Voiding

## 2023-10-11 NOTE — PROVIDER CONTACT NOTE (MEDICATION) - BACKGROUND
Patient w/ osteomyelitis in R foot on abx, hep gtt 24mL/hr. Pending heart cath, 5th met resection and CHLOE. Incident on 10/10 were AICD fired.

## 2023-10-12 ENCOUNTER — TRANSCRIPTION ENCOUNTER (OUTPATIENT)
Age: 63
End: 2023-10-12

## 2023-10-12 LAB
ANION GAP SERPL CALC-SCNC: 13 MMOL/L — SIGNIFICANT CHANGE UP (ref 5–17)
APTT BLD: 71.3 SEC — HIGH (ref 24.5–35.6)
APTT BLD: 76.1 SEC — HIGH (ref 24.5–35.6)
BUN SERPL-MCNC: 15 MG/DL — SIGNIFICANT CHANGE UP (ref 7–23)
CALCIUM SERPL-MCNC: 9.2 MG/DL — SIGNIFICANT CHANGE UP (ref 8.4–10.5)
CHLORIDE SERPL-SCNC: 101 MMOL/L — SIGNIFICANT CHANGE UP (ref 96–108)
CO2 SERPL-SCNC: 25 MMOL/L — SIGNIFICANT CHANGE UP (ref 22–31)
CREAT SERPL-MCNC: 1.01 MG/DL — SIGNIFICANT CHANGE UP (ref 0.5–1.3)
EGFR: 84 ML/MIN/1.73M2 — SIGNIFICANT CHANGE UP
GLUCOSE BLDC GLUCOMTR-MCNC: 131 MG/DL — HIGH (ref 70–99)
GLUCOSE BLDC GLUCOMTR-MCNC: 131 MG/DL — HIGH (ref 70–99)
GLUCOSE BLDC GLUCOMTR-MCNC: 133 MG/DL — HIGH (ref 70–99)
GLUCOSE BLDC GLUCOMTR-MCNC: 141 MG/DL — HIGH (ref 70–99)
GLUCOSE BLDC GLUCOMTR-MCNC: 193 MG/DL — HIGH (ref 70–99)
GLUCOSE SERPL-MCNC: 142 MG/DL — HIGH (ref 70–99)
HCT VFR BLD CALC: 32 % — LOW (ref 39–50)
HCT VFR BLD CALC: 32.7 % — LOW (ref 39–50)
HGB BLD-MCNC: 10.1 G/DL — LOW (ref 13–17)
HGB BLD-MCNC: 10.2 G/DL — LOW (ref 13–17)
MCHC RBC-ENTMCNC: 26.2 PG — LOW (ref 27–34)
MCHC RBC-ENTMCNC: 26.4 PG — LOW (ref 27–34)
MCHC RBC-ENTMCNC: 31.2 GM/DL — LOW (ref 32–36)
MCHC RBC-ENTMCNC: 31.6 GM/DL — LOW (ref 32–36)
MCV RBC AUTO: 83.8 FL — SIGNIFICANT CHANGE UP (ref 80–100)
MCV RBC AUTO: 83.8 FL — SIGNIFICANT CHANGE UP (ref 80–100)
NRBC # BLD: 0 /100 WBCS — SIGNIFICANT CHANGE UP (ref 0–0)
NRBC # BLD: 0 /100 WBCS — SIGNIFICANT CHANGE UP (ref 0–0)
PLATELET # BLD AUTO: 190 K/UL — SIGNIFICANT CHANGE UP (ref 150–400)
PLATELET # BLD AUTO: 198 K/UL — SIGNIFICANT CHANGE UP (ref 150–400)
POTASSIUM SERPL-MCNC: 4.3 MMOL/L — SIGNIFICANT CHANGE UP (ref 3.5–5.3)
POTASSIUM SERPL-SCNC: 4.3 MMOL/L — SIGNIFICANT CHANGE UP (ref 3.5–5.3)
RBC # BLD: 3.82 M/UL — LOW (ref 4.2–5.8)
RBC # BLD: 3.9 M/UL — LOW (ref 4.2–5.8)
RBC # FLD: 18.2 % — HIGH (ref 10.3–14.5)
RBC # FLD: 18.3 % — HIGH (ref 10.3–14.5)
SODIUM SERPL-SCNC: 139 MMOL/L — SIGNIFICANT CHANGE UP (ref 135–145)
WBC # BLD: 4.88 K/UL — SIGNIFICANT CHANGE UP (ref 3.8–10.5)
WBC # BLD: 4.96 K/UL — SIGNIFICANT CHANGE UP (ref 3.8–10.5)
WBC # FLD AUTO: 4.88 K/UL — SIGNIFICANT CHANGE UP (ref 3.8–10.5)
WBC # FLD AUTO: 4.96 K/UL — SIGNIFICANT CHANGE UP (ref 3.8–10.5)

## 2023-10-12 PROCEDURE — 93320 DOPPLER ECHO COMPLETE: CPT | Mod: 26

## 2023-10-12 PROCEDURE — 93325 DOPPLER ECHO COLOR FLOW MAPG: CPT | Mod: 26

## 2023-10-12 PROCEDURE — 93312 ECHO TRANSESOPHAGEAL: CPT | Mod: 26

## 2023-10-12 RX ORDER — FUROSEMIDE 40 MG
20 TABLET ORAL
Refills: 0 | Status: DISCONTINUED | OUTPATIENT
Start: 2023-10-12 | End: 2023-10-13

## 2023-10-12 RX ADMIN — AMPICILLIN SODIUM AND SULBACTAM SODIUM 200 GRAM(S): 250; 125 INJECTION, POWDER, FOR SUSPENSION INTRAMUSCULAR; INTRAVENOUS at 05:19

## 2023-10-12 RX ADMIN — HEPARIN SODIUM 2400 UNIT(S)/HR: 5000 INJECTION INTRAVENOUS; SUBCUTANEOUS at 08:45

## 2023-10-12 RX ADMIN — Medication 81 MILLIGRAM(S): at 17:13

## 2023-10-12 RX ADMIN — ATORVASTATIN CALCIUM 80 MILLIGRAM(S): 80 TABLET, FILM COATED ORAL at 21:45

## 2023-10-12 RX ADMIN — AMPICILLIN SODIUM AND SULBACTAM SODIUM 200 GRAM(S): 250; 125 INJECTION, POWDER, FOR SUSPENSION INTRAMUSCULAR; INTRAVENOUS at 17:12

## 2023-10-12 RX ADMIN — Medication 100 MILLIGRAM(S): at 05:19

## 2023-10-12 RX ADMIN — Medication 20 MILLIGRAM(S): at 17:12

## 2023-10-12 RX ADMIN — SACUBITRIL AND VALSARTAN 1 TABLET(S): 24; 26 TABLET, FILM COATED ORAL at 06:27

## 2023-10-12 RX ADMIN — Medication 5 MILLIGRAM(S): at 21:45

## 2023-10-12 RX ADMIN — Medication 650 MILLIGRAM(S): at 05:19

## 2023-10-12 RX ADMIN — AMPICILLIN SODIUM AND SULBACTAM SODIUM 200 GRAM(S): 250; 125 INJECTION, POWDER, FOR SUSPENSION INTRAMUSCULAR; INTRAVENOUS at 23:29

## 2023-10-12 RX ADMIN — HEPARIN SODIUM 2400 UNIT(S)/HR: 5000 INJECTION INTRAVENOUS; SUBCUTANEOUS at 06:33

## 2023-10-12 RX ADMIN — Medication 650 MILLIGRAM(S): at 23:50

## 2023-10-12 RX ADMIN — Medication 20 MILLIGRAM(S): at 05:19

## 2023-10-12 RX ADMIN — SPIRONOLACTONE 25 MILLIGRAM(S): 25 TABLET, FILM COATED ORAL at 05:19

## 2023-10-12 RX ADMIN — Medication 650 MILLIGRAM(S): at 21:45

## 2023-10-12 RX ADMIN — SENNA PLUS 2 TABLET(S): 8.6 TABLET ORAL at 21:45

## 2023-10-12 RX ADMIN — SACUBITRIL AND VALSARTAN 1 TABLET(S): 24; 26 TABLET, FILM COATED ORAL at 17:12

## 2023-10-12 RX ADMIN — HEPARIN SODIUM 2400 UNIT(S)/HR: 5000 INJECTION INTRAVENOUS; SUBCUTANEOUS at 02:09

## 2023-10-12 RX ADMIN — HEPARIN SODIUM 2400 UNIT(S)/HR: 5000 INJECTION INTRAVENOUS; SUBCUTANEOUS at 07:18

## 2023-10-12 NOTE — PROGRESS NOTE ADULT - SUBJECTIVE AND OBJECTIVE BOX
Chief complaint  Patient is a 62y old  Male who presents with a chief complaint of fevers/  foot infection (12 Oct 2023 11:56)         Labs and Fingersticks  CAPILLARY BLOOD GLUCOSE      POCT Blood Glucose.: 131 mg/dL (12 Oct 2023 12:31)  POCT Blood Glucose.: 133 mg/dL (12 Oct 2023 08:39)  POCT Blood Glucose.: 143 mg/dL (11 Oct 2023 21:01)  POCT Blood Glucose.: 184 mg/dL (11 Oct 2023 17:19)      Anion Gap: 13 (10-12 @ 07:55)  Anion Gap: 11 (10-11 @ 07:25)      Calcium: 9.2 (10-12 @ 07:55)  Calcium: 9.2 (10-11 @ 07:25)          10-12    139  |  101  |  15  ----------------------------<  142<H>  4.3   |  25  |  1.01    Ca    9.2      12 Oct 2023 07:55  Mg     1.8     10-11                          10.1   4.96  )-----------( 198      ( 12 Oct 2023 07:55 )             32.0     Medications  MEDICATIONS  (STANDING):  ampicillin/sulbactam  IVPB 3 Gram(s) IV Intermittent every 6 hours  ampicillin/sulbactam  IVPB      aspirin enteric coated 81 milliGRAM(s) Oral daily  atorvastatin 80 milliGRAM(s) Oral at bedtime  dextrose 5%. 1000 milliLiter(s) (50 mL/Hr) IV Continuous <Continuous>  dextrose 5%. 1000 milliLiter(s) (100 mL/Hr) IV Continuous <Continuous>  dextrose 50% Injectable 25 Gram(s) IV Push once  dextrose 50% Injectable 25 Gram(s) IV Push once  dextrose 50% Injectable 12.5 Gram(s) IV Push once  furosemide   Injectable 20 milliGRAM(s) IV Push two times a day  glucagon  Injectable 1 milliGRAM(s) IntraMuscular once  heparin  Infusion. 2400 Unit(s)/Hr (24 mL/Hr) IV Continuous <Continuous>  insulin lispro (ADMELOG) corrective regimen sliding scale   SubCutaneous three times a day before meals  melatonin 5 milliGRAM(s) Oral at bedtime  metoprolol succinate  milliGRAM(s) Oral daily  polyethylene glycol 3350 17 Gram(s) Oral daily  sacubitril 24 mG/valsartan 26 mG 1 Tablet(s) Oral two times a day  senna 2 Tablet(s) Oral at bedtime  sodium bicarbonate 650 milliGRAM(s) Oral three times a day  spironolactone 25 milliGRAM(s) Oral daily      Physical Exam  General: Patient comfortable in bed   Vital Signs Last 12 Hrs  T(F): 97.9 (10-12-23 @ 11:09), Max: 97.9 (10-12-23 @ 11:09)  HR: 59 (10-12-23 @ 11:09) (59 - 75)  BP: 111/65 (10-12-23 @ 11:09) (106/76 - 111/65)  BP(mean): --  RR: 18 (10-12-23 @ 11:09) (18 - 18)  SpO2: 100% (10-12-23 @ 11:09) (98% - 100%)    CVS: S1S2   Respiratory: No wheezing, no crepitations  GI: Abdomen soft, bowel sounds positive  Musculoskeletal:  moves all extremities  : Voiding        Chief complaint  Patient is a 62y old  Male who presents with a chief complaint of fevers/  foot infection (12 Oct 2023 11:56)         Labs and Fingersticks  CAPILLARY BLOOD GLUCOSE      POCT Blood Glucose.: 131 mg/dL (12 Oct 2023 12:31)  POCT Blood Glucose.: 133 mg/dL (12 Oct 2023 08:39)  POCT Blood Glucose.: 143 mg/dL (11 Oct 2023 21:01)  POCT Blood Glucose.: 184 mg/dL (11 Oct 2023 17:19)      Anion Gap: 13 (10-12 @ 07:55)  Anion Gap: 11 (10-11 @ 07:25)      Calcium: 9.2 (10-12 @ 07:55)  Calcium: 9.2 (10-11 @ 07:25)          10-12    139  |  101  |  15  ----------------------------<  142<H>  4.3   |  25  |  1.01    Ca    9.2      12 Oct 2023 07:55  Mg     1.8     10-11                          10.1   4.96  )-----------( 198      ( 12 Oct 2023 07:55 )             32.0     Medications  MEDICATIONS  (STANDING):  ampicillin/sulbactam  IVPB 3 Gram(s) IV Intermittent every 6 hours  ampicillin/sulbactam  IVPB      aspirin enteric coated 81 milliGRAM(s) Oral daily  atorvastatin 80 milliGRAM(s) Oral at bedtime  dextrose 5%. 1000 milliLiter(s) (50 mL/Hr) IV Continuous <Continuous>  dextrose 5%. 1000 milliLiter(s) (100 mL/Hr) IV Continuous <Continuous>  dextrose 50% Injectable 25 Gram(s) IV Push once  dextrose 50% Injectable 25 Gram(s) IV Push once  dextrose 50% Injectable 12.5 Gram(s) IV Push once  furosemide   Injectable 20 milliGRAM(s) IV Push two times a day  glucagon  Injectable 1 milliGRAM(s) IntraMuscular once  heparin  Infusion. 2400 Unit(s)/Hr (24 mL/Hr) IV Continuous <Continuous>  insulin lispro (ADMELOG) corrective regimen sliding scale   SubCutaneous three times a day before meals  melatonin 5 milliGRAM(s) Oral at bedtime  metoprolol succinate  milliGRAM(s) Oral daily  polyethylene glycol 3350 17 Gram(s) Oral daily  sacubitril 24 mG/valsartan 26 mG 1 Tablet(s) Oral two times a day  senna 2 Tablet(s) Oral at bedtime  sodium bicarbonate 650 milliGRAM(s) Oral three times a day  spironolactone 25 milliGRAM(s) Oral daily      Physical Exam  General: Patient comfortable in bed   Vital Signs Last 12 Hrs  T(F): 97.9 (10-12-23 @ 11:09), Max: 97.9 (10-12-23 @ 11:09)  HR: 59 (10-12-23 @ 11:09) (59 - 75)  BP: 111/65 (10-12-23 @ 11:09) (106/76 - 111/65)  BP(mean): --  RR: 18 (10-12-23 @ 11:09) (18 - 18)  SpO2: 100% (10-12-23 @ 11:09) (98% - 100%)    CVS: S1S2   Respiratory: No wheezing, no crepitations  GI: Abdomen soft, bowel sounds positive  Musculoskeletal:  moves all extremities  : Voiding

## 2023-10-12 NOTE — PROGRESS NOTE ADULT - SUBJECTIVE AND OBJECTIVE BOX
Date of Service: 10-12-23 @ 07:53           CARDIOLOGY     PROGRESS  NOTE   ________________________________________________    CHIEF COMPLAINT:Patient is a 62y old  Male who presents with a chief complaint of fevers/  foot infection (11 Oct 2023 21:37)  comfortable  	  REVIEW OF SYSTEMS:  CONSTITUTIONAL: No fever, weight loss, or fatigue  EYES: No eye pain, visual disturbances, or discharge  ENT:  No difficulty hearing, tinnitus, vertigo; No sinus or throat pain  NECK: No pain or stiffness  RESPIRATORY: No cough, wheezing, chills or hemoptysis; No Shortness of Breath  CARDIOVASCULAR: No chest pain, palpitations, passing out, dizziness, or leg swelling  GASTROINTESTINAL: No abdominal or epigastric pain. No nausea, vomiting, or hematemesis; No diarrhea or constipation. No melena or hematochezia.  GENITOURINARY: No dysuria, frequency, hematuria, or incontinence  NEUROLOGICAL: No headaches, memory loss, loss of strength, numbness, or tremors  SKIN: No itching, burning, rashes, or lesions   LYMPH Nodes: No enlarged glands  ENDOCRINE: No heat or cold intolerance; No hair loss  MUSCULOSKELETAL: No joint pain or swelling; No muscle, back, or extremity pain  PSYCHIATRIC: No depression, anxiety, mood swings, or difficulty sleeping  HEME/LYMPH: No easy bruising, or bleeding gums  ALLERGY AND IMMUNOLOGIC: No hives or eczema	    [ x] All others negative	  [ ] Unable to obtain    PHYSICAL EXAM:  T(C): 36.5 (10-12-23 @ 04:30), Max: 36.8 (10-11-23 @ 23:52)  HR: 75 (10-12-23 @ 06:27) (68 - 91)  BP: 106/76 (10-12-23 @ 06:27) (98/62 - 131/76)  RR: 18 (10-12-23 @ 04:30) (16 - 19)  SpO2: 98% (10-12-23 @ 04:30) (93% - 99%)  Wt(kg): --  I&O's Summary    11 Oct 2023 07:01  -  12 Oct 2023 07:00  --------------------------------------------------------  IN: 240 mL / OUT: 1250 mL / NET: -1010 mL        Appearance: Normal	  HEENT:   Normal oral mucosa, PERRL, EOMI	  Lymphatic: No lymphadenopathy  Cardiovascular: Normal S1 S2, No JVD, + murmurs, No edema  Respiratory:rhonchi  Psychiatry: A & O x 3, Mood & affect appropriate  Gastrointestinal:  Soft, Non-tender, + BS	  Skin: No rashes, No ecchymoses, No cyanosis	  Neurologic: Non-focal  Extremities: Normal range of motion, + bandaged r foot  Vascular: Peripheral pulses palpable 2+ bilaterally    MEDICATIONS  (STANDING):  ampicillin/sulbactam  IVPB 3 Gram(s) IV Intermittent every 6 hours  ampicillin/sulbactam  IVPB      aspirin enteric coated 81 milliGRAM(s) Oral daily  atorvastatin 80 milliGRAM(s) Oral at bedtime  dextrose 5%. 1000 milliLiter(s) (50 mL/Hr) IV Continuous <Continuous>  dextrose 5%. 1000 milliLiter(s) (100 mL/Hr) IV Continuous <Continuous>  dextrose 50% Injectable 25 Gram(s) IV Push once  dextrose 50% Injectable 25 Gram(s) IV Push once  dextrose 50% Injectable 12.5 Gram(s) IV Push once  furosemide    Tablet 20 milliGRAM(s) Oral daily  glucagon  Injectable 1 milliGRAM(s) IntraMuscular once  heparin  Infusion. 2400 Unit(s)/Hr (24 mL/Hr) IV Continuous <Continuous>  insulin lispro (ADMELOG) corrective regimen sliding scale   SubCutaneous three times a day before meals  melatonin 5 milliGRAM(s) Oral at bedtime  metoprolol succinate  milliGRAM(s) Oral daily  polyethylene glycol 3350 17 Gram(s) Oral daily  sacubitril 24 mG/valsartan 26 mG 1 Tablet(s) Oral two times a day  senna 2 Tablet(s) Oral at bedtime  sodium bicarbonate 650 milliGRAM(s) Oral three times a day  spironolactone 25 milliGRAM(s) Oral daily      TELEMETRY: 	    ECG:  	  RADIOLOGY:  OTHER: 	  	  LABS:	 	    CARDIAC MARKERS:                                10.2   4.88  )-----------( 190      ( 12 Oct 2023 00:57 )             32.7     10-11    138  |  103  |  17  ----------------------------<  124<H>  3.6   |  24  |  0.89    Ca    9.2      11 Oct 2023 07:25  Mg     1.8     10-11      proBNP:   Lipid Profile:   HgA1c:   TSH: Thyroid Stimulating Hormone, Serum: 5.55 uIU/mL (10-07 @ 07:08)    PT/INR - ( 11 Oct 2023 07:27 )   PT: 14.1 sec;   INR: 1.29 ratio         PTT - ( 12 Oct 2023 00:57 )  PTT:71.3 sec    < from: Cardiac Catheterization (10.11.23 @ 11:23) >  Severe three vessel obstructive coronary artery disease   Chronic total occlusion of the right coronary artery   --Left to right, Rentrop grade 1-2 filling of the posterior descending  artery/posterolateral artery  Patent LIMA to the left anterior descending artery   Patent SVG to the obtuse marginal artery   Patent SVG to the first diagonal artery   Normal left main coronary artery   Right dominant system   LVEDP = 22mmHg   No gradient across the aortic valve    #Group B strep bacteremia  concerning as grew so quickly   changed abs to unasyn- this would also cover previous cultured pathogens  Check repeat BC q 48 hours until clears  cardiology following - as patient with an AICD  cardiac echo noted  likely source is the foot  check MRSA swab  hold further vancomycin      Assessment and plan  ---------------------------  62M hx of type 2 DM on metformin, farixga, heart failure with reduced EF on metoprolol 75mg, lasix 20mg qd, xarelto for vte ppx (had RUE vte 2/2 PICC line), removed 2 weeks prior as well as right foot wound wac (present 2/2 osteo of foot with finegoldia magna), here for 36 hrs of fever, tmax 101, took 800mg advil today, prior to arrival. + slight right lower back pain, nonradiating, mild, no assc GI sxs. + urinating more freq as of late. Still tolerating PO. Denies chest pain, shortness of breath, diaphoresis. Hypotensive to 90s/50 in triage, then 80s/60s, last bp 100/40, not tachy but in setting of bb. Appears slightly dehydrated, clear lungs, s3 gallop, no murmurs appreciable. + right foot redness, swelling, nontender, no flucutance, no crepitations, 1+ dp pulses bilateral, full rom. Nontender calves. Benign abd, no peritoneal signs, no jaundice, no cva ttp. No midline spinal ttp. RUE no signs of infx, former picc site clean, Patient meets sepsis criteria by vitals. Will eval for common source of infection (ex. osteo, urinary, bacterial pulmonary, viral uri; unlikely central neurologic such as meninigitis or encephalitis) vs metabolic derangement. Check labs, lactate, UA, CXs, CXR, foot xray, inflam markers screening EKG, hydrate-> empiric abxs, antipyretics, additional crystalloid infusion as clinically warranted. Will start with 500 cc crystalloid 2/2 heart failure but euglycemic dka.  pt is well known to me with hx of htn, ashd, chf, s/p BIV AICD , PVD with multiple admissions sec to foot infection  pt with sig lv dysfunction/ severe ischemic cardiomyopathy  will  adjust cardiac meds  hold Farxiga for now. may  requiring surgery  dvt prophylaxis  vascula/podiatry consult  abx  pt Entresto dose has decrease sec to ?infection will gradually will increase dose  continue Metroprolol er  avoid excess fluid, may need to re start on  Lasix  and aldactone  check inr if elevated will give vitamin K on Xarelto  may need to decrease Xarelto dose if increase renal function  + BC for strep, continue iv abx, pt has hardware (biv Aicd)  will increase Entresto as bp tolerates  will check AICD/ ECHO  re start on aldactone 25 mg daily  start iv heparin as is off Xarelto, awaiting surgery today, pt is clear for procedure with high risk for complication  AICD  was recently checked, report in the chart  s/p AICD shock yesterday , beta blocker increased to 100 mg daily , pt can not tolerate AMIO was tried before  cardiac cath today will cancel surgery for today  continue iv heparin plan will discuss with ID  cardiac cath noted with patent graft, increase EDP, need to increase Lasix dose, observe bp closely  pt is a high risk for surgery

## 2023-10-12 NOTE — PROGRESS NOTE ADULT - PROBLEM SELECTOR PLAN 1
Will continue current insulin regimen for now. Will continue monitoring  blood sugars, will Follow up.  Patient counseled for compliance with consistent low carb diet.  Stop Home Farxiga  Can continue home Metformin and Januvia on discharge.

## 2023-10-12 NOTE — CHART NOTE - NSCHARTNOTEFT_GEN_A_CORE
- recommended stop Hgtt 4am if no other contraindications from med/cards   - Will need Cardiac optimization /clearance prior surgery  - excellent ACP management appreciated.

## 2023-10-12 NOTE — PROGRESS NOTE ADULT - SUBJECTIVE AND OBJECTIVE BOX
RYLEE HOWE, MRN [id number], 62ymale      Patient is a 62y old  Male who presents with a chief complaint of fevers/  foot infection (12 Oct 2023 13:40)       INTERVAL HPI/OVERNIGHT EVENTS:  Patient seen and evaluated at bedside.  Pt is resting comfortable in NAD. Denies N/V/F/C.    Allergies    No Known Allergies    Intolerances        Vital Signs Last 24 Hrs  T(C): 36.6 (12 Oct 2023 14:22), Max: 36.8 (11 Oct 2023 23:52)  T(F): 97.9 (12 Oct 2023 11:09), Max: 98.2 (11 Oct 2023 23:52)  HR: 59 (12 Oct 2023 14:22) (59 - 91)  BP: 111/65 (12 Oct 2023 14:22) (98/62 - 125/74)  BP(mean): 81 (12 Oct 2023 14:22) (81 - 81)  RR: 18 (12 Oct 2023 14:22) (18 - 19)  SpO2: 100% (12 Oct 2023 14:22) (95% - 100%)    Parameters below as of 12 Oct 2023 11:09  Patient On (Oxygen Delivery Method): room air        LABS:                        10.1   4.96  )-----------( 198      ( 12 Oct 2023 07:55 )             32.0     10-12    139  |  101  |  15  ----------------------------<  142<H>  4.3   |  25  |  1.01    Ca    9.2      12 Oct 2023 07:55  Mg     1.8     10-11      PT/INR - ( 11 Oct 2023 07:27 )   PT: 14.1 sec;   INR: 1.29 ratio         PTT - ( 12 Oct 2023 07:55 )  PTT:76.1 sec  Urinalysis Basic - ( 12 Oct 2023 07:55 )    Color: x / Appearance: x / SG: x / pH: x  Gluc: 142 mg/dL / Ketone: x  / Bili: x / Urobili: x   Blood: x / Protein: x / Nitrite: x   Leuk Esterase: x / RBC: x / WBC x   Sq Epi: x / Non Sq Epi: x / Bacteria: x      CAPILLARY BLOOD GLUCOSE      POCT Blood Glucose.: 131 mg/dL (12 Oct 2023 12:31)  POCT Blood Glucose.: 133 mg/dL (12 Oct 2023 08:39)  POCT Blood Glucose.: 143 mg/dL (11 Oct 2023 21:01)  POCT Blood Glucose.: 184 mg/dL (11 Oct 2023 17:19)      Lower Extremity Physical Exam:    Vascular: DP/PT 0/4 B/L, CFT <3 sec x 10, Temp gradient warm to warm, RLE, warm to cool LLE.    Neurology: Epicritic sensation intact to level of digits, B/L  Musculoskeletal/Ortho: pain to palpation over right foot 5th digit, 8/8 s/p right foot I&D w/ Right foot partial 5th ray resection, closed  Skin: R foot lateral 5th ray wound to bone, erythema to dorsal 3rd-5th ray, mild malodor, tracking proximally and dorsally about 1cm, no purulence, mild serosanguineous drainage, wound bed necrotic.       RADIOLOGY & ADDITIONAL TESTS:

## 2023-10-12 NOTE — PROGRESS NOTE ADULT - ASSESSMENT
62 M with PMHx T2DM, HFrEF (22%), Recent VTE associated with PICC line, R foot OM presents with sepsis secondary to foot ulcer, found to have Group B Strep bacteremia (4/4 bottles on 10/6). EP consulted for device extraction eval.    1. Bacteremia with CRT-D  2. ICD shock for PVC-triggerd mVT 10/11    Device Info  Generator: [facility]  Date of implant: 5/20/20  Lead info:   A lead - [facility] Tendril STS 2088TC DOI: 4/19/12  RV lead - [employer] 6935 DOI: 4/19/12  LV lead - [facility] Quartet 1458Q DOI: 4/19/12    Recommendations:  - Will need to assess device leads and valves - CHLOE pending  - Would recommend increased beta blocker as tolerated. Currently on metoprolol succinate to 100mg daily.  - Please maintain K>4, Mg>2  - If continues to have VT, may need to start anti-arrhythmic like amiodarone vs sotalol  - ID following: on ceftria     62 M with PMHx T2DM, HFrEF (22%), Recent VTE associated with PICC line, R foot OM presents with sepsis secondary to foot ulcer, found to have Group B Strep bacteremia (4/4 bottles on 10/6). EP consulted for device extraction eval.    1. Bacteremia with CRT-D  2. ICD shock for PVC-triggerd mVT 10/11    Device Info  Generator: [facility]  Date of implant: 5/20/20  Lead info:   A lead - [facility] Tendril STS 2088TC DOI: 4/19/12  RV lead - [employer] 6935 DOI: 4/19/12  LV lead - [facility] Quartet 1458Q DOI: 4/19/12    Recommendations:  - Will need to assess device leads and valves - CHLOE pending  - Would recommend increased beta blocker as tolerated. Currently on metoprolol succinate to 100mg daily.  - Please maintain K>4, Mg>2  - If continues to have VT, may need to start anti-arrhythmic like amiodarone vs sotalol  - ID following: on unasyn    Plan discussed with Dr. Munoz

## 2023-10-12 NOTE — PROGRESS NOTE ADULT - ASSESSMENT
62y Male with R foot lateral 5th ray wound to bone.   - Patient seen and evaluated  -Afebrile, no leukocytosis  - R foot lateral 5th ray wound to bone, erythema to dorsal 3rd-5th ray, mild malodor, tracking proximally and dorsally about 1cm, no purulence, mild serosanguineous drainage, wound bed necrotic.   - R foot xray: proximal 5th met fracture  - R foot culture:  Arcanobacterium haemolyticum, Streptococcus agalactiae (Group B)  - ID recs appreciated.   - EP recs appreciated.   - Vasc recs appreciated.   - Pod plan R foot 5th met base resection with PT tenotomy 10/13 1230 with Dr. Foss  - Documented Medical optimization for anesthesia for Podiatry surgery, appreciated   - Cards clear pending,   - Discussed with attending      - NPO after midnight  - PO meds with a sip of water  - CBC, BMP , Coags, type and screen in AM  - Void on call or OR  - if patient has diabetes, please perform Finger stick with Accucheck and sliding scale insulin on call to OR.

## 2023-10-12 NOTE — PROGRESS NOTE ADULT - ASSESSMENT
62yr M hx of  HTN,  CAD, HF AICD,  AFIB,  DM, with right foot  osteo  Assessment  DMT2: 62y Male with DM T2 with hyperglycemia, A1C 6% , was on oral meds at home, now on low dose insulin, feeling better, eating meals, sugars are trending within stable ranges.  Had mild BHB on labs initially, elevated lactate on VBG, s/p IV hydration, glucose trending stable.   CAD: on medications, stable, monitored. s/p angiogram  Foot Osteo: Podiatry eval, wound care, on IV Zosyn. Or with podiatry pending  HTN: on antihypertensive medications, monitored, asymptomatic.        Discussed plan and management with Dr Roxanne Lennon NP - TEAMS  Wanda Oliveira MD  Cell: [phone]  Office: [phone]      62yr M hx of  HTN,  CAD, HF AICD,  AFIB,  DM, with right foot  osteo  Assessment  DMT2: 62y Male with DM T2 with hyperglycemia, A1C 6% , was on oral meds at home, now on low dose insulin, feeling better, eating meals, sugars are  trending within stable ranges.  Had mild BHB on labs initially, elevated lactate on VBG, s/p IV hydration, glucose trending stable.   CAD: on medications, stable, monitored. s/p angiogram  Foot Osteo: Podiatry eval, wound care, on IV Zosyn. Or with podiatry pending  HTN: on antihypertensive medications, monitored, asymptomatic.        Discussed plan and management with Dr Roxanne Lennon NP - TEAMS  Wanda Oliveira MD  Cell: [phone]  Office: [phone]

## 2023-10-12 NOTE — PROGRESS NOTE ADULT - SUBJECTIVE AND OBJECTIVE BOX
Patient is a 62y Male whom presented to the hospital with shanda     PAST MEDICAL & SURGICAL HISTORY:  Diabetes      Chronic osteomyelitis      H/O heart failure      No significant past surgical history          MEDICATIONS  (STANDING):  aspirin enteric coated 81 milliGRAM(s) Oral daily  atorvastatin 80 milliGRAM(s) Oral at bedtime  dextrose 5%. 1000 milliLiter(s) (100 mL/Hr) IV Continuous <Continuous>  dextrose 5%. 1000 milliLiter(s) (50 mL/Hr) IV Continuous <Continuous>  dextrose 50% Injectable 25 Gram(s) IV Push once  dextrose 50% Injectable 25 Gram(s) IV Push once  dextrose 50% Injectable 12.5 Gram(s) IV Push once  glucagon  Injectable 1 milliGRAM(s) IntraMuscular once  insulin lispro (ADMELOG) corrective regimen sliding scale   SubCutaneous three times a day before meals  metoprolol succinate ER 25 milliGRAM(s) Oral daily  piperacillin/tazobactam IVPB.. 3.375 Gram(s) IV Intermittent every 8 hours  rivaroxaban 20 milliGRAM(s) Oral with dinner  sodium bicarbonate 650 milliGRAM(s) Oral three times a day      Allergies    No Known Allergies    Intolerances        SOCIAL HISTORY:  Denies ETOh,Smoking,     FAMILY HISTORY:      REVIEW OF SYSTEMS:    CONSTITUTIONAL: No weakness, fevers or chills  EYES/ENT: No visual changes;  no throat pain   NECK: No pain or stiffness  RESPIRATORY: No cough, wheezing, hemoptysis; No shortness of breath                            10.1   4.96  )-----------( 198      ( 12 Oct 2023 07:55 )             32.0       CBC Full  -  ( 12 Oct 2023 07:55 )  WBC Count : 4.96 K/uL  RBC Count : 3.82 M/uL  Hemoglobin : 10.1 g/dL  Hematocrit : 32.0 %  Platelet Count - Automated : 198 K/uL  Mean Cell Volume : 83.8 fl  Mean Cell Hemoglobin : 26.4 pg  Mean Cell Hemoglobin Concentration : 31.6 gm/dL  Auto Neutrophil # : x  Auto Lymphocyte # : x  Auto Monocyte # : x  Auto Eosinophil # : x  Auto Basophil # : x  Auto Neutrophil % : x  Auto Lymphocyte % : x  Auto Monocyte % : x  Auto Eosinophil % : x  Auto Basophil % : x      10-12    139  |  101  |  15  ----------------------------<  142<H>  4.3   |  25  |  1.01    Ca    9.2      12 Oct 2023 07:55  Mg     1.8     10-11        CAPILLARY BLOOD GLUCOSE      POCT Blood Glucose.: 141 mg/dL (12 Oct 2023 17:19)  POCT Blood Glucose.: 131 mg/dL (12 Oct 2023 12:31)  POCT Blood Glucose.: 133 mg/dL (12 Oct 2023 08:39)  POCT Blood Glucose.: 143 mg/dL (11 Oct 2023 21:01)      Vital Signs Last 24 Hrs  T(C): 36.7 (12 Oct 2023 16:17), Max: 36.8 (11 Oct 2023 23:52)  T(F): 98.1 (12 Oct 2023 16:17), Max: 98.2 (11 Oct 2023 23:52)  HR: 74 (12 Oct 2023 16:17) (59 - 75)  BP: 114/76 (12 Oct 2023 16:17) (98/62 - 120/57)  BP(mean): 81 (12 Oct 2023 14:22) (81 - 81)  RR: 18 (12 Oct 2023 16:17) (18 - 18)  SpO2: 95% (12 Oct 2023 16:17) (92% - 100%)    Parameters below as of 12 Oct 2023 16:17  Patient On (Oxygen Delivery Method): room air        Urinalysis Basic - ( 12 Oct 2023 07:55 )    Color: x / Appearance: x / SG: x / pH: x  Gluc: 142 mg/dL / Ketone: x  / Bili: x / Urobili: x   Blood: x / Protein: x / Nitrite: x   Leuk Esterase: x / RBC: x / WBC x   Sq Epi: x / Non Sq Epi: x / Bacteria: x        PT/INR - ( 11 Oct 2023 07:27 )   PT: 14.1 sec;   INR: 1.29 ratio         PTT - ( 12 Oct 2023 07:55 )  PTT:76.1 sec        PHYSICAL EXAM:    Constitutional: NAD  HEENT: conjunctive   clear   Neck:  No JVD  Respiratory: CTAB  Cardiovascular: S1 and S2  Gastrointestinal: BS+, soft, NT/ND  Extremities: No peripheral edema  Neurological: A/O x 3, no focal deficits  Psychiatric: Normal mood, normal affect  : No Atkins  Skin: No rashes  Access: Not applicable

## 2023-10-12 NOTE — PROGRESS NOTE ADULT - ASSESSMENT
_________________________________________________________________________________________  ========>>  M E D I C A L   A T T E N D I N G    F O L L O W  U P  N O T E  <<=========  -----------------------------------------------------------------------------------------------------    - Patient seen and examined by me earlier today.   - In summary,  RYLEE HOWE is a 62y year old man admitted with Diabetic foot infection  - Patient today overall doing ok, comfortable, eating OK. NPO for CHLOE .. pain controlled     ==================>> REVIEW OF SYSTEM <<=================    GEN: no fever, no chills, pain Controlled  RESP: no SOB, no cough, no sputum  CVS: no chest pain, no palpitations, no edema  GI: no abdominal pain, no nausea,   : no dysuria, no frequency,  Neuro: no headache, no dizziness  Derm : no itching, no rash    ==================>> PHYSICAL EXAM <<=================    GEN: A&O X 3 , NAD , comfortable, pleasant, calm in bed .. encouraged out of bed to chair with assistance as needed.   HEENT: NCAT, PERRL, MMM, hearing intact  Neck: supple , no JVD appreciated  CVS: S1S2 , regular , No M/R/G appreciated  PULM: CTA B/L,  no W/R/R appreciated  ABD.: soft. non tender, non distended,  bowel sounds present  Extrem: intact pulses , Right foot wound dressed  PSYCH : normal mood,  not anxious       ( Note written / Date of service 10-12-23 ( This is certified to be the same as "ENTERED" date above ( for billing purposes)))    ==================>> MEDICATIONS <<====================    ampicillin/sulbactam  IVPB 3 Gram(s) IV Intermittent every 6 hours  ampicillin/sulbactam  IVPB      aspirin enteric coated 81 milliGRAM(s) Oral daily  atorvastatin 80 milliGRAM(s) Oral at bedtime  dextrose 5%. 1000 milliLiter(s) IV Continuous <Continuous>  dextrose 5%. 1000 milliLiter(s) IV Continuous <Continuous>  dextrose 50% Injectable 25 Gram(s) IV Push once  dextrose 50% Injectable 25 Gram(s) IV Push once  dextrose 50% Injectable 12.5 Gram(s) IV Push once  furosemide   Injectable 20 milliGRAM(s) IV Push two times a day  glucagon  Injectable 1 milliGRAM(s) IntraMuscular once  heparin  Infusion. 2400 Unit(s)/Hr IV Continuous <Continuous>  insulin lispro (ADMELOG) corrective regimen sliding scale   SubCutaneous three times a day before meals  melatonin 5 milliGRAM(s) Oral at bedtime  metoprolol succinate  milliGRAM(s) Oral daily  polyethylene glycol 3350 17 Gram(s) Oral daily  sacubitril 24 mG/valsartan 26 mG 1 Tablet(s) Oral two times a day  senna 2 Tablet(s) Oral at bedtime  sodium bicarbonate 650 milliGRAM(s) Oral three times a day  spironolactone 25 milliGRAM(s) Oral daily    MEDICATIONS  (PRN):  acetaminophen     Tablet .. 650 milliGRAM(s) Oral every 6 hours PRN Temp greater or equal to 38C (100.4F), Mild Pain (1 - 3)  dextrose Oral Gel 15 Gram(s) Oral once PRN Blood Glucose LESS THAN 70 milliGRAM(s)/deciliter  heparin   Injectable 9000 Unit(s) IV Push every 6 hours PRN For aPTT less than 40  heparin   Injectable 4000 Unit(s) IV Push every 6 hours PRN For aPTT between 40 - 57    ___________  Active diet:  Diet, NPO after Midnight:      NPO Start Date: 11-Oct-2023,   NPO Start Time: 23:59  Except Medications  Diet, Consistent Carbohydrate w/Evening Snack  ___________________    ==================>> VITAL SIGNS <<==================    Weight (kg): 108.5  Vital Signs Last 24 HrsT(C): 36.6 (10-12-23 @ 11:09)  T(F): 97.9 (10-12-23 @ 11:09), Max: 98.2 (10-11-23 @ 23:52)  HR: 59 (10-12-23 @ 11:09) (59 - 91)  BP: 111/65 (10-12-23 @ 11:09)  RR: 18 (10-12-23 @ 11:09) (16 - 19)  SpO2: 100% (10-12-23 @ 11:09) (93% - 100%)      CAPILLARY BLOOD GLUCOSE  POCT Blood Glucose.: 133 mg/dL (12 Oct 2023 08:39)  POCT Blood Glucose.: 143 mg/dL (11 Oct 2023 21:01)  POCT Blood Glucose.: 184 mg/dL (11 Oct 2023 17:19)  POCT Blood Glucose.: 151 mg/dL (11 Oct 2023 12:57)     ==================>> LAB AND IMAGING <<==================                        10.1   4.96  )-----------( 198      ( 12 Oct 2023 07:55 )             32.0        10-12    139  |  101  |  15  ----------------------------<  142<H>  4.3   |  25  |  1.01    Ca    9.2      12 Oct 2023 07:55  Mg     1.8     10-11      WBC count:   4.96 <<== ,  4.88 <<== ,  5.43 <<== ,  4.46 <<== ,  4.75 <<== ,  4.54 <<==   Hemoglobin:   10.1 <<==,  10.2 <<==,  11.3 <<==,  9.9 <<==,  10.4 <<==,  10.4 <<==  platelets:  198 <==, 190 <==, 221 <==, 174 <==, 147 <==, 144 <==, 157 <==    Creatinine:  1.01  <<==, 0.89  <<==, 0.87  <<==, 1.15  <<==, 1.50  <<==  Sodium:   139  <==, 138  <==, 143  <==, 138  <==, 138  <==    ____________________________    M I C R O B I O L O G Y :    Culture - Blood (collected 10 Oct 2023 07:40)  Source: .Blood Blood-Peripheral  Preliminary Report (12 Oct 2023 10:01):    No growth at 48 Hours    Culture - Blood (collected 10 Oct 2023 07:40)  Source: .Blood Blood-Peripheral  Preliminary Report (12 Oct 2023 10:01):    No growth at 48 Hours    Culture - Blood (collected 08 Oct 2023 07:01)  Source: .Blood Blood-Peripheral  Preliminary Report (12 Oct 2023 10:01):    No growth at 4 days    Culture - Blood (collected 08 Oct 2023 07:01)  Source: .Blood Blood-Peripheral  Preliminary Report (12 Oct 2023 10:01):    No growth at 4 days    Culture - Urine (collected 06 Oct 2023 16:25)  Source: Clean Catch Clean Catch (Midstream)  Final Report (07 Oct 2023 15:13):    <10,000 CFU/mL Normal Urogenital Mona    Culture - Abscess with Gram Stain (collected 06 Oct 2023 15:12)  Source: .Abscess right foot  Gram Stain (06 Oct 2023 22:45):    Rare polymorphonuclear leukocytes per low power field    Numerous Gram Variable Rods per oil power field  Final Report (11 Oct 2023 17:25):    Moderate Gram Positive Rods Most closely resembling Arcanobacterium    haemolyticum "Susceptibilities not performed"    Moderate Streptococcus agalactiae (Group B) isolated    Group B streptococci are susceptible to ampicillin,    penicillin and cefazolin,but may be resistant to    erythromycin and clindamycin.    Culture - Blood (collected 06 Oct 2023 06:28)  Source: .Blood Blood-Peripheral  Gram Stain (06 Oct 2023 19:46):    Growth in aerobic and anaerobic bottles: Gram Positive Cocci in Pairs and    Chains  Final Report (08 Oct 2023 11:33):    Growth in aerobic and anaerobic bottles: Streptococcus agalactiae (Grou  B)    Direct identification is available within approximately 3-5    hours either by Blood Panel Multiplexed PCR or Direct    MALDI-TOF. Details: [email or web address]  Organism: Blood Culture PCR  Streptococcus agalactiae (Group B) (08 Oct 2023 11:33)  Organism: Streptococcus agalactiae (Group B) (08 Oct 2023 11:33)    Sensitivities:      -  Levofloxacin: S 1      -  Clindamycin: S <=0.06      -  Vancomycin: S 0.5      -  Ceftriaxone: S <=0.25      Method Type: HEATHER      -  Penicillin: S 0.06 Predicts results for ampicillin, amoxicillin, amoxicillin/clavulanate, ampicillin/sulbactam, 1st, 2nd and 3rd generation cephalosporins and carbapenems.  Organism: Blood Culture PCR (08 Oct 2023 11:33)    Sensitivities:      -  Streptococcus agalactiae (Group B): Detec      Method Type: PCR    SARS-CoV-2: NotDetec (10-06-23 @ 06:54)      < from: Cardiac Catheterization (10.11.23 @ 11:23) >  Procedures:                 1.    Ultrasound Guided Access   2.    Arterial Access - Left Femoral   3.    Diagnostic Coronary Angiography   4.    Diagnostic Graft Angiography   5.    Left Heart Cath   6.    Aortogram     Indications:                Preoperative evaluation for extracardiac  surgery  Non-sustained ventricular tachycardia     Lab Visit Indication:       ExCardEv, NonSVT     Diagnostic Conclusions:   Severe three vessel obstructive coronary artery disease   Chronic total occlusion of the right coronary artery   --Left to right, Rentrop grade 1-2 filling of the posterior descending  artery/posterolateral artery  Patent LIMA to the left anterior descending artery   Patent SVG to the obtuse marginal artery   Patent SVG to the first diagonal artery   Normal left main coronary artery   Right dominant system   LVEDP = 22mmHg   No gradient across the aortic valve    Recommendations:   Keep left leg straight for 4 hours following removal of sheath   Continue aggressive medical management of coronary artery disease and  associated risk factors  Gentle IV hydration as per protocol   < end of copied text >    __________________________________________________________________________________  ===============>>  A S S E S S M E N T   A N D   P L A N <<===============  ------------------------------------------------------------------------------------------    · Assessment	  62yr M with PMH of  HTN,   AFIB,  DM/. right foot  osteo (Recently completed IV antibiotics via PICC line) ,CAD, s/p  cabg / AICD,hx of Sommers fracture, non-union many years ago,   prior CT:  c/c  transverse   fx, through the fifth metatarsal base. soft tissue wound/ cortical irregularity along the fifth metatarsal base  wound cx   MSSA and fingoldia,  s/p I&D with necrotic tissue close to bone,suspicion for residual osteo  completed   ancef 2 gms IVSS q 8 hours .  last  month     now  admitted with fevers/  right foot  wound/  and  shanda   Rt  5th metatarsal  wound/  foot  cellulitis >> Now with bacteremia    multiple specialists following, appreciated    HTN/  HLD  CAD/   cardiomyopathy,  ef  25 Post AICD (as per patient not MRI compatible), VT, Afib,  on xarelo/  , anemia  DM,  on  farxiga. januvia. metformin    hold  lasix/  farxiga/  entresto, for  now/ farxiga  not  recommended  with osteo  foot/ ha s risk  for  amputation    bacteremia  Gp  BsStrep,  , on IV antibiotics iD  following  >> follow CHLOE  vascular angiogram not needed per vascular team  Plan for amputation by podiatry tomorrow ( xarelto on hold / on heparin drip)   >> medically stable for procedures..      cardiology following >> medications adjusted given PVC and NSVT / VT       post cath as above with no intervention         continue medical optimization per cardio   continuing the antibiotics per ID   -GI/DVT Prophylaxis per protocol.    --------------------------------------------  Case discussed with Patient,   Education given on findings and plan of care  ___________________________  H. RACHAEL Boudreaux.  Pager: [phone]

## 2023-10-12 NOTE — PROGRESS NOTE ADULT - SUBJECTIVE AND OBJECTIVE BOX
24H hour events:     MEDICATIONS:  aspirin enteric coated 81 milliGRAM(s) Oral daily  furosemide   Injectable 20 milliGRAM(s) IV Push two times a day  heparin   Injectable 9000 Unit(s) IV Push every 6 hours PRN  heparin   Injectable 4000 Unit(s) IV Push every 6 hours PRN  heparin  Infusion. 2400 Unit(s)/Hr IV Continuous <Continuous>  metoprolol succinate  milliGRAM(s) Oral daily  sacubitril 24 mG/valsartan 26 mG 1 Tablet(s) Oral two times a day  spironolactone 25 milliGRAM(s) Oral daily  ampicillin/sulbactam  IVPB 3 Gram(s) IV Intermittent every 6 hours  ampicillin/sulbactam  IVPB      acetaminophen     Tablet .. 650 milliGRAM(s) Oral every 6 hours PRN  melatonin 5 milliGRAM(s) Oral at bedtime  polyethylene glycol 3350 17 Gram(s) Oral daily  senna 2 Tablet(s) Oral at bedtime  atorvastatin 80 milliGRAM(s) Oral at bedtime  dextrose 50% Injectable 25 Gram(s) IV Push once  dextrose 50% Injectable 12.5 Gram(s) IV Push once  dextrose 50% Injectable 25 Gram(s) IV Push once  dextrose Oral Gel 15 Gram(s) Oral once PRN  glucagon  Injectable 1 milliGRAM(s) IntraMuscular once  insulin lispro (ADMELOG) corrective regimen sliding scale   SubCutaneous three times a day before meals  dextrose 5%. 1000 milliLiter(s) IV Continuous <Continuous>  dextrose 5%. 1000 milliLiter(s) IV Continuous <Continuous>  sodium bicarbonate 650 milliGRAM(s) Oral three times a day    REVIEW OF SYSTEMS:  See HPI, otherwise ROS negative.    PHYSICAL EXAM:  T(C): 36.6 (10-12-23 @ 11:09), Max: 36.8 (10-11-23 @ 23:52)  HR: 59 (10-12-23 @ 11:09) (59 - 91)  BP: 111/65 (10-12-23 @ 11:09) (98/62 - 125/74)  RR: 18 (10-12-23 @ 11:09) (18 - 19)  SpO2: 100% (10-12-23 @ 11:09) (95% - 100%)  Wt(kg): --  I&O's Summary    11 Oct 2023 07:01  -  12 Oct 2023 07:00  --------------------------------------------------------  IN: 240 mL / OUT: 1250 mL / NET: -1010 mL    Appearance: Alert. NAD	  HEENT:   NC/AT	  Cardiovascular: +S1S2   Respiratory: CTA B/L	  Psychiatry: A & O x 3, Mood & affect appropriate  Gastrointestinal:  Soft, NT.ND., + BS	  Neurologic: Non-focal  Extremities: No edema BLE  Vascular: Peripheral pulses palpable 2+ bilaterally    LABS:	 	  CBC Full  -  ( 12 Oct 2023 07:55 )  WBC Count : 4.96 K/uL  Hemoglobin : 10.1 g/dL  Hematocrit : 32.0 %  Platelet Count - Automated : 198 K/uL  Mean Cell Volume : 83.8 fl  Mean Cell Hemoglobin : 26.4 pg  Mean Cell Hemoglobin Concentration : 31.6 gm/dL  Auto Neutrophil # : x  Auto Lymphocyte # : x  Auto Monocyte # : x  Auto Eosinophil # : x  Auto Basophil # : x  Auto Neutrophil % : x  Auto Lymphocyte % : x  Auto Monocyte % : x  Auto Eosinophil % : x  Auto Basophil % : x    10-12    139  |  101  |  15  ----------------------------<  142<H>  4.3   |  25  |  1.01  10-11    138  |  103  |  17  ----------------------------<  124<H>  3.6   |  24  |  0.89    Ca    9.2      12 Oct 2023 07:55  Ca    9.2      11 Oct 2023 07:25  Mg     1.8     10-11    TELEMETRY: Vpaced 60s-70s with frequent PVCs  	    ECHO:  TRANSTHORACIC ECHOCARDIOGRAM REPORT  ________________________________________________________________________________                                      _______       Pt. Name:       RYLEE HOWE Study Date:    10/9/2023  MRN:            [id number]     YOB: 1960  Accession #:    [id number]      Age:           62 years  Account#:       [id number]   Gender:        M  Heart Rate:     72 bpm         Height:        72.00 in (182.88 cm)  Rhythm:         sinus rhythm   Weight:  240.00 lb (108.86 kg)  Blood Pressure: 110/67 mmHg    BSA/BMI:       2.30 m² / 32.55 kg/m²  ________________________________________________________________________________________  Referring Physician:    [id number] Jamari Boudreaux  Interpreting Physician: Kaci Sloan  Primary Sonographer:    [facility]    CPT:                ECHO TTE W CON FU LTD - .m;LIMITED SPECTRAL -                      57300.m;OPTISON ECHO CONTRAST PER ML WASTED -                      .m;OPTISONECHO CONTRAST PER ML - .m  Indication(s):      Heart failure, unspecified - I50.9  Procedure:          Limited transthoracic echocardiogram.  Ordering Location:  [facility]  Admission Status:   Inpatient  Contrast Injection: Verbal consent was obtained for injection of Ultrasonic                      Enhancing Agent following a discussion of risks and                      benefits.                      Endocardial visualization enhanced with 2 ml of Optison                      Ultrasound enhancing agent (Lot#:[id number] Exp.Date:                      Discarded Dose:7ml).  UEA Reaction:       Patient had no adverse reaction after injection of                      Ultrasound Enhancing Agent.  Study Information:  Image quality for this study is adequate.    _______________________________________________________________________________________     CONCLUSIONS:      1. Left ventricular systolic function is severely decreased with an ejection fraction of 22 % by Galvan's method of disks. Global left ventricular hypokinesis.   2. Multiple segmental abnormalities exist. See findings.   3. There is no evidence of a left ventricular thrombus.   4. Compared to the transthoracic echocardiogram performed on 2023 there have been no significant interval changes.    ________________________________________________________________________________________  FINDINGS:     Left Ventricle:  The left ventricular cavity is moderately dilated. Left ventricular systolic function is severely decreased with a calculated ejection fraction of 22 % by the Galvan's biplane method of disks. There is global left ventricular hypokinesis. Mild left ventricular hypertrophy. There is increased LV mass and eccentric hypertrophy. There is no evidence of a left ventricular thrombus.  LV Wall Scoring: The entire apex, mid and apical anterior wall, mid and apical  anterior septum, mid and apical inferior septum, mid and apical inferior wall,  and mid anterolateral segment are hypokinetic.          Right Ventricle:  The right ventricle is not well visualized. Tricuspid annular plane systolic excursion (TAPSE) is 1.3 cm (normal >=1.7 cm). Tricuspid annular tissue Doppler S' is 10.0 cm/s (normal >10 cm/s). A device lead is visualized in the right heart.     Aortic Valve:  There is mild calcification of the aortic valve leaflets.     Mitral Valve:  The mitral valve was not well visualized. There is mild mitral regurgitation.     Tricuspid Valve:  The tricuspid valve was not well visualized. There is mild tricuspid regurgitation.     Pulmonic Valve:  The pulmonic valve was not well visualized. There is mild pulmonic regurgitation.  ____________________________________________________________________  Quantitative Data:  Left Ventricle Measurements:(Indexed to BSA)     IVSd (2D):   0.9 cm  LVPWd (2D):  1.1 cm  LVIDd (2D):  6.7 cm  LVIDs (2D):  6.0 cm  LV Mass:     290 g  125.8 g/m²  LV Vol d, MOD A2C: 323.0 ml 140.31 ml/m²  LV Vol d, MOD A4C: 317.0 ml 137.70 ml/m²  LV Vol d, MOD BP:  323.7 ml 140.61 ml/m²  LV Vol s, MOD A2C: 259.0 ml 112.51 ml/m²  LV Vol s, MOD A4C: 241.0 ml 104.69 ml/m²  LV Vol s, MOD BP:  253.1 ml 109.96 ml/m²  LVOT SV MOD BP:    70.6 ml  LV EF% MOD BP:     22 %     MV E Vmax: 1.05 m/s       Right Ventricle Measurements:     TAPSE: 1.3 cm       LVOT / RVOT/ Qp/Qs Data: (Indexed to BSA)  LVOT Vmax: 0.56 m/s  LVOT VTI:  10.20 cm    Aortic Valve Measurements:  AV Vmax:          1.4 m/s  AV Peak Gradient: 7.6 mmHg  AV Mean Gradient: 5.0 mmHg  AV VTI:           26.4 cm  AVVTI Ratio:     0.39    Mitral Valve Measurements:     MV E Vmax: 1.0 m/s         MR Vmax:          3.41 m/s                             MR VTI:           93.30 cm                             MR Mean Gradient: 34.0 mmHg                             MR Peak Gradient: 46.5 mmHg       Tricuspid Valve Measurements:     TR Vmax:          2.8 m/s  TR Peak Gradient: 32.5 mmHg    ________________________________________________________________________________________  Electronically signed on 10/9/2023 at 2:38:20 PM by Kaci Sloan    Catheterization:  Name:           RYLEE HOWE   :            1960   (62 years)   Gender:         male   MR#:            [id number]   [facility]#:           [id number]   Patient Class:  Inpatient     Cath Lab Report    Diagnostic Cardiologist:       Kavin Blanchard MD   Referring Physician:           Jhon Wild DO     Procedures Performed   Procedures:               1.    Ultrasound Guided Access     2.    Arterial Access - Left Femoral   3.    Diagnostic Coronary Angiography   4.    Diagnostic Graft Angiography   5.    Left Heart Cath   6.    Aortogram     Indications:                Preoperative evaluation for extracardiac  surgery  Non-sustained ventricular tachycardia     Lab Visit Indication:       ExCardEv, NonSVT     Diagnostic Conclusions:     Severe three vessel obstructive coronary artery disease   Chronic total occlusion of the right coronary artery   --Left to right, Rentrop grade 1-2 filling of the posterior descending  artery/posterolateral artery  Patent LIMA to the left anterior descending artery   Patent SVG to the obtuse marginal artery   Patent SVG to the first diagonal artery   Normal left main coronary artery   Right dominant system   LVEDP = 22mmHg   No gradient across the aortic valve    Recommendations:     Keep left leg straight for 4 hours following removal of sheath   Continue aggressive medical management of coronary artery disease and  associated risk factors  Gentle IV hydration as per protocol     Findings discussed with cardiology/Dr. Vang     Procedure Narrative:   The risks and alternatives of the procedures and conscious sedation  were explained to the patient and informed consent was  obtained. The patient was brought to the cath lab and placed on the  exam table.  Access   Left femoral artery:   The puncture site was infiltrated with 1% Lidocaine. Vascular access  was obtained using modified seldinger technique.    Patient: RYLEE HOWE             MRN: [id number]  Study Date: 10/11/2023   11:23 AM      Page 1 of 3          Diagnostic Findings:     Coronary Angiography   The coronary circulation is right dominant.      LM   Left main artery: The segment is visually normal in size and  structure.    LAD   Proximal left anterior descending: There is an 80 % stenosis. Mid left  anterior descending: There is a 50 % in-stent restenosis.    CX   Mid circumflex: There is a 100 % stenosis. First obtuse marginal:  There is a 100 % stenosis in the ostium portion of the  segment.      RCA   Distal right coronary artery: There is a 100 % stenosis. Proximal  right coronary artery: There is a 50 % in-stent restenosis. Mid  right coronary artery: There is an 80 % in-stent restenosis.      X-Ray:   Diagnostic Flouro time:       7.1 min.                    Exam record  DAP:           95899.10 cGycm2  Interventional Flouro time:                               Exam fluoro  DAP:           2865.80 cGycm2  Total Flouro Time:            7.1 min.                    Exam total  DAP:            61435.90  cGycm2    Exam Start Time:   11:23 AM   Exam End Time:     11:53 AM   Exam Duration:     30 min     Contrast:   Description                      Dose         Unit        Serial No.   Omnipaque                          120.000   mL     Medication:   Description                Dose, Unit, Route, Time   midazolam  1 mG/mL            1.0, mg, IV, 11:28:16   Injectable (Versed)   fentaNYL  50 mCg/mL           25.0, mcg, IV, 11:28:25   Injectable (Sublimaze)     Inventory:   Description                   Quantity     [employer]#  Reference No.    Serial No.      Lot No.        CDM  CATH PACK                   1.000         [id number]   [id number]  [id number]    030   .035 X 150 GUIDERIGHT       1.000         [id number]   [id number]  [id number]    594     Patient: RYLEE HOWE             MRN: [id number]  Study Date: 10/11/2023   11:23 AM      Page 2 of 3          MEDRAD PROVIS SYRINGE 1.000 [id number] 150FTQ [id number]   797     OMNIPAQUE 350 150ML         1.000         [id number]   [id number]  [id number]  368     5FR SHEATH PINNACLE         1.000         [id number]   [id number]  [id number]  054     .035 X 260 INQUIRE          1.000         [id number]   [id number]  [id number]  205     EXPO FL4                    1.000         [id number]  [id number]                                      [id number]  837     EXPO FR4                    1.000         [id number]  [id number]                                      [id number]  838     EXPO IM                     1.000         [id number]  [id number]                                     [id number]  075     OMNIPAQUE 350 150ML         1.000         [id number]   [id number]  [id number]  368     EXPO PIGTAIL 145            1.000         [id number]  [id number]           [id number]  839     Hemodynamic Pressures:   Phase         Location          [mmHg]               [mmHg]  [mmHg]           HR  Phase 0       LV                  s      120  ed      32         85  Phase 0       Ao                  s      115             d      71  m   88         66    Conscious sedation was administered and monitored by Cardiology  nursing staff,  under my direct supervision when applicable.     Electronically signed by Kavin Blanchard MD on 10/11/2023 at 11:56 AM     Patient: RYLEE HOWE             MRN: [id number]  Study Date: 10/11/2023   11:23 AM      Page 3 of 3     24H hour events: NO acute overnight events. Telemetry showing frequent PVCs    MEDICATIONS:  aspirin enteric coated 81 milliGRAM(s) Oral daily  furosemide   Injectable 20 milliGRAM(s) IV Push two times a day  heparin   Injectable 9000 Unit(s) IV Push every 6 hours PRN  heparin   Injectable 4000 Unit(s) IV Push every 6 hours PRN  heparin  Infusion. 2400 Unit(s)/Hr IV Continuous <Continuous>  metoprolol succinate  milliGRAM(s) Oral daily  sacubitril 24 mG/valsartan 26 mG 1 Tablet(s) Oral two times a day  spironolactone 25 milliGRAM(s) Oral daily  ampicillin/sulbactam  IVPB 3 Gram(s) IV Intermittent every 6 hours  ampicillin/sulbactam  IVPB      acetaminophen     Tablet .. 650 milliGRAM(s) Oral every 6 hours PRN  melatonin 5 milliGRAM(s) Oral at bedtime  polyethylene glycol 3350 17 Gram(s) Oral daily  senna 2 Tablet(s) Oral at bedtime  atorvastatin 80 milliGRAM(s) Oral at bedtime  dextrose 50% Injectable 25 Gram(s) IV Push once  dextrose 50% Injectable 12.5 Gram(s) IV Push once  dextrose 50% Injectable 25 Gram(s) IV Push once  dextrose Oral Gel 15 Gram(s) Oral once PRN  glucagon  Injectable 1 milliGRAM(s) IntraMuscular once  insulin lispro (ADMELOG) corrective regimen sliding scale   SubCutaneous three times a day before meals  dextrose 5%. 1000 milliLiter(s) IV Continuous <Continuous>  dextrose 5%. 1000 milliLiter(s) IV Continuous <Continuous>  sodium bicarbonate 650 milliGRAM(s) Oral three times a day    REVIEW OF SYSTEMS:  See HPI, otherwise ROS negative.    PHYSICAL EXAM:  T(C): 36.6 (10-12-23 @ 11:09), Max: 36.8 (10-11-23 @ 23:52)  HR: 59 (10-12-23 @ 11:09) (59 - 91)  BP: 111/65 (10-12-23 @ 11:09) (98/62 - 125/74)  RR: 18 (10-12-23 @ 11:09) (18 - 19)  SpO2: 100% (10-12-23 @ 11:09) (95% - 100%)  Wt(kg): --  I&O's Summary    11 Oct 2023 07:01  -  12 Oct 2023 07:00  --------------------------------------------------------  IN: 240 mL / OUT: 1250 mL / NET: -1010 mL    Appearance: Alert. NAD	  HEENT:   NC/AT	  Cardiovascular: +S1S2   Respiratory: CTA B/L	  Psychiatry: A & O x 3, Mood & affect appropriate  Gastrointestinal:  Soft, NT.ND., + BS	  Neurologic: Non-focal  Extremities: No edema BLE  Vascular: Peripheral pulses palpable 2+ bilaterally    LABS:	 	  CBC Full  -  ( 12 Oct 2023 07:55 )  WBC Count : 4.96 K/uL  Hemoglobin : 10.1 g/dL  Hematocrit : 32.0 %  Platelet Count - Automated : 198 K/uL  Mean Cell Volume : 83.8 fl  Mean Cell Hemoglobin : 26.4 pg  Mean Cell Hemoglobin Concentration : 31.6 gm/dL  Auto Neutrophil # : x  Auto Lymphocyte # : x  Auto Monocyte # : x  Auto Eosinophil # : x  Auto Basophil # : x  Auto Neutrophil % : x  Auto Lymphocyte % : x  Auto Monocyte % : x  Auto Eosinophil % : x  Auto Basophil % : x    10-12    139  |  101  |  15  ----------------------------<  142<H>  4.3   |  25  |  1.01  10-11    138  |  103  |  17  ----------------------------<  124<H>  3.6   |  24  |  0.89    Ca    9.2      12 Oct 2023 07:55  Ca    9.2      11 Oct 2023 07:25  Mg     1.8     10-11    TELEMETRY: Vpaced 60s-70s with frequent PVCs  	    ECHO:  TRANSTHORACIC ECHOCARDIOGRAM REPORT  ________________________________________________________________________________                                      _______       Pt. Name:       RYLEE HOWE Study Date:    10/9/2023  MRN:            [id number]     YOB: 1960  Accession #:    [id number]      Age:           62 years  Account#:       [id number]   Gender:        M  Heart Rate:     72 bpm         Height:        72.00 in (182.88 cm)  Rhythm:         sinus rhythm   Weight:  240.00 lb (108.86 kg)  Blood Pressure: 110/67 mmHg    BSA/BMI:       2.30 m² / 32.55 kg/m²  ________________________________________________________________________________________  Referring Physician:    [id number] Jamari Boudreaux  Interpreting Physician: Kaci Sloan  Primary Sonographer:    Ronel Parson [facility]    CPT:                ECHO TTE W CON FU LTD - .m;LIMITED SPECTRAL -                      35891.m;OPTISON ECHO CONTRAST PER ML WASTED -                      .m;OPTISONECHO CONTRAST PER ML - .m  Indication(s):      Heart failure, unspecified - I50.9  Procedure:          Limited transthoracic echocardiogram.  Ordering Location:  [facility]  Admission Status:   Inpatient  Contrast Injection: Verbal consent was obtained for injection of Ultrasonic                      Enhancing Agent following a discussion of risks and                      benefits.                      Endocardial visualization enhanced with 2 ml of Optison                      Ultrasound enhancing agent (Lot#:[id number] Exp.Date:                      Discarded Dose:7ml).  UEA Reaction:       Patient had no adverse reaction after injection of                      Ultrasound Enhancing Agent.  Study Information:  Image quality for this study is adequate.    _______________________________________________________________________________________     CONCLUSIONS:      1. Left ventricular systolic function is severely decreased with an ejection fraction of 22 % by Galvan's method of disks. Global left ventricular hypokinesis.   2. Multiple segmental abnormalities exist. See findings.   3. There is no evidence of a left ventricular thrombus.   4. Compared to the transthoracic echocardiogram performed on 2023 there have been no significant interval changes.    ________________________________________________________________________________________  FINDINGS:     Left Ventricle:  The left ventricular cavity is moderately dilated. Left ventricular systolic function is severely decreased with a calculated ejection fraction of 22 % by the Galvan's biplane method of disks. There is global left ventricular hypokinesis. Mild left ventricular hypertrophy. There is increased LV mass and eccentric hypertrophy. There is no evidence of a left ventricular thrombus.  LV Wall Scoring: The entire apex, mid and apical anterior wall, mid and apical  anterior septum, mid and apical inferior septum, mid and apical inferior wall,  and mid anterolateral segment are hypokinetic.          Right Ventricle:  The right ventricle is not well visualized. Tricuspid annular plane systolic excursion (TAPSE) is 1.3 cm (normal >=1.7 cm). Tricuspid annular tissue Doppler S' is 10.0 cm/s (normal >10 cm/s). A device lead is visualized in the right heart.     Aortic Valve:  There is mild calcification of the aortic valve leaflets.     Mitral Valve:  The mitral valve was not well visualized. There is mild mitral regurgitation.     Tricuspid Valve:  The tricuspid valve was not well visualized. There is mild tricuspid regurgitation.     Pulmonic Valve:  The pulmonic valve was not well visualized. There is mild pulmonic regurgitation.  ____________________________________________________________________  Quantitative Data:  Left Ventricle Measurements:(Indexed to BSA)     IVSd (2D):   0.9 cm  LVPWd (2D):  1.1 cm  LVIDd (2D):  6.7 cm  LVIDs (2D):  6.0 cm  LV Mass:     290 g  125.8 g/m²  LV Vol d, MOD A2C: 323.0 ml 140.31 ml/m²  LV Vol d, MOD A4C: 317.0 ml 137.70 ml/m²  LV Vol d, MOD BP:  323.7 ml 140.61 ml/m²  LV Vol s, MOD A2C: 259.0 ml 112.51 ml/m²  LV Vol s, MOD A4C: 241.0 ml 104.69 ml/m²  LV Vol s, MOD BP:  253.1 ml 109.96 ml/m²  LVOT SV MOD BP:    70.6 ml  LV EF% MOD BP:     22 %     MV E Vmax: 1.05 m/s       Right Ventricle Measurements:     TAPSE: 1.3 cm       LVOT / RVOT/ Qp/Qs Data: (Indexed to BSA)  LVOT Vmax: 0.56 m/s  LVOT VTI:  10.20 cm    Aortic Valve Measurements:  AV Vmax:          1.4 m/s  AV Peak Gradient: 7.6 mmHg  AV Mean Gradient: 5.0 mmHg  AV VTI:           26.4 cm  AVVTI Ratio:     0.39    Mitral Valve Measurements:     MV E Vmax: 1.0 m/s         MR Vmax:          3.41 m/s                             MR VTI:           93.30 cm                             MR Mean Gradient: 34.0 mmHg                             MR Peak Gradient: 46.5 mmHg       Tricuspid Valve Measurements:     TR Vmax:          2.8 m/s  TR Peak Gradient: 32.5 mmHg    ________________________________________________________________________________________  Electronically signed on 10/9/2023 at 2:38:20 PM by Kaci Sloan    Catheterization:  Name:           RYLEE HOWE   :            1960   (62 years)   Gender:         male   MR#:            [id number]   [facility]#:           [id number]   Patient Class:  Inpatient     Cath Lab Report    Diagnostic Cardiologist:       Kavin Blanchard MD   Referring Physician:           Jhon Wild DO     Procedures Performed   Procedures:               1.    Ultrasound Guided Access     2.    Arterial Access - Left Femoral   3.    Diagnostic Coronary Angiography   4.    Diagnostic Graft Angiography   5.    Left Heart Cath   6.    Aortogram     Indications:                Preoperative evaluation for extracardiac  surgery  Non-sustained ventricular tachycardia     Lab Visit Indication:       ExCardEv, NonSVT     Diagnostic Conclusions:     Severe three vessel obstructive coronary artery disease   Chronic total occlusion of the right coronary artery   --Left to right, Rentrop grade 1-2 filling of the posterior descending  artery/posterolateral artery  Patent LIMA to the left anterior descending artery   Patent SVG to the obtuse marginal artery   Patent SVG to the first diagonal artery   Normal left main coronary artery   Right dominant system   LVEDP = 22mmHg   No gradient across the aortic valve    Recommendations:     Keep left leg straight for 4 hours following removal of sheath   Continue aggressive medical management of coronary artery disease and  associated risk factors  Gentle IV hydration as per protocol     Findings discussed with cardiology/Dr. Vang     Procedure Narrative:   The risks and alternatives of the procedures and conscious sedation  were explained to the patient and informed consent was  obtained. The patient was brought to the cath lab and placed on the  exam table.  Access   Left femoral artery:   The puncture site was infiltrated with 1% Lidocaine. Vascular access  was obtained using modified seldinger technique.    Patient: RYLEE HOWE             MRN: [id number]  Study Date: 10/11/2023   11:23 AM      Page 1 of 3          Diagnostic Findings:     Coronary Angiography   The coronary circulation is right dominant.      LM   Left main artery: The segment is visually normal in size and  structure.    LAD   Proximal left anterior descending: There is an 80 % stenosis. Mid left  anterior descending: There is a 50 % in-stent restenosis.    CX   Mid circumflex: There is a 100 % stenosis. First obtuse marginal:  There is a 100 % stenosis in the ostium portion of the  segment.      RCA   Distal right coronary artery: There is a 100 % stenosis. Proximal  right coronary artery: There is a 50 % in-stent restenosis. Mid  right coronary artery: There is an 80 % in-stent restenosis.      X-Ray:   Diagnostic Flouro time:       7.1 min.                    Exam record  DAP:           20177.10 cGycm2  Interventional Flouro time:                               Exam fluoro  DAP:           2865.80 cGycm2  Total Flouro Time:            7.1 min.                    Exam total  DAP:            58099.90  cGycm2    Exam Start Time:   11:23 AM   Exam End Time:     11:53 AM   Exam Duration:     30 min     Contrast:   Description                      Dose         Unit        Serial No.   Omnipaque                          120.000   mL     Medication:   Description                Dose, Unit, Route, Time   midazolam  1 mG/mL            1.0, mg, IV, 11:28:16   Injectable (Versed)   fentaNYL  50 mCg/mL           25.0, mcg, IV, 11:28:25   Injectable (Sublimaze)     Inventory:   Description                   Quantity     [employer]oples[employer]#  Reference No.    Serial No.      Lot No.        CDM  CATH PACK                   1.000         [id number]   [id number]  [id number]    030   .035 X 150 GUIDERIGHT       1.000         [id number]   [id number]  [id number]    594     Patient: RYLEE HOWE             MRN: [id number]  Study Date: 10/11/2023   11:23 AM      Page 2 of 3          MEDRAD PROVIS SYRINGE 1.000 [id number]   797     OMNIPAQUE 350 150ML         1.000         [id number]   [id number]  [id number]  368     5FR SHEATH PINNACLE         1.000         [id number]   [id number]  [id number]  054     .035 X 260 INQUIRE          1.000         [id number]   [id number]  [id number]  205     EXPO FL4                    1.000         [id number]  [id number]                                      [id number]  837     EXPO FR4                    1.000         [id number]  [id number]                                      [id number]  838     EXPO IM                     1.000         [id number]  [id number]                                     [id number]  075     OMNIPAQUE 350 150ML         1.000         [id number]   [id number]  [id number]  368     EXPO PIGTAIL 145            1.000         [id number]  [id number]           [id number]  839     Hemodynamic Pressures:   Phase         Location          [mmHg]               [mmHg]  [mmHg]           HR  Phase 0       LV                  s      120  ed      32         85  Phase 0       Ao                  s      115             d      71  m   88         66    Conscious sedation was administered and monitored by Cardiology  nursing staff,  under my direct supervision when applicable.     Electronically signed by Kavin Blanchard MD on 10/11/2023 at 11:56 AM     Patient: RYLEE HOWE             MRN: [id number]  Study Date: 10/11/2023   11:23 AM      Page 3 of 3

## 2023-10-13 ENCOUNTER — TRANSCRIPTION ENCOUNTER (OUTPATIENT)
Age: 63
End: 2023-10-13

## 2023-10-13 LAB
ANION GAP SERPL CALC-SCNC: 12 MMOL/L — SIGNIFICANT CHANGE UP (ref 5–17)
APTT BLD: 77.3 SEC — HIGH (ref 24.5–35.6)
BASOPHILS # BLD AUTO: 0.06 K/UL — SIGNIFICANT CHANGE UP (ref 0–0.2)
BASOPHILS NFR BLD AUTO: 1.4 % — SIGNIFICANT CHANGE UP (ref 0–2)
BLD GP AB SCN SERPL QL: NEGATIVE — SIGNIFICANT CHANGE UP
BUN SERPL-MCNC: 15 MG/DL — SIGNIFICANT CHANGE UP (ref 7–23)
CALCIUM SERPL-MCNC: 9.5 MG/DL — SIGNIFICANT CHANGE UP (ref 8.4–10.5)
CHLORIDE SERPL-SCNC: 99 MMOL/L — SIGNIFICANT CHANGE UP (ref 96–108)
CO2 SERPL-SCNC: 29 MMOL/L — SIGNIFICANT CHANGE UP (ref 22–31)
CREAT SERPL-MCNC: 1.06 MG/DL — SIGNIFICANT CHANGE UP (ref 0.5–1.3)
CULTURE RESULTS: SIGNIFICANT CHANGE UP
CULTURE RESULTS: SIGNIFICANT CHANGE UP
EGFR: 79 ML/MIN/1.73M2 — SIGNIFICANT CHANGE UP
EOSINOPHIL # BLD AUTO: 0.13 K/UL — SIGNIFICANT CHANGE UP (ref 0–0.5)
EOSINOPHIL NFR BLD AUTO: 3.1 % — SIGNIFICANT CHANGE UP (ref 0–6)
GLUCOSE BLDC GLUCOMTR-MCNC: 113 MG/DL — HIGH (ref 70–99)
GLUCOSE BLDC GLUCOMTR-MCNC: 120 MG/DL — HIGH (ref 70–99)
GLUCOSE BLDC GLUCOMTR-MCNC: 121 MG/DL — HIGH (ref 70–99)
GLUCOSE BLDC GLUCOMTR-MCNC: 143 MG/DL — HIGH (ref 70–99)
GLUCOSE SERPL-MCNC: 113 MG/DL — HIGH (ref 70–99)
HCT VFR BLD CALC: 32.9 % — LOW (ref 39–50)
HGB BLD-MCNC: 10.2 G/DL — LOW (ref 13–17)
IMM GRANULOCYTES NFR BLD AUTO: 1.2 % — HIGH (ref 0–0.9)
INR BLD: 1.39 RATIO — HIGH (ref 0.85–1.18)
LYMPHOCYTES # BLD AUTO: 1.34 K/UL — SIGNIFICANT CHANGE UP (ref 1–3.3)
LYMPHOCYTES # BLD AUTO: 31.5 % — SIGNIFICANT CHANGE UP (ref 13–44)
MCHC RBC-ENTMCNC: 26.2 PG — LOW (ref 27–34)
MCHC RBC-ENTMCNC: 31 GM/DL — LOW (ref 32–36)
MCV RBC AUTO: 84.4 FL — SIGNIFICANT CHANGE UP (ref 80–100)
MONOCYTES # BLD AUTO: 0.51 K/UL — SIGNIFICANT CHANGE UP (ref 0–0.9)
MONOCYTES NFR BLD AUTO: 12 % — SIGNIFICANT CHANGE UP (ref 2–14)
NEUTROPHILS # BLD AUTO: 2.17 K/UL — SIGNIFICANT CHANGE UP (ref 1.8–7.4)
NEUTROPHILS NFR BLD AUTO: 50.8 % — SIGNIFICANT CHANGE UP (ref 43–77)
NRBC # BLD: 0 /100 WBCS — SIGNIFICANT CHANGE UP (ref 0–0)
PLATELET # BLD AUTO: 231 K/UL — SIGNIFICANT CHANGE UP (ref 150–400)
POTASSIUM SERPL-MCNC: 4.4 MMOL/L — SIGNIFICANT CHANGE UP (ref 3.5–5.3)
POTASSIUM SERPL-SCNC: 4.4 MMOL/L — SIGNIFICANT CHANGE UP (ref 3.5–5.3)
PROTHROM AB SERPL-ACNC: 15.1 SEC — HIGH (ref 9.5–13)
RBC # BLD: 3.9 M/UL — LOW (ref 4.2–5.8)
RBC # FLD: 18.2 % — HIGH (ref 10.3–14.5)
RH IG SCN BLD-IMP: POSITIVE — SIGNIFICANT CHANGE UP
SODIUM SERPL-SCNC: 140 MMOL/L — SIGNIFICANT CHANGE UP (ref 135–145)
SPECIMEN SOURCE: SIGNIFICANT CHANGE UP
SPECIMEN SOURCE: SIGNIFICANT CHANGE UP
WBC # BLD: 4.26 K/UL — SIGNIFICANT CHANGE UP (ref 3.8–10.5)
WBC # FLD AUTO: 4.26 K/UL — SIGNIFICANT CHANGE UP (ref 3.8–10.5)

## 2023-10-13 PROCEDURE — 88305 TISSUE EXAM BY PATHOLOGIST: CPT | Mod: 26

## 2023-10-13 PROCEDURE — 73620 X-RAY EXAM OF FOOT: CPT | Mod: 26,RT

## 2023-10-13 PROCEDURE — 99233 SBSQ HOSP IP/OBS HIGH 50: CPT

## 2023-10-13 PROCEDURE — 88304 TISSUE EXAM BY PATHOLOGIST: CPT | Mod: 26

## 2023-10-13 PROCEDURE — 88311 DECALCIFY TISSUE: CPT | Mod: 26

## 2023-10-13 PROCEDURE — 99232 SBSQ HOSP IP/OBS MODERATE 35: CPT

## 2023-10-13 RX ORDER — ACETAMINOPHEN 500 MG
650 TABLET ORAL EVERY 6 HOURS
Refills: 0 | Status: DISCONTINUED | OUTPATIENT
Start: 2023-10-13 | End: 2023-10-18

## 2023-10-13 RX ORDER — FUROSEMIDE 40 MG
20 TABLET ORAL
Refills: 0 | Status: DISCONTINUED | OUTPATIENT
Start: 2023-10-13 | End: 2023-10-14

## 2023-10-13 RX ORDER — GLUCAGON INJECTION, SOLUTION 0.5 MG/.1ML
1 INJECTION, SOLUTION SUBCUTANEOUS ONCE
Refills: 0 | Status: DISCONTINUED | OUTPATIENT
Start: 2023-10-13 | End: 2023-10-18

## 2023-10-13 RX ORDER — SENNA PLUS 8.6 MG/1
2 TABLET ORAL AT BEDTIME
Refills: 0 | Status: DISCONTINUED | OUTPATIENT
Start: 2023-10-13 | End: 2023-10-18

## 2023-10-13 RX ORDER — AMPICILLIN SODIUM AND SULBACTAM SODIUM 250; 125 MG/ML; MG/ML
3 INJECTION, POWDER, FOR SUSPENSION INTRAMUSCULAR; INTRAVENOUS EVERY 6 HOURS
Refills: 0 | Status: DISCONTINUED | OUTPATIENT
Start: 2023-10-13 | End: 2023-10-17

## 2023-10-13 RX ORDER — ONDANSETRON 8 MG/1
4 TABLET, FILM COATED ORAL EVERY 4 HOURS
Refills: 0 | Status: DISCONTINUED | OUTPATIENT
Start: 2023-10-13 | End: 2023-10-13

## 2023-10-13 RX ORDER — SODIUM CHLORIDE 9 MG/ML
1000 INJECTION, SOLUTION INTRAVENOUS
Refills: 0 | Status: DISCONTINUED | OUTPATIENT
Start: 2023-10-13 | End: 2023-10-18

## 2023-10-13 RX ORDER — DEXTROSE 50 % IN WATER 50 %
15 SYRINGE (ML) INTRAVENOUS ONCE
Refills: 0 | Status: DISCONTINUED | OUTPATIENT
Start: 2023-10-13 | End: 2023-10-18

## 2023-10-13 RX ORDER — LANOLIN ALCOHOL/MO/W.PET/CERES
5 CREAM (GRAM) TOPICAL AT BEDTIME
Refills: 0 | Status: DISCONTINUED | OUTPATIENT
Start: 2023-10-13 | End: 2023-10-18

## 2023-10-13 RX ORDER — SODIUM BICARBONATE 1 MEQ/ML
650 SYRINGE (ML) INTRAVENOUS THREE TIMES A DAY
Refills: 0 | Status: DISCONTINUED | OUTPATIENT
Start: 2023-10-13 | End: 2023-10-14

## 2023-10-13 RX ORDER — ASPIRIN/CALCIUM CARB/MAGNESIUM 324 MG
81 TABLET ORAL DAILY
Refills: 0 | Status: DISCONTINUED | OUTPATIENT
Start: 2023-10-13 | End: 2023-10-18

## 2023-10-13 RX ORDER — ASPIRIN/CALCIUM CARB/MAGNESIUM 324 MG
81 TABLET ORAL DAILY
Refills: 0 | Status: DISCONTINUED | OUTPATIENT
Start: 2023-10-13 | End: 2023-10-13

## 2023-10-13 RX ORDER — METOPROLOL TARTRATE 50 MG
100 TABLET ORAL DAILY
Refills: 0 | Status: DISCONTINUED | OUTPATIENT
Start: 2023-10-13 | End: 2023-10-18

## 2023-10-13 RX ORDER — OXYCODONE HYDROCHLORIDE 5 MG/1
5 TABLET ORAL ONCE
Refills: 0 | Status: DISCONTINUED | OUTPATIENT
Start: 2023-10-13 | End: 2023-10-13

## 2023-10-13 RX ORDER — DEXTROSE 50 % IN WATER 50 %
25 SYRINGE (ML) INTRAVENOUS ONCE
Refills: 0 | Status: DISCONTINUED | OUTPATIENT
Start: 2023-10-13 | End: 2023-10-18

## 2023-10-13 RX ORDER — INSULIN LISPRO 100/ML
VIAL (ML) SUBCUTANEOUS AT BEDTIME
Refills: 0 | Status: DISCONTINUED | OUTPATIENT
Start: 2023-10-13 | End: 2023-10-18

## 2023-10-13 RX ORDER — SACUBITRIL AND VALSARTAN 24; 26 MG/1; MG/1
1 TABLET, FILM COATED ORAL
Refills: 0 | Status: DISCONTINUED | OUTPATIENT
Start: 2023-10-13 | End: 2023-10-18

## 2023-10-13 RX ORDER — ATORVASTATIN CALCIUM 80 MG/1
80 TABLET, FILM COATED ORAL AT BEDTIME
Refills: 0 | Status: DISCONTINUED | OUTPATIENT
Start: 2023-10-13 | End: 2023-10-18

## 2023-10-13 RX ORDER — SPIRONOLACTONE 25 MG/1
25 TABLET, FILM COATED ORAL DAILY
Refills: 0 | Status: DISCONTINUED | OUTPATIENT
Start: 2023-10-13 | End: 2023-10-18

## 2023-10-13 RX ORDER — SODIUM CHLORIDE 9 MG/ML
1000 INJECTION, SOLUTION INTRAVENOUS
Refills: 0 | Status: DISCONTINUED | OUTPATIENT
Start: 2023-10-13 | End: 2023-10-13

## 2023-10-13 RX ORDER — INSULIN LISPRO 100/ML
VIAL (ML) SUBCUTANEOUS
Refills: 0 | Status: DISCONTINUED | OUTPATIENT
Start: 2023-10-13 | End: 2023-10-18

## 2023-10-13 RX ORDER — OXYCODONE HYDROCHLORIDE 5 MG/1
10 TABLET ORAL ONCE
Refills: 0 | Status: DISCONTINUED | OUTPATIENT
Start: 2023-10-13 | End: 2023-10-13

## 2023-10-13 RX ORDER — DEXTROSE 50 % IN WATER 50 %
12.5 SYRINGE (ML) INTRAVENOUS ONCE
Refills: 0 | Status: DISCONTINUED | OUTPATIENT
Start: 2023-10-13 | End: 2023-10-18

## 2023-10-13 RX ADMIN — AMPICILLIN SODIUM AND SULBACTAM SODIUM 200 GRAM(S): 250; 125 INJECTION, POWDER, FOR SUSPENSION INTRAMUSCULAR; INTRAVENOUS at 18:16

## 2023-10-13 RX ADMIN — Medication 5 MILLIGRAM(S): at 23:02

## 2023-10-13 RX ADMIN — HEPARIN SODIUM 2400 UNIT(S)/HR: 5000 INJECTION INTRAVENOUS; SUBCUTANEOUS at 03:50

## 2023-10-13 RX ADMIN — Medication 100 MILLIGRAM(S): at 05:27

## 2023-10-13 RX ADMIN — SACUBITRIL AND VALSARTAN 1 TABLET(S): 24; 26 TABLET, FILM COATED ORAL at 05:26

## 2023-10-13 RX ADMIN — SACUBITRIL AND VALSARTAN 1 TABLET(S): 24; 26 TABLET, FILM COATED ORAL at 18:10

## 2023-10-13 RX ADMIN — AMPICILLIN SODIUM AND SULBACTAM SODIUM 200 GRAM(S): 250; 125 INJECTION, POWDER, FOR SUSPENSION INTRAMUSCULAR; INTRAVENOUS at 23:01

## 2023-10-13 RX ADMIN — ATORVASTATIN CALCIUM 80 MILLIGRAM(S): 80 TABLET, FILM COATED ORAL at 21:28

## 2023-10-13 RX ADMIN — AMPICILLIN SODIUM AND SULBACTAM SODIUM 200 GRAM(S): 250; 125 INJECTION, POWDER, FOR SUSPENSION INTRAMUSCULAR; INTRAVENOUS at 05:29

## 2023-10-13 RX ADMIN — Medication 20 MILLIGRAM(S): at 18:10

## 2023-10-13 RX ADMIN — Medication 20 MILLIGRAM(S): at 05:27

## 2023-10-13 RX ADMIN — Medication 650 MILLIGRAM(S): at 21:29

## 2023-10-13 RX ADMIN — Medication 650 MILLIGRAM(S): at 05:27

## 2023-10-13 RX ADMIN — Medication 650 MILLIGRAM(S): at 00:50

## 2023-10-13 RX ADMIN — SPIRONOLACTONE 25 MILLIGRAM(S): 25 TABLET, FILM COATED ORAL at 05:27

## 2023-10-13 RX ADMIN — Medication 650 MILLIGRAM(S): at 18:16

## 2023-10-13 RX ADMIN — SENNA PLUS 2 TABLET(S): 8.6 TABLET ORAL at 21:28

## 2023-10-13 NOTE — PROGRESS NOTE ADULT - SUBJECTIVE AND OBJECTIVE BOX
Date of Service: 10-13-23 @ 08:18           CARDIOLOGY     PROGRESS  NOTE   ________________________________________________    CHIEF COMPLAINT:Patient is a 62y old  Male who presents with a chief complaint of fevers/  foot infection (13 Oct 2023 06:46)  no complain  	  REVIEW OF SYSTEMS:  CONSTITUTIONAL: No fever, weight loss, or fatigue  EYES: No eye pain, visual disturbances, or discharge  ENT:  No difficulty hearing, tinnitus, vertigo; No sinus or throat pain  NECK: No pain or stiffness  RESPIRATORY: No cough, wheezing, chills or hemoptysis; No Shortness of Breath  CARDIOVASCULAR: No chest pain, palpitations, passing out, dizziness, or leg swelling  GASTROINTESTINAL: No abdominal or epigastric pain. No nausea, vomiting, or hematemesis; No diarrhea or constipation. No melena or hematochezia.  GENITOURINARY: No dysuria, frequency, hematuria, or incontinence  NEUROLOGICAL: No headaches, memory loss, loss of strength, numbness, or tremors  SKIN: No itching, burning, rashes, or lesions   LYMPH Nodes: No enlarged glands  ENDOCRINE: No heat or cold intolerance; No hair loss  MUSCULOSKELETAL: No joint pain or swelling; No muscle, back, or extremity pain  PSYCHIATRIC: No depression, anxiety, mood swings, or difficulty sleeping  HEME/LYMPH: No easy bruising, or bleeding gums  ALLERGY AND IMMUNOLOGIC: No hives or eczema	    [x ] All others negative	  [ ] Unable to obtain    PHYSICAL EXAM:  T(C): 36.4 (10-13-23 @ 04:49), Max: 36.7 (10-12-23 @ 15:05)  HR: 67 (10-13-23 @ 04:49) (59 - 78)  BP: 100/66 (10-13-23 @ 04:49) (100/66 - 120/57)  RR: 18 (10-13-23 @ 04:49) (18 - 18)  SpO2: 97% (10-13-23 @ 04:49) (92% - 100%)  Wt(kg): --  I&O's Summary    12 Oct 2023 07:01  -  13 Oct 2023 07:00  --------------------------------------------------------  IN: 648 mL / OUT: 1200 mL / NET: -552 mL        Appearance: Normal	  HEENT:   Normal oral mucosa, PERRL, EOMI	  Lymphatic: No lymphadenopathy  Cardiovascular: Normal S1 S2, No JVD, + murmurs, No edema  Respiratory:rhonchi  Psychiatry: A & O x 3, Mood & affect appropriate  Gastrointestinal:  Soft, Non-tender, + BS	  Skin: No rashes, No ecchymoses, No cyanosis	  Neurologic: Non-focal  Extremities: Normal range of motion, +bandaged r foot      MEDICATIONS  (STANDING):  ampicillin/sulbactam  IVPB 3 Gram(s) IV Intermittent every 6 hours  ampicillin/sulbactam  IVPB      aspirin enteric coated 81 milliGRAM(s) Oral daily  atorvastatin 80 milliGRAM(s) Oral at bedtime  dextrose 5%. 1000 milliLiter(s) (50 mL/Hr) IV Continuous <Continuous>  dextrose 5%. 1000 milliLiter(s) (100 mL/Hr) IV Continuous <Continuous>  dextrose 50% Injectable 25 Gram(s) IV Push once  dextrose 50% Injectable 25 Gram(s) IV Push once  dextrose 50% Injectable 12.5 Gram(s) IV Push once  furosemide   Injectable 20 milliGRAM(s) IV Push two times a day  glucagon  Injectable 1 milliGRAM(s) IntraMuscular once  heparin  Infusion. 2400 Unit(s)/Hr (24 mL/Hr) IV Continuous <Continuous>  insulin lispro (ADMELOG) corrective regimen sliding scale   SubCutaneous three times a day before meals  melatonin 5 milliGRAM(s) Oral at bedtime  metoprolol succinate  milliGRAM(s) Oral daily  polyethylene glycol 3350 17 Gram(s) Oral daily  sacubitril 24 mG/valsartan 26 mG 1 Tablet(s) Oral two times a day  senna 2 Tablet(s) Oral at bedtime  sodium bicarbonate 650 milliGRAM(s) Oral three times a day  spironolactone 25 milliGRAM(s) Oral daily      TELEMETRY: 	    ECG:  	  RADIOLOGY:  OTHER: 	  	  LABS:	 	    CARDIAC MARKERS:                                10.2   4.26  )-----------( 231      ( 13 Oct 2023 07:21 )             32.9     10-12    139  |  101  |  15  ----------------------------<  142<H>  4.3   |  25  |  1.01    Ca    9.2      12 Oct 2023 07:55      proBNP:   Lipid Profile:   HgA1c:   TSH: Thyroid Stimulating Hormone, Serum: 5.55 uIU/mL (10-07 @ 07:08)    PT/INR - ( 13 Oct 2023 07:20 )   PT: 15.1 sec;   INR: 1.39 ratio         PTT - ( 13 Oct 2023 07:20 )  PTT:77.3 sec  < from: CHLOE W or WO Ultrasound Enhancing Agent (10.12.23 @ 13:45) >      1. Left ventricular systolic function is severely decreased with an ejection fraction visually estimated at <20 %.   2. Moderate mitral regurgitation.   3. No pericardial effusion seen.   4. No echocardiographic evidence of valvular endocarditis.      No vegetation on the visible segments of the device leads.   5. Compared to the transthoracic echocardiogram performed on 10/9/2023 there have been no significant interval changes.   6. Minimal (grade 1) spontaneous echo contrast located in the left atrial apendage and minimal (grade 1) spontaneous echo contrast located in the left atrium.   7. No evidence of left atrial or left atrial appendage thrombus. The left atrial appendage emptying velocity is low.   8. Symmetric mitral valve leaflet tethering.        Assessment and plan  ---------------------------  62M hx of type 2 DM on metformin, farixga, heart failure with reduced EF on metoprolol 75mg, lasix 20mg qd, xarelto for vte ppx (had RUE vte 2/2 PICC line), removed 2 weeks prior as well as right foot wound wac (present 2/2 osteo of foot with finegoldia magna), here for 36 hrs of fever, tmax 101, took 800mg advil today, prior to arrival. + slight right lower back pain, nonradiating, mild, no assc GI sxs. + urinating more freq as of late. Still tolerating PO. Denies chest pain, shortness of breath, diaphoresis. Hypotensive to 90s/50 in triage, then 80s/60s, last bp 100/40, not tachy but in setting of bb. Appears slightly dehydrated, clear lungs, s3 gallop, no murmurs appreciable. + right foot redness, swelling, nontender, no flucutance, no crepitations, 1+ dp pulses bilateral, full rom. Nontender calves. Benign abd, no peritoneal signs, no jaundice, no cva ttp. No midline spinal ttp. RUE no signs of infx, former picc site clean, Patient meets sepsis criteria by vitals. Will eval for common source of infection (ex. osteo, urinary, bacterial pulmonary, viral uri; unlikely central neurologic such as meninigitis or encephalitis) vs metabolic derangement. Check labs, lactate, UA, CXs, CXR, foot xray, inflam markers screening EKG, hydrate-> empiric abxs, antipyretics, additional crystalloid infusion as clinically warranted. Will start with 500 cc crystalloid 2/2 heart failure but euglycemic dka.  pt is well known to me with hx of htn, ashd, chf, s/p BIV AICD , PVD with multiple admissions sec to foot infection  pt with sig lv dysfunction/ severe ischemic cardiomyopathy  will  adjust cardiac meds  hold Farxiga for now. may  requiring surgery  dvt prophylaxis  vascula/podiatry consult  abx  pt Entresto dose has decrease sec to ?infection will gradually will increase dose  continue Metroprolol er  avoid excess fluid, may need to re start on  Lasix  and aldactone  check inr if elevated will give vitamin K on Xarelto  may need to decrease Xarelto dose if increase renal function  + BC for strep, continue iv abx, pt has hardware (biv Aicd)  will increase Entresto as bp tolerates  re start on aldactone 25 mg daily  s/p AICD shock yesterday , beta blocker increased to 100 mg daily , pt can not tolerate AMIO was tried before  continue iv heparin plan will discuss with ID  cardiac cath noted with patent graft, increase EDP, need to increase Lasix dose, observe bp closely, excellent uo  pt is a high risk for surgery, unless other way of medical therapy  will hold Entresto if bp low  fu bp and hr closely

## 2023-10-13 NOTE — BRIEF OPERATIVE NOTE - OPERATION/FINDINGS
pt is s/p partial resection of right foot 5th metatarsal bone, open  - low concern for residual infection, bone hard and of good quality  - low concern for viability, adequate intraoperative bleeding

## 2023-10-13 NOTE — DISCHARGE NOTE PROVIDER - NSDCFUADDAPPT_GEN_ALL_CORE_FT
Podiatry Discharge Instructions:  Follow up: Please follow up with Dr. Foss within 1 week of discharge from the hospital, please call [phone] for appointment and discuss that you recently were seen in the hospital.  Wound Care: Please apply pack with iodoform packing followed by adaptic, 4x4 gauze, kerlix and ace.   Weight bearing: Please be non weight bearing to right foot.  Antibiotics: Please continue as instructed. Podiatry Discharge Instructions:  Follow up: Please follow up with Dr. Foss within 1 week of discharge from the hospital, please call [phone] for appointment and discuss that you recently were seen in the hospital.  Wound Care: Please manage wound vac to right foot at 125mmHg with black foam continuously and change 3x per week.  Weight bearing: Please be non weight bearing to right foot.  Antibiotics: Please continue as instructed. Podiatry Discharge Instructions:  Follow up: Please follow up with Dr. Foss within 1 week of discharge from the hospital, please call [phone] for appointment and discuss that you recently were seen in the hospital.  Wound Care: Please manage wound vac to right foot at 125mmHg with black foam continuously and change 3x per week.  Weight bearing: Please be non weight bearing to right foot.  Antibiotics: Please continue as instructed.    APPTS ARE READY TO BE MADE: [ ] YES    Best Family or Patient Contact (if needed):    Additional Information about above appointments (if needed):    1: infectious disease  2: podiatry as outlined above  3: PCP  4: Nephrology  5: cardiology    Other comments or requests:      Podiatry Discharge Instructions:  Follow up: Please follow up with Dr. Foss within 1 week of discharge from the hospital, please call [phone] for appointment and discuss that you recently were seen in the hospital.  Wound Care: Please manage wound vac to right foot at 125mmHg with black foam continuously and change 3x per week.  Weight bearing: Please be non weight bearing to right foot.  Antibiotics: Please continue as instructed.    APPTS ARE READY TO BE MADE: [X ] YES    Best Family or Patient Contact (if needed):    Additional Information about above appointments (if needed):    1: infectious disease  2: podiatry as outlined above  3: PCP  4: Nephrology  5: cardiology    Other comments or requests:      Podiatry Discharge Instructions:  Follow up: Please follow up with Dr. Foss within 1 week of discharge from the hospital, please call [phone] for appointment and discuss that you recently were seen in the hospital.  Wound Care: Please manage wound vac to right foot at 125mmHg with black foam continuously and change 3x per week.  Weight bearing: Please be non weight bearing to right foot.  Antibiotics: Please continue as instructed.    APPTS ARE READY TO BE MADE: [X ] YES    Best Family or Patient Contact (if needed):    Additional Information about above appointments (if needed):    1: infectious disease  2: podiatry as outlined above  3: PCP  4: Nephrology  5: cardiology    Other comments or requests:   Patient was previously scheduled with Dr. Sidney Foss on 10/23 at 11am at [address] [place] [address]  Patient prefers to schedule his own follow up appointments as he advised these are his established providers and does not need assistance.

## 2023-10-13 NOTE — PROGRESS NOTE ADULT - ASSESSMENT
To OR today  for R foot 5th met base resection and PT tenotomy with Dr. Foss at 1230 .   CXR on sunrise.EKG on sunrise.  Medical  clearance since 10/12 and documented in chart.  Cards clearance pending AM eval  Consent signed and in chart.   To OR today  for R foot 5th met base resection and PT tenotomy with Dr. Foss at 1230 .   CXR on sunrise.EKG on sunrise.  Medical  clearance since 10/12 and documented in chart.  Cards clearance pending AM eval  Consent signed and in chart. .

## 2023-10-13 NOTE — PROGRESS NOTE ADULT - ASSESSMENT
To OR today  for R foot 5th met base resection and PT tenotomy with Dr. Foss at 1230 .   CXR on sunrise.EKG on sunrise.  Medical  clearance since 10/12 and documented in chart.  Cards clearance pending AM eval  Consent signed and in chart.    Procedure was explained to patient in detail. All alternatives, risks and complications were discussed. All questions answered.

## 2023-10-13 NOTE — DISCHARGE NOTE PROVIDER - NSDCCPCAREPLAN_GEN_ALL_CORE_FT
PRINCIPAL DISCHARGE DIAGNOSIS  Diagnosis: Sepsis  Assessment and Plan of Treatment: Take all antibiotics as ordered.  Call you Health care provider upon arrival home to make a one week follow up appointment.  If you develop fever, chills, malaise, or change in mental status call your Health Care Provider or go to the Emergency Department.  Nutrition is important, eat small frequent meals to help ensure you get adequate calories.  Do not stay in bed all day!  Increase your activity daily as tolerated.        SECONDARY DISCHARGE DIAGNOSES  Diagnosis: Chronic atrial fibrillation  Assessment and Plan of Treatment: Atrial fibrillation is the most common heart rhythm problem.  The condition puts you at risk for has stroke and heart attack  You were started on blood thinners to prevent possible clot and stroke complications.   Monitor for any signs of bleeding and avoid injury while on this medication  If any bleeding persistent and symptomatic stop the medication and notify your doctor immediately.  It helps if you control your blood pressure, not drink more than 1-2 alcohol drinks per day, cut down on caffeine, getting treatment for over active thyroid gland, and get regular exercise  Call your doctor if you feel your heart racing or beating unusually, chest tightness or pain, lightheaded, faint, shortness of breath especially with exercise  It is important to take your heart medication as prescribed  You may be on anticoagulation which is very important to take as directed - you may need blood work to monitor drug levels      Diagnosis: HTN (hypertension)  Assessment and Plan of Treatment:     Diagnosis: Chronic osteomyelitis  Assessment and Plan of Treatment: Podiatry Discharge Instructions:  Follow up: Please follow up with Dr. Foss within 1 week of discharge from the hospital, please call [phone] for appointment and discuss that you recently were seen in the hospital.  Wound Care: Please manage wound vac to right foot at 125mmHg with black foam continuously and change 3x per week.  Weight bearing: Please be non weight bearing to right foot.  Antibiotics: Please continue as instructed.    Diagnosis: Ischemic cardiomyopathy  Assessment and Plan of Treatment: please follow up with Dr viveros ,c/w kerry simpson and aldactone    Diagnosis: ALEJANDRO (acute kidney injury)  Assessment and Plan of Treatment: please follow up with nephrology  Avoid taking (NSAIDs) - (ex: Ibuprofen, Advil, Celebrex, Naprosyn)  Avoid taking any nephrotoxic agents (can harm kidneys) - Intravenous contrast for diagnostic testing, combination cold medications.  Have all medications adjusted for your renal function by your Health Care Provider.  Blood pressure control is important.  Take all medication as prescribed.      Diagnosis: Diabetes  Assessment and Plan of Treatment: D/C Planning:  -Discussed with patient the importance of Carb consistent diet and exercise as tolerated. -Discussed glycemic goal of a1c <7% to prevent microvascular complications of diabetes mellitus and reduce the risk of macrovascular complications.  At home, Patient should check FSBG premeals and bedtime. Pt should call their doctor when FSBG <70 or above >400 and or consistently above 200s as changes in the regimen will have to be made.  -pt should call PMD for DM related questions or concerns until pt is seen by CDE or endocrine  -Recommend annual podiatry and ophthalmology follow up.     -------------------------------------------------------------------------------------------------------------------------------------      Diagnosis: Hypomagnesemia  Assessment and Plan of Treatment:      PRINCIPAL DISCHARGE DIAGNOSIS  Diagnosis: Sepsis  Assessment and Plan of Treatment: Take all antibiotics as ordered.  Call you Health care provider upon arrival home to make a one week follow up appointment.  If you develop fever, chills, malaise, or change in mental status call your Health Care Provider or go to the Emergency Department.  Nutrition is important, eat small frequent meals to help ensure you get adequate calories.  Do not stay in bed all day!  Increase your activity daily as tolerated.        SECONDARY DISCHARGE DIAGNOSES  Diagnosis: ALEJANDRO (acute kidney injury)  Assessment and Plan of Treatment: please follow up with nephrology  Avoid taking (NSAIDs) - (ex: Ibuprofen, Advil, Celebrex, Naprosyn)  Avoid taking any nephrotoxic agents (can harm kidneys) - Intravenous contrast for diagnostic testing, combination cold medications.  Have all medications adjusted for your renal function by your Health Care Provider.  Blood pressure control is important.  Take all medication as prescribed.      Diagnosis: Hypomagnesemia  Assessment and Plan of Treatment:     Diagnosis: Diabetes  Assessment and Plan of Treatment: D/C Planning:  -Discussed with patient the importance of Carb consistent diet and exercise as tolerated. -Discussed glycemic goal of a1c <7% to prevent microvascular complications of diabetes mellitus and reduce the risk of macrovascular complications.  At home, Patient should check FSBG premeals and bedtime. Pt should call their doctor when FSBG <70 or above >400 and or consistently above 200s as changes in the regimen will have to be made.  -pt should call PMD for DM related questions or concerns until pt is seen by CDE or endocrine  -Recommend annual podiatry and ophthalmology follow up.     -------------------------------------------------------------------------------------------------------------------------------------      Diagnosis: Ischemic cardiomyopathy  Assessment and Plan of Treatment: please follow up with Dr viveros ,c/w skylaro, kerry and aldactone    Diagnosis: Chronic osteomyelitis  Assessment and Plan of Treatment: Podiatry Discharge Instructions:  Follow up: Please follow up with Dr. Foss within 1 week of discharge from the hospital, please call [phone] for appointment and discuss that you recently were seen in the hospital.  Wound Care: Please manage wound vac to right foot at 125mmHg with black foam continuously and change 3x per week.  Weight bearing: Please be non weight bearing to right foot.  Antibiotics: Please continue as instructed.    Diagnosis: Chronic atrial fibrillation  Assessment and Plan of Treatment: Atrial fibrillation is the most common heart rhythm problem.  The condition puts you at risk for has stroke and heart attack  You were started on blood thinners to prevent possible clot and stroke complications.   Monitor for any signs of bleeding and avoid injury while on this medication  If any bleeding persistent and symptomatic stop the medication and notify your doctor immediately.  It helps if you control your blood pressure, not drink more than 1-2 alcohol drinks per day, cut down on caffeine, getting treatment for over active thyroid gland, and get regular exercise  Call your doctor if you feel your heart racing or beating unusually, chest tightness or pain, lightheaded, faint, shortness of breath especially with exercise  It is important to take your heart medication as prescribed  You may be on anticoagulation which is very important to take as directed - you may need blood work to monitor drug levels      Diagnosis: HTN (hypertension)  Assessment and Plan of Treatment: Low salt diet  Activity as tolerated.  Take all medication as prescribed.  Follow up with your medical doctor for routine blood pressure monitoring at your next visit.  Notify your doctor if you have any of the following symptoms:   Dizziness, Lightheadedness, Blurry vision, Headache, Chest pain, Shortness of breath

## 2023-10-13 NOTE — PROGRESS NOTE ADULT - ASSESSMENT
_________________________________________________________________________________________  ========>>  M E D I C A L   A T T E N D I N G    F O L L O W  U P  N O T E  <<=========  -----------------------------------------------------------------------------------------------------    - Patient evaluated by me, chart reviewed.  Patient evaluated before surgery, Note written post op  - In summary,  RYLEE HOWE is a 62y year old man admitted with Diabetic foot infection  - Patient today overall doing ok, comfortable,     ==================>> REVIEW OF SYSTEM <<=================    GEN: no fever, no chills,   RESP: no SOB, no cough, no sputum  CVS: no chest pain, no palpitations, no edema  GI: no abdominal pain, no nausea,   : no dysuria, no frequency,  Neuro: no headache, no dizziness  Derm : no itching, no rash    ==================>> PHYSICAL EXAM <<=================    GEN: A&O X 3 , NAD , comfortable, pleasant, calm in bed .. encouraged out of bed to chair with assistance as needed.   HEENT: NCAT, PERRL, MMM, hearing intact  Neck: supple , no JVD appreciated  CVS: S1S2 , regular , No M/R/G appreciated  PULM: CTA B/L,  no W/R/R appreciated  ABD.: soft. non tender, non distended,  bowel sounds present  Extrem: intact pulses , Right foot wound dressed  PSYCH : normal mood,  not anxious         ( Note written / Date of service 10-13-23 ( This is certified to be the same as "ENTERED" date above ( for billing purposes)))    ==================>> MEDICATIONS <<====================    ampicillin/sulbactam  IVPB 3 Gram(s) IV Intermittent every 6 hours  aspirin enteric coated 81 milliGRAM(s) Oral daily  atorvastatin 80 milliGRAM(s) Oral at bedtime  dextrose 5%. 1000 milliLiter(s) IV Continuous <Continuous>  dextrose 5%. 1000 milliLiter(s) IV Continuous <Continuous>  dextrose 50% Injectable 25 Gram(s) IV Push once  dextrose 50% Injectable 12.5 Gram(s) IV Push once  dextrose 50% Injectable 25 Gram(s) IV Push once  furosemide   Injectable 20 milliGRAM(s) IV Push two times a day  glucagon  Injectable 1 milliGRAM(s) IntraMuscular once  insulin lispro (ADMELOG) corrective regimen sliding scale   SubCutaneous three times a day before meals  insulin lispro (ADMELOG) corrective regimen sliding scale   SubCutaneous at bedtime  melatonin 5 milliGRAM(s) Oral at bedtime  metoprolol succinate  milliGRAM(s) Oral daily  sacubitril 24 mG/valsartan 26 mG 1 Tablet(s) Oral two times a day  senna 2 Tablet(s) Oral at bedtime  sodium bicarbonate 650 milliGRAM(s) Oral three times a day  spironolactone 25 milliGRAM(s) Oral daily    MEDICATIONS  (PRN):  acetaminophen     Tablet .. 650 milliGRAM(s) Oral every 6 hours PRN Mild Pain (1 - 3)  dextrose Oral Gel 15 Gram(s) Oral once PRN Blood Glucose LESS THAN 70 milliGRAM(s)/deciliter    ___________  Active diet:  Diet, Consistent Carbohydrate w/Evening Snack:   DASH/TLC Sodium & Cholesterol Restricted (DASH)  ___________________    ==================>> VITAL SIGNS <<==================    Height (cm): 185.4  Weight (kg): 108.5  BMI (kg/m2): 31.6  Vital Signs Last 24 HrsT(C): 36.3 (10-13-23 @ 15:58)  T(F): 97.4 (10-13-23 @ 15:58), Max: 98.1 (10-13-23 @ 11:44)  HR: 60 (10-13-23 @ 18:13) (47 - 78)  BP: 107/68 (10-13-23 @ 18:13)  RR: 18 (10-13-23 @ 15:58) (12 - 18)  SpO2: 96% (10-13-23 @ 15:58) (95% - 99%)      CAPILLARY BLOOD GLUCOSE  POCT Blood Glucose.: 113 mg/dL (13 Oct 2023 17:16)  POCT Blood Glucose.: 121 mg/dL (13 Oct 2023 14:31)  POCT Blood Glucose.: 143 mg/dL (13 Oct 2023 08:47)  POCT Blood Glucose.: 193 mg/dL (12 Oct 2023 21:47)     ==================>> LAB AND IMAGING <<==================                        10.2   4.26  )-----------( 231      ( 13 Oct 2023 07:21 )             32.9        10-13    140  |  99  |  15  ----------------------------<  113<H>  4.4   |  29  |  1.06    Ca    9.5      13 Oct 2023 07:21    WBC count:   4.26 <<== ,  4.96 <<== ,  4.88 <<== ,  5.43 <<== ,  4.46 <<== ,  4.75 <<==   Hemoglobin:   10.2 <<==,  10.1 <<==,  10.2 <<==,  11.3 <<==,  9.9 <<==,  10.4 <<==  platelets:  231 <==, 198 <==, 190 <==, 221 <==, 174 <==, 147 <==, 144 <==    Creatinine:  1.06  <<==, 1.01  <<==, 0.89  <<==, 0.87  <<==, 1.15  <<==  Sodium:   140  <==, 139  <==, 138  <==, 143  <==, 138  <==    ____________________________    M I C R O B I O L O G Y :    Culture - Blood (collected 10 Oct 2023 07:40)  Source: .Blood Blood-Peripheral  Preliminary Report (13 Oct 2023 10:01):    No growth at 72 Hours    Culture - Blood (collected 10 Oct 2023 07:40)  Source: .Blood Blood-Peripheral  Preliminary Report (13 Oct 2023 10:01):    No growth at 72 Hours    Culture - Blood (collected 08 Oct 2023 07:01)  Source: .Blood Blood-Peripheral  Final Report (13 Oct 2023 10:00):    No growth at 5 days    Culture - Blood (collected 08 Oct 2023 07:01)  Source: .Blood Blood-Peripheral  Final Report (13 Oct 2023 10:00):    No growth at 5 days    Culture - Urine (collected 06 Oct 2023 16:25)  Source: Clean Catch Clean Catch (Midstream)  Final Report (07 Oct 2023 15:13):    <10,000 CFU/mL Normal Urogenital Mona    Culture - Abscess with Gram Stain (collected 06 Oct 2023 15:12)  Source: .Abscess right foot  Gram Stain (06 Oct 2023 22:45):    Rare polymorphonuclear leukocytes per low power field    Numerous Gram Variable Rods per oil power field  Final Report (11 Oct 2023 17:25):    Moderate Gram Positive Rods Most closely resembling Arcanobacterium    haemolyticum "Susceptibilities not performed"    Moderate Streptococcus agalactiae (Group B) isolated    Group B streptococci are susceptible to ampicillin,    penicillin and cefazolin,but may be resistant to    erythromycin and clindamycin.    Culture - Blood (collected 06 Oct 2023 06:28)  Source: .Blood Blood-Peripheral  Gram Stain (06 Oct 2023 19:46):    Growth in aerobic and anaerobic bottles: Gram Positive Cocci in Pairs and    Chains  Final Report (08 Oct 2023 11:33):    Growth in aerobic and anaerobic bottles: Streptococcus agalactiae (Group    B)    Direct identification is available within approximately 3-5    hours either by Blood Panel Multiplexed PCR or Direct    MALDI-TOF. Details: [email or web address]  Organism: Blood Culture PCR  Streptococcus agalactiae (Group B) (08 Oct 2023 11:33)  Organism: Streptococcus agalactiae (Group B) (08 Oct 2023 11:33)    Sensitivities:      -  Levofloxacin: S 1      -  Clindamycin: S <=0.06      -  Vancomycin: S 0.5      -  Ceftriaxone: S <=0.25      Method Type: HEATHER      -  Penicillin: S 0.06 Predicts results for ampicillin, amoxicillin, amoxicillin/clavulanate, ampicillin/sulbactam, 1st, 2nd and 3rd generation cephalosporins and carbapenems.  Organism: Blood Culture PCR (08 Oct 2023 11:33)    Sensitivities:      -  Streptococcus agalactiae (Group B): Detec      Method Type: PCR        < from: CHLOE W or WO Ultrasound Enhancing Agent (10.12.23 @ 13:45) >  CONCLUSIONS:    1. Left ventricular systolic function is severely decreased with an ejection fraction visually estimated at <20 %.   2. Moderate mitral regurgitation.   3. No pericardial effusion seen.   4. No echocardiographic evidence of valvular endocarditis.      No vegetation on the visible segments of the device leads.   5. Compared to the transthoracic echocardiogram performed on 10/9/2023 there have been no significant interval changes.   6. Minimal (grade 1) spontaneous echo contrast located in the left atrial apendage and minimal (grade 1) spontaneous echo contrast located in the left atrium.   7. No evidence of left atrial or left atrial appendage thrombus. The left atrial appendage emptying velocity is low.   8. Symmetric mitral valve leaflet tethering.  < end of copied text >    < from: Cardiac Catheterization (10.11.23 @ 11:23) >  Procedures:                 1.    Ultrasound Guided Access   2.    Arterial Access - Left Femoral   3.    Diagnostic Coronary Angiography   4.    Diagnostic Graft Angiography   5.    Left Heart Cath   6.    Aortogram     Indications:                Preoperative evaluation for extracardiac  surgery  Non-sustained ventricular tachycardia     Lab Visit Indication:       ExCardEv, NonSVT     Diagnostic Conclusions:   Severe three vessel obstructive coronary artery disease   Chronic total occlusion of the right coronary artery   --Left to right, Rentrop grade 1-2 filling of the posterior descending  artery/posterolateral artery  Patent LIMA to the left anterior descending artery   Patent SVG to the obtuse marginal artery   Patent SVG to the first diagonal artery   Normal left main coronary artery   Right dominant system   LVEDP = 22mmHg   No gradient across the aortic valve    Recommendations:   Keep left leg straight for 4 hours following removal of sheath   Continue aggressive medical management of coronary artery disease and  associated risk factors  Gentle IV hydration as per protocol   < end of copied text >    __________________________________________________________________________________  ===============>>  A S S E S S M E N T   A N D   P L A N <<===============  ------------------------------------------------------------------------------------------    · Assessment	  62yr M with PMH of  HTN,   AFIB,  DM/. right foot  osteo (Recently completed IV antibiotics via PICC line) ,CAD, s/p  cabg / AICD,hx of Sommers fracture, non-union many years ago,   prior CT:  c/c  transverse   fx, through the fifth metatarsal base. soft tissue wound/ cortical irregularity along the fifth metatarsal base  wound cx   MSSA and fingoldia,  s/p I&D with necrotic tissue close to bone,suspicion for residual osteo  completed   ancef 2 gms IVSS q 8 hours .  last  month     now  admitted with fevers/  right foot  wound/  and  shanda   Rt  5th metatarsal  wound/  foot  cellulitis >> Now with bacteremia    multiple specialists following, appreciated    HTN/  HLD  CAD/   cardiomyopathy,  ef  25 Post AICD (as per patient not MRI compatible), VT, Afib,  on xarelo/  , anemia  DM,  on  farxiga. januvia. metformin    hold  lasix/  farxiga/  entresto, for  now/ farxiga  not  recommended  with osteo  foot/ ha s risk  for  amputation    bacteremia  Gp  BsStrep,  , on IV antibiotics iD  following  >> No evidence of endocarditis on CHLOE  Follow surgical cultures  vascular angiogram not needed per vascular team  amputation by podiatry today >> Resume Anticoagulation as able post op      cardiology following >> medications adjusted given PVC and NSVT / VT       post cath as above with no intervention         continue medical optimization per cardio   continuing the antibiotics per ID   -GI/DVT Prophylaxis per protocol.    --------------------------------------------  Case discussed with Patient,   Education given on findings and plan of care  ___________________________  H. RACHAEL Boudreaux.  Pager: [phone]

## 2023-10-13 NOTE — PROGRESS NOTE ADULT - SUBJECTIVE AND OBJECTIVE BOX
Podiatry Pager #: [phone] @ [place] and [phone] at [facility], please page 10 digits full number.    Patient is a 62y old  Male who presents with a chief complaint of fevers/  foot infection (12 Oct 2023 19:43)      INTERVAL HPI/OVERNIGHT EVENTS:   Pt is scheduled for R foot 5th met base resection with Dr. Foss at 1230. Patient is aware of procedure and is NPO since midnight.    MEDICATIONS  (STANDING):  ampicillin/sulbactam  IVPB 3 Gram(s) IV Intermittent every 6 hours  ampicillin/sulbactam  IVPB      aspirin enteric coated 81 milliGRAM(s) Oral daily  atorvastatin 80 milliGRAM(s) Oral at bedtime  dextrose 5%. 1000 milliLiter(s) (100 mL/Hr) IV Continuous <Continuous>  dextrose 5%. 1000 milliLiter(s) (50 mL/Hr) IV Continuous <Continuous>  dextrose 50% Injectable 25 Gram(s) IV Push once  dextrose 50% Injectable 25 Gram(s) IV Push once  dextrose 50% Injectable 12.5 Gram(s) IV Push once  furosemide   Injectable 20 milliGRAM(s) IV Push two times a day  glucagon  Injectable 1 milliGRAM(s) IntraMuscular once  heparin  Infusion. 2400 Unit(s)/Hr (24 mL/Hr) IV Continuous <Continuous>  insulin lispro (ADMELOG) corrective regimen sliding scale   SubCutaneous three times a day before meals  melatonin 5 milliGRAM(s) Oral at bedtime  metoprolol succinate  milliGRAM(s) Oral daily  polyethylene glycol 3350 17 Gram(s) Oral daily  sacubitril 24 mG/valsartan 26 mG 1 Tablet(s) Oral two times a day  senna 2 Tablet(s) Oral at bedtime  sodium bicarbonate 650 milliGRAM(s) Oral three times a day  spironolactone 25 milliGRAM(s) Oral daily    MEDICATIONS  (PRN):  acetaminophen     Tablet .. 650 milliGRAM(s) Oral every 6 hours PRN Temp greater or equal to 38C (100.4F), Mild Pain (1 - 3)  dextrose Oral Gel 15 Gram(s) Oral once PRN Blood Glucose LESS THAN 70 milliGRAM(s)/deciliter  heparin   Injectable 9000 Unit(s) IV Push every 6 hours PRN For aPTT less than 40  heparin   Injectable 4000 Unit(s) IV Push every 6 hours PRN For aPTT between 40 - 57      Allergies    No Known Allergies    Intolerances        Vital Signs Last 24 Hrs  T(C): 36.4 (13 Oct 2023 04:49), Max: 36.7 (12 Oct 2023 15:05)  T(F): 97.5 (13 Oct 2023 04:49), Max: 98.1 (12 Oct 2023 16:17)  HR: 67 (13 Oct 2023 04:49) (59 - 78)  BP: 100/66 (13 Oct 2023 04:49) (100/66 - 120/57)  BP(mean): 81 (12 Oct 2023 14:22) (81 - 81)  RR: 18 (13 Oct 2023 04:49) (18 - 18)  SpO2: 97% (13 Oct 2023 04:49) (92% - 100%)    Parameters below as of 13 Oct 2023 04:49  Patient On (Oxygen Delivery Method): room air        LABS:                        10.1   4.96  )-----------( 198      ( 12 Oct 2023 07:55 )             32.0     10-12    139  |  101  |  15  ----------------------------<  142<H>  4.3   |  25  |  1.01    Ca    9.2      12 Oct 2023 07:55  Mg     1.8     10-11      PT/INR - ( 11 Oct 2023 07:27 )   PT: 14.1 sec;   INR: 1.29 ratio         PTT - ( 12 Oct 2023 07:55 )  PTT:76.1 sec  Urinalysis Basic - ( 12 Oct 2023 07:55 )    Color: x / Appearance: x / SG: x / pH: x  Gluc: 142 mg/dL / Ketone: x  / Bili: x / Urobili: x   Blood: x / Protein: x / Nitrite: x   Leuk Esterase: x / RBC: x / WBC x   Sq Epi: x / Non Sq Epi: x / Bacteria: x      CAPILLARY BLOOD GLUCOSE      POCT Blood Glucose.: 193 mg/dL (12 Oct 2023 21:47)  POCT Blood Glucose.: 141 mg/dL (12 Oct 2023 17:19)  POCT Blood Glucose.: 131 mg/dL (12 Oct 2023 12:31)  POCT Blood Glucose.: 133 mg/dL (12 Oct 2023 08:39)      RADIOLOGY & ADDITIONAL TESTS:    Plan:   To OR today  for R foot 5th met base resection and PT tenotomy with Dr. Foss at 1230 .   CXR on sunrise.EKG on sunrise.  Medical  clearance since 10/12 and documented in chart.  Cards clearance pending AM eval  Consent signed and in chart.    Procedure was explained to patient in detail. All alternatives, risks and complications were discussed. All questions answered.

## 2023-10-13 NOTE — PROGRESS NOTE ADULT - SUBJECTIVE AND OBJECTIVE BOX
Patient is a 62y Male whom presented to the hospital with shanda     PAST MEDICAL & SURGICAL HISTORY:  Diabetes      Chronic osteomyelitis      H/O heart failure      No significant past surgical history          MEDICATIONS  (STANDING):  aspirin enteric coated 81 milliGRAM(s) Oral daily  atorvastatin 80 milliGRAM(s) Oral at bedtime  dextrose 5%. 1000 milliLiter(s) (100 mL/Hr) IV Continuous <Continuous>  dextrose 5%. 1000 milliLiter(s) (50 mL/Hr) IV Continuous <Continuous>  dextrose 50% Injectable 25 Gram(s) IV Push once  dextrose 50% Injectable 25 Gram(s) IV Push once  dextrose 50% Injectable 12.5 Gram(s) IV Push once  glucagon  Injectable 1 milliGRAM(s) IntraMuscular once  insulin lispro (ADMELOG) corrective regimen sliding scale   SubCutaneous three times a day before meals  metoprolol succinate ER 25 milliGRAM(s) Oral daily  piperacillin/tazobactam IVPB.. 3.375 Gram(s) IV Intermittent every 8 hours  rivaroxaban 20 milliGRAM(s) Oral with dinner  sodium bicarbonate 650 milliGRAM(s) Oral three times a day      Allergies    No Known Allergies    Intolerances        SOCIAL HISTORY:  Denies ETOh,Smoking,     FAMILY HISTORY:      REVIEW OF SYSTEMS:    CONSTITUTIONAL: No weakness, fevers or chills  EYES/ENT: No visual changes;  no throat pain   NECK: No pain or stiffness  RESPIRATORY: No cough, wheezing, hemoptysis; No shortness of breath                                                  10.2   4.26  )-----------( 231      ( 13 Oct 2023 07:21 )             32.9       CBC Full  -  ( 13 Oct 2023 07:21 )  WBC Count : 4.26 K/uL  RBC Count : 3.90 M/uL  Hemoglobin : 10.2 g/dL  Hematocrit : 32.9 %  Platelet Count - Automated : 231 K/uL  Mean Cell Volume : 84.4 fl  Mean Cell Hemoglobin : 26.2 pg  Mean Cell Hemoglobin Concentration : 31.0 gm/dL  Auto Neutrophil # : 2.17 K/uL  Auto Lymphocyte # : 1.34 K/uL  Auto Monocyte # : 0.51 K/uL  Auto Eosinophil # : 0.13 K/uL  Auto Basophil # : 0.06 K/uL  Auto Neutrophil % : 50.8 %  Auto Lymphocyte % : 31.5 %  Auto Monocyte % : 12.0 %  Auto Eosinophil % : 3.1 %  Auto Basophil % : 1.4 %      10-13    140  |  99  |  15  ----------------------------<  113<H>  4.4   |  29  |  1.06    Ca    9.5      13 Oct 2023 07:21        CAPILLARY BLOOD GLUCOSE      POCT Blood Glucose.: 113 mg/dL (13 Oct 2023 17:16)  POCT Blood Glucose.: 121 mg/dL (13 Oct 2023 14:31)  POCT Blood Glucose.: 143 mg/dL (13 Oct 2023 08:47)  POCT Blood Glucose.: 193 mg/dL (12 Oct 2023 21:47)      Vital Signs Last 24 Hrs  T(C): 36.3 (13 Oct 2023 15:58), Max: 36.7 (13 Oct 2023 11:44)  T(F): 97.4 (13 Oct 2023 15:58), Max: 98.1 (13 Oct 2023 11:44)  HR: 60 (13 Oct 2023 18:13) (47 - 78)  BP: 107/68 (13 Oct 2023 18:13) (97/63 - 112/68)  BP(mean): 71 (13 Oct 2023 15:15) (71 - 86)  RR: 18 (13 Oct 2023 15:58) (12 - 18)  SpO2: 96% (13 Oct 2023 15:58) (95% - 99%)    Parameters below as of 13 Oct 2023 15:58  Patient On (Oxygen Delivery Method): room air        Urinalysis Basic - ( 13 Oct 2023 07:21 )    Color: x / Appearance: x / SG: x / pH: x  Gluc: 113 mg/dL / Ketone: x  / Bili: x / Urobili: x   Blood: x / Protein: x / Nitrite: x   Leuk Esterase: x / RBC: x / WBC x   Sq Epi: x / Non Sq Epi: x / Bacteria: x        PT/INR - ( 13 Oct 2023 07:20 )   PT: 15.1 sec;   INR: 1.39 ratio         PTT - ( 13 Oct 2023 07:20 )  PTT:77.3 sec              PHYSICAL EXAM:    Constitutional: NAD  HEENT: conjunctive   clear   Neck:  No JVD  Respiratory: CTAB  Cardiovascular: S1 and S2  Gastrointestinal: BS+, soft, NT/ND  Extremities: No peripheral edema  Neurological: A/O x 3, no focal deficits  Psychiatric: Normal mood, normal affect  : No Atkins  Skin: No rashes  Access: Not applicable

## 2023-10-13 NOTE — PROGRESS NOTE ADULT - SUBJECTIVE AND OBJECTIVE BOX
Chief complaint  Patient is a 62y old  Male who presents with a chief complaint of fevers/  foot infection (13 Oct 2023 09:40)         Labs and Fingersticks  CAPILLARY BLOOD GLUCOSE      POCT Blood Glucose.: 143 mg/dL (13 Oct 2023 08:47)  POCT Blood Glucose.: 193 mg/dL (12 Oct 2023 21:47)  POCT Blood Glucose.: 141 mg/dL (12 Oct 2023 17:19)  POCT Blood Glucose.: 131 mg/dL (12 Oct 2023 12:31)      Anion Gap: 12 (10-13 @ 07:21)  Anion Gap: 13 (10-12 @ 07:55)      Calcium: 9.5 (10-13 @ 07:21)  Calcium: 9.2 (10-12 @ 07:55)          10-13    140  |  99  |  15  ----------------------------<  113<H>  4.4   |  29  |  1.06    Ca    9.5      13 Oct 2023 07:21                          10.2   4.26  )-----------( 231      ( 13 Oct 2023 07:21 )             32.9     Medications  MEDICATIONS  (STANDING):  ampicillin/sulbactam  IVPB 3 Gram(s) IV Intermittent every 6 hours  ampicillin/sulbactam  IVPB      aspirin enteric coated 81 milliGRAM(s) Oral daily  atorvastatin 80 milliGRAM(s) Oral at bedtime  dextrose 5%. 1000 milliLiter(s) (100 mL/Hr) IV Continuous <Continuous>  dextrose 5%. 1000 milliLiter(s) (50 mL/Hr) IV Continuous <Continuous>  dextrose 50% Injectable 25 Gram(s) IV Push once  dextrose 50% Injectable 25 Gram(s) IV Push once  dextrose 50% Injectable 12.5 Gram(s) IV Push once  furosemide   Injectable 20 milliGRAM(s) IV Push two times a day  glucagon  Injectable 1 milliGRAM(s) IntraMuscular once  insulin lispro (ADMELOG) corrective regimen sliding scale   SubCutaneous three times a day before meals  melatonin 5 milliGRAM(s) Oral at bedtime  metoprolol succinate  milliGRAM(s) Oral daily  polyethylene glycol 3350 17 Gram(s) Oral daily  sacubitril 24 mG/valsartan 26 mG 1 Tablet(s) Oral two times a day  senna 2 Tablet(s) Oral at bedtime  sodium bicarbonate 650 milliGRAM(s) Oral three times a day  spironolactone 25 milliGRAM(s) Oral daily      Physical Exam  General: Patient comfortable in bed   Vital Signs Last 12 Hrs  T(F): 97.5 (10-13-23 @ 04:49), Max: 97.5 (10-13-23 @ 04:49)  HR: 67 (10-13-23 @ 04:49) (67 - 73)  BP: 100/66 (10-13-23 @ 04:49) (100/66 - 108/68)  BP(mean): --  RR: 18 (10-13-23 @ 04:49) (18 - 18)  SpO2: 97% (10-13-23 @ 04:49) (96% - 97%)    CVS: S1S2   Respiratory: No wheezing, no crepitations  GI: Abdomen soft, bowel sounds positive  Musculoskeletal:  moves all extremities  : Voiding        Chief complaint  Patient is a 62y old  Male who presents with a chief complaint of fevers/  foot infection (13 Oct 2023 09:40)         Labs and Fingersticks  CAPILLARY BLOOD GLUCOSE      POCT Blood Glucose.: 143 mg/dL (13 Oct 2023 08:47)  POCT Blood Glucose.: 193 mg/dL (12 Oct 2023 21:47)  POCT Blood Glucose.: 141 mg/dL (12 Oct 2023 17:19)  POCT Blood Glucose.: 131 mg/dL (12 Oct 2023 12:31)      Anion Gap: 12 (10-13 @ 07:21)  Anion Gap: 13 (10-12 @ 07:55)      Calcium: 9.5 (10-13 @ 07:21)  Calcium: 9.2 (10-12 @ 07:55)          10-13    140  |  99  |  15  ----------------------------<  113<H>  4.4   |  29  |  1.06    Ca    9.5      13 Oct 2023 07:21                          10.2   4.26  )-----------( 231      ( 13 Oct 2023 07:21 )             32.9     Medications  MEDICATIONS  (STANDING):  ampicillin/sulbactam  IVPB 3 Gram(s) IV Intermittent every 6 hours  ampicillin/sulbactam  IVPB      aspirin enteric coated 81 milliGRAM(s) Oral daily  atorvastatin 80 milliGRAM(s) Oral at bedtime  dextrose 5%. 1000 milliLiter(s) (100 mL/Hr) IV Continuous <Continuous>  dextrose 5%. 1000 milliLiter(s) (50 mL/Hr) IV Continuous <Continuous>  dextrose 50% Injectable 25 Gram(s) IV Push once  dextrose 50% Injectable 25 Gram(s) IV Push once  dextrose 50% Injectable 12.5 Gram(s) IV Push once  furosemide   Injectable 20 milliGRAM(s) IV Push two times a day  glucagon  Injectable 1 milliGRAM(s) IntraMuscular once  insulin lispro (ADMELOG) corrective regimen sliding scale   SubCutaneous three times a day before meals  melatonin 5 milliGRAM(s) Oral at bedtime  metoprolol succinate  milliGRAM(s) Oral daily  polyethylene glycol 3350 17 Gram(s) Oral daily  sacubitril 24 mG/valsartan 26 mG 1 Tablet(s) Oral two times a day  senna 2 Tablet(s) Oral at bedtime  sodium bicarbonate 650 milliGRAM(s) Oral three times a day  spironolactone 25 milliGRAM(s) Oral daily      Physical Exam  General: Patient comfortable in bed   Vital Signs Last 12 Hrs  T(F): 97.5 (10-13-23 @ 04:49), Max: 97.5 (10-13-23 @ 04:49)  HR: 67 (10-13-23 @ 04:49) (67 - 73)  BP: 100/66 (10-13-23 @ 04:49) (100/66 - 108/68)  BP(mean): --  RR: 18 (10-13-23 @ 04:49) (18 - 18)  SpO2: 97% (10-13-23 @ 04:49) (96% - 97%)    CVS: S1S2   Respiratory: No wheezing, no crepitations  GI: Abdomen soft, bowel sounds positive  Musculoskeletal:  moves all extremities  : Voiding

## 2023-10-13 NOTE — DISCHARGE NOTE PROVIDER - HOSPITAL COURSE
HPI:  62yr M      hx of  HTN,  C/C  AFIB,  DM/  right foot  osteo  completed recent course of antibiotics via PICC for rt foot infection / last month   p/w erythema and subjective fevers at home   seen  by podiatry,  in  er   denies  cp/sob/abd  pain     (06 Oct 2023 09:29)    Hospital Course: 62yr M with PMH of  HTN,   AFIB,  DM/. right foot  osteo (Recently completed IV antibiotics via PICC line) ,CAD, s/p  cabg / AICD,hx of Sommers fracture, non-union many years ago,   prior CT:  c/c  transverse   fx, through the fifth metatarsal base. soft tissue wound/ cortical irregularity along the fifth metatarsal base  wound cx   MSSA and fingoldia,  s/p I&D with necrotic tissue close to bone,suspicion for residual osteo  completed   ancef 2 gms IVSS q 8 hours  last  month; now  admitted with fevers/  right foot  wound/  and  shanda. PAtient with Rt  5th metatarsal  wound/  foot  cellulitis >> Now with bacteremia; positive blood cultures growing streptocococus agalactiae.. Patient seen by podiatry and s/p  infectious disease . As per podiatry- 10/13 s/p right foot 5th metatarsal base resection, open: distal and proximal sutures intact, central wound has granular wound bed, mild sanguinous drainage, no hematoma formation, periwound erythema resolving, no purulence. Intraop Findings: Low concern for residual infection and viability.  - Right Foot Clean Bone Margin Culture: No growth prelim, Right foot wound VAC to be re-applied today 10/18. As per ID patient with right foot erythema/OSTEOMYELITIS AT THIS POINT WOULD D/C HOME ON IV CEFTRIAXONE 2 GMS IVSS DAILY. Patient WILL NEED WEEKLY ESR, CRP, CMP, CBC WITH DIFF .Pt was seen by cardiology for #Group B strep bacteremia concerning as grew so quickly as patient with an AICD cardiac echo noted likely source is the foot MRSA swab negative, no need for AICD extraction; Patient  s/p AICD shock  , beta blocker increased to 100 mg daily as per cardiology , pt can not tolerate AMIO was tried before cardiac cath noted with patent graft, increase EDP, need to increase Lasix dose, observe bp closely, excellent uo .pt is a high risk for surgery, unless other way of medical therapy Patient with diabetes seen by endocrinology advised to continue metformin. PAtient had aTEE ,  No evidence of endocarditis. Patient is medically cleared for dfischarge and outpatient follow up with infectious disease and podiatry.          Important Medication Changes and Reason:    Active or Pending Issues Requiring Follow-up:    Advanced Directives:   [ ] Full code  [ ] DNR  [ ] Hospice    Discharge Diagnoses:           HPI:  62yr M      hx of  HTN,  C/C  AFIB,  DM/  right foot  osteo  completed recent course of antibiotics via PICC for rt foot infection / last month   p/w erythema and subjective fevers at home   seen  by podiatry,  in  er   denies  cp/sob/abd  pain     (06 Oct 2023 09:29)    Hospital Course: 62yr M with PMH of  HTN,   AFIB,  DM/. right foot  osteo (Recently completed IV antibiotics via PICC line) ,CAD, s/p  cabg / AICD,hx of Sommers fracture, non-union many years ago,   prior CT:  c/c  transverse   fx, through the fifth metatarsal base. soft tissue wound/ cortical irregularity along the fifth metatarsal base  wound cx   MSSA and fingoldia,  s/p I&D with necrotic tissue close to bone,suspicion for residual osteo  completed   ancef 2 gms IVSS q 8 hours  last  month; now  admitted with fevers/  right foot  wound/  and  alejandro. PAtient with Rt  5th metatarsal  wound/  foot  cellulitis >> Now with bacteremia; positive blood cultures growing streptocococus agalactiae.. Patient seen by podiatry and s/p  infectious disease . As per podiatry- 10/13 s/p right foot 5th metatarsal base resection, open: distal and proximal sutures intact, central wound has granular wound bed, mild sanguinous drainage, no hematoma formation, periwound erythema resolving, no purulence. Intraop Findings: Low concern for residual infection and viability.  - Right Foot Clean Bone Margin Culture: No growth prelim, Right foot wound VAC to be re-applied today 10/18. As per ID patient with right foot erythema/OSTEOMYELITIS AT THIS POINT WOULD D/C HOME ON IV CEFTRIAXONE 2 GMS IVSS DAILY. Patient WILL NEED WEEKLY ESR, CRP, CMP, CBC WITH DIFF .Pt was seen by cardiology for #Group B strep bacteremia concerning as grew so quickly as patient with an AICD cardiac echo noted likely source is the foot MRSA swab negative, no need for AICD extraction; Patient  s/p AICD shock  , beta blocker increased to 100 mg daily as per cardiology , pt can not tolerate AMIO was tried before cardiac cath noted with patent graft, increase EDP, need to increase Lasix dose, observe bp closely, excellent uo .pt is a high risk for surgery, unless other way of medical therapy Patient with diabetes seen by endocrinology advised to continue metformin. PAtient had aTEE ,  No evidence of endocarditis. Patient is medically cleared for dfischarge and outpatient follow up with infectious disease and podiatry.        Active or Pending Issues Requiring Follow-up:  Follow up with podiatry, ID, renal, and cardiology as outpatient    Advanced Directives:   [ ] Full code  [ ] DNR  [ ] Hospice    Discharge Diagnoses:  Sepsis  chronic a fib  HTN   chronic OM  Ischemic cardiomyopathy  ALEJANDRO  DM

## 2023-10-13 NOTE — PROGRESS NOTE ADULT - ASSESSMENT
Patient is our 62M who is consulted for a diabetic foot infection of the right, dorsal lateral foot. Past medical history of DM, defibrillator, CAD post CABG, CHF, hx of non-union Sommers fracture around 2014, right upper extremity infection in June 2023 s/p 1 month of daptomycin and ceftriaxone.     Patient noticed an ulcer on his lateral right foot for the past several weeks. Over the past few days right foot has been having increased edema, erythema and pain on ambulation. Temperature at home was 101-102. Denies recent fall or trauma. states denies n/v/d, CP, SOB, HA, dizziness, abdominal pain, urinary symptoms, cough, leg pain, swelling. Patient endorses stools have become darker.     On August 1, 2023, the patient saw podiatrist Dr. Foss for the foot infection and bone biopsy came back positive for staph. He began treatment with levaquin but his symptoms progressed and patient came to the ED for further evaluation.      ON 8/8 pt underwent  R foot Incision and Drainage and partial 5th met base resection  Pt grew mssa and finegolda  pt received 6 weeks IV abs at home    Pt was doing well at home . Tuesday pt had evaluation at podiatrist and all looked well  wednesday pt noted foot pain and by thursday pt was having fevers and chills  Pt stephen noted to have Group B strep bacteremia      A/P   #Group B strep bacteremia  concerning as grew so quickly   changed abs to unasyn- this would also cover previous cultured pathogens  Check repeat BC q 48 hours until clears  cardiology following - as patient with an AICD  cardiac echo noted  likely source is the foot  MRSA swab negative  hold further vancomycin      #right foot erythema  pt is s/p partial resection of right foot 5th metatarsal bone, open  concerned for residual osteo   PT's AICD is not MRI compatible  appreciate podiatry in put      #hypotension  h/o CHF  maybe in part form sepsis  cardiac echo noted  cardiology follow up appreciated     #elevated creatinine  dose meds per renal function      Lori Bennett M.D. ,   please reach via teams   If no answer, or after 5PM/ weekends,  then please call  [phone]    Assessment and plan discussed with the primary team .    ID service will be covering over the weekend. Please call for acute issues or questions. [phone]

## 2023-10-13 NOTE — DISCHARGE NOTE PROVIDER - PROVIDER TOKENS
PROVIDER:[TOKEN:[[id number]],FOLLOWUP:[1 week]],PROVIDER:[TOKEN:[[id number]],FOLLOWUP:[2 weeks]],PROVIDER:[TOKEN:[[id number]],FOLLOWUP:[2 weeks],ESTABLISHEDPATIENT:[T]],PROVIDER:[TOKEN:[[id number]],FOLLOWUP:[2 weeks]]

## 2023-10-13 NOTE — PROGRESS NOTE ADULT - SUBJECTIVE AND OBJECTIVE BOX
Podiatry Pager #: [phone] @ NS and [phone] at [facility], please page 10 digits full number.    Patient is a 62y old  Male who presents with a chief complaint of fevers/  foot infection (12 Oct 2023 19:43)      INTERVAL HPI/OVERNIGHT EVENTS:   Pt is scheduled for R foot 5th met base resection with Dr. Foss at 1230. Patient is aware of procedure and is NPO since midnight.    MEDICATIONS  (STANDING):  ampicillin/sulbactam  IVPB 3 Gram(s) IV Intermittent every 6 hours  ampicillin/sulbactam  IVPB      aspirin enteric coated 81 milliGRAM(s) Oral daily  atorvastatin 80 milliGRAM(s) Oral at bedtime  dextrose 5%. 1000 milliLiter(s) (100 mL/Hr) IV Continuous <Continuous>  dextrose 5%. 1000 milliLiter(s) (50 mL/Hr) IV Continuous <Continuous>  dextrose 50% Injectable 25 Gram(s) IV Push once  dextrose 50% Injectable 25 Gram(s) IV Push once  dextrose 50% Injectable 12.5 Gram(s) IV Push once  furosemide   Injectable 20 milliGRAM(s) IV Push two times a day  glucagon  Injectable 1 milliGRAM(s) IntraMuscular once  heparin  Infusion. 2400 Unit(s)/Hr (24 mL/Hr) IV Continuous <Continuous>  insulin lispro (ADMELOG) corrective regimen sliding scale   SubCutaneous three times a day before meals  melatonin 5 milliGRAM(s) Oral at bedtime  metoprolol succinate  milliGRAM(s) Oral daily  polyethylene glycol 3350 17 Gram(s) Oral daily  sacubitril 24 mG/valsartan 26 mG 1 Tablet(s) Oral two times a day  senna 2 Tablet(s) Oral at bedtime  sodium bicarbonate 650 milliGRAM(s) Oral three times a day  spironolactone 25 milliGRAM(s) Oral daily    MEDICATIONS  (PRN):  acetaminophen     Tablet .. 650 milliGRAM(s) Oral every 6 hours PRN Temp greater or equal to 38C (100.4F), Mild Pain (1 - 3)  dextrose Oral Gel 15 Gram(s) Oral once PRN Blood Glucose LESS THAN 70 milliGRAM(s)/deciliter  heparin   Injectable 9000 Unit(s) IV Push every 6 hours PRN For aPTT less than 40  heparin   Injectable 4000 Unit(s) IV Push every 6 hours PRN For aPTT between 40 - 57      Allergies    No Known Allergies    Intolerances        Vital Signs Last 24 Hrs  T(C): 36.4 (13 Oct 2023 04:49), Max: 36.7 (12 Oct 2023 15:05)  T(F): 97.5 (13 Oct 2023 04:49), Max: 98.1 (12 Oct 2023 16:17)  HR: 67 (13 Oct 2023 04:49) (59 - 78)  BP: 100/66 (13 Oct 2023 04:49) (100/66 - 120/57)  BP(mean): 81 (12 Oct 2023 14:22) (81 - 81)  RR: 18 (13 Oct 2023 04:49) (18 - 18)  SpO2: 97% (13 Oct 2023 04:49) (92% - 100%)    Parameters below as of 13 Oct 2023 04:49  Patient On (Oxygen Delivery Method): room air        LABS:                        10.1   4.96  )-----------( 198      ( 12 Oct 2023 07:55 )             32.0     10-12    139  |  101  |  15  ----------------------------<  142<H>  4.3   |  25  |  1.01    Ca    9.2      12 Oct 2023 07:55  Mg     1.8     10-11      PT/INR - ( 11 Oct 2023 07:27 )   PT: 14.1 sec;   INR: 1.29 ratio         PTT - ( 12 Oct 2023 07:55 )  PTT:76.1 sec  Urinalysis Basic - ( 12 Oct 2023 07:55 )    Color: x / Appearance: x / SG: x / pH: x  Gluc: 142 mg/dL / Ketone: x  / Bili: x / Urobili: x   Blood: x / Protein: x / Nitrite: x   Leuk Esterase: x / RBC: x / WBC x   Sq Epi: x / Non Sq Epi: x / Bacteria: x      CAPILLARY BLOOD GLUCOSE      POCT Blood Glucose.: 193 mg/dL (12 Oct 2023 21:47)  POCT Blood Glucose.: 141 mg/dL (12 Oct 2023 17:19)  POCT Blood Glucose.: 131 mg/dL (12 Oct 2023 12:31)  POCT Blood Glucose.: 133 mg/dL (12 Oct 2023 08:39)      RADIOLOGY & ADDITIONAL TESTS:    Plan:   To OR today  for R foot 5th met base resection and PT tenotomy with Dr. Foss at 1230 .   CXR on sunrise.EKG on sunrise.  Medical  clearance since 10/12 and documented in chart.  Cards clearance pending AM eval  Consent signed and in chart.    Procedure was explained to patient in detail. All alternatives, risks and complications were discussed. All questions answered.

## 2023-10-13 NOTE — PROGRESS NOTE ADULT - SUBJECTIVE AND OBJECTIVE BOX
Patient is a 62y old  Male who presents with a chief complaint of fevers/  foot infection (13 Oct 2023 14:21)    Being followed by ID for        Interval history:  pt s/p procedure earlier  pt is s/p partial resection of right foot 5th metatarsal bone, open  - low concern for residual infection, bone hard and of good quality  - low concern for viability, adequate intraoperative bleeding  pt feeling ok    No other acute events          PAST MEDICAL & SURGICAL HISTORY:  Diabetes      Chronic osteomyelitis      H/O heart failure      No significant past surgical history        Allergies    No Known Allergies    Intolerances      Antimicrobials:    ampicillin/sulbactam  IVPB 3 Gram(s) IV Intermittent every 6 hours    MEDICATIONS  (STANDING):  ampicillin/sulbactam  IVPB 3 Gram(s) IV Intermittent every 6 hours  aspirin enteric coated 81 milliGRAM(s) Oral daily  atorvastatin 80 milliGRAM(s) Oral at bedtime  dextrose 5%. 1000 milliLiter(s) (100 mL/Hr) IV Continuous <Continuous>  dextrose 5%. 1000 milliLiter(s) (50 mL/Hr) IV Continuous <Continuous>  dextrose 50% Injectable 12.5 Gram(s) IV Push once  dextrose 50% Injectable 25 Gram(s) IV Push once  dextrose 50% Injectable 25 Gram(s) IV Push once  furosemide   Injectable 20 milliGRAM(s) IV Push two times a day  glucagon  Injectable 1 milliGRAM(s) IntraMuscular once  insulin lispro (ADMELOG) corrective regimen sliding scale   SubCutaneous three times a day before meals  insulin lispro (ADMELOG) corrective regimen sliding scale   SubCutaneous at bedtime  melatonin 5 milliGRAM(s) Oral at bedtime  metoprolol succinate  milliGRAM(s) Oral daily  sacubitril 24 mG/valsartan 26 mG 1 Tablet(s) Oral two times a day  senna 2 Tablet(s) Oral at bedtime  sodium bicarbonate 650 milliGRAM(s) Oral three times a day  spironolactone 25 milliGRAM(s) Oral daily      Vital Signs Last 24 Hrs  T(C): 36.3 (10-13-23 @ 15:58), Max: 36.7 (10-13-23 @ 11:44)  T(F): 97.4 (10-13-23 @ 15:58), Max: 98.1 (10-13-23 @ 11:44)  HR: 69 (10-13-23 @ 15:58) (47 - 78)  BP: 99/67 (10-13-23 @ 15:58) (97/63 - 112/68)  BP(mean): 71 (10-13-23 @ 15:15) (71 - 86)  RR: 18 (10-13-23 @ 15:58) (12 - 18)  SpO2: 96% (10-13-23 @ 15:58) (95% - 99%)    Physical Exam:    Constitutional well preserved,comfortable,pleasant    HEENT PERRLA EOMI,No pallor or icterus    No oral exudate or erythema    Neck supple no JVD or LN    Chest Good AE,CTA    CVS RRR S1 S2 WNl No murmur or rub or gallop    Abd soft BS normal No tenderness no masses    Ext No cyanosis clubbing or edema    IV site no erythema tenderness or discharge    Joints no swelling or LOM    CNS AAO X 3 no focal    Lab Data:                          10.2   4.26  )-----------( 231      ( 13 Oct 2023 07:21 )             32.9       10-13    140  |  99  |  15  ----------------------------<  113<H>  4.4   |  29  |  1.06    Ca    9.5      13 Oct 2023 07:21        Urinalysis (10.06.23 @ 16:25)    pH Urine: 5.5   Glucose Qualitative, Urine: 1000 mg/dL   Blood, Urine: Small   Color: Yellow   Urine Appearance: Slightly Turbid   Bilirubin: Negative   Ketone - Urine: Negative   Specific Gravity: 1.023   Protein, Urine: 100 mg/dl   Urobilinogen: Negative   Nitrite: Negative   Leukocyte Esterase Concentration: Negative          .Blood Blood-Peripheral  10-10-23   No growth at 72 Hours  --  --      .Blood Blood-Peripheral  10-08-23   No growth at 5 days  --  --      WBC Count: 4.26 (10-13-23 @ 07:21)  WBC Count: 4.96 (10-12-23 @ 07:55)  WBC Count: 4.88 (10-12-23 @ 00:57)  WBC Count: 5.43 (10-11-23 @ 16:41)  WBC Count: 4.46 (10-11-23 @ 07:25)  WBC Count: 4.75 (10-10-23 @ 12:34)  WBC Count: 4.54 (10-10-23 @ 07:43)  WBC Count: 4.23 (10-10-23 @ 00:43)  WBC Count: 4.76 (10-07-23 @ 07:09)       Culture - Abscess with Gram Stain (10.06.23 @ 15:12)    Gram Stain:   Rare polymorphonuclear leukocytes per low power field  Numerous Gram Variable Rods per oil power field   Specimen Source: .Abscess right foot   Culture Results:   Moderate Gram Positive Rods Most closely resembling Arcanobacterium  haemolyticum "Susceptibilities not performed"  Moderate Streptococcus agalactiae (Group B) isolated  Group B streptococci are susceptible to ampicillin,  penicillin and cefazolin,but may be resistant to  erythromycin and clindamycin.           Patient is a 62y old  Male who presents with a chief complaint of fevers/  foot infection (13 Oct 2023 14:21)    Being followed by ID for group b strep bacteremia        Interval history:  pt s/p procedure earlier  pt is s/p partial resection of right foot 5th metatarsal bone, open  - low concern for residual infection, bone hard and of good quality  - low concern for viability, adequate intraoperative bleeding  pt feeling ok    No other acute events          PAST MEDICAL & SURGICAL HISTORY:  Diabetes      Chronic osteomyelitis      H/O heart failure      No significant past surgical history        Allergies    No Known Allergies    Intolerances      Antimicrobials:    ampicillin/sulbactam  IVPB 3 Gram(s) IV Intermittent every 6 hours    MEDICATIONS  (STANDING):  ampicillin/sulbactam  IVPB 3 Gram(s) IV Intermittent every 6 hours  aspirin enteric coated 81 milliGRAM(s) Oral daily  atorvastatin 80 milliGRAM(s) Oral at bedtime  dextrose 5%. 1000 milliLiter(s) (100 mL/Hr) IV Continuous <Continuous>  dextrose 5%. 1000 milliLiter(s) (50 mL/Hr) IV Continuous <Continuous>  dextrose 50% Injectable 12.5 Gram(s) IV Push once  dextrose 50% Injectable 25 Gram(s) IV Push once  dextrose 50% Injectable 25 Gram(s) IV Push once  furosemide   Injectable 20 milliGRAM(s) IV Push two times a day  glucagon  Injectable 1 milliGRAM(s) IntraMuscular once  insulin lispro (ADMELOG) corrective regimen sliding scale   SubCutaneous three times a day before meals  insulin lispro (ADMELOG) corrective regimen sliding scale   SubCutaneous at bedtime  melatonin 5 milliGRAM(s) Oral at bedtime  metoprolol succinate  milliGRAM(s) Oral daily  sacubitril 24 mG/valsartan 26 mG 1 Tablet(s) Oral two times a day  senna 2 Tablet(s) Oral at bedtime  sodium bicarbonate 650 milliGRAM(s) Oral three times a day  spironolactone 25 milliGRAM(s) Oral daily      Vital Signs Last 24 Hrs  T(C): 36.3 (10-13-23 @ 15:58), Max: 36.7 (10-13-23 @ 11:44)  T(F): 97.4 (10-13-23 @ 15:58), Max: 98.1 (10-13-23 @ 11:44)  HR: 69 (10-13-23 @ 15:58) (47 - 78)  BP: 99/67 (10-13-23 @ 15:58) (97/63 - 112/68)  BP(mean): 71 (10-13-23 @ 15:15) (71 - 86)  RR: 18 (10-13-23 @ 15:58) (12 - 18)  SpO2: 96% (10-13-23 @ 15:58) (95% - 99%)    Physical Exam:    Constitutional well preserved,comfortable,pleasant    HEENT PERRLA EOMI,No pallor or icterus    No oral exudate or erythema    Neck supple no JVD or LN    Chest Good AE,CTA    CVS  S1 S2     Abd soft BS normal No tenderness     Ext RLE dressed     IV site no erythema tenderness or discharge    Joints no swelling or LOM    CNS AAO X 3 no focal    Lab Data:                          10.2   4.26  )-----------( 231      ( 13 Oct 2023 07:21 )             32.9       10-13    140  |  99  |  15  ----------------------------<  113<H>  4.4   |  29  |  1.06    Ca    9.5      13 Oct 2023 07:21        Urinalysis (10.06.23 @ 16:25)    pH Urine: 5.5   Glucose Qualitative, Urine: 1000 mg/dL   Blood, Urine: Small   Color: Yellow   Urine Appearance: Slightly Turbid   Bilirubin: Negative   Ketone - Urine: Negative   Specific Gravity: 1.023   Protein, Urine: 100 mg/dl   Urobilinogen: Negative   Nitrite: Negative   Leukocyte Esterase Concentration: Negative          .Blood Blood-Peripheral  10-10-23   No growth at 72 Hours  --  --      .Blood Blood-Peripheral  10-08-23   No growth at 5 days  --  --      WBC Count: 4.26 (10-13-23 @ 07:21)  WBC Count: 4.96 (10-12-23 @ 07:55)  WBC Count: 4.88 (10-12-23 @ 00:57)  WBC Count: 5.43 (10-11-23 @ 16:41)  WBC Count: 4.46 (10-11-23 @ 07:25)  WBC Count: 4.75 (10-10-23 @ 12:34)  WBC Count: 4.54 (10-10-23 @ 07:43)  WBC Count: 4.23 (10-10-23 @ 00:43)  WBC Count: 4.76 (10-07-23 @ 07:09)       Culture - Abscess with Gram Stain (10.06.23 @ 15:12)    Gram Stain:   Rare polymorphonuclear leukocytes per low power field  Numerous Gram Variable Rods per oil power field   Specimen Source: .Abscess right foot   Culture Results:   Moderate Gram Positive Rods Most closely resembling Arcanobacterium  haemolyticum "Susceptibilities not performed"  Moderate Streptococcus agalactiae (Group B) isolated  Group B streptococci are susceptible to ampicillin,  penicillin and cefazolin,but may be resistant to  erythromycin and clindamycin.

## 2023-10-13 NOTE — CHART NOTE - NSCHARTNOTEFT_GEN_A_CORE
Pt is s/p partial resection of right foot 5th metatarsal bone, open today; patient seen and evaluated at bedside. Dressing clean dry and intact. Pt advised to notify RN if any pain or bleeding noticed at site     Allyson Valdes NP  Department of Medicine  [phone]

## 2023-10-13 NOTE — DISCHARGE NOTE PROVIDER - NSDCCAREPROVSEEN_GEN_ALL_CORE_FT
Jamari Boudreaux Hoorbod Delshadfar  Hoorbod Delshadfar  Hoorbod Delshadfar  Kam Jauregui  [facility]  Salvatore Valdes  Ordering Physician  Lori Bennett  [facility] Mohsen Pahlavan Justine Brando  Advance PracticeTeam [facility] Medicine      [ Greater than 35 min spent for discharge services.   CORTNEY Boudreaux ]       ( Note written / Date of service is the same as last day of patient stay  in the hospital ( for billing purposes)))

## 2023-10-13 NOTE — BRIEF OPERATIVE NOTE - COMMENTS
pt is s/p partial resection of right foot 5th metatarsal bone, open  - low concern for residual infection, bone hard and of good quality  - low concern for viability, adequate intraoperative bleeding  - pod stable for d/c on PO antibiotics, recs by ID, pending no growth on clean margins for 48h pt is s/p partial resection of right foot 5th metatarsal bone, open  - low concern for residual infection, bone hard and of good quality  - low concern for viability, adequate intraoperative bleeding  - wound closed proximally and distally, packed with iodaform centrally.  - pod stable for d/c on PO antibiotics, recs by ID, pending no growth on clean margins for 48h

## 2023-10-13 NOTE — PROGRESS NOTE ADULT - ASSESSMENT
HPI:  62yr M      hx of  HTN,  C/C  AFIB,  DM/  right foot  osteo      completed recent course of antibiotics via PICC for rt foot infection / last month     p/w erythema and subjective fevers at home     seen  by podiatry,   in  er   denies  cp/sob/abd  pain     (06 Oct 2023 09:29)      ACUTE RENAL FAILURE: spironolactone 25 milliGRAM(s) Oral daily   sodium bicarbonate 650 milliGRAM(s) Oral three times a day  Serum creatinine is  improved , furosemide   Injectable 20 milliGRAM(s) IV Push two times a day  There is no progression . No uremic symptoms  No evidence of anemia .  Fluid status stable.  Will continue to avoid nephrotoxic drugs.  Patient remains asymptomatic.   Continue current therapy.    BP monitoring,continue current antihypertensive meds, low salt diet,followup with PMD in 1-2 weeks  metoprolol succinate ER 25 milliGRAM(s) Oral daily    Admit for septic workup and ID evaluation,send blood and urine cx,serial lactate levels,monitor vitals closley,ivfs hydration,monitor urine output and renal profile,iv abx as per id cons  piperacillin/tazobactam IVPB.. 3.375 Gram(s) IV Intermittent every 8 hours

## 2023-10-13 NOTE — DISCHARGE NOTE PROVIDER - CARE PROVIDER_API CALL
Sidney Foss  Podiatric Medicine and Surgery  [address] [facility], [address]  [place] [address]  Phone: [phone]  Fax: [phone]  Follow Up Time: 1 week    Lori Bennett  Infectious Disease  [address] [place] [id number]  Phone: [phone]  Fax: [phone]  Follow Up Time: 2 weeks    Jhon Wild  Cardiovascular Disease  [address] [place] [address], [facility] [address]  [place] [address]  Phone: [phone]  Fax: [phone]  Established Patient  Follow Up Time: 2 weeks    Pahlavan, Mohsen  Nephrology  [address], [address]  [place] [address]  Phone: [phone]  Fax: [phone]  Follow Up Time: 2 weeks

## 2023-10-13 NOTE — DISCHARGE NOTE PROVIDER - NSDCMRMEDTOKEN_GEN_ALL_CORE_FT
aspirin 81 mg oral delayed release tablet: 1 tab(s) orally once a day  eplerenone 25 mg oral tablet: 1 tab(s) orally every other day  Farxiga 10 mg oral tablet: 1 tab(s) orally once a day  furosemide 20 mg oral tablet: 1 tab(s) orally once a day  IV Ancef 2g every 8 hours till 9/19/23: Weekly CBC, CMP, ESR, CRP emailed to [email or web address] and fax to ID Office [phone]  Januvia 100 mg oral tablet: 1 tab(s) orally once a day  metFORMIN 1000 mg oral tablet: 1 tab(s) orally 2 times a day  metoprolol succinate 50 mg oral tablet, extended release: 1 tab(s) orally once a day  metroNIDAZOLE 500 mg oral tablet: 1 tab(s) orally every 12 hours To be taken until 9/19/23  polyethylene glycol 3350 oral powder for reconstitution: 17 gram(s) orally once a day  rosuvastatin 40 mg oral tablet: 1 tab(s) orally once a day  sacubitril-valsartan 49 mg-51 mg oral tablet: 1 tab(s) orally 2 times a day  Xarelto 20 mg oral tablet: 1 tab(s) orally once a day   aspirin 81 mg oral delayed release tablet: 1 tab(s) orally once a day  cefTRIAXone 2 g/50 mL-iso-osmotic dextrose intravenous solution: 2 gram(s) intravenously every 24 hours x 6 weeks total (end date: 11/28/23). Obtain weekly ESR, CRP, CMP and CBC with diff and email to [email or web address]; fax results to ID office [phone]  eplerenone 25 mg oral tablet: 1 tab(s) orally every other day  Farxiga 10 mg oral tablet: 1 tab(s) orally once a day  furosemide 20 mg oral tablet: 1 tab(s) orally once a day  IV Ancef 2g every 8 hours till 9/19/23: Weekly CBC, CMP, ESR, CRP emailed to [email or web address] and fax to ID Office [phone]  Januvia 100 mg oral tablet: 1 tab(s) orally once a day  metFORMIN 1000 mg oral tablet: 1 tab(s) orally 2 times a day  metoprolol succinate 50 mg oral tablet, extended release: 1 tab(s) orally once a day  metroNIDAZOLE 500 mg oral tablet: 1 tab(s) orally every 12 hours To be taken until 9/19/23  polyethylene glycol 3350 oral powder for reconstitution: 17 gram(s) orally once a day  rosuvastatin 40 mg oral tablet: 1 tab(s) orally once a day  sacubitril-valsartan 49 mg-51 mg oral tablet: 1 tab(s) orally 2 times a day  Xarelto 20 mg oral tablet: 1 tab(s) orally once a day   aspirin 81 mg oral delayed release tablet: 1 tab(s) orally once a day  cefTRIAXone 2 g/50 mL-iso-osmotic dextrose intravenous solution: 2 gram(s) intravenously every 24 hours x 6 weeks total (end date: 11/28/23). Obtain weekly ESR, CRP, CMP and CBC with diff and email to [email or web address]; fax results to ID office [phone]  furosemide 20 mg oral tablet: 1 tab(s) orally once a day  Januvia 100 mg oral tablet: 1 tab(s) orally once a day  metFORMIN 1000 mg oral tablet: 1 tab(s) orally 2 times a day  polyethylene glycol 3350 oral powder for reconstitution: 17 gram(s) orally once a day  rosuvastatin 40 mg oral tablet: 1 tab(s) orally once a day  sacubitril-valsartan 49 mg-51 mg oral tablet: 1 tab(s) orally 2 times a day  Xarelto 20 mg oral tablet: 1 tab(s) orally once a day   aspirin 81 mg oral delayed release tablet: 1 tab(s) orally once a day  cefTRIAXone 2 g/50 mL-iso-osmotic dextrose intravenous solution: 2 gram(s) intravenously every 24 hours x 6 weeks total (end date: 11/28/23). Obtain weekly ESR, CRP, CMP and CBC with diff and email to [email or web address]; fax results to ID office [phone]  furosemide 20 mg oral tablet: 1 tab(s) orally 2 times a day  Januvia 100 mg oral tablet: 1 tab(s) orally once a day  metFORMIN 1000 mg oral tablet: 1 tab(s) orally 2 times a day  metoprolol succinate 100 mg oral tablet, extended release: 1 tab(s) orally once a day  polyethylene glycol 3350 oral powder for reconstitution: 17 gram(s) orally once a day  rosuvastatin 40 mg oral tablet: 1 tab(s) orally once a day  sacubitril-valsartan 24 mg-26 mg oral tablet: 1 tab(s) orally 2 times a day  spironolactone 25 mg oral tablet: 1 tab(s) orally once a day  Xarelto 20 mg oral tablet: 1 tab(s) orally once a day

## 2023-10-13 NOTE — PROGRESS NOTE ADULT - ASSESSMENT
62 M with PMHx T2DM, HFrEF (22%), Recent VTE associated with PICC line, R foot OM presents with sepsis secondary to foot ulcer, found to have Group B Strep bacteremia (4/4 bottles on 10/6). EP consulted for device extraction eval.    1. Bacteremia with CRT-D  2. ICD shock for PVC-triggerd mVT 10/11    Device Info  Generator: [facility]  Date of implant: 5/20/20  Lead info:   A lead - [facility] Tendril STS 2088TC DOI: 4/19/12  RV lead - [employer] 6935 DOI: 4/19/12  LV lead - [facility] Quartet 1458Q DOI: 4/19/12    Recommendations:  - CHLOE done 10/12 - no evidence of vegetation on leads or valves  - Patient going to OR today for right foot 5th metatarsal base resection  - Continue beta blocker. Increase as tolerated  - Please maintain K>4, Mg>2  - If continues to have VT, may need to start anti-arrhythmic like amiodarone vs sotalol  - ID following: on unasyn    *** Incomplete note ***   62 M with PMHx T2DM, HFrEF (22%), Recent VTE associated with PICC line, R foot OM presents with sepsis secondary to foot ulcer, found to have Group B Strep bacteremia (4/4 bottles on 10/6). EP consulted for device extraction eval.    1. Bacteremia with CRT-D  2. ICD shock for PVC-triggerd mVT 10/11    Device Info  Generator: [facility]  Date of implant: 5/20/20  Lead info:   A lead - [facility] Tendril STS 2088TC DOI: 4/19/12  RV lead - [employer] 6935 DOI: 4/19/12  LV lead - [facility] Quartet 1458Q DOI: 4/19/12    Recommendations:  - CHLOE done 10/12 - no evidence of vegetation on leads or valves  - Patient going to OR today for right foot 5th metatarsal base resection  - Continue beta blocker. Increase as tolerated  - Please maintain K>4, Mg>2  - If continues to have VT, may need to start anti-arrhythmic like amiodarone vs sotalol  - ID following: on unasyn  - Need for extraction of ICD to be determined    Plan discussed with Dr. Munoz

## 2023-10-13 NOTE — PROGRESS NOTE ADULT - SUBJECTIVE AND OBJECTIVE BOX
24H hour events: No acute overnight events    MEDICATIONS:  aspirin enteric coated 81 milliGRAM(s) Oral daily  furosemide   Injectable 20 milliGRAM(s) IV Push two times a day  metoprolol succinate  milliGRAM(s) Oral daily  sacubitril 24 mG/valsartan 26 mG 1 Tablet(s) Oral two times a day  spironolactone 25 milliGRAM(s) Oral daily  ampicillin/sulbactam  IVPB 3 Gram(s) IV Intermittent every 6 hours  ampicillin/sulbactam  IVPB      acetaminophen     Tablet .. 650 milliGRAM(s) Oral every 6 hours PRN  melatonin 5 milliGRAM(s) Oral at bedtime  polyethylene glycol 3350 17 Gram(s) Oral daily  senna 2 Tablet(s) Oral at bedtime  atorvastatin 80 milliGRAM(s) Oral at bedtime  dextrose 50% Injectable 25 Gram(s) IV Push once  dextrose 50% Injectable 25 Gram(s) IV Push once  dextrose 50% Injectable 12.5 Gram(s) IV Push once  dextrose Oral Gel 15 Gram(s) Oral once PRN  glucagon  Injectable 1 milliGRAM(s) IntraMuscular once  insulin lispro (ADMELOG) corrective regimen sliding scale   SubCutaneous three times a day before meals  dextrose 5%. 1000 milliLiter(s) IV Continuous <Continuous>  dextrose 5%. 1000 milliLiter(s) IV Continuous <Continuous>  sodium bicarbonate 650 milliGRAM(s) Oral three times a day    REVIEW OF SYSTEMS:  See HPI, otherwise ROS negative.    PHYSICAL EXAM:  T(C): 36.4 (10-13-23 @ 04:49), Max: 36.7 (10-12-23 @ 15:05)  HR: 67 (10-13-23 @ 04:49) (59 - 78)  BP: 100/66 (10-13-23 @ 04:49) (100/66 - 120/57)  RR: 18 (10-13-23 @ 04:49) (18 - 18)  SpO2: 97% (10-13-23 @ 04:49) (92% - 100%)  Wt(kg): --  I&O's Summary    12 Oct 2023 07:01  -  13 Oct 2023 07:00  --------------------------------------------------------  IN: 648 mL / OUT: 1200 mL / NET: -552 mL    Appearance: Alert. NAD	  HEENT:   NC/AT	  Cardiovascular: +S1S2   Respiratory: CTA B/L	  Psychiatry: A & O x 3, Mood & affect appropriate  Gastrointestinal:  Soft, NT.ND., + BS	  Skin: No rashes	  Neurologic: Non-focal  Extremities: No edema BLE  Vascular: Peripheral pulses palpable 2+ bilaterally    LABS:	 	  CBC Full  -  ( 13 Oct 2023 07:21 )  WBC Count : 4.26 K/uL  Hemoglobin : 10.2 g/dL  Hematocrit : 32.9 %  Platelet Count - Automated : 231 K/uL  Mean Cell Volume : 84.4 fl  Mean Cell Hemoglobin : 26.2 pg  Mean Cell Hemoglobin Concentration : 31.0 gm/dL  Auto Neutrophil # : 2.17 K/uL  Auto Lymphocyte # : 1.34 K/uL  Auto Monocyte # : 0.51 K/uL  Auto Eosinophil # : 0.13 K/uL  Auto Basophil # : 0.06 K/uL  Auto Neutrophil % : 50.8 %  Auto Lymphocyte % : 31.5 %  Auto Monocyte % : 12.0 %  Auto Eosinophil % : 3.1 %  Auto Basophil % : 1.4 %    10-13    140  |  99  |  15  ----------------------------<  113<H>  4.4   |  29  |  1.06  10-12    139  |  101  |  15  ----------------------------<  142<H>  4.3   |  25  |  1.01    Ca    9.5      13 Oct 2023 07:21  Ca    9.2      12 Oct 2023 07:55    TELEMETRY: V paced 70s  	    TRANSESOPHAGEAL ECHO  TRANSESOPHAGEAL ECHOCARDIOGRAM REPORT  ________________________________________________________________________________                                      _______       Pt. Name:       RYLEE HOWE Study Date:    10/12/2023  MRN:            [id number]     YOB: 1960  Accession #:    [id number]      Age:           62 years  Account#:       [id number]   Gender:        M  Heart Rate:                    Height:        73.00 in (185.42 cm)  Rhythm:                        Weight:     240.00 lb (108.86 kg)  Blood Pressure: 116/58 mmHg    BSA/BMI:       2.33 m² / 31.66 kg/m²  ________________________________________________________________________________________  Referring Physician:    [id number] Jamari Boudreaux  Interpreting Physician: Wei Humphrey M.D.  Primary Sonographer:    ABIMAEL    CPT:               ECHO TRANSESOPH W/O CON - 73868.m;DOPPLER ECHO COMP W SPECT -                     67381.m;DOPPL ECHO COLOR FLOW - 23586.m  Indication(s):     Acute and subacute endocarditis, unspecified - I33.9  Procedure:         Transesophageal echocardiogram performed with 2D, M-mode and                     complete spectral and color flow Doppler.  Ordering Location: [facility]    _______________________________________________________________________________________     CONCLUSIONS:      1. Left ventricular systolic function is severely decreased with an ejection fraction visually estimated at <20 %.   2. Moderate mitral regurgitation.   3. No pericardial effusion seen.   4. No echocardiographic evidence of valvular endocarditis.      No vegetation on the visible segments of the device leads.   5. Compared to the transthoracic echocardiogram performed on 10/9/2023 there have been no significant interval changes.   6. Minimal (grade 1) spontaneous echo contrast located in the left atrial apendage and minimal (grade 1) spontaneous echo contrast located in the left atrium.   7. No evidence of left atrial or left atrial appendage thrombus. The left atrial appendage emptying velocity is low.   8. Symmetric mitral valve leaflet tethering.    ____________________________________________________________________  CHLOE Procedure:  After discussion of the risks and benefits of the CHLOE, an informed consent was obtained. Study was performed under anesthesia. The adult Patricio CHLOE probe was introduced and passed into the esophagus. The CHLOE probe was passed without difficulty. Images were obtained with the patient in a left lateral decubitus position. Image quality was excellent. The patient's vital signs; including heart rate, blood pressure, and oxygen saturation; remained stable throughout the procedure. The patient tolerated the procedure well and without complications.  ________________________________________________________________________________________  FINDINGS:     Left Ventricle:  Left ventricular systolic function is severely decreased with an ejection fraction visually estimated at <20%.     Right Ventricle:  The right ventricular cavity is normal insize, normal wall thickness and borderline reduced systolic function. A device lead is visualized.     Left Atrium:  There is minimal (grade 1) spontaneous echo contrast located in the left atrial apendage and minimal (grade 1) spontaneous echo contrast located in the left atrium. There is no evidence of left atrial or left atrial appendage thrombus. The left atrial appendage emptying velocity is low.     Aortic Valve:  The aortic valve appears trileaflet with normal systolic excursion. There is trace aortic regurgitation.     Mitral Valve:  There is symmetric leaflet tethering. There is moderate mitral regurgitation.     Tricuspid Valve:  Structurally normal tricuspid valve with normal leaflet excursion. There is mild tricuspid regurgitation. Estimated pulmonary artery systolic pressure is 33 mmHg. IVC was not evaluated.     Pulmonic Valve:  Structurally normal pulmonic valve with normal leaflet excursion. There is mild to moderate pulmonic regurgitation.     Aorta:  The aortic root at the sinuses of Valsalva is normal in size. The aortic diameter at the sinotubular junction is normal in size. The ascending aorta diameter is normal in size.     Pericardium:  No pericardial effusion seen.  ____________________________________________________________________  Quantitative Data:  Left Ventricle Measurements: (Indexed to BSA)     Visualized LV EF%: <20%    Aorta Measurements: (normal range) (Indexed to BSA)     Sinuses of Valsalva: 3.40 cm (3.1 - 3.7 cm)  Ao ST Junct:         2.9 cm  Ao Asc prox:         3.10 cm       LVOT / RVOT/ Qp/Qs Data: (Indexed to BSA)  LVOT Vmax: 0.71 m/s  LVOT VTI:  12.58 cm    Aortic Valve Measurements:  AV Vmax:          1.6 m/s  AV Peak Gradient: 9.9 mmHg  AV Mean Gradient: 4.3 mmHg  AV VTI:28.2 cm  AV VTI Ratio:     0.45       Tricuspid Valve Measurements:     TR Vmax:          2.5 m/s  TR Peak Gradient: 25.0 mmHg  RA Pressure:      8 mmHg  PASP:             33 mmHg    ________________________________________________________________________________________  Electronically signed on 10/12/2023 at 4:25:05 PM by Wei Humphrey M.D.

## 2023-10-13 NOTE — PROGRESS NOTE ADULT - ASSESSMENT
62yr M hx of  HTN,  CAD, HF AICD,  AFIB,  DM, with right foot  osteo  Assessment  DMT2: 62y Male with DM T2 with hyperglycemia, A1C 6% , was on oral meds at home, now on low dose insulin, feeling better, eating meals, sugars are  trending within stable ranges.  Had mild BHB on labs initially, elevated lactate on VBG, s/p IV hydration, glucose trending stable.   NPO today for OR with podiatry  CAD: on medications, stable, monitored. s/p angiogram  Foot Osteo: Podiatry eval, wound care, on IV Zosyn. Or with podiatry pending  HTN: on antihypertensive medications, monitored, asymptomatic.        Discussed plan and management with Dr Roxanne Lennon NP - TEAMS  Wanda Oliveira MD  Cell: [phone]  Office: [phone]      62yr M hx of  HTN,  CAD, HF AICD,  AFIB,  DM, with right foot  osteo  Assessment  DMT2: 62y Male with DM T2 with hyperglycemia, A1C 6% , was on oral meds at home, now on low dose insulin, feeling better, eating meals,  sugars are  trending within stable ranges.  Had mild BHB on labs initially, elevated lactate on VBG, s/p IV hydration, glucose trending stable.   NPO today for OR with podiatry  CAD: on medications, stable, monitored. s/p angiogram  Foot Osteo: Podiatry eval, wound care, on IV Zosyn. Or with podiatry pending  HTN: on antihypertensive medications, monitored, asymptomatic.        Discussed plan and management with Dr Roxanne Lennon NP - TEAMS  Wanda Oliveira MD  Cell: [phone]  Office: [phone]

## 2023-10-13 NOTE — DISCHARGE NOTE PROVIDER - CARE PROVIDERS DIRECT ADDRESSES
,DirectAddress_Unknown,[email or web address],DirectAddress_Unknown,[email or web address]

## 2023-10-14 LAB
A1C WITH ESTIMATED AVERAGE GLUCOSE RESULT: 5.8 % — HIGH (ref 4–5.6)
ANION GAP SERPL CALC-SCNC: 15 MMOL/L — SIGNIFICANT CHANGE UP (ref 5–17)
BUN SERPL-MCNC: 17 MG/DL — SIGNIFICANT CHANGE UP (ref 7–23)
CALCIUM SERPL-MCNC: 9.3 MG/DL — SIGNIFICANT CHANGE UP (ref 8.4–10.5)
CHLORIDE SERPL-SCNC: 98 MMOL/L — SIGNIFICANT CHANGE UP (ref 96–108)
CO2 SERPL-SCNC: 27 MMOL/L — SIGNIFICANT CHANGE UP (ref 22–31)
CREAT SERPL-MCNC: 1.06 MG/DL — SIGNIFICANT CHANGE UP (ref 0.5–1.3)
EGFR: 79 ML/MIN/1.73M2 — SIGNIFICANT CHANGE UP
ESTIMATED AVERAGE GLUCOSE: 120 MG/DL — HIGH (ref 68–114)
GLUCOSE BLDC GLUCOMTR-MCNC: 124 MG/DL — HIGH (ref 70–99)
GLUCOSE BLDC GLUCOMTR-MCNC: 132 MG/DL — HIGH (ref 70–99)
GLUCOSE BLDC GLUCOMTR-MCNC: 150 MG/DL — HIGH (ref 70–99)
GLUCOSE BLDC GLUCOMTR-MCNC: 160 MG/DL — HIGH (ref 70–99)
GLUCOSE SERPL-MCNC: 124 MG/DL — HIGH (ref 70–99)
GRAM STN FLD: SIGNIFICANT CHANGE UP
HCT VFR BLD CALC: 34.3 % — LOW (ref 39–50)
HGB BLD-MCNC: 10.6 G/DL — LOW (ref 13–17)
MAGNESIUM SERPL-MCNC: 1.7 MG/DL — SIGNIFICANT CHANGE UP (ref 1.6–2.6)
MCHC RBC-ENTMCNC: 25.7 PG — LOW (ref 27–34)
MCHC RBC-ENTMCNC: 30.9 GM/DL — LOW (ref 32–36)
MCV RBC AUTO: 83.3 FL — SIGNIFICANT CHANGE UP (ref 80–100)
NRBC # BLD: 0 /100 WBCS — SIGNIFICANT CHANGE UP (ref 0–0)
PLATELET # BLD AUTO: 244 K/UL — SIGNIFICANT CHANGE UP (ref 150–400)
POTASSIUM SERPL-MCNC: 3.6 MMOL/L — SIGNIFICANT CHANGE UP (ref 3.5–5.3)
POTASSIUM SERPL-SCNC: 3.6 MMOL/L — SIGNIFICANT CHANGE UP (ref 3.5–5.3)
RBC # BLD: 4.12 M/UL — LOW (ref 4.2–5.8)
RBC # FLD: 18 % — HIGH (ref 10.3–14.5)
SODIUM SERPL-SCNC: 140 MMOL/L — SIGNIFICANT CHANGE UP (ref 135–145)
SPECIMEN SOURCE: SIGNIFICANT CHANGE UP
WBC # BLD: 5.05 K/UL — SIGNIFICANT CHANGE UP (ref 3.8–10.5)
WBC # FLD AUTO: 5.05 K/UL — SIGNIFICANT CHANGE UP (ref 3.8–10.5)

## 2023-10-14 RX ORDER — POTASSIUM CHLORIDE 20 MEQ
40 PACKET (EA) ORAL ONCE
Refills: 0 | Status: COMPLETED | OUTPATIENT
Start: 2023-10-14 | End: 2023-10-14

## 2023-10-14 RX ORDER — RIVAROXABAN 15 MG-20MG
20 KIT ORAL
Refills: 0 | Status: DISCONTINUED | OUTPATIENT
Start: 2023-10-14 | End: 2023-10-18

## 2023-10-14 RX ORDER — FUROSEMIDE 40 MG
20 TABLET ORAL
Refills: 0 | Status: DISCONTINUED | OUTPATIENT
Start: 2023-10-14 | End: 2023-10-18

## 2023-10-14 RX ORDER — MAGNESIUM SULFATE 500 MG/ML
2 VIAL (ML) INJECTION ONCE
Refills: 0 | Status: COMPLETED | OUTPATIENT
Start: 2023-10-14 | End: 2023-10-14

## 2023-10-14 RX ADMIN — SACUBITRIL AND VALSARTAN 1 TABLET(S): 24; 26 TABLET, FILM COATED ORAL at 17:37

## 2023-10-14 RX ADMIN — Medication 81 MILLIGRAM(S): at 10:02

## 2023-10-14 RX ADMIN — Medication 40 MILLIEQUIVALENT(S): at 09:59

## 2023-10-14 RX ADMIN — Medication 20 MILLIGRAM(S): at 05:02

## 2023-10-14 RX ADMIN — ATORVASTATIN CALCIUM 80 MILLIGRAM(S): 80 TABLET, FILM COATED ORAL at 21:31

## 2023-10-14 RX ADMIN — Medication 100 MILLIGRAM(S): at 05:02

## 2023-10-14 RX ADMIN — Medication 650 MILLIGRAM(S): at 05:02

## 2023-10-14 RX ADMIN — Medication 5 MILLIGRAM(S): at 23:51

## 2023-10-14 RX ADMIN — AMPICILLIN SODIUM AND SULBACTAM SODIUM 200 GRAM(S): 250; 125 INJECTION, POWDER, FOR SUSPENSION INTRAMUSCULAR; INTRAVENOUS at 17:36

## 2023-10-14 RX ADMIN — AMPICILLIN SODIUM AND SULBACTAM SODIUM 200 GRAM(S): 250; 125 INJECTION, POWDER, FOR SUSPENSION INTRAMUSCULAR; INTRAVENOUS at 23:51

## 2023-10-14 RX ADMIN — Medication 650 MILLIGRAM(S): at 12:56

## 2023-10-14 RX ADMIN — AMPICILLIN SODIUM AND SULBACTAM SODIUM 200 GRAM(S): 250; 125 INJECTION, POWDER, FOR SUSPENSION INTRAMUSCULAR; INTRAVENOUS at 11:50

## 2023-10-14 RX ADMIN — Medication 1: at 13:36

## 2023-10-14 RX ADMIN — Medication 25 GRAM(S): at 17:39

## 2023-10-14 RX ADMIN — Medication 20 MILLIGRAM(S): at 21:31

## 2023-10-14 RX ADMIN — SENNA PLUS 2 TABLET(S): 8.6 TABLET ORAL at 21:31

## 2023-10-14 RX ADMIN — RIVAROXABAN 20 MILLIGRAM(S): KIT at 17:38

## 2023-10-14 RX ADMIN — SACUBITRIL AND VALSARTAN 1 TABLET(S): 24; 26 TABLET, FILM COATED ORAL at 05:02

## 2023-10-14 RX ADMIN — SPIRONOLACTONE 25 MILLIGRAM(S): 25 TABLET, FILM COATED ORAL at 05:02

## 2023-10-14 RX ADMIN — Medication 650 MILLIGRAM(S): at 04:22

## 2023-10-14 RX ADMIN — AMPICILLIN SODIUM AND SULBACTAM SODIUM 200 GRAM(S): 250; 125 INJECTION, POWDER, FOR SUSPENSION INTRAMUSCULAR; INTRAVENOUS at 05:01

## 2023-10-14 NOTE — PROVIDER CONTACT NOTE (OTHER) - ASSESSMENT
HR 42 /69 SPO2 96 % asymptomatic
Patient is visibly shaken, A&Ox4, VSS, denies pain, SoB, or CP.
Pt A&Ox4. VSS. Denies chest pain, SOB, or any other discomfort.
pt was asleep during the time of event. upon arousal pt denies any symptoms dizziness light headache v/s b/p 119/69 HR 63 SPO2 97
pt was asleep during time of event. upon arousal pt denies any symptoms v/s stable
Pt denies cp or sob, no ectopy on tele.  VS T-97.5 HR-79 BP-113/74 SPO2 99% RA

## 2023-10-14 NOTE — PROVIDER CONTACT NOTE (OTHER) - REASON
Pt had 7 beats wide complex on tele
HR was 42
Pt felt a fluttering sensation in chest
21wct for the first time  
Pt. has 6 beats WCT
Patient reported AICD fired.

## 2023-10-14 NOTE — PHYSICAL THERAPY INITIAL EVALUATION ADULT - PERTINENT HX OF CURRENT PROBLEM, REHAB EVAL
Patient is a 62y old  Male who presents with a chief complaint of fevers/  foot infection. s/p partial resection of right foot 5th metatarsal bone on 10/13.

## 2023-10-14 NOTE — PROGRESS NOTE ADULT - ASSESSMENT
62yr M hx of  HTN,  CAD, HF AICD,  AFIB,  DM, with right foot  osteo  Assessment  DMT2: 62y Male with DM T2 with hyperglycemia, A1C 6% , was on oral meds at home, now on low dose insulin, feeling better, eating meals, sugars are improving.  Had mild BHB on labs initially, elevated lactate on VBG, s/p IV hydration, glucose trending stable.   NPO today for OR with podiatry  CAD: on medications, stable, monitored. s/p angiogram  Foot Osteo: Podiatry eval, wound care, on IV Zosyn. Or with podiatry pending  HTN: on antihypertensive medications, monitored, asymptomatic.      Wanda Oliveira MD  Cell: [phone]  Office: [phone]

## 2023-10-14 NOTE — PROVIDER CONTACT NOTE (OTHER) - BACKGROUND
62M hx of type 2 DM,HFrEF s/p AICD; here for 36 hrs of fever,
62M hx of type 2 DM,HFrEF s/p AICD; - Sepsis 2/2 Group B Strep Bacteremia likely i/s/o R foot Cellulitis; On Unasyn  Has a history 21 wide complex on tele
pt admitted for sepsis
AICD, osteomyelitis on abx course, pending R foot 5th met resection, on hep gtt non therapeutic @19 mL/hr.
pt admitted for sepsis
Pt admitted for Rt foot pain/ foot infection. S/P rt foot 5th digit met base resection.

## 2023-10-14 NOTE — PROGRESS NOTE ADULT - SUBJECTIVE AND OBJECTIVE BOX
Patient is a 62y old  Male who presents with a chief complaint of fevers/  foot infection (14 Oct 2023 09:02)       INTERVAL HPI/OVERNIGHT EVENTS:  Patient seen and evaluated at bedside.  Pt is resting comfortable in NAD. Denies N/V/F/C.    Allergies    No Known Allergies    Intolerances        Vital Signs Last 24 Hrs  T(C): 36.4 (14 Oct 2023 05:30), Max: 36.7 (13 Oct 2023 11:44)  T(F): 97.5 (14 Oct 2023 05:30), Max: 98.1 (13 Oct 2023 11:44)  HR: 79 (14 Oct 2023 05:30) (47 - 82)  BP: 113/74 (14 Oct 2023 05:30) (97/63 - 113/74)  BP(mean): 71 (13 Oct 2023 15:15) (71 - 86)  RR: 18 (14 Oct 2023 05:30) (12 - 18)  SpO2: 99% (14 Oct 2023 05:30) (94% - 99%)    Parameters below as of 14 Oct 2023 05:30  Patient On (Oxygen Delivery Method): room air        LABS:                        10.6   5.05  )-----------( 244      ( 14 Oct 2023 07:00 )             34.3     10-14    140  |  98  |  17  ----------------------------<  124<H>  3.6   |  27  |  1.06    Ca    9.3      14 Oct 2023 07:00      PT/INR - ( 13 Oct 2023 07:20 )   PT: 15.1 sec;   INR: 1.39 ratio         PTT - ( 13 Oct 2023 07:20 )  PTT:77.3 sec  Urinalysis Basic - ( 14 Oct 2023 07:00 )    Color: x / Appearance: x / SG: x / pH: x  Gluc: 124 mg/dL / Ketone: x  / Bili: x / Urobili: x   Blood: x / Protein: x / Nitrite: x   Leuk Esterase: x / RBC: x / WBC x   Sq Epi: x / Non Sq Epi: x / Bacteria: x      CAPILLARY BLOOD GLUCOSE      POCT Blood Glucose.: 132 mg/dL (14 Oct 2023 07:31)  POCT Blood Glucose.: 120 mg/dL (13 Oct 2023 21:42)  POCT Blood Glucose.: 113 mg/dL (13 Oct 2023 17:16)  POCT Blood Glucose.: 121 mg/dL (13 Oct 2023 14:31)      Lower Extremity Physical Exam:  Vascular: DP/PT 0/4 B/L, CFT <3 sec x 10, Temp gradient warm to warm, RLE, warm to cool LLE.    Neurology: Epicritic sensation intact to level of digits, B/L  Musculoskeletal/Ortho: pain to palpation over right foot 5th digit, 8/8 s/p right foot I&D w/ Right foot partial 5th ray resection, closed  Skin: 10/13 s/p right foot 5th metatarsal base resection, open: distal and proximal sutures intact, central wound has granular wound bed, moderate sanguinous drainage, no hematoma formation, periwound erythema within the normal post-op course, no purulence.    RADIOLOGY & ADDITIONAL TESTS:

## 2023-10-14 NOTE — PROGRESS NOTE ADULT - ASSESSMENT
HPI:  62yr M      hx of  HTN,  C/C  AFIB,  DM/  right foot  osteo      completed recent course of antibiotics via PICC for rt foot infection / last month     p/w erythema and subjective fevers at home     seen  by podiatry,   in  er   denies  cp/sob/abd  pain     (06 Oct 2023 09:29)      ACUTE RENAL FAILURE: spironolactone 25 milliGRAM(s) Oral daily   sodium bicarbonate 650 milliGRAM(s) Oral three times a day  Serum creatinine is  improved , furosemide   Injectable 20 milliGRAM(s) IV Push two times a day  There is no progression . No uremic symptoms  No evidence of anemia .  Fluid status stable.  Will continue to avoid nephrotoxic drugs.  Patient remains asymptomatic.   Continue current therapy.    BP monitoring,continue current antihypertensive meds, low salt diet,followup with PMD in 1-2 weeks  metoprolol succinate ER 25 milliGRAM(s) Oral daily    f/u  blood and urine cx,serial lactate levels,monitor vitals closley,ivfs hydration,monitor urine output and renal profile,iv abx as per id cons  ampicillin/sulbactam  IVPB 3 Gram(s) IV Intermittent every 6 hours

## 2023-10-14 NOTE — PROVIDER CONTACT NOTE (OTHER) - NAME OF MD/NP/PA/DO NOTIFIED:
Allyson Valdes
Allyson Valdes NP
Fallon Bach ACP
Leela James
Colby Archibald, Resident
Lucio Valadez Np

## 2023-10-14 NOTE — PROGRESS NOTE ADULT - SUBJECTIVE AND OBJECTIVE BOX
Patient is a 62y Male whom presented to the hospital with shanda     PAST MEDICAL & SURGICAL HISTORY:  Diabetes      Chronic osteomyelitis      H/O heart failure      No significant past surgical history          MEDICATIONS  (STANDING):  aspirin enteric coated 81 milliGRAM(s) Oral daily  atorvastatin 80 milliGRAM(s) Oral at bedtime  dextrose 5%. 1000 milliLiter(s) (100 mL/Hr) IV Continuous <Continuous>  dextrose 5%. 1000 milliLiter(s) (50 mL/Hr) IV Continuous <Continuous>  dextrose 50% Injectable 25 Gram(s) IV Push once  dextrose 50% Injectable 25 Gram(s) IV Push once  dextrose 50% Injectable 12.5 Gram(s) IV Push once  glucagon  Injectable 1 milliGRAM(s) IntraMuscular once  insulin lispro (ADMELOG) corrective regimen sliding scale   SubCutaneous three times a day before meals  metoprolol succinate ER 25 milliGRAM(s) Oral daily  piperacillin/tazobactam IVPB.. 3.375 Gram(s) IV Intermittent every 8 hours  rivaroxaban 20 milliGRAM(s) Oral with dinner  sodium bicarbonate 650 milliGRAM(s) Oral three times a day      Allergies    No Known Allergies    Intolerances        SOCIAL HISTORY:  Denies ETOh,Smoking,     FAMILY HISTORY:      REVIEW OF SYSTEMS:    CONSTITUTIONAL: No weakness, fevers or chills  EYES/ENT: No visual changes;  no throat pain   NECK: No pain or stiffness  RESPIRATORY: No cough, wheezing, hemoptysis; No shortness of breath                                                                        10.6   5.05  )-----------( 244      ( 14 Oct 2023 07:00 )             34.3       CBC Full  -  ( 14 Oct 2023 07:00 )  WBC Count : 5.05 K/uL  RBC Count : 4.12 M/uL  Hemoglobin : 10.6 g/dL  Hematocrit : 34.3 %  Platelet Count - Automated : 244 K/uL  Mean Cell Volume : 83.3 fl  Mean Cell Hemoglobin : 25.7 pg  Mean Cell Hemoglobin Concentration : 30.9 gm/dL  Auto Neutrophil # : x  Auto Lymphocyte # : x  Auto Monocyte # : x  Auto Eosinophil # : x  Auto Basophil # : x  Auto Neutrophil % : x  Auto Lymphocyte % : x  Auto Monocyte % : x  Auto Eosinophil % : x  Auto Basophil % : x      10-14    140  |  98  |  17  ----------------------------<  124<H>  3.6   |  27  |  1.06    Ca    9.3      14 Oct 2023 07:00  Mg     1.7     10-14        CAPILLARY BLOOD GLUCOSE      POCT Blood Glucose.: 160 mg/dL (14 Oct 2023 12:39)  POCT Blood Glucose.: 132 mg/dL (14 Oct 2023 07:31)  POCT Blood Glucose.: 120 mg/dL (13 Oct 2023 21:42)  POCT Blood Glucose.: 113 mg/dL (13 Oct 2023 17:16)      Vital Signs Last 24 Hrs  T(C): 36.7 (14 Oct 2023 11:44), Max: 36.7 (13 Oct 2023 20:19)  T(F): 98.1 (14 Oct 2023 11:44), Max: 98.1 (13 Oct 2023 20:19)  HR: 68 (14 Oct 2023 11:59) (60 - 82)  BP: 101/63 (14 Oct 2023 11:59) (93/58 - 113/74)  BP(mean): --  RR: 18 (14 Oct 2023 11:44) (18 - 18)  SpO2: 98% (14 Oct 2023 11:59) (94% - 99%)    Parameters below as of 14 Oct 2023 11:59  Patient On (Oxygen Delivery Method): room air        Urinalysis Basic - ( 14 Oct 2023 07:00 )    Color: x / Appearance: x / SG: x / pH: x  Gluc: 124 mg/dL / Ketone: x  / Bili: x / Urobili: x   Blood: x / Protein: x / Nitrite: x   Leuk Esterase: x / RBC: x / WBC x   Sq Epi: x / Non Sq Epi: x / Bacteria: x        PT/INR - ( 13 Oct 2023 07:20 )   PT: 15.1 sec;   INR: 1.39 ratio         PTT - ( 13 Oct 2023 07:20 )  PTT:77.3 sec      PHYSICAL EXAM:    Constitutional: NAD  HEENT: conjunctive   clear   Neck:  No JVD  Respiratory: CTAB  Cardiovascular: S1 and S2  Gastrointestinal: BS+, soft, NT/ND  Extremities: No peripheral edema  Neurological: A/O x 3, no focal deficits  Psychiatric: Normal mood, normal affect  : No Atkins  Skin: No rashes  Access: Not applicable

## 2023-10-14 NOTE — PROVIDER CONTACT NOTE (OTHER) - SITUATION
21wct for the first time  
Patient reported AICD fired.
Pt felt a fluttering sensation in his chest, described as almost the same feeling when his AICD fired a few days ago.
Pt had 7 beats wide complex on tele
Pt. has 6 beats WCT
HR was 42 asymptomatic

## 2023-10-14 NOTE — PROGRESS NOTE ADULT - ASSESSMENT
62M s/p right foot 5th metatarsal base resection, open (DOS 10/13/23).  - Patient seen and evaluated.  - Afebrile, no leukocytosis.  - 10/13 s/p right foot 5th metatarsal base resection, open: distal and proximal sutures intact, central wound has granular wound bed, moderate sanguinous drainage, no hematoma formation, periwound erythema within the normal post-op course, no purulence.  - Intraop Findings: Low concern for residual infection and viability.  - Right Foot Clean Bone Margin Culture: Pending.  - Pt is stable for discharge tomorrow pending no growth from the clean bone margin culture, final ID recs, and periwound erythema resolution.  - Wound care instructions and followup information listed in discharge provider note.  - Discussed with attending.

## 2023-10-14 NOTE — PHYSICAL THERAPY INITIAL EVALUATION ADULT - MODALITIES TREATMENT COMMENTS
Pt seen for PT woundcare eval for vac application to R foot s/p partial 5th ray resection. Dressing rec'd CDI. Wound appears clean red granular base. incisional portion 9cm, open wound [id number]. wound cleansed with NS, cavilon to periwound. adaptic over top of incisional portion, VAC with black sponge to 125mmHg, continuous therapy, good seal. PT woundcare to continue to follow 3x per week.

## 2023-10-14 NOTE — PROVIDER CONTACT NOTE (OTHER) - RECOMMENDATIONS
Follow up with AM labs and Notified Allyson Valdes NP.
labs? PO lopressor?
?
Plan of care ongoing
monitor for reoccurrence

## 2023-10-14 NOTE — CHART NOTE - NSCHARTNOTESELECT_GEN_ALL_CORE
Podiatry/Event Note
Pre-Procedure Note
RLE Angio Cancelled
Event Note
Event Note
ICD shock/Event Note
POST PROCEDURE SITE CHECK/Event Note
Podiatry case cancel/Event Note
restart AC/Event Note

## 2023-10-14 NOTE — PROGRESS NOTE ADULT - SUBJECTIVE AND OBJECTIVE BOX
Date of Service: 10-14-23 @ 08:47           CARDIOLOGY     PROGRESS  NOTE   ________________________________________________    CHIEF COMPLAINT:Patient is a 62y old  Male who presents with a chief complaint of fevers/  foot infection (13 Oct 2023 20:33)  s/p surgery  	  REVIEW OF SYSTEMS:  CONSTITUTIONAL: No fever, weight loss, or fatigue  EYES: No eye pain, visual disturbances, or discharge  ENT:  No difficulty hearing, tinnitus, vertigo; No sinus or throat pain  NECK: No pain or stiffness  RESPIRATORY: No cough, wheezing, chills or hemoptysis; No Shortness of Breath  CARDIOVASCULAR: No chest pain, palpitations, passing out, dizziness, or leg swelling  GASTROINTESTINAL: No abdominal or epigastric pain. No nausea, vomiting, or hematemesis; No diarrhea or constipation. No melena or hematochezia.  GENITOURINARY: No dysuria, frequency, hematuria, or incontinence  NEUROLOGICAL: No headaches, memory loss, loss of strength, numbness, or tremors  SKIN: No itching, burning, rashes, or lesions   LYMPH Nodes: No enlarged glands  ENDOCRINE: No heat or cold intolerance; No hair loss  MUSCULOSKELETAL: No joint pain or swelling; No muscle, back, or extremity pain  PSYCHIATRIC: No depression, anxiety, mood swings, or difficulty sleeping  HEME/LYMPH: No easy bruising, or bleeding gums  ALLERGY AND IMMUNOLOGIC: No hives or eczema	    [x ] All others negative	  [ ] Unable to obtain    PHYSICAL EXAM:  T(C): 36.4 (10-14-23 @ 05:30), Max: 36.7 (10-13-23 @ 11:44)  HR: 79 (10-14-23 @ 05:30) (47 - 82)  BP: 113/74 (10-14-23 @ 05:30) (97/63 - 113/74)  RR: 18 (10-14-23 @ 05:30) (12 - 18)  SpO2: 99% (10-14-23 @ 05:30) (94% - 99%)  Wt(kg): --  I&O's Summary    13 Oct 2023 07:01  -  14 Oct 2023 07:00  --------------------------------------------------------  IN: 420 mL / OUT: 450 mL / NET: -30 mL        Appearance: Normal	  HEENT:   Normal oral mucosa, PERRL, EOMI	  Lymphatic: No lymphadenopathy  Cardiovascular: Normal S1 S2, No JVD, + murmurs, No edema  Respiratory: Lungs clear to auscultation	  Psychiatry: A & O x 3, Mood & affect appropriate  Gastrointestinal:  Soft, Non-tender, + BS	  Skin: No rashes, No ecchymoses, No cyanosis	  Neurologic: Non-focal  Extremities: Normal range of motion, No clubbing, cyanosis or edema  Vascular: Peripheral pulses palpable 2+ bilaterally    MEDICATIONS  (STANDING):  ampicillin/sulbactam  IVPB 3 Gram(s) IV Intermittent every 6 hours  aspirin enteric coated 81 milliGRAM(s) Oral daily  atorvastatin 80 milliGRAM(s) Oral at bedtime  dextrose 5%. 1000 milliLiter(s) (50 mL/Hr) IV Continuous <Continuous>  dextrose 5%. 1000 milliLiter(s) (100 mL/Hr) IV Continuous <Continuous>  dextrose 50% Injectable 12.5 Gram(s) IV Push once  dextrose 50% Injectable 25 Gram(s) IV Push once  dextrose 50% Injectable 25 Gram(s) IV Push once  furosemide   Injectable 20 milliGRAM(s) IV Push two times a day  glucagon  Injectable 1 milliGRAM(s) IntraMuscular once  insulin lispro (ADMELOG) corrective regimen sliding scale   SubCutaneous three times a day before meals  insulin lispro (ADMELOG) corrective regimen sliding scale   SubCutaneous at bedtime  melatonin 5 milliGRAM(s) Oral at bedtime  metoprolol succinate  milliGRAM(s) Oral daily  sacubitril 24 mG/valsartan 26 mG 1 Tablet(s) Oral two times a day  senna 2 Tablet(s) Oral at bedtime  sodium bicarbonate 650 milliGRAM(s) Oral three times a day  spironolactone 25 milliGRAM(s) Oral daily      TELEMETRY: 	    ECG:  	  RADIOLOGY:  OTHER: 	  	  LABS:	 	    CARDIAC MARKERS:                            10.6   5.05  )-----------( 244      ( 14 Oct 2023 07:00 )             34.3     10-14    140  |  98  |  17  ----------------------------<  124<H>  3.6   |  27  |  1.06    Ca    9.3      14 Oct 2023 07:00      proBNP:   Lipid Profile:   HgA1c:   TSH: Thyroid Stimulating Hormone, Serum: 5.55 uIU/mL (10-07 @ 07:08)    PT/INR - ( 13 Oct 2023 07:20 )   PT: 15.1 sec;   INR: 1.39 ratio         PTT - ( 13 Oct 2023 07:20 )  PTT:77.3 sec        Assessment and plan  ---------------------------  62M hx of type 2 DM on metformin, farixga, heart failure with reduced EF on metoprolol 75mg, lasix 20mg qd, xarelto for vte ppx (had RUE vte 2/2 PICC line), removed 2 weeks prior as well as right foot wound wac (present 2/2 osteo of foot with finegoldia magna), here for 36 hrs of fever, tmax 101, took 800mg advil today, prior to arrival. + slight right lower back pain, nonradiating, mild, no assc GI sxs. + urinating more freq as of late. Still tolerating PO. Denies chest pain, shortness of breath, diaphoresis. Hypotensive to 90s/50 in triage, then 80s/60s, last bp 100/40, not tachy but in setting of bb. Appears slightly dehydrated, clear lungs, s3 gallop, no murmurs appreciable. + right foot redness, swelling, nontender, no flucutance, no crepitations, 1+ dp pulses bilateral, full rom. Nontender calves. Benign abd, no peritoneal signs, no jaundice, no cva ttp. No midline spinal ttp. RUE no signs of infx, former picc site clean, Patient meets sepsis criteria by vitals. Will eval for common source of infection (ex. osteo, urinary, bacterial pulmonary, viral uri; unlikely central neurologic such as meninigitis or encephalitis) vs metabolic derangement. Check labs, lactate, UA, CXs, CXR, foot xray, inflam markers screening EKG, hydrate-> empiric abxs, antipyretics, additional crystalloid infusion as clinically warranted. Will start with 500 cc crystalloid 2/2 heart failure but euglycemic dka.  pt is well known to me with hx of htn, ashd, chf, s/p BIV AICD , PVD with multiple admissions sec to foot infection  pt with sig lv dysfunction/ severe ischemic cardiomyopathy  will  adjust cardiac meds  hold Farxiga for now. may  requiring surgery  dvt prophylaxis  vascula/podiatry consult  abx  pt Entresto dose has decrease sec to ?infection will gradually will increase dose  continue Metroprolol er  avoid excess fluid, may need to re start on  Lasix  and aldactone  check inr if elevated will give vitamin K on Xarelto  may need to decrease Xarelto dose if increase renal function  + BC for strep, continue iv abx, pt has hardware (biv Aicd)  will increase Entresto as bp tolerates  re start on aldactone 25 mg daily  s/p AICD shock yesterday , beta blocker increased to 100 mg daily , pt can not tolerate AMIO was tried before  continue iv heparin plan will discuss with ID  cardiac cath noted with patent graft, increase EDP, need to increase Lasix dose, observe bp closely, excellent uo  pt is a high risk for surgery, unless other way of medical therapy  will hold Entresto if bp low  fu bp and hr closely  NO AICD EXTRACTION AT THIS TIME, pt very unstable ,blood culture very transient bacteriemia, cleared within one  day, CHLOE negative  LIKE TO OBSERVE CLINICALLY

## 2023-10-14 NOTE — PROGRESS NOTE ADULT - SUBJECTIVE AND OBJECTIVE BOX
date of service: 10-14-23 @ 09:02    REVIEW OF SYSTEMS:  CONSTITUTIONAL: No fever,  no  weight loss  ENT:  No  tinnitus,   no   vertigo  NECK: No pain or stiffness  RESPIRATORY: No cough, wheezing, chills or hemoptysis;    No Shortness of Breath  CARDIOVASCULAR: No chest pain, palpitations, dizziness  GASTROINTESTINAL: No abdominal or epigastric pain. No nausea, vomiting, or hematemesis; No diarrhea  No melena or hematochezia.  GENITOURINARY: No dysuria, frequency, hematuria, or incontinence  NEUROLOGICAL: No headaches  SKIN: No itching,  no   rash  LYMPH Nodes: No enlarged glands  ENDOCRINE: No heat or cold intolerance  MUSCULOSKELETAL: No joint pain or swelling  PSYCHIATRIC: No depression, anxiety  HEME/LYMPH: No easy bruising, or bleeding gums  ALLERGY AND IMMUNOLOGIC: No hives or eczema	    MEDICATIONS  (STANDING):  ampicillin/sulbactam  IVPB 3 Gram(s) IV Intermittent every 6 hours  aspirin enteric coated 81 milliGRAM(s) Oral daily  atorvastatin 80 milliGRAM(s) Oral at bedtime  dextrose 5%. 1000 milliLiter(s) (100 mL/Hr) IV Continuous <Continuous>  dextrose 5%. 1000 milliLiter(s) (50 mL/Hr) IV Continuous <Continuous>  dextrose 50% Injectable 12.5 Gram(s) IV Push once  dextrose 50% Injectable 25 Gram(s) IV Push once  dextrose 50% Injectable 25 Gram(s) IV Push once  furosemide   Injectable 20 milliGRAM(s) IV Push two times a day  glucagon  Injectable 1 milliGRAM(s) IntraMuscular once  insulin lispro (ADMELOG) corrective regimen sliding scale   SubCutaneous three times a day before meals  insulin lispro (ADMELOG) corrective regimen sliding scale   SubCutaneous at bedtime  melatonin 5 milliGRAM(s) Oral at bedtime  metoprolol succinate  milliGRAM(s) Oral daily  sacubitril 24 mG/valsartan 26 mG 1 Tablet(s) Oral two times a day  senna 2 Tablet(s) Oral at bedtime  sodium bicarbonate 650 milliGRAM(s) Oral three times a day  spironolactone 25 milliGRAM(s) Oral daily    MEDICATIONS  (PRN):  acetaminophen     Tablet .. 650 milliGRAM(s) Oral every 6 hours PRN Mild Pain (1 - 3)  dextrose Oral Gel 15 Gram(s) Oral once PRN Blood Glucose LESS THAN 70 milliGRAM(s)/deciliter      Vital Signs Last 24 Hrs  T(C): 36.4 (14 Oct 2023 05:30), Max: 36.7 (13 Oct 2023 11:44)  T(F): 97.5 (14 Oct 2023 05:30), Max: 98.1 (13 Oct 2023 11:44)  HR: 79 (14 Oct 2023 05:30) (47 - 82)  BP: 113/74 (14 Oct 2023 05:30) (97/63 - 113/74)  BP(mean): 71 (13 Oct 2023 15:15) (71 - 86)  RR: 18 (14 Oct 2023 05:30) (12 - 18)  SpO2: 99% (14 Oct 2023 05:30) (94% - 99%)    Parameters below as of 14 Oct 2023 05:30  Patient On (Oxygen Delivery Method): room air      CAPILLARY BLOOD GLUCOSE      POCT Blood Glucose.: 132 mg/dL (14 Oct 2023 07:31)  POCT Blood Glucose.: 120 mg/dL (13 Oct 2023 21:42)  POCT Blood Glucose.: 113 mg/dL (13 Oct 2023 17:16)  POCT Blood Glucose.: 121 mg/dL (13 Oct 2023 14:31)    I&O's Summary    13 Oct 2023 07:01  -  14 Oct 2023 07:00  --------------------------------------------------------  IN: 420 mL / OUT: 450 mL / NET: -30 mL          Appearance: Normal	  HEENT:   Normal oral mucosa, PERRL, EOMI	  Lymphatic: No lymphadenopathy  Cardiovascular: Normal S1 S2, No JVD  Respiratory: Lungs clear to auscultation	  Gastrointestinal:  Soft, Non-tender, + BS	  Skin: No rash, No ecchymoses	  Extremities:     LABS:                        10.6   5.05  )-----------( 244      ( 14 Oct 2023 07:00 )             34.3     10-14    140  |  98  |  17  ----------------------------<  124<H>  3.6   |  27  |  1.06    Ca    9.3      14 Oct 2023 07:00      PT/INR - ( 13 Oct 2023 07:20 )   PT: 15.1 sec;   INR: 1.39 ratio         PTT - ( 13 Oct 2023 07:20 )  PTT:77.3 sec      Urinalysis Basic - ( 14 Oct 2023 07:00 )    Color: x / Appearance: x / SG: x / pH: x  Gluc: 124 mg/dL / Ketone: x  / Bili: x / Urobili: x   Blood: x / Protein: x / Nitrite: x   Leuk Esterase: x / RBC: x / WBC x   Sq Epi: x / Non Sq Epi: x / Bacteria: x              Thyroid Stimulating Hormone, Serum: 5.55 uIU/mL (10-07 @ 07:08)        Culture - Tissue with Gram Stain (collected 10-13-23 @ 22:35)  Source: .Tissue Other  Gram Stain (10-14-23 @ 03:51):    No polymorphonuclear cells seen per low power field    No organisms seen per oil power field        Consultant(s) Notes Reviewed:      Care Discussed with Consultants/Other Providers:

## 2023-10-14 NOTE — PHYSICAL THERAPY INITIAL EVALUATION ADULT - ASSISTIVE DEVICE FOR TRANSFER: SIT/STAND, REHAB EVAL
CHIEF COMPLAINT: Patient presents with:  Medication Follow-Up: Sertraline working; felling better but still with anxiety leaving home         HPI:     Mitch Marrero is a 35year old female presents for anxiety medication f-u.     Anxiety: Pt has had a hi her feel better, too. Previous HPI from 11/19/19: Pt comes in to discuss her worsening anxiety. She states over the last few months she noticed she has had difficulty with controlling and coping with her anxiety.   She finds that being in her car and dri missing out on family memories and does not want her kids to remember her as being anxious and worried all of the time. She has a  who is supportive and expresses his concern for her worsening anxiety.  They have had a history of going through a time • Diabetes Maternal Grandmother    • Gastro-Intestinal Disorder Father    • Hypertension Father    • Lipids Father    • Gastro-Intestinal Disorder Mother    • Hypertension Mother    • Thyroid disease Mother    • No Known Problems Daughter    • Allergies reviewed. Appears stated age, well groomed. Physical Exam   Vitals reviewed. Constitutional: She is oriented to person, place, and time. She appears well-developed and well-nourished. HENT:   Head: Normocephalic.    Eyes: Conjunctivae are normal.   Neck displayed here. • Ancillary Studies 03/05/2019                      Value: This result contains rich text formatting which cannot be displayed here. • Clinical Information 03/05/2019                      Value: This result contains rich text formatting w dependence     Other hyperlipidemia     LGSIL on Pap smear of cervix      Imaging & Referrals:  None     12/19/2019  Sandhya Castaneda MD      Patient understands plan and follow-up.   Return in about 4 weeks (around 1/16/2020) for annual physical and rolling walker

## 2023-10-14 NOTE — PROVIDER CONTACT NOTE (OTHER) - DATE AND TIME:
08-Oct-2023 00:15
08-Oct-2023 04:57
10-Oct-2023 04:10
13-Oct-2023 04:59
14-Oct-2023 05:30
10-Oct-2023 12:40

## 2023-10-14 NOTE — PHYSICAL THERAPY INITIAL EVALUATION ADULT - ADDITIONAL COMMENTS
pt lives in a private home with wife and 2 sons. pt has 5 stairs to enter with +HR and 1 flight inside with +HR to bedroom. pt was fully independent with all ADLs and ambulates with no AD. pt owns no DME.

## 2023-10-14 NOTE — PHYSICAL THERAPY INITIAL EVALUATION ADULT - ACTIVE RANGE OF MOTION EXAMINATION, REHAB EVAL
matthieu. upper extremity Active ROM was WNL (within normal limits)/bilateral lower extremity Active ROM was WNL (within normal limits)

## 2023-10-14 NOTE — PROGRESS NOTE ADULT - SUBJECTIVE AND OBJECTIVE BOX
Chief complaint    Patient is a 62y old  Male who presents with a chief complaint of fevers/  foot infection (14 Oct 2023 15:19)   Review of systems  Patient appears comfortable.    Labs and Fingersticks  CAPILLARY BLOOD GLUCOSE      POCT Blood Glucose.: 124 mg/dL (14 Oct 2023 17:19)  POCT Blood Glucose.: 160 mg/dL (14 Oct 2023 12:39)  POCT Blood Glucose.: 132 mg/dL (14 Oct 2023 07:31)  POCT Blood Glucose.: 120 mg/dL (13 Oct 2023 21:42)      Anion Gap: 15 (10-14 @ 07:00)  Anion Gap: 12 (10-13 @ 07:21)      Calcium: 9.3 (10-14 @ 07:00)  Calcium: 9.5 (10-13 @ 07:21)          10-14    140  |  98  |  17  ----------------------------<  124<H>  3.6   |  27  |  1.06    Ca    9.3      14 Oct 2023 07:00  Mg     1.7     10-14                          10.6   5.05  )-----------( 244      ( 14 Oct 2023 07:00 )             34.3     Medications  MEDICATIONS  (STANDING):  ampicillin/sulbactam  IVPB 3 Gram(s) IV Intermittent every 6 hours  aspirin enteric coated 81 milliGRAM(s) Oral daily  atorvastatin 80 milliGRAM(s) Oral at bedtime  dextrose 5%. 1000 milliLiter(s) (50 mL/Hr) IV Continuous <Continuous>  dextrose 5%. 1000 milliLiter(s) (100 mL/Hr) IV Continuous <Continuous>  dextrose 50% Injectable 12.5 Gram(s) IV Push once  dextrose 50% Injectable 25 Gram(s) IV Push once  dextrose 50% Injectable 25 Gram(s) IV Push once  furosemide    Tablet 20 milliGRAM(s) Oral two times a day  glucagon  Injectable 1 milliGRAM(s) IntraMuscular once  insulin lispro (ADMELOG) corrective regimen sliding scale   SubCutaneous three times a day before meals  insulin lispro (ADMELOG) corrective regimen sliding scale   SubCutaneous at bedtime  melatonin 5 milliGRAM(s) Oral at bedtime  metoprolol succinate  milliGRAM(s) Oral daily  rivaroxaban 20 milliGRAM(s) Oral with dinner  sacubitril 24 mG/valsartan 26 mG 1 Tablet(s) Oral two times a day  senna 2 Tablet(s) Oral at bedtime  spironolactone 25 milliGRAM(s) Oral daily      Physical Exam  Culture - Tissue with Gram Stain (collected 10-13-23 @ 22:35)  Source: .Tissue Other  Gram Stain (10-14-23 @ 03:51):    No polymorphonuclear cells seen per low power field    No organisms seen per oil power field      General: Patient appears comfortable.  Vital Signs Last 12 Hrs  T(F): 98.1 (10-14-23 @ 21:00), Max: 98.1 (10-14-23 @ 11:44)  HR: 85 (10-14-23 @ 21:00) (68 - 85)  BP: 98/61 (10-14-23 @ 21:00) (93/58 - 101/63)  BP(mean): --  RR: 18 (10-14-23 @ 21:00) (18 - 18)  SpO2: 95% (10-14-23 @ 21:00) (95% - 98%)  Neck: No palpable thyroid nodules.  CVS: S1S2, No murmurs  Respiratory: No wheezing, no crepitations  GI: Abdomen soft, non tender.    Diagnostics    Free Thyroxine, Serum: AM Sched. Collection: 07-Oct-2023 06:00 (10-06 @ 13:55)  Thyroid Stimulating Hormone, Serum: AM Sched. Collection: 07-Oct-2023 06:00 (10-06 @ 13:55)      Radiology:

## 2023-10-14 NOTE — PROGRESS NOTE ADULT - ASSESSMENT
62yr M      hx of  HTN,   c/c   AFIB,  DM/. right foot  osteo      CAD, s/p  cabg / AICD,hx of Sommers fracture, non-union many years ago      prior CT:  c/c  transverse   fx, through the fifth metatarsal base. soft tissue wound/ cortical irregularity along the fifth metatarsal base     wound cx   MSSA and fingoldia,  s/p I&D with necrotic tissue close to bone,suspicion for residual osteo     completed   ancef 2 gms IVSS q 8 hours .  last  month     now  admitted with fevers/  right foot  wound/  and  shanda   R  5th metatarsal  wound/  foot  cellulitis      seen by podiatry /  ID     HTN/  HLD   CAD/    cardiomyopathy,  ef  25    c/c  Afib,  on   home  xarelo/   c/c  anemia  DM,  on  farxiga. januvia. metformin   has   c/c  low  baseline  sbp  in  90's   hold  lasix/  farxiga/  entresto, for  now/ farxiga  not  recommended  with osteo  foot/   risk  for  amputation     Gp  B Strep,   bactermia,  on iv Unasyn    ID  following      card  f/p,  has  aicd/  defer  a/c  to  card         rad< from: TTE W or WO Ultrasound Enhancing Agent (08.07.23 @ 15:33) >  ONCLUSIONS:     1. Severely dilated left ventricular cavity size.The left ventricular systolic function is severely decreased with an ejection fraction visually estimated at 25 to 30 %. There is global left ventricular hypokinesis.   2. Multiple segmental abnormalities exist. See findings.   3. Device lead is visualized in the right ventricle.   4. The aortic annulus and aortic root appear normal in size.    ________________________________________________________________________________________  FINDINGS:  < end of copied text >

## 2023-10-15 PROBLEM — A49.1 INFECTION DUE TO PEPTOSTREPTOCOCCUS SPECIES: Status: ACTIVE | Noted: 2023-09-11

## 2023-10-15 PROBLEM — A49.01 MSSA (METHICILLIN SUSCEPTIBLE STAPHYLOCOCCUS AUREUS): Status: ACTIVE | Noted: 2023-09-11

## 2023-10-15 LAB
ANION GAP SERPL CALC-SCNC: 14 MMOL/L — SIGNIFICANT CHANGE UP (ref 5–17)
BUN SERPL-MCNC: 21 MG/DL — SIGNIFICANT CHANGE UP (ref 7–23)
CALCIUM SERPL-MCNC: 9.1 MG/DL — SIGNIFICANT CHANGE UP (ref 8.4–10.5)
CHLORIDE SERPL-SCNC: 101 MMOL/L — SIGNIFICANT CHANGE UP (ref 96–108)
CO2 SERPL-SCNC: 25 MMOL/L — SIGNIFICANT CHANGE UP (ref 22–31)
CREAT SERPL-MCNC: 1.03 MG/DL — SIGNIFICANT CHANGE UP (ref 0.5–1.3)
CULTURE RESULTS: SIGNIFICANT CHANGE UP
CULTURE RESULTS: SIGNIFICANT CHANGE UP
EGFR: 82 ML/MIN/1.73M2 — SIGNIFICANT CHANGE UP
GLUCOSE BLDC GLUCOMTR-MCNC: 113 MG/DL — HIGH (ref 70–99)
GLUCOSE BLDC GLUCOMTR-MCNC: 133 MG/DL — HIGH (ref 70–99)
GLUCOSE BLDC GLUCOMTR-MCNC: 140 MG/DL — HIGH (ref 70–99)
GLUCOSE BLDC GLUCOMTR-MCNC: 161 MG/DL — HIGH (ref 70–99)
GLUCOSE SERPL-MCNC: 130 MG/DL — HIGH (ref 70–99)
HCT VFR BLD CALC: 32.3 % — LOW (ref 39–50)
HGB BLD-MCNC: 10.1 G/DL — LOW (ref 13–17)
MAGNESIUM SERPL-MCNC: 2.2 MG/DL — SIGNIFICANT CHANGE UP (ref 1.6–2.6)
MCHC RBC-ENTMCNC: 26.7 PG — LOW (ref 27–34)
MCHC RBC-ENTMCNC: 31.3 GM/DL — LOW (ref 32–36)
MCV RBC AUTO: 85.4 FL — SIGNIFICANT CHANGE UP (ref 80–100)
NRBC # BLD: 0 /100 WBCS — SIGNIFICANT CHANGE UP (ref 0–0)
PLATELET # BLD AUTO: 233 K/UL — SIGNIFICANT CHANGE UP (ref 150–400)
POTASSIUM SERPL-MCNC: 4.6 MMOL/L — SIGNIFICANT CHANGE UP (ref 3.5–5.3)
POTASSIUM SERPL-SCNC: 4.6 MMOL/L — SIGNIFICANT CHANGE UP (ref 3.5–5.3)
RBC # BLD: 3.78 M/UL — LOW (ref 4.2–5.8)
RBC # FLD: 18.1 % — HIGH (ref 10.3–14.5)
SODIUM SERPL-SCNC: 140 MMOL/L — SIGNIFICANT CHANGE UP (ref 135–145)
SPECIMEN SOURCE: SIGNIFICANT CHANGE UP
SPECIMEN SOURCE: SIGNIFICANT CHANGE UP
WBC # BLD: 5.83 K/UL — SIGNIFICANT CHANGE UP (ref 3.8–10.5)
WBC # FLD AUTO: 5.83 K/UL — SIGNIFICANT CHANGE UP (ref 3.8–10.5)

## 2023-10-15 RX ADMIN — RIVAROXABAN 20 MILLIGRAM(S): KIT at 17:20

## 2023-10-15 RX ADMIN — AMPICILLIN SODIUM AND SULBACTAM SODIUM 200 GRAM(S): 250; 125 INJECTION, POWDER, FOR SUSPENSION INTRAMUSCULAR; INTRAVENOUS at 11:59

## 2023-10-15 RX ADMIN — AMPICILLIN SODIUM AND SULBACTAM SODIUM 200 GRAM(S): 250; 125 INJECTION, POWDER, FOR SUSPENSION INTRAMUSCULAR; INTRAVENOUS at 05:29

## 2023-10-15 RX ADMIN — AMPICILLIN SODIUM AND SULBACTAM SODIUM 200 GRAM(S): 250; 125 INJECTION, POWDER, FOR SUSPENSION INTRAMUSCULAR; INTRAVENOUS at 17:20

## 2023-10-15 RX ADMIN — AMPICILLIN SODIUM AND SULBACTAM SODIUM 200 GRAM(S): 250; 125 INJECTION, POWDER, FOR SUSPENSION INTRAMUSCULAR; INTRAVENOUS at 23:03

## 2023-10-15 RX ADMIN — Medication 81 MILLIGRAM(S): at 11:59

## 2023-10-15 RX ADMIN — Medication 20 MILLIGRAM(S): at 17:20

## 2023-10-15 RX ADMIN — Medication 5 MILLIGRAM(S): at 23:03

## 2023-10-15 RX ADMIN — Medication 1: at 13:06

## 2023-10-15 RX ADMIN — ATORVASTATIN CALCIUM 80 MILLIGRAM(S): 80 TABLET, FILM COATED ORAL at 21:50

## 2023-10-15 RX ADMIN — Medication 100 MILLIGRAM(S): at 05:27

## 2023-10-15 RX ADMIN — SACUBITRIL AND VALSARTAN 1 TABLET(S): 24; 26 TABLET, FILM COATED ORAL at 05:26

## 2023-10-15 RX ADMIN — SACUBITRIL AND VALSARTAN 1 TABLET(S): 24; 26 TABLET, FILM COATED ORAL at 17:19

## 2023-10-15 RX ADMIN — SENNA PLUS 2 TABLET(S): 8.6 TABLET ORAL at 21:50

## 2023-10-15 RX ADMIN — SPIRONOLACTONE 25 MILLIGRAM(S): 25 TABLET, FILM COATED ORAL at 05:27

## 2023-10-15 RX ADMIN — Medication 20 MILLIGRAM(S): at 05:27

## 2023-10-15 NOTE — PROGRESS NOTE ADULT - ASSESSMENT
62M s/p right foot 5th metatarsal base resection, open (DOS 10/13/23).  - Patient seen and evaluated.  - Afebrile, no leukocytosis.  - 10/13 s/p right foot 5th metatarsal base resection, open: distal and proximal sutures intact, central wound has granular wound bed, moderate sanguinous drainage, no hematoma formation, periwound erythema resolving, no purulence.  - Intraop Findings: Low concern for residual infection and viability.  - Right Foot Clean Bone Margin Culture: No growth.  - ID recs, appreciated.  - Pt is stable for discharge from the podiatry standpoint today pending final ID recs.  - Wound care instructions and followup information listed in discharge provider note.  - Discussed with attending. 62M s/p right foot 5th metatarsal base resection, open (DOS 10/13/23).  - Patient seen and evaluated.  - Afebrile, no leukocytosis.  - 10/13 s/p right foot 5th metatarsal base resection, open: distal and proximal sutures intact, central wound has granular wound bed, moderate sanguinous drainage, no hematoma formation, periwound erythema resolving, no purulence.  - Intraop Findings: Low concern for residual infection and viability.  - Right Foot Clean Bone Margin Culture: No growth.  - ID recs, appreciated.  - Right foot wound VAC application tomorrow 10/16, after morning evaluation.  - Pt is stable for discharge from the podiatry standpoint pending final ID recs and VAC application.  - Wound care instructions and followup information listed in discharge provider note.  - Discussed with attending.

## 2023-10-15 NOTE — PROGRESS NOTE ADULT - SUBJECTIVE AND OBJECTIVE BOX
Date of Service: 10-15-23 @ 06:01           CARDIOLOGY     PROGRESS  NOTE   ________________________________________________    CHIEF COMPLAINT:Patient is a 62y old  Male who presents with a chief complaint of fevers/  foot infection (14 Oct 2023 21:29)  no complain  	  REVIEW OF SYSTEMS:  CONSTITUTIONAL: No fever, weight loss, or fatigue  EYES: No eye pain, visual disturbances, or discharge  ENT:  No difficulty hearing, tinnitus, vertigo; No sinus or throat pain  NECK: No pain or stiffness  RESPIRATORY: No cough, wheezing, chills or hemoptysis; No Shortness of Breath  CARDIOVASCULAR: No chest pain, palpitations, passing out, dizziness, or leg swelling  GASTROINTESTINAL: No abdominal or epigastric pain. No nausea, vomiting, or hematemesis; No diarrhea or constipation. No melena or hematochezia.  GENITOURINARY: No dysuria, frequency, hematuria, or incontinence  NEUROLOGICAL: No headaches, memory loss, loss of strength, numbness, or tremors  SKIN: No itching, burning, rashes, or lesions   LYMPH Nodes: No enlarged glands  ENDOCRINE: No heat or cold intolerance; No hair loss  MUSCULOSKELETAL: No joint pain or swelling; No muscle, back, or extremity pain  PSYCHIATRIC: No depression, anxiety, mood swings, or difficulty sleeping  HEME/LYMPH: No easy bruising, or bleeding gums  ALLERGY AND IMMUNOLOGIC: No hives or eczema	    [ x] All others negative	  [ ] Unable to obtain    PHYSICAL EXAM:  T(C): 36.3 (10-15-23 @ 04:44), Max: 36.7 (10-14-23 @ 11:44)  HR: 71 (10-15-23 @ 04:44) (68 - 85)  BP: 102/67 (10-15-23 @ 04:44) (93/58 - 102/67)  RR: 18 (10-15-23 @ 04:44) (18 - 18)  SpO2: 98% (10-15-23 @ 04:44) (95% - 98%)  Wt(kg): --  I&O's Summary    13 Oct 2023 07:01  -  14 Oct 2023 07:00  --------------------------------------------------------  IN: 420 mL / OUT: 450 mL / NET: -30 mL    14 Oct 2023 07:01  -  15 Oct 2023 06:01  --------------------------------------------------------  IN: 0 mL / OUT: 500 mL / NET: -500 mL        Appearance: Normal	  HEENT:   Normal oral mucosa, PERRL, EOMI	  Lymphatic: No lymphadenopathy  Cardiovascular: Normal S1 S2, No JVD, + murmurs, No edema  Respiratory: rhonchi  Psychiatry: A & O x 3, Mood & affect appropriate  Gastrointestinal:  Soft, Non-tender, + BS	  Skin: No rashes, No ecchymoses, No cyanosis	  Neurologic: Non-focal  Extremities: Normal range of motion,bandaged foot  Vascular:+ pvd    MEDICATIONS  (STANDING):  ampicillin/sulbactam  IVPB 3 Gram(s) IV Intermittent every 6 hours  aspirin enteric coated 81 milliGRAM(s) Oral daily  atorvastatin 80 milliGRAM(s) Oral at bedtime  dextrose 5%. 1000 milliLiter(s) (50 mL/Hr) IV Continuous <Continuous>  dextrose 5%. 1000 milliLiter(s) (100 mL/Hr) IV Continuous <Continuous>  dextrose 50% Injectable 25 Gram(s) IV Push once  dextrose 50% Injectable 12.5 Gram(s) IV Push once  dextrose 50% Injectable 25 Gram(s) IV Push once  furosemide    Tablet 20 milliGRAM(s) Oral two times a day  glucagon  Injectable 1 milliGRAM(s) IntraMuscular once  insulin lispro (ADMELOG) corrective regimen sliding scale   SubCutaneous three times a day before meals  insulin lispro (ADMELOG) corrective regimen sliding scale   SubCutaneous at bedtime  melatonin 5 milliGRAM(s) Oral at bedtime  metoprolol succinate  milliGRAM(s) Oral daily  rivaroxaban 20 milliGRAM(s) Oral with dinner  sacubitril 24 mG/valsartan 26 mG 1 Tablet(s) Oral two times a day  senna 2 Tablet(s) Oral at bedtime  spironolactone 25 milliGRAM(s) Oral daily      TELEMETRY: 	    ECG:  	  RADIOLOGY:  OTHER: 	  	  LABS:	 	    CARDIAC MARKERS:                            10.6   5.05  )-----------( 244      ( 14 Oct 2023 07:00 )             34.3     10-14    140  |  98  |  17  ----------------------------<  124<H>  3.6   |  27  |  1.06    Ca    9.3      14 Oct 2023 07:00  Mg     1.7     10-14      proBNP:   Lipid Profile:   HgA1c:   TSH: Thyroid Stimulating Hormone, Serum: 5.55 uIU/mL (10-07 @ 07:08)    PT/INR - ( 13 Oct 2023 07:20 )   PT: 15.1 sec;   INR: 1.39 ratio         PTT - ( 13 Oct 2023 07:20 )  PTT:77.3 sec      Assessment and plan  ---------------------------  62M hx of type 2 DM on metformin, farixga, heart failure with reduced EF on metoprolol 75mg, lasix 20mg qd, xarelto for vte ppx (had RUE vte 2/2 PICC line), removed 2 weeks prior as well as right foot wound wac (present 2/2 osteo of foot with finegoldia magna), here for 36 hrs of fever, tmax 101, took 800mg advil today, prior to arrival. + slight right lower back pain, nonradiating, mild, no assc GI sxs. + urinating more freq as of late. Still tolerating PO. Denies chest pain, shortness of breath, diaphoresis. Hypotensive to 90s/50 in triage, then 80s/60s, last bp 100/40, not tachy but in setting of bb. Appears slightly dehydrated, clear lungs, s3 gallop, no murmurs appreciable. + right foot redness, swelling, nontender, no flucutance, no crepitations, 1+ dp pulses bilateral, full rom. Nontender calves. Benign abd, no peritoneal signs, no jaundice, no cva ttp. No midline spinal ttp. RUE no signs of infx, former picc site clean, Patient meets sepsis criteria by vitals. Will eval for common source of infection (ex. osteo, urinary, bacterial pulmonary, viral uri; unlikely central neurologic such as meninigitis or encephalitis) vs metabolic derangement. Check labs, lactate, UA, CXs, CXR, foot xray, inflam markers screening EKG, hydrate-> empiric abxs, antipyretics, additional crystalloid infusion as clinically warranted. Will start with 500 cc crystalloid 2/2 heart failure but euglycemic dka.  pt is well known to me with hx of htn, ashd, chf, s/p BIV AICD , PVD with multiple admissions sec to foot infection  pt with sig lv dysfunction/ severe ischemic cardiomyopathy  will  adjust cardiac meds  hold Farxiga for now. may  requiring surgery  dvt prophylaxis  vascula/podiatry consult  abx  pt Entresto dose has decrease sec to ?infection will gradually will increase dose  continue Metroprolol er  avoid excess fluid, may need to re start on  Lasix  and aldactone  check inr if elevated will give vitamin K on Xarelto  may need to decrease Xarelto dose if increase renal function  + BC for strep, continue iv abx, pt has hardware (biv Aicd)  will increase Entresto as bp tolerates  re start on aldactone 25 mg daily  s/p AICD shock yesterday , beta blocker increased to 100 mg daily , pt can not tolerate AMIO was tried before  continue iv heparin plan will discuss with ID  cardiac cath noted with patent graft, increase EDP, need to increase Lasix dose, observe bp closely, excellent uo  pt is a high risk for surgery, unless other way of medical therapy  will hold Entresto if bp low  fu bp and hr closely  NO AICD EXTRACTION AT THIS TIME, pt very unstable ,blood culture very transient bacteriemia, cleared within one  day, CHLOE negative  LIKE TO OBSERVE CLINICALLY, Discussed with ID  kimber increase entresto as bp tolerated  continue meds/ no arrythmia on tele

## 2023-10-15 NOTE — PROGRESS NOTE ADULT - SUBJECTIVE AND OBJECTIVE BOX
Patient is a 62y old  Male who presents with a chief complaint of fevers/  foot infection (15 Oct 2023 06:01)       INTERVAL HPI/OVERNIGHT EVENTS:  Patient seen and evaluated at bedside.  Pt is resting comfortable in NAD. Denies N/V/F/C.    Allergies    No Known Allergies    Intolerances        Vital Signs Last 24 Hrs  T(C): 36.3 (15 Oct 2023 04:44), Max: 36.7 (14 Oct 2023 11:44)  T(F): 97.3 (15 Oct 2023 04:44), Max: 98.1 (14 Oct 2023 11:44)  HR: 71 (15 Oct 2023 04:44) (68 - 85)  BP: 102/67 (15 Oct 2023 04:44) (93/58 - 102/67)  BP(mean): --  RR: 18 (15 Oct 2023 04:44) (18 - 18)  SpO2: 98% (15 Oct 2023 04:44) (95% - 98%)    Parameters below as of 15 Oct 2023 04:44  Patient On (Oxygen Delivery Method): room air        LABS:                        10.1   5.83  )-----------( 233      ( 15 Oct 2023 07:04 )             32.3     10-15    140  |  101  |  21  ----------------------------<  130<H>  4.6   |  25  |  1.03    Ca    9.1      15 Oct 2023 07:04  Mg     2.2     10-15        Urinalysis Basic - ( 15 Oct 2023 07:04 )    Color: x / Appearance: x / SG: x / pH: x  Gluc: 130 mg/dL / Ketone: x  / Bili: x / Urobili: x   Blood: x / Protein: x / Nitrite: x   Leuk Esterase: x / RBC: x / WBC x   Sq Epi: x / Non Sq Epi: x / Bacteria: x      CAPILLARY BLOOD GLUCOSE      POCT Blood Glucose.: 133 mg/dL (15 Oct 2023 08:49)  POCT Blood Glucose.: 150 mg/dL (14 Oct 2023 21:33)  POCT Blood Glucose.: 124 mg/dL (14 Oct 2023 17:19)  POCT Blood Glucose.: 160 mg/dL (14 Oct 2023 12:39)      Lower Extremity Physical Exam:  Vascular: DP/PT 0/4 B/L, CFT <3 sec x 10, Temp gradient warm to warm, RLE, warm to cool LLE.    Neurology: Epicritic sensation intact to level of digits, B/L  Musculoskeletal/Ortho: pain to palpation over right foot 5th digit, 8/8 s/p right foot I&D w/ Right foot partial 5th ray resection, closed  Skin: 10/13 s/p right foot 5th metatarsal base resection, open: distal and proximal sutures intact, central wound has granular wound bed, moderate sanguinous drainage, no hematoma formation, periwound erythema resolving, no purulence.    RADIOLOGY & ADDITIONAL TESTS:

## 2023-10-15 NOTE — PROGRESS NOTE ADULT - ASSESSMENT
HPI:  62yr M      hx of  HTN,  C/C  AFIB,  DM/  right foot  osteo      completed recent course of antibiotics via PICC for rt foot infection / last month     p/w erythema and subjective fevers at home     seen  by podiatry,   in  er   denies  cp/sob/abd  pain     (06 Oct 2023 09:29)      ACUTE RENAL FAILURE: spironolactone 25 milliGRAM(s) Oral daily   sodium bicarbonate 650 milliGRAM(s) Oral three times a day  Serum creatinine is  improved , furosemide   Injectable 20 milliGRAM(s) IV Push two times a day  There is no progression . No uremic symptoms  No evidence of anemia .  Fluid status stable.  Will continue to avoid nephrotoxic drugs.  Patient remains asymptomatic.   Continue current therapy.    BP monitoring,continue current antihypertensive meds, low salt diet,followup with PMD in 1-2 weeks  metoprolol succinate ER 25 milliGRAM(s) Oral daily  sacubitril 24 mG/valsartan 26 mG 1 Tablet(s) Oral two times a day    f/u  blood and urine cx,serial lactate levels,monitor vitals closley,ivfs hydration,monitor urine output and renal profile,iv abx as per id cons  ampicillin/sulbactam  IVPB 3 Gram(s) IV Intermittent every 6 hours

## 2023-10-15 NOTE — PROGRESS NOTE ADULT - SUBJECTIVE AND OBJECTIVE BOX
Patient is a 62y Male whom presented to the hospital with shanda     PAST MEDICAL & SURGICAL HISTORY:  Diabetes      Chronic osteomyelitis      H/O heart failure      No significant past surgical history          MEDICATIONS  (STANDING):  aspirin enteric coated 81 milliGRAM(s) Oral daily  atorvastatin 80 milliGRAM(s) Oral at bedtime  dextrose 5%. 1000 milliLiter(s) (100 mL/Hr) IV Continuous <Continuous>  dextrose 5%. 1000 milliLiter(s) (50 mL/Hr) IV Continuous <Continuous>  dextrose 50% Injectable 25 Gram(s) IV Push once  dextrose 50% Injectable 25 Gram(s) IV Push once  dextrose 50% Injectable 12.5 Gram(s) IV Push once  glucagon  Injectable 1 milliGRAM(s) IntraMuscular once  insulin lispro (ADMELOG) corrective regimen sliding scale   SubCutaneous three times a day before meals  metoprolol succinate ER 25 milliGRAM(s) Oral daily  piperacillin/tazobactam IVPB.. 3.375 Gram(s) IV Intermittent every 8 hours  rivaroxaban 20 milliGRAM(s) Oral with dinner  sodium bicarbonate 650 milliGRAM(s) Oral three times a day      Allergies    No Known Allergies    Intolerances        SOCIAL HISTORY:  Denies ETOh,Smoking,     FAMILY HISTORY:      REVIEW OF SYSTEMS:    CONSTITUTIONAL: No weakness, fevers or chills  EYES/ENT: No visual changes;  no throat pain   NECK: No pain or stiffness  RESPIRATORY: No cough, wheezing, hemoptysis; No shortness of breath                                                                                              10.1   5.83  )-----------( 233      ( 15 Oct 2023 07:04 )             32.3       CBC Full  -  ( 15 Oct 2023 07:04 )  WBC Count : 5.83 K/uL  RBC Count : 3.78 M/uL  Hemoglobin : 10.1 g/dL  Hematocrit : 32.3 %  Platelet Count - Automated : 233 K/uL  Mean Cell Volume : 85.4 fl  Mean Cell Hemoglobin : 26.7 pg  Mean Cell Hemoglobin Concentration : 31.3 gm/dL  Auto Neutrophil # : x  Auto Lymphocyte # : x  Auto Monocyte # : x  Auto Eosinophil # : x  Auto Basophil # : x  Auto Neutrophil % : x  Auto Lymphocyte % : x  Auto Monocyte % : x  Auto Eosinophil % : x  Auto Basophil % : x      10-15    140  |  101  |  21  ----------------------------<  130<H>  4.6   |  25  |  1.03    Ca    9.1      15 Oct 2023 07:04  Mg     2.2     10-15        CAPILLARY BLOOD GLUCOSE      POCT Blood Glucose.: 161 mg/dL (15 Oct 2023 12:57)  POCT Blood Glucose.: 133 mg/dL (15 Oct 2023 08:49)  POCT Blood Glucose.: 150 mg/dL (14 Oct 2023 21:33)  POCT Blood Glucose.: 124 mg/dL (14 Oct 2023 17:19)      Vital Signs Last 24 Hrs  T(C): 36.5 (15 Oct 2023 12:33), Max: 36.7 (14 Oct 2023 21:00)  T(F): 97.7 (15 Oct 2023 12:33), Max: 98.1 (14 Oct 2023 21:00)  HR: 70 (15 Oct 2023 12:33) (70 - 85)  BP: 96/63 (15 Oct 2023 12:33) (96/63 - 102/67)  BP(mean): --  RR: 18 (15 Oct 2023 12:33) (18 - 18)  SpO2: 99% (15 Oct 2023 12:33) (95% - 99%)    Parameters below as of 15 Oct 2023 12:33  Patient On (Oxygen Delivery Method): room air        Urinalysis Basic - ( 15 Oct 2023 07:04 )    Color: x / Appearance: x / SG: x / pH: x  Gluc: 130 mg/dL / Ketone: x  / Bili: x / Urobili: x   Blood: x / Protein: x / Nitrite: x   Leuk Esterase: x / RBC: x / WBC x   Sq Epi: x / Non Sq Epi: x / Bacteria: x            PHYSICAL EXAM:    Constitutional: NAD  HEENT: conjunctive   clear   Neck:  No JVD  Respiratory: CTAB  Cardiovascular: S1 and S2  Gastrointestinal: BS+, soft, NT/ND  Extremities: No peripheral edema  Neurological:  no focal deficits  Psychiatric: Normal mood, normal affect  : No Atkins  Skin: No rashes  Access: Not applicable

## 2023-10-15 NOTE — PROGRESS NOTE ADULT - ASSESSMENT
_________________________________________________________________________________________  ========>>  M E D I C A L   A T T E N D I N G    F O L L O W  U P  N O T E  <<=========  -----------------------------------------------------------------------------------------------------    - Patient evaluated by me, chart reviewed.  Patient evaluated before surgery, Note written post op  - In summary,  RYLEE HWOE is a 62y year old man admitted with Diabetic foot infection  - Patient today overall doing ok, comfortable,     ==================>> REVIEW OF SYSTEM <<=================    GEN: no fever, no chills,   RESP: no SOB, no cough, no sputum  CVS: no chest pain, no palpitations, no edema  GI: no abdominal pain, no nausea,   : no dysuria, no frequency,  Neuro: no headache, no dizziness  Derm : no itching, no rash    ==================>> PHYSICAL EXAM <<=================    GEN: A&O X 3 , NAD , comfortable, pleasant, calm in bed .. encouraged out of bed to chair with assistance as needed.   HEENT: NCAT, PERRL, MMM, hearing intact  Neck: supple , no JVD appreciated  CVS: S1S2 , regular , No M/R/G appreciated  PULM: CTA B/L,  no W/R/R appreciated  ABD.: soft. non tender, non distended,  bowel sounds present  Extrem: intact pulses , Right foot wound dressed  PSYCH : normal mood,  not anxious        ( Note written / Date of service 10-15-23 ( This is certified to be the same as "ENTERED" date above ( for billing purposes)))    ==================>> MEDICATIONS <<====================    ampicillin/sulbactam  IVPB 3 Gram(s) IV Intermittent every 6 hours  aspirin enteric coated 81 milliGRAM(s) Oral daily  atorvastatin 80 milliGRAM(s) Oral at bedtime  dextrose 5%. 1000 milliLiter(s) IV Continuous <Continuous>  dextrose 5%. 1000 milliLiter(s) IV Continuous <Continuous>  dextrose 50% Injectable 12.5 Gram(s) IV Push once  dextrose 50% Injectable 25 Gram(s) IV Push once  dextrose 50% Injectable 25 Gram(s) IV Push once  furosemide    Tablet 20 milliGRAM(s) Oral two times a day  glucagon  Injectable 1 milliGRAM(s) IntraMuscular once  insulin lispro (ADMELOG) corrective regimen sliding scale   SubCutaneous three times a day before meals  insulin lispro (ADMELOG) corrective regimen sliding scale   SubCutaneous at bedtime  melatonin 5 milliGRAM(s) Oral at bedtime  metoprolol succinate  milliGRAM(s) Oral daily  rivaroxaban 20 milliGRAM(s) Oral with dinner  sacubitril 24 mG/valsartan 26 mG 1 Tablet(s) Oral two times a day  senna 2 Tablet(s) Oral at bedtime  spironolactone 25 milliGRAM(s) Oral daily    MEDICATIONS  (PRN):  acetaminophen     Tablet .. 650 milliGRAM(s) Oral every 6 hours PRN Mild Pain (1 - 3)  dextrose Oral Gel 15 Gram(s) Oral once PRN Blood Glucose LESS THAN 70 milliGRAM(s)/deciliter    ___________  Active diet:  Diet, Consistent Carbohydrate w/Evening Snack:   DASH/TLC Sodium & Cholesterol Restricted (DASH)  ___________________    ==================>> VITAL SIGNS <<==================  Height (cm): 185.4  Weight (kg): 108.5  BMI (kg/m2): 31.6  Vital Signs Last 24 HrsT(C): 36.5 (10-15-23 @ 12:33)  T(F): 97.7 (10-15-23 @ 12:33), Max: 98.1 (10-14-23 @ 21:00)  HR: 70 (10-15-23 @ 12:33) (70 - 85)  BP: 96/63 (10-15-23 @ 12:33)  RR: 18 (10-15-23 @ 12:33) (18 - 18)  SpO2: 99% (10-15-23 @ 12:33) (95% - 99%)      CAPILLARY BLOOD GLUCOSE      POCT Blood Glucose.: 161 mg/dL (15 Oct 2023 12:57)  POCT Blood Glucose.: 133 mg/dL (15 Oct 2023 08:49)  POCT Blood Glucose.: 150 mg/dL (14 Oct 2023 21:33)  POCT Blood Glucose.: 124 mg/dL (14 Oct 2023 17:19)     ==================>> LAB AND IMAGING <<==================                        10.1   5.83  )-----------( 233      ( 15 Oct 2023 07:04 )             32.3        10-15    140  |  101  |  21  ----------------------------<  130<H>  4.6   |  25  |  1.03    Ca    9.1      15 Oct 2023 07:04  Mg     2.2     10-15      WBC count:   5.83 <<== ,  5.05 <<== ,  4.26 <<== ,  4.96 <<== ,  4.88 <<== ,  5.43 <<==   Hemoglobin:   10.1 <<==,  10.6 <<==,  10.2 <<==,  10.1 <<==,  10.2 <<==,  11.3 <<==  platelets:  233 <==, 244 <==, 231 <==, 198 <==, 190 <==, 221 <==, 174 <==    Creatinine:  1.03  <<==, 1.06  <<==, 1.06  <<==, 1.01  <<==, 0.89  <<==, 0.87  <<==  Sodium:   140  <==, 140  <==, 140  <==, 139  <==, 138  <==, 143  <==       AST:               ALT:             AP:             Bili:            ____________________________    M I C R O B I O L O G Y :    Culture - Tissue with Gram Stain (collected 13 Oct 2023 22:35)  Source: .Tissue Other  Gram Stain (14 Oct 2023 03:51):    No polymorphonuclear cells seen per low power field    No organisms seen per oil power field  Preliminary Report (14 Oct 2023 21:59):    No growth    Culture - Blood (collected 10 Oct 2023 07:40)  Source: .Blood Blood-Peripheral  Final Report (15 Oct 2023 10:00):    No growth at 5 days    Culture - Blood (collected 10 Oct 2023 07:40)  Source: .Blood Blood-Peripheral  Final Report (15 Oct 2023 10:00):    No growth at 5 days    Culture - Blood (collected 08 Oct 2023 07:01)  Source: .Blood Blood-Peripheral  Final Report (13 Oct 2023 10:00):    No growth at 5 days    Culture - Blood (collected 08 Oct 2023 07:01)  Source: .Blood Blood-Peripheral  Final Report (13 Oct 2023 10:00):    No growth at 5 days    Culture - Urine (collected 06 Oct 2023 16:25)  Source: Clean Catch Clean Catch (Midstream)  Final Report (07 Oct 2023 15:13):    <10,000 CFU/mL Normal Urogenital Mona    Culture - Abscess with Gram Stain (collected 06 Oct 2023 15:12)  Source: .Abscess right foot  Gram Stain (06 Oct 2023 22:45):    Rare polymorphonuclear leukocytes per low power field    Numerous Gram Variable Rods per oil power field  Final Report (11 Oct 2023 17:25):    Moderate Gram Positive Rods Most closely resembling Arcanobacterium    haemolyticum "Susceptibilities not performed"    Moderate Streptococcus agalactiae (Group B) isolated    Group B streptococci are susceptible to ampicillin,    penicillin and cefazolin,but may be resistant to    erythromycin and clindamycin.        SARS-CoV-2: NotDetec (10-06-23 @ 06:54)        < from: CHLOE W or WO Ultrasound Enhancing Agent (10.12.23 @ 13:45) >  CONCLUSIONS:    1. Left ventricular systolic function is severely decreased with an ejection fraction visually estimated at <20 %.   2. Moderate mitral regurgitation.   3. No pericardial effusion seen.   4. No echocardiographic evidence of valvular endocarditis.      No vegetation on the visible segments of the device leads.   5. Compared to the transthoracic echocardiogram performed on 10/9/2023 there have been no significant interval changes.   6. Minimal (grade 1) spontaneous echo contrast located in the left atrial apendage and minimal (grade 1) spontaneous echo contrast located in the left atrium.   7. No evidence of left atrial or left atrial appendage thrombus. The left atrial appendage emptying velocity is low.   8. Symmetric mitral valve leaflet tethering.  < end of copied text >    < from: Cardiac Catheterization (10.11.23 @ 11:23) >  Procedures:                 1.    Ultrasound Guided Access   2.    Arterial Access - Left Femoral   3.    Diagnostic Coronary Angiography   4.    Diagnostic Graft Angiography   5.    Left Heart Cath   6.    Aortogram     Indications:                Preoperative evaluation for extracardiac  surgery  Non-sustained ventricular tachycardia     Lab Visit Indication:       ExCardEv, NonSVT     Diagnostic Conclusions:   Severe three vessel obstructive coronary artery disease   Chronic total occlusion of the right coronary artery   --Left to right, Rentrop grade 1-2 filling of the posterior descending  artery/posterolateral artery  Patent LIMA to the left anterior descending artery   Patent SVG to the obtuse marginal artery   Patent SVG to the first diagonal artery   Normal left main coronary artery   Right dominant system   LVEDP = 22mmHg   No gradient across the aortic valve    Recommendations:   Keep left leg straight for 4 hours following removal of sheath   Continue aggressive medical management of coronary artery disease and  associated risk factors  Gentle IV hydration as per protocol   < end of copied text >    __________________________________________________________________________________  ===============>>  A S S E S S M E N T   A N D   P L A N <<===============  ------------------------------------------------------------------------------------------    · Assessment	  62yr M with PMH of  HTN,   AFIB,  DM/. right foot  osteo (Recently completed IV antibiotics via PICC line) ,CAD, s/p  cabg / AICD,hx of Sommers fracture, non-union many years ago,   prior CT:  c/c  transverse   fx, through the fifth metatarsal base. soft tissue wound/ cortical irregularity along the fifth metatarsal base  wound cx   MSSA and fingoldia,  s/p I&D with necrotic tissue close to bone,suspicion for residual osteo  completed   ancef 2 gms IVSS q 8 hours .  last  month     now  admitted with fevers/  right foot  wound/  and  shanda   Rt  5th metatarsal  wound/  foot  cellulitis >> Now with bacteremia    multiple specialists following, appreciated    HTN/  HLD  CAD/   cardiomyopathy,  ef  25 Post AICD (as per patient not MRI compatible), VT, Afib,  on xarelo/  , anemia  DM,  on  farxiga. januvia. metformin    hold  lasix/  farxiga/  entresto, for  now/ farxiga  not  recommended  with osteo  foot/ ha s risk  for  amputation    bacteremia  Gp  BsStrep,  , on IV antibiotics iD  following  >> No evidence of endocarditis on CHLOE  Follow surgical cultures  vascular angiogram not needed per vascular team  amputation by podiatry today >> Resume Anticoagulation as able post op      cardiology following >> medications adjusted given PVC and NSVT / VT       post cath as above with no intervention         continue medical optimization per cardio   continuing the antibiotics per ID   -GI/DVT Prophylaxis per protocol.    --------------------------------------------  Case discussed with Patient,   Education given on findings and plan of care  ___________________________  H. RACHAEL Boudreaux.  Pager: [phone]     _________________________________________________________________________________________  ========>>  M E D I C A L   A T T E N D I N G    F O L L O W  U P  N O T E  <<=========  -----------------------------------------------------------------------------------------------------    - Patient evaluated by me, chart reviewed.  Patient evaluated before surgery, Note written post op  - In summary,  RYLEE HOWE is a 62y year old man admitted with Diabetic foot infection  - Patient today overall doing ok, comfortable,      ==================>> REVIEW OF SYSTEM <<=================    GEN: no fever, no chills,   RESP: no SOB, no cough, no sputum  CVS: no chest pain, no palpitations, no edema  GI: no abdominal pain, no nausea,   : no dysuria, no frequency,  Neuro: no headache, no dizziness  Derm : no itching, no rash    ==================>> PHYSICAL EXAM <<=================    GEN: A&O X 3 , NAD , comfortable, pleasant, calm in bed .. encouraged out of bed to chair with assistance as needed.   HEENT: NCAT, PERRL, MMM, hearing intact  Neck: supple , no JVD appreciated  CVS: S1S2 , regular , No M/R/G appreciated  PULM: CTA B/L,  no W/R/R appreciated  ABD.: soft. non tender, non distended,  bowel sounds present  Extrem: intact pulses , Right foot wound dressed  PSYCH : normal mood,  not anxious        ( Note written / Date of service 10-15-23 ( This is certified to be the same as "ENTERED" date above ( for billing purposes)))    ==================>> MEDICATIONS <<====================    ampicillin/sulbactam  IVPB 3 Gram(s) IV Intermittent every 6 hours  aspirin enteric coated 81 milliGRAM(s) Oral daily  atorvastatin 80 milliGRAM(s) Oral at bedtime  dextrose 5%. 1000 milliLiter(s) IV Continuous <Continuous>  dextrose 5%. 1000 milliLiter(s) IV Continuous <Continuous>  dextrose 50% Injectable 12.5 Gram(s) IV Push once  dextrose 50% Injectable 25 Gram(s) IV Push once  dextrose 50% Injectable 25 Gram(s) IV Push once  furosemide    Tablet 20 milliGRAM(s) Oral two times a day  glucagon  Injectable 1 milliGRAM(s) IntraMuscular once  insulin lispro (ADMELOG) corrective regimen sliding scale   SubCutaneous three times a day before meals  insulin lispro (ADMELOG) corrective regimen sliding scale   SubCutaneous at bedtime  melatonin 5 milliGRAM(s) Oral at bedtime  metoprolol succinate  milliGRAM(s) Oral daily  rivaroxaban 20 milliGRAM(s) Oral with dinner  sacubitril 24 mG/valsartan 26 mG 1 Tablet(s) Oral two times a day  senna 2 Tablet(s) Oral at bedtime  spironolactone 25 milliGRAM(s) Oral daily    MEDICATIONS  (PRN):  acetaminophen     Tablet .. 650 milliGRAM(s) Oral every 6 hours PRN Mild Pain (1 - 3)  dextrose Oral Gel 15 Gram(s) Oral once PRN Blood Glucose LESS THAN 70 milliGRAM(s)/deciliter    ___________  Active diet:  Diet, Consistent Carbohydrate w/Evening Snack:   DASH/TLC Sodium & Cholesterol Restricted (DASH)  ___________________    ==================>> VITAL SIGNS <<==================  Height (cm): 185.4  Weight (kg): 108.5  BMI (kg/m2): 31.6  Vital Signs Last 24 HrsT(C): 36.5 (10-15-23 @ 12:33)  T(F): 97.7 (10-15-23 @ 12:33), Max: 98.1 (10-14-23 @ 21:00)  HR: 70 (10-15-23 @ 12:33) (70 - 85)  BP: 96/63 (10-15-23 @ 12:33)  RR: 18 (10-15-23 @ 12:33) (18 - 18)  SpO2: 99% (10-15-23 @ 12:33) (95% - 99%)      CAPILLARY BLOOD GLUCOSE      POCT Blood Glucose.: 161 mg/dL (15 Oct 2023 12:57)  POCT Blood Glucose.: 133 mg/dL (15 Oct 2023 08:49)  POCT Blood Glucose.: 150 mg/dL (14 Oct 2023 21:33)  POCT Blood Glucose.: 124 mg/dL (14 Oct 2023 17:19)     ==================>> LAB AND IMAGING <<==================                        10.1   5.83  )-----------( 233      ( 15 Oct 2023 07:04 )             32.3        10-15    140  |  101  |  21  ----------------------------<  130<H>  4.6   |  25  |  1.03    Ca    9.1      15 Oct 2023 07:04  Mg     2.2     10-15      WBC count:   5.83 <<== ,  5.05 <<== ,  4.26 <<== ,  4.96 <<== ,  4.88 <<== ,  5.43 <<==   Hemoglobin:   10.1 <<==,  10.6 <<==,  10.2 <<==,  10.1 <<==,  10.2 <<==,  11.3 <<==  platelets:  233 <==, 244 <==, 231 <==, 198 <==, 190 <==, 221 <==, 174 <==    Creatinine:  1.03  <<==, 1.06  <<==, 1.06  <<==, 1.01  <<==, 0.89  <<==, 0.87  <<==  Sodium:   140  <==, 140  <==, 140  <==, 139  <==, 138  <==, 143  <==    ____________________________    M I C R O B I O L O G Y :    Culture - Tissue with Gram Stain (collected 13 Oct 2023 22:35)  Source: .Tissue Other  Gram Stain (14 Oct 2023 03:51):    No polymorphonuclear cells seen per low power field    No organisms seen per oil power field  Preliminary Report (14 Oct 2023 21:59):    No growth    Culture - Blood (collected 10 Oct 2023 07:40)  Source: .Blood Blood-Peripheral  Final Report (15 Oct 2023 10:00):    No growth at 5 days    Culture - Blood (collected 10 Oct 2023 07:40)  Source: .Blood Blood-Peripheral  Final Report (15 Oct 2023 10:00):    No growth at 5 days    Culture - Blood (collected 08 Oct 2023 07:01)  Source: .Blood Blood-Peripheral  Final Report (13 Oct 2023 10:00):    No growth at 5 days    Culture - Blood (collected 08 Oct 2023 07:01)  Source: .Blood Blood-Peripheral  Final Report (13 Oct 2023 10:00):    No growth at 5 days    Culture - Urine (collected 06 Oct 2023 16:25)  Source: Clean Catch Clean Catch (Midstream)  Final Report (07 Oct 2023 15:13):    <10,000 CFU/mL Normal Urogenital Mona    Culture - Abscess with Gram Stain (collected 06 Oct 2023 15:12)  Source: .Abscess right foot  Gram Stain (06 Oct 2023 22:45):    Rare polymorphonuclear leukocytes per low power field    Numerous Gram Variable Rods per oil power field  Final Report (11 Oct 2023 17:25):    Moderate Gram Positive Rods Most closely resembling Arcanobacterium    haemolyticum "Susceptibilities not performed"    Moderate Streptococcus agalactiae (Group B) isolated    Group B streptococci are susceptible to ampicillin,    penicillin and cefazolin,but may be resistant to    erythromycin and clindamycin.        < from: CHLOE W or WO Ultrasound Enhancing Agent (10.12.23 @ 13:45) >  CONCLUSIONS:    1. Left ventricular systolic function is severely decreased with an ejection fraction visually estimated at <20 %.   2. Moderate mitral regurgitation.   3. No pericardial effusion seen.   4. No echocardiographic evidence of valvular endocarditis.      No vegetation on the visible segments of the device leads.   5. Compared to the transthoracic echocardiogram performed on 10/9/2023 there have been no significant interval changes.   6. Minimal (grade 1) spontaneous echo contrast located in the left atrial apendage and minimal (grade 1) spontaneous echo contrast located in the left atrium.   7. No evidence of left atrial or left atrial appendage thrombus. The left atrial appendage emptying velocity is low.   8. Symmetric mitral valve leaflet tethering.  < end of copied text >    < from: Cardiac Catheterization (10.11.23 @ 11:23) >  Procedures:                 1.    Ultrasound Guided Access   2.    Arterial Access - Left Femoral   3.    Diagnostic Coronary Angiography   4.    Diagnostic Graft Angiography   5.    Left Heart Cath   6.    Aortogram     Indications:                Preoperative evaluation for extracardiac  surgery  Non-sustained ventricular tachycardia     Lab Visit Indication:       ExCardEv, NonSVT     Diagnostic Conclusions:   Severe three vessel obstructive coronary artery disease   Chronic total occlusion of the right coronary artery   --Left to right, Rentrop grade 1-2 filling of the posterior descending  artery/posterolateral artery  Patent LIMA to the left anterior descending artery   Patent SVG to the obtuse marginal artery   Patent SVG to the first diagonal artery   Normal left main coronary artery   Right dominant system   LVEDP = 22mmHg   No gradient across the aortic valve    Recommendations:   Keep left leg straight for 4 hours following removal of sheath   Continue aggressive medical management of coronary artery disease and  associated risk factors  Gentle IV hydration as per protocol   < end of copied text >    __________________________________________________________________________________  ===============>>  A S S E S S M E N T   A N D   P L A N <<===============  ------------------------------------------------------------------------------------------    · Assessment	  62yr M with PMH of  HTN,   AFIB,  DM/. right foot  osteo (Recently completed IV antibiotics via PICC line) ,CAD, s/p  cabg / AICD,hx of Sommers fracture, non-union many years ago,   prior CT:  c/c  transverse   fx, through the fifth metatarsal base. soft tissue wound/ cortical irregularity along the fifth metatarsal base  wound cx   MSSA and fingoldia,  s/p I&D with necrotic tissue close to bone,suspicion for residual osteo  completed   ancef 2 gms IVSS q 8 hours .  last  month     now  admitted with fevers/  right foot  wound/  and  shanda   Rt  5th metatarsal  wound/  foot  cellulitis >> Now with bacteremia    multiple specialists following, appreciated    HTN/  HLD  CAD/   cardiomyopathy,  ef  25 Post AICD (as per patient not MRI compatible), VT, Afib,  on xarelo/  , anemia  DM,  on  farxiga. januvia. metformin    hold  lasix/  farxiga/  entresto, for  now/ farxiga  not  recommended  with osteo  foot/ ha s risk  for  amputation    bacteremia  Gp  BsStrep,  , on IV antibiotics iD  following  >> No evidence of endocarditis on CHLOE  Follow surgical cultures  vascular angiogram not needed per vascular team  amputation by podiatry today >> Resume Anticoagulation as able post op      cardiology following >> medications adjusted given PVC and NSVT / VT       post cath as above with no intervention         continue medical optimization per cardio   continuing the antibiotics per ID   -GI/DVT Prophylaxis per protocol.    >> Discharge planning home likely within the next 24 hours, pending clarification by podiatry in regards to wound VAC placement, and final antibiotic recommendations from ID     --------------------------------------------  Case discussed with Patient,   Education given on findings and plan of care  ___________________________  H. RACHAEL Boudreaux.  Pager: [phone]

## 2023-10-15 NOTE — PROGRESS NOTE ADULT - ASSESSMENT
62yr M hx of  HTN,  CAD, HF AICD,  AFIB,  DM, with right foot  osteo  Assessment  DMT2: 62y Male with DM T2 with hyperglycemia, A1C 6% , was on oral meds at home, now on low dose insulin, feeling better, eating meals, sugars are improving.  Had mild BHB on labs initially, elevated lactate on VBG, s/p IV hydration, glucose trending stable.   CAD: on medications, stable, monitored. s/p angiogram  Foot Osteo: Podiatry eval, wound care, on IV Zosyn. OR with podiatry   HTN: on antihypertensive medications, monitored, asymptomatic.        Discussed plan and management with Dr Roxanne Lennon NP - TEAMS  Wanda Oliveira MD  Cell: [phone]  Office: [phone]  62yr M hx of  HTN,  CAD, HF AICD,  AFIB,  DM, with right foot  osteo  Assessment  DMT2: 62y Male with DM T2 with hyperglycemia, A1C 6% , was on oral meds at home, now on low dose insulin, feeling better, eating meals, sugars are  improving.  Had mild BHB on labs initially, elevated lactate on VBG, s/p IV hydration, glucose trending stable.   CAD: on medications, stable, monitored. s/p angiogram  Foot Osteo: Podiatry eval, wound care, on IV Zosyn. OR with podiatry   HTN: on antihypertensive medications, monitored, asymptomatic.        Discussed plan and management with Dr Roxanne Lennon NP - TEAMS  Wanda Oliveira MD  Cell: [phone]  Office: [phone]

## 2023-10-15 NOTE — PROGRESS NOTE ADULT - SUBJECTIVE AND OBJECTIVE BOX
Chief complaint  Patient is a 62y old  Male who presents with a chief complaint of fevers/  foot infection (15 Oct 2023 13:14)         Labs and Fingersticks  CAPILLARY BLOOD GLUCOSE      POCT Blood Glucose.: 161 mg/dL (15 Oct 2023 12:57)  POCT Blood Glucose.: 133 mg/dL (15 Oct 2023 08:49)  POCT Blood Glucose.: 150 mg/dL (14 Oct 2023 21:33)  POCT Blood Glucose.: 124 mg/dL (14 Oct 2023 17:19)      Anion Gap: 14 (10-15 @ 07:04)  Anion Gap: 15 (10-14 @ 07:00)      Calcium: 9.1 (10-15 @ 07:04)  Calcium: 9.3 (10-14 @ 07:00)          10-15    140  |  101  |  21  ----------------------------<  130<H>  4.6   |  25  |  1.03    Ca    9.1      15 Oct 2023 07:04  Mg     2.2     10-15                          10.1   5.83  )-----------( 233      ( 15 Oct 2023 07:04 )             32.3     Medications  MEDICATIONS  (STANDING):  ampicillin/sulbactam  IVPB 3 Gram(s) IV Intermittent every 6 hours  aspirin enteric coated 81 milliGRAM(s) Oral daily  atorvastatin 80 milliGRAM(s) Oral at bedtime  dextrose 5%. 1000 milliLiter(s) (100 mL/Hr) IV Continuous <Continuous>  dextrose 5%. 1000 milliLiter(s) (50 mL/Hr) IV Continuous <Continuous>  dextrose 50% Injectable 25 Gram(s) IV Push once  dextrose 50% Injectable 12.5 Gram(s) IV Push once  dextrose 50% Injectable 25 Gram(s) IV Push once  furosemide    Tablet 20 milliGRAM(s) Oral two times a day  glucagon  Injectable 1 milliGRAM(s) IntraMuscular once  insulin lispro (ADMELOG) corrective regimen sliding scale   SubCutaneous three times a day before meals  insulin lispro (ADMELOG) corrective regimen sliding scale   SubCutaneous at bedtime  melatonin 5 milliGRAM(s) Oral at bedtime  metoprolol succinate  milliGRAM(s) Oral daily  rivaroxaban 20 milliGRAM(s) Oral with dinner  sacubitril 24 mG/valsartan 26 mG 1 Tablet(s) Oral two times a day  senna 2 Tablet(s) Oral at bedtime  spironolactone 25 milliGRAM(s) Oral daily      Physical Exam  General: Patient comfortable in bed   Vital Signs Last 12 Hrs  T(F): 97.7 (10-15-23 @ 12:33), Max: 97.7 (10-15-23 @ 12:33)  HR: 70 (10-15-23 @ 12:33) (70 - 71)  BP: 96/63 (10-15-23 @ 12:33) (96/63 - 102/67)  BP(mean): --  RR: 18 (10-15-23 @ 12:33) (18 - 18)  SpO2: 99% (10-15-23 @ 12:33) (98% - 99%)    CVS: S1S2   Respiratory: No wheezing, no crepitations  GI: Abdomen soft, bowel sounds positive  Musculoskeletal:  moves all extremities           Chief complaint  Patient is a 62y old  Male who presents with a chief complaint of fevers/  foot infection (15 Oct 2023 13:14)     Labs and Fingersticks  CAPILLARY BLOOD GLUCOSE      POCT Blood Glucose.: 161 mg/dL (15 Oct 2023 12:57)  POCT Blood Glucose.: 133 mg/dL (15 Oct 2023 08:49)  POCT Blood Glucose.: 150 mg/dL (14 Oct 2023 21:33)  POCT Blood Glucose.: 124 mg/dL (14 Oct 2023 17:19)      Anion Gap: 14 (10-15 @ 07:04)  Anion Gap: 15 (10-14 @ 07:00)      Calcium: 9.1 (10-15 @ 07:04)  Calcium: 9.3 (10-14 @ 07:00)          10-15    140  |  101  |  21  ----------------------------<  130<H>  4.6   |  25  |  1.03    Ca    9.1      15 Oct 2023 07:04  Mg     2.2     10-15                          10.1   5.83  )-----------( 233      ( 15 Oct 2023 07:04 )             32.3     Medications  MEDICATIONS  (STANDING):  ampicillin/sulbactam  IVPB 3 Gram(s) IV Intermittent every 6 hours  aspirin enteric coated 81 milliGRAM(s) Oral daily  atorvastatin 80 milliGRAM(s) Oral at bedtime  dextrose 5%. 1000 milliLiter(s) (100 mL/Hr) IV Continuous <Continuous>  dextrose 5%. 1000 milliLiter(s) (50 mL/Hr) IV Continuous <Continuous>  dextrose 50% Injectable 25 Gram(s) IV Push once  dextrose 50% Injectable 12.5 Gram(s) IV Push once  dextrose 50% Injectable 25 Gram(s) IV Push once  furosemide    Tablet 20 milliGRAM(s) Oral two times a day  glucagon  Injectable 1 milliGRAM(s) IntraMuscular once  insulin lispro (ADMELOG) corrective regimen sliding scale   SubCutaneous three times a day before meals  insulin lispro (ADMELOG) corrective regimen sliding scale   SubCutaneous at bedtime  melatonin 5 milliGRAM(s) Oral at bedtime  metoprolol succinate  milliGRAM(s) Oral daily  rivaroxaban 20 milliGRAM(s) Oral with dinner  sacubitril 24 mG/valsartan 26 mG 1 Tablet(s) Oral two times a day  senna 2 Tablet(s) Oral at bedtime  spironolactone 25 milliGRAM(s) Oral daily      Physical Exam  General: Patient comfortable in bed   Vital Signs Last 12 Hrs  T(F): 97.7 (10-15-23 @ 12:33), Max: 97.7 (10-15-23 @ 12:33)  HR: 70 (10-15-23 @ 12:33) (70 - 71)  BP: 96/63 (10-15-23 @ 12:33) (96/63 - 102/67)  BP(mean): --  RR: 18 (10-15-23 @ 12:33) (18 - 18)  SpO2: 99% (10-15-23 @ 12:33) (98% - 99%)    CVS: S1S2   Respiratory: No wheezing, no crepitations  GI: Abdomen soft, bowel sounds positive  Musculoskeletal:  moves all extremities

## 2023-10-15 NOTE — PROGRESS NOTE ADULT - PROBLEM SELECTOR PLAN 1
Will continue current insulin regimen for now. Will continue monitoring  blood sugars, will Follow up.  Patient counseled for compliance with consistent low carb diet.  Can continue home Metformin and Januvia on discharge.  FU outpatient Will continue current insulin regimen for now. Will continue monitoring  blood sugars, will Follow up.  Patient counseled for compliance with consistent low carb diet.  Can continue home Metformin and Januvia on discharge.  [facility] outpatient, has endocrine Dr Ortiz.

## 2023-10-16 LAB
ANION GAP SERPL CALC-SCNC: 10 MMOL/L — SIGNIFICANT CHANGE UP (ref 5–17)
BUN SERPL-MCNC: 19 MG/DL — SIGNIFICANT CHANGE UP (ref 7–23)
CALCIUM SERPL-MCNC: 9 MG/DL — SIGNIFICANT CHANGE UP (ref 8.4–10.5)
CHLORIDE SERPL-SCNC: 100 MMOL/L — SIGNIFICANT CHANGE UP (ref 96–108)
CO2 SERPL-SCNC: 27 MMOL/L — SIGNIFICANT CHANGE UP (ref 22–31)
CREAT SERPL-MCNC: 1.06 MG/DL — SIGNIFICANT CHANGE UP (ref 0.5–1.3)
EGFR: 79 ML/MIN/1.73M2 — SIGNIFICANT CHANGE UP
GLUCOSE BLDC GLUCOMTR-MCNC: 115 MG/DL — HIGH (ref 70–99)
GLUCOSE BLDC GLUCOMTR-MCNC: 123 MG/DL — HIGH (ref 70–99)
GLUCOSE BLDC GLUCOMTR-MCNC: 123 MG/DL — HIGH (ref 70–99)
GLUCOSE BLDC GLUCOMTR-MCNC: 147 MG/DL — HIGH (ref 70–99)
GLUCOSE BLDC GLUCOMTR-MCNC: 178 MG/DL — HIGH (ref 70–99)
GLUCOSE BLDC GLUCOMTR-MCNC: 178 MG/DL — HIGH (ref 70–99)
GLUCOSE SERPL-MCNC: 115 MG/DL — HIGH (ref 70–99)
POTASSIUM SERPL-MCNC: 4 MMOL/L — SIGNIFICANT CHANGE UP (ref 3.5–5.3)
POTASSIUM SERPL-SCNC: 4 MMOL/L — SIGNIFICANT CHANGE UP (ref 3.5–5.3)
SODIUM SERPL-SCNC: 137 MMOL/L — SIGNIFICANT CHANGE UP (ref 135–145)

## 2023-10-16 PROCEDURE — 99232 SBSQ HOSP IP/OBS MODERATE 35: CPT

## 2023-10-16 RX ADMIN — Medication 20 MILLIGRAM(S): at 17:25

## 2023-10-16 RX ADMIN — Medication 100 MILLIGRAM(S): at 05:50

## 2023-10-16 RX ADMIN — AMPICILLIN SODIUM AND SULBACTAM SODIUM 200 GRAM(S): 250; 125 INJECTION, POWDER, FOR SUSPENSION INTRAMUSCULAR; INTRAVENOUS at 12:31

## 2023-10-16 RX ADMIN — AMPICILLIN SODIUM AND SULBACTAM SODIUM 200 GRAM(S): 250; 125 INJECTION, POWDER, FOR SUSPENSION INTRAMUSCULAR; INTRAVENOUS at 05:50

## 2023-10-16 RX ADMIN — Medication 5 MILLIGRAM(S): at 21:28

## 2023-10-16 RX ADMIN — AMPICILLIN SODIUM AND SULBACTAM SODIUM 200 GRAM(S): 250; 125 INJECTION, POWDER, FOR SUSPENSION INTRAMUSCULAR; INTRAVENOUS at 17:26

## 2023-10-16 RX ADMIN — SENNA PLUS 2 TABLET(S): 8.6 TABLET ORAL at 21:30

## 2023-10-16 RX ADMIN — SACUBITRIL AND VALSARTAN 1 TABLET(S): 24; 26 TABLET, FILM COATED ORAL at 17:25

## 2023-10-16 RX ADMIN — SPIRONOLACTONE 25 MILLIGRAM(S): 25 TABLET, FILM COATED ORAL at 09:03

## 2023-10-16 RX ADMIN — SACUBITRIL AND VALSARTAN 1 TABLET(S): 24; 26 TABLET, FILM COATED ORAL at 05:50

## 2023-10-16 RX ADMIN — Medication 81 MILLIGRAM(S): at 12:30

## 2023-10-16 RX ADMIN — Medication 20 MILLIGRAM(S): at 05:50

## 2023-10-16 RX ADMIN — RIVAROXABAN 20 MILLIGRAM(S): KIT at 17:25

## 2023-10-16 RX ADMIN — AMPICILLIN SODIUM AND SULBACTAM SODIUM 200 GRAM(S): 250; 125 INJECTION, POWDER, FOR SUSPENSION INTRAMUSCULAR; INTRAVENOUS at 23:05

## 2023-10-16 RX ADMIN — ATORVASTATIN CALCIUM 80 MILLIGRAM(S): 80 TABLET, FILM COATED ORAL at 21:28

## 2023-10-16 NOTE — PROGRESS NOTE ADULT - SUBJECTIVE AND OBJECTIVE BOX
Patient is a 62y Male whom presented to the hospital with shanda     PAST MEDICAL & SURGICAL HISTORY:  Diabetes      Chronic osteomyelitis      H/O heart failure      No significant past surgical history          MEDICATIONS  (STANDING):  aspirin enteric coated 81 milliGRAM(s) Oral daily  atorvastatin 80 milliGRAM(s) Oral at bedtime  dextrose 5%. 1000 milliLiter(s) (100 mL/Hr) IV Continuous <Continuous>  dextrose 5%. 1000 milliLiter(s) (50 mL/Hr) IV Continuous <Continuous>  dextrose 50% Injectable 25 Gram(s) IV Push once  dextrose 50% Injectable 25 Gram(s) IV Push once  dextrose 50% Injectable 12.5 Gram(s) IV Push once  glucagon  Injectable 1 milliGRAM(s) IntraMuscular once  insulin lispro (ADMELOG) corrective regimen sliding scale   SubCutaneous three times a day before meals  metoprolol succinate ER 25 milliGRAM(s) Oral daily  piperacillin/tazobactam IVPB.. 3.375 Gram(s) IV Intermittent every 8 hours  rivaroxaban 20 milliGRAM(s) Oral with dinner  sodium bicarbonate 650 milliGRAM(s) Oral three times a day      Allergies    No Known Allergies    Intolerances        SOCIAL HISTORY:  Denies ETOh,Smoking,     FAMILY HISTORY:      REVIEW OF SYSTEMS:    CONSTITUTIONAL: No weakness, fevers or chills  EYES/ENT: No visual changes;  no throat pain   NECK: No pain or stiffness  RESPIRATORY: No cough, wheezing, hemoptysis; No shortness of breath                                                                                                                    10.1   5.83  )-----------( 233      ( 15 Oct 2023 07:04 )             32.3       CBC Full  -  ( 15 Oct 2023 07:04 )  WBC Count : 5.83 K/uL  RBC Count : 3.78 M/uL  Hemoglobin : 10.1 g/dL  Hematocrit : 32.3 %  Platelet Count - Automated : 233 K/uL  Mean Cell Volume : 85.4 fl  Mean Cell Hemoglobin : 26.7 pg  Mean Cell Hemoglobin Concentration : 31.3 gm/dL  Auto Neutrophil # : x  Auto Lymphocyte # : x  Auto Monocyte # : x  Auto Eosinophil # : x  Auto Basophil # : x  Auto Neutrophil % : x  Auto Lymphocyte % : x  Auto Monocyte % : x  Auto Eosinophil % : x  Auto Basophil % : x      10-16    137  |  100  |  19  ----------------------------<  115<H>  4.0   |  27  |  1.06    Ca    9.0      16 Oct 2023 07:00  Mg     2.2     10-15        CAPILLARY BLOOD GLUCOSE      POCT Blood Glucose.: 115 mg/dL (16 Oct 2023 07:43)  POCT Blood Glucose.: 140 mg/dL (15 Oct 2023 22:26)  POCT Blood Glucose.: 113 mg/dL (15 Oct 2023 16:55)  POCT Blood Glucose.: 161 mg/dL (15 Oct 2023 12:57)      Vital Signs Last 24 Hrs  T(C): 36.6 (16 Oct 2023 12:21), Max: 37 (15 Oct 2023 21:07)  T(F): 97.8 (16 Oct 2023 12:21), Max: 98.6 (15 Oct 2023 21:07)  HR: 74 (16 Oct 2023 12:21) (58 - 74)  BP: 91/57 (16 Oct 2023 12:21) (91/57 - 106/68)  BP(mean): --  RR: 18 (16 Oct 2023 12:21) (18 - 18)  SpO2: 97% (16 Oct 2023 12:21) (96% - 99%)    Parameters below as of 16 Oct 2023 12:21  Patient On (Oxygen Delivery Method): room air        Urinalysis Basic - ( 16 Oct 2023 07:00 )    Color: x / Appearance: x / SG: x / pH: x  Gluc: 115 mg/dL / Ketone: x  / Bili: x / Urobili: x   Blood: x / Protein: x / Nitrite: x   Leuk Esterase: x / RBC: x / WBC x   Sq Epi: x / Non Sq Epi: x / Bacteria: x                  PHYSICAL EXAM:    Constitutional: NAD  HEENT: conjunctive   clear   Neck:  No JVD  Respiratory: CTAB  Cardiovascular: S1 and S2  Gastrointestinal: BS+, soft, NT/ND  Extremities: No peripheral edema  Neurological:  no focal deficits  Psychiatric: Normal mood, normal affect  : No Atkins  Skin: No rashes  Access: Not applicable

## 2023-10-16 NOTE — PROGRESS NOTE ADULT - ASSESSMENT
_________________________________________________________________________________________  ========>>  M E D I C A L   A T T E N D I N G    F O L L O W  U P  N O T E  <<=========  -----------------------------------------------------------------------------------------------------    - Patient evaluated by me, chart reviewed.  Patient evaluated before surgery, Note written post op  - In summary,  RYLEE HOWE is a 62y year old man admitted with Diabetic foot infection   - Patient today overall doing ok, comfortable .. no pain.. no     ==================>> REVIEW OF SYSTEM <<=================    GEN: no fever, no chills,   RESP: no SOB, no cough, no sputum  CVS: no chest pain, no palpitations, no edema  GI: no abdominal pain, no nausea,   : no dysuria, no frequency,  Neuro: no headache, no dizziness  Derm : no itching, no rash    ==================>> PHYSICAL EXAM <<=================    GEN: A&O X 3 , NAD , comfortable, pleasant, calm in bed .. encouraged out of bed to chair with assistance as needed.   HEENT: NCAT, PERRL, MMM, hearing intact  Neck: supple , no JVD appreciated  CVS: S1S2 , regular , No M/R/G appreciated  PULM: CTA B/L,  no W/R/R appreciated  ABD.: soft. non tender, non distended,  bowel sounds present  Extrem: intact pulses , Right foot wound dressed  PSYCH : normal mood,  not anxious        ( Note written / Date of service 10-16-23 ( This is certified to be the same as "ENTERED" date above ( for billing purposes)))    ==================>> MEDICATIONS <<====================    ampicillin/sulbactam  IVPB 3 Gram(s) IV Intermittent every 6 hours  aspirin enteric coated 81 milliGRAM(s) Oral daily  atorvastatin 80 milliGRAM(s) Oral at bedtime  dextrose 5%. 1000 milliLiter(s) IV Continuous <Continuous>  dextrose 5%. 1000 milliLiter(s) IV Continuous <Continuous>  dextrose 50% Injectable 25 Gram(s) IV Push once  dextrose 50% Injectable 12.5 Gram(s) IV Push once  dextrose 50% Injectable 25 Gram(s) IV Push once  furosemide    Tablet 20 milliGRAM(s) Oral two times a day  glucagon  Injectable 1 milliGRAM(s) IntraMuscular once  insulin lispro (ADMELOG) corrective regimen sliding scale   SubCutaneous three times a day before meals  insulin lispro (ADMELOG) corrective regimen sliding scale   SubCutaneous at bedtime  melatonin 5 milliGRAM(s) Oral at bedtime  metoprolol succinate  milliGRAM(s) Oral daily  rivaroxaban 20 milliGRAM(s) Oral with dinner  sacubitril 24 mG/valsartan 26 mG 1 Tablet(s) Oral two times a day  senna 2 Tablet(s) Oral at bedtime  spironolactone 25 milliGRAM(s) Oral daily    MEDICATIONS  (PRN):  acetaminophen     Tablet .. 650 milliGRAM(s) Oral every 6 hours PRN Mild Pain (1 - 3)  dextrose Oral Gel 15 Gram(s) Oral once PRN Blood Glucose LESS THAN 70 milliGRAM(s)/deciliter    ___________  Active diet:  Diet, Consistent Carbohydrate w/Evening Snack:   DASH/TLC Sodium & Cholesterol Restricted (DASH)  ___________________    ==================>> VITAL SIGNS <<==================    Vital Signs Last 24 HrsT(C): 36.6 (10-16-23 @ 12:21)  T(F): 97.8 (10-16-23 @ 12:21), Max: 98.6 (10-15-23 @ 21:07)  HR: 74 (10-16-23 @ 12:21) (58 - 74)  BP: 101/68 (10-16-23 @ 12:46)  RR: 18 (10-16-23 @ 12:21) (18 - 18)  SpO2: 97% (10-16-23 @ 12:21) (96% - 99%)      CAPILLARY BLOOD GLUCOSE  POCT Blood Glucose.: 147 mg/dL (16 Oct 2023 12:42)  POCT Blood Glucose.: 115 mg/dL (16 Oct 2023 07:43)  POCT Blood Glucose.: 140 mg/dL (15 Oct 2023 22:26)  POCT Blood Glucose.: 113 mg/dL (15 Oct 2023 16:55)     ==================>> LAB AND IMAGING <<==================                        10.1   5.83  )-----------( 233      ( 15 Oct 2023 07:04 )             32.3        10-16    137  |  100  |  19  ----------------------------<  115<H>  4.0   |  27  |  1.06    Ca    9.0      16 Oct 2023 07:00  Mg     2.2     10-15      WBC count:   5.83 <<== ,  5.05 <<== ,  4.26 <<== ,  4.96 <<== ,  4.88 <<== ,  5.43 <<==   Hemoglobin:   10.1 <<==,  10.6 <<==,  10.2 <<==,  10.1 <<==,  10.2 <<==,  11.3 <<==  platelets:  233 <==, 244 <==, 231 <==, 198 <==, 190 <==, 221 <==, 174 <==    Creatinine:  1.06  <<==, 1.03  <<==, 1.06  <<==, 1.06  <<==, 1.01  <<==, 0.89  <<==  Sodium:   137  <==, 140  <==, 140  <==, 140  <==, 139  <==, 138  <==    ____________________________    M I C R O B I O L O G Y :    Culture - Tissue with Gram Stain (collected 13 Oct 2023 22:35)  Source: .Tissue Other  Gram Stain (14 Oct 2023 03:51):    No polymorphonuclear cells seen per low power field    No organisms seen per oil power field  Preliminary Report (14 Oct 2023 21:59):    No growth    Culture - Blood (collected 10 Oct 2023 07:40)  Source: .Blood Blood-Peripheral  Final Report (15 Oct 2023 10:00):    No growth at 5 days    Culture - Blood (collected 10 Oct 2023 07:40)  Source: .Blood Blood-Peripheral  Final Report (15 Oct 2023 10:00):    No growth at 5 days    Culture - Blood (collected 08 Oct 2023 07:01)  Source: .Blood Blood-Peripheral  Final Report (13 Oct 2023 10:00):    No growth at 5 days    Culture - Blood (collected 08 Oct 2023 07:01)  Source: .Blood Blood-Peripheral  Final Report (13 Oct 2023 10:00):    No growth at 5 days    Culture - Urine (collected 06 Oct 2023 16:25)  Source: Clean Catch Clean Catch (Midstream)  Final Report (07 Oct 2023 15:13):    <10,000 CFU/mL Normal Urogenital Mona    Culture - Abscess with Gram Stain (collected 06 Oct 2023 15:12)  Source: .Abscess right foot  Gram Stain (06 Oct 2023 22:45):    Rare polymorphonuclear leukocytes per low power field    Numerous Gram Variable Rods per oil power field  Final Report (11 Oct 2023 17:25):    Moderate Gram Positive Rods Most closely resembling Arcanobacterium    haemolyticum "Susceptibilities not performed"    Moderate Streptococcus agalactiae (Group B) isolated    Group B streptococci are susceptible to ampicillin,    penicillin and cefazolin,but may be resistant to    erythromycin and clindamycin.      < from: CHLOE W or WO Ultrasound Enhancing Agent (10.12.23 @ 13:45) >  CONCLUSIONS:    1. Left ventricular systolic function is severely decreased with an ejection fraction visually estimated at <20 %.   2. Moderate mitral regurgitation.   3. No pericardial effusion seen.   4. No echocardiographic evidence of valvular endocarditis.      No vegetation on the visible segments of the device leads.   5. Compared to the transthoracic echocardiogram performed on 10/9/2023 there have been no significant interval changes.   6. Minimal (grade 1) spontaneous echo contrast located in the left atrial apendage and minimal (grade 1) spontaneous echo contrast located in the left atrium.   7. No evidence of left atrial or left atrial appendage thrombus. The left atrial appendage emptying velocity is low.   8. Symmetric mitral valve leaflet tethering.  < end of copied text >    < from: Cardiac Catheterization (10.11.23 @ 11:23) >  Procedures:                 1.    Ultrasound Guided Access   2.    Arterial Access - Left Femoral   3.    Diagnostic Coronary Angiography   4.    Diagnostic Graft Angiography   5.    Left Heart Cath   6.    Aortogram     Indications:                Preoperative evaluation for extracardiac  surgery  Non-sustained ventricular tachycardia     Lab Visit Indication:       ExCardEv, NonSVT     Diagnostic Conclusions:   Severe three vessel obstructive coronary artery disease   Chronic total occlusion of the right coronary artery   --Left to right, Rentrop grade 1-2 filling of the posterior descending  artery/posterolateral artery  Patent LIMA to the left anterior descending artery   Patent SVG to the obtuse marginal artery   Patent SVG to the first diagonal artery   Normal left main coronary artery   Right dominant system   LVEDP = 22mmHg   No gradient across the aortic valve    Recommendations:   Keep left leg straight for 4 hours following removal of sheath   Continue aggressive medical management of coronary artery disease and  associated risk factors  Gentle IV hydration as per protocol   < end of copied text >    __________________________________________________________________________________  ===============>>  A S S E S S M E N T   A N D   P L A N <<===============  ------------------------------------------------------------------------------------------    · Assessment	  62yr M with PMH of  HTN,   AFIB,  DM/. right foot  osteo (Recently completed IV antibiotics via PICC line) ,CAD, s/p  cabg / AICD,hx of Sommers fracture, non-union many years ago,   prior CT:  c/c  transverse   fx, through the fifth metatarsal base. soft tissue wound/ cortical irregularity along the fifth metatarsal base  wound cx   MSSA and fingoldia,  s/p I&D with necrotic tissue close to bone,suspicion for residual osteo  completed   ancef 2 gms IVSS q 8 hours .  last  month     now  admitted with fevers/  right foot  wound/  and  shanda   Rt  5th metatarsal  wound/  foot  cellulitis >> Now with bacteremia    multiple specialists following, appreciated    HTN/  HLD  CAD/   cardiomyopathy,  ef  25 Post AICD (as per patient not MRI compatible), VT, Afib,  on xarelo/  , anemia  DM,  on  farxiga. januvia. metformin    hold  lasix/  farxiga/  entresto, for  now/ farxiga  not  recommended  with osteo  foot/ ha s risk  for  amputation    bacteremia  Gp  BsStrep,  , on IV antibiotics iD  following  >> No evidence of endocarditis on CHLOE  amputation by podiatry today >> Resume Anticoagulation as able post op      cardiology following >> medications adjusted given PVC and NSVT / VT       post cath as above with no intervention         continue medical optimization per cardio   continuing the antibiotics per ID on discharge..      awaiting wound vac set up   -GI/DVT Prophylaxis per protocol.    >> Discharge planning home pending wound VAC     --------------------------------------------  Case discussed with Patient,   Education given on findings and plan of care  ___________________________  H. RACHAEL Boudreaux.  Pager: [phone]

## 2023-10-16 NOTE — PROGRESS NOTE ADULT - ASSESSMENT
HPI:  62yr M      hx of  HTN,  C/C  AFIB,  DM/  right foot  osteo      completed recent course of antibiotics via PICC for rt foot infection / last month     p/w erythema and subjective fevers at home     seen  by podiatry,   in  er   denies  cp/sob/abd  pain     (06 Oct 2023 09:29)      ACUTE RENAL FAILURE: f/u in office    spironolactone 25 milliGRAM(s) Oral daily   sodium bicarbonate 650 milliGRAM(s) Oral three times a day  Serum creatinine is  improved , furosemide   Injectable 20 milliGRAM(s) IV Push two times a day  There is no progression . No uremic symptoms  No evidence of anemia .  Fluid status stable.  Will continue to avoid nephrotoxic drugs.  Patient remains asymptomatic.   Continue current therapy.    BP monitoring,continue current antihypertensive meds, low salt diet,followup with PMD in 1-2 weeks  metoprolol succinate ER 25 milliGRAM(s) Oral daily  sacubitril 24 mG/valsartan 26 mG 1 Tablet(s) Oral two times a day    f/u  blood and urine cx,serial lactate levels,monitor vitals chapis arnold hydration,monitor urine output and renal profile,iv abx as per id cons  ampicillin/sulbactam  IVPB 3 Gram(s) IV Intermittent every 6 hours

## 2023-10-16 NOTE — PROGRESS NOTE ADULT - SUBJECTIVE AND OBJECTIVE BOX
Patient is a 62y old  Male who presents with a chief complaint of fevers/  foot infection (16 Oct 2023 07:47)       INTERVAL HPI/OVERNIGHT EVENTS:  Patient seen and evaluated at bedside.  Pt is resting comfortable in NAD. Denies N/V/F/C.    Allergies    No Known Allergies    Intolerances        Vital Signs Last 24 Hrs  T(C): 36.4 (16 Oct 2023 05:02), Max: 37 (15 Oct 2023 21:07)  T(F): 97.5 (16 Oct 2023 05:02), Max: 98.6 (15 Oct 2023 21:07)  HR: 73 (16 Oct 2023 05:02) (58 - 73)  BP: 106/68 (16 Oct 2023 05:02) (96/63 - 106/68)  BP(mean): --  RR: 18 (16 Oct 2023 05:02) (18 - 18)  SpO2: 99% (16 Oct 2023 05:02) (96% - 99%)    Parameters below as of 16 Oct 2023 05:02  Patient On (Oxygen Delivery Method): room air        LABS:                        10.1   5.83  )-----------( 233      ( 15 Oct 2023 07:04 )             32.3     10-16    137  |  100  |  19  ----------------------------<  115<H>  4.0   |  27  |  1.06    Ca    9.0      16 Oct 2023 07:00  Mg     2.2     10-15        Urinalysis Basic - ( 16 Oct 2023 07:00 )    Color: x / Appearance: x / SG: x / pH: x  Gluc: 115 mg/dL / Ketone: x  / Bili: x / Urobili: x   Blood: x / Protein: x / Nitrite: x   Leuk Esterase: x / RBC: x / WBC x   Sq Epi: x / Non Sq Epi: x / Bacteria: x      CAPILLARY BLOOD GLUCOSE      POCT Blood Glucose.: 115 mg/dL (16 Oct 2023 07:43)  POCT Blood Glucose.: 140 mg/dL (15 Oct 2023 22:26)  POCT Blood Glucose.: 113 mg/dL (15 Oct 2023 16:55)  POCT Blood Glucose.: 161 mg/dL (15 Oct 2023 12:57)      Lower Extremity Physical Exam:  Vascular: DP/PT 0/4 B/L, CFT <3 sec x 10, Temp gradient warm to warm, RLE, warm to cool LLE.    Neurology: Epicritic sensation intact to level of digits, B/L  Musculoskeletal/Ortho: pain to palpation over right foot 5th digit, 8/8 s/p right foot I&D w/ Right foot partial 5th ray resection, closed  Skin: 10/13 s/p right foot 5th metatarsal base resection, open: distal and proximal sutures intact, central wound has granular wound bed, mild sanguinous drainage, no hematoma formation, periwound erythema resolving, no purulence.    RADIOLOGY & ADDITIONAL TESTS:

## 2023-10-16 NOTE — PROGRESS NOTE ADULT - ASSESSMENT
62M s/p right foot 5th metatarsal base resection, open (DOS 10/13/23).  - Patient seen and evaluated.  - Afebrile, no leukocytosis.  - 10/13 s/p right foot 5th metatarsal base resection, open: distal and proximal sutures intact, central wound has granular wound bed, mild sanguinous drainage, no hematoma formation, periwound erythema resolving, no purulence.  - Intraop Findings: Low concern for residual infection and viability.  - Right Foot Clean Bone Margin Culture: No growth (prelim).  - ID recs, appreciated.  - Right foot wound VAC to be applied today 10/16.  - Pt is stable for discharge from the podiatry standpoint pending final ID recs and VAC application.  - Wound care instructions and followup information listed in discharge provider note.  - Discussed with attending. 62M s/p right foot 5th metatarsal base resection, open (DOS 10/13/23).  - Patient seen and evaluated.  - Afebrile, no leukocytosis.  - 10/13 s/p right foot 5th metatarsal base resection, open: distal and proximal sutures intact, central wound has granular wound bed, mild sanguinous drainage, no hematoma formation, periwound erythema resolving, no purulence.  - Intraop Findings: Low concern for residual infection and viability.  - Right Foot Clean Bone Margin Culture: No growth (prelim).  - ID recs, appreciated.  - Right foot wound VAC to be applied today 10/16.  - Pt is stable for discharge from the podiatry standpoint pending final ID recs.  - Wound care instructions and followup information listed in discharge provider note.  - Discussed with attending.

## 2023-10-16 NOTE — PROGRESS NOTE ADULT - ASSESSMENT
Patient is our 62M who is consulted for a diabetic foot infection of the right, dorsal lateral foot. Past medical history of DM, defibrillator, CAD post CABG, CHF, hx of non-union Sommers fracture around 2014, right upper extremity infection in June 2023 s/p 1 month of daptomycin and ceftriaxone.     Patient noticed an ulcer on his lateral right foot for the past several weeks. Over the past few days right foot has been having increased edema, erythema and pain on ambulation. Temperature at home was 101-102. Denies recent fall or trauma. states denies n/v/d, CP, SOB, HA, dizziness, abdominal pain, urinary symptoms, cough, leg pain, swelling. Patient endorses stools have become darker.     On August 1, 2023, the patient saw podiatrist Dr. Foss for the foot infection and bone biopsy came back positive for staph. He began treatment with levaquin but his symptoms progressed and patient came to the ED for further evaluation.      ON 8/8 pt underwent  R foot Incision and Drainage and partial 5th met base resection  Pt grew mssa and finegolda  pt received 6 weeks IV abs at home    Pt was doing well at home . Tuesday pt had evaluation at podiatrist and all looked well  wednesday pt noted foot pain and by thursday pt was having fevers and chills  Pt stephen noted to have Group B strep bacteremia      A/P   #Group B strep bacteremia  concerning as grew so quickly   changed abs to unasyn- this would also cover previous cultured pathogens  Check repeat BC q 48 hours until clears  cardiology following - as patient with an AICD  cardiac echo noted  likely source is the foot  MRSA swab negative  hold further vancomycin      #right foot erythema  pt is s/p partial resection of right foot 5th metatarsal bone, open  concerned for residual osteo   PT's AICD is not MRI compatible  appreciate podiatry in put      #hypotension  h/o CHF  maybe in part form sepsis  cardiac echo noted  cardiology follow up appreciated     #elevated creatinine  dose meds per renal function    will discuss with pharmacy and come up with d/c plan tomorrow  will also need to discuss with podiatry their concern for residual infected bone. If pt was to come back the next step may be a BKA which we want to avoid.  Lori Bennett M.D. ,   please reach via teams   If no answer, or after 5PM/ weekends,  then please call  [phone]    Assessment and plan discussed with the primary team .

## 2023-10-16 NOTE — PROGRESS NOTE ADULT - SUBJECTIVE AND OBJECTIVE BOX
Date of Service: 10-16-23 @ 07:47           CARDIOLOGY     PROGRESS  NOTE   ________________________________________________    CHIEF COMPLAINT:Patient is a 62y old  Male who presents with a chief complaint of fevers/  foot infection (15 Oct 2023 13:38)  no complain, doing well  	  REVIEW OF SYSTEMS:  CONSTITUTIONAL: No fever, weight loss, or fatigue  EYES: No eye pain, visual disturbances, or discharge  ENT:  No difficulty hearing, tinnitus, vertigo; No sinus or throat pain  NECK: No pain or stiffness  RESPIRATORY: No cough, wheezing, chills or hemoptysis; No Shortness of Breath  CARDIOVASCULAR: No chest pain, palpitations, passing out, dizziness, or leg swelling  GASTROINTESTINAL: No abdominal or epigastric pain. No nausea, vomiting, or hematemesis; No diarrhea or constipation. No melena or hematochezia.  GENITOURINARY: No dysuria, frequency, hematuria, or incontinence  NEUROLOGICAL: No headaches, memory loss, loss of strength, numbness, or tremors  SKIN: No itching, burning, rashes, or lesions   LYMPH Nodes: No enlarged glands  ENDOCRINE: No heat or cold intolerance; No hair loss  MUSCULOSKELETAL: No joint pain or swelling; No muscle, back, or extremity pain  PSYCHIATRIC: No depression, anxiety, mood swings, or difficulty sleeping  HEME/LYMPH: No easy bruising, or bleeding gums  ALLERGY AND IMMUNOLOGIC: No hives or eczema	    [ x] All others negative	  [ ] Unable to obtain    PHYSICAL EXAM:  T(C): 36.4 (10-16-23 @ 05:02), Max: 37 (10-15-23 @ 21:07)  HR: 73 (10-16-23 @ 05:02) (58 - 73)  BP: 106/68 (10-16-23 @ 05:02) (96/63 - 106/68)  RR: 18 (10-16-23 @ 05:02) (18 - 18)  SpO2: 99% (10-16-23 @ 05:02) (96% - 99%)  Wt(kg): --  I&O's Summary    15 Oct 2023 07:01  -  16 Oct 2023 07:00  --------------------------------------------------------  IN: 0 mL / OUT: 550 mL / NET: -550 mL        Appearance: Normal	  HEENT:   Normal oral mucosa, PERRL, EOMI	  Lymphatic: No lymphadenopathy  Cardiovascular: Normal S1 S2, No JVD, + murmurs, No edema  Respiratory: rhonchi  Psychiatry: A & O x 3, Mood & affect appropriate  Gastrointestinal:  Soft, Non-tender, + BS	  Skin: No rashes, No ecchymoses, No cyanosis	  Neurologic: Non-focal  Extremities: Normal range of motion R foot  bandaged  Vascular: + pvd    MEDICATIONS  (STANDING):  ampicillin/sulbactam  IVPB 3 Gram(s) IV Intermittent every 6 hours  aspirin enteric coated 81 milliGRAM(s) Oral daily  atorvastatin 80 milliGRAM(s) Oral at bedtime  dextrose 5%. 1000 milliLiter(s) (100 mL/Hr) IV Continuous <Continuous>  dextrose 5%. 1000 milliLiter(s) (50 mL/Hr) IV Continuous <Continuous>  dextrose 50% Injectable 25 Gram(s) IV Push once  dextrose 50% Injectable 12.5 Gram(s) IV Push once  dextrose 50% Injectable 25 Gram(s) IV Push once  furosemide    Tablet 20 milliGRAM(s) Oral two times a day  glucagon  Injectable 1 milliGRAM(s) IntraMuscular once  insulin lispro (ADMELOG) corrective regimen sliding scale   SubCutaneous three times a day before meals  insulin lispro (ADMELOG) corrective regimen sliding scale   SubCutaneous at bedtime  melatonin 5 milliGRAM(s) Oral at bedtime  metoprolol succinate  milliGRAM(s) Oral daily  rivaroxaban 20 milliGRAM(s) Oral with dinner  sacubitril 24 mG/valsartan 26 mG 1 Tablet(s) Oral two times a day  senna 2 Tablet(s) Oral at bedtime  spironolactone 25 milliGRAM(s) Oral daily      TELEMETRY: 	    ECG:  	  RADIOLOGY:  OTHER: 	  	  LABS:	 	    CARDIAC MARKERS:                            10.1   5.83  )-----------( 233      ( 15 Oct 2023 07:04 )             32.3     10-16    137  |  100  |  19  ----------------------------<  115<H>  4.0   |  27  |  1.06    Ca    9.0      16 Oct 2023 07:00  Mg     2.2     10-15      proBNP:   Lipid Profile:   HgA1c:   TSH: Thyroid Stimulating Hormone, Serum: 5.55 uIU/mL (10-07 @ 07:08)      Assessment and plan  ---------------------------  62M hx of type 2 DM on metformin, farixga, heart failure with reduced EF on metoprolol 75mg, lasix 20mg qd, xarelto for vte ppx (had RUE vte 2/2 PICC line), removed 2 weeks prior as well as right foot wound wac (present 2/2 osteo of foot with finegoldia magna), here for 36 hrs of fever, tmax 101, took 800mg advil today, prior to arrival. + slight right lower back pain, nonradiating, mild, no assc GI sxs. + urinating more freq as of late. Still tolerating PO. Denies chest pain, shortness of breath, diaphoresis. Hypotensive to 90s/50 in triage, then 80s/60s, last bp 100/40, not tachy but in setting of bb. Appears slightly dehydrated, clear lungs, s3 gallop, no murmurs appreciable. + right foot redness, swelling, nontender, no flucutance, no crepitations, 1+ dp pulses bilateral, full rom. Nontender calves. Benign abd, no peritoneal signs, no jaundice, no cva ttp. No midline spinal ttp. RUE no signs of infx, former picc site clean, Patient meets sepsis criteria by vitals. Will eval for common source of infection (ex. osteo, urinary, bacterial pulmonary, viral uri; unlikely central neurologic such as meninigitis or encephalitis) vs metabolic derangement. Check labs, lactate, UA, CXs, CXR, foot xray, inflam markers screening EKG, hydrate-> empiric abxs, antipyretics, additional crystalloid infusion as clinically warranted. Will start with 500 cc crystalloid 2/2 heart failure but euglycemic dka.  pt is well known to me with hx of htn, ashd, chf, s/p BIV AICD , PVD with multiple admissions sec to foot infection  pt with sig lv dysfunction/ severe ischemic cardiomyopathy  will  adjust cardiac meds  hold Farxiga for now. may  requiring surgery  dvt prophylaxis  vascula/podiatry consult  abx  pt Entresto dose has decrease sec to ?infection will gradually will increase dose  continue Metroprolol er  avoid excess fluid, may need to re start on  Lasix  and aldactone  check inr if elevated will give vitamin K on Xarelto  may need to decrease Xarelto dose if increase renal function  + BC for strep, continue iv abx, pt has hardware (biv Aicd)  will increase Entresto as bp tolerates  re start on aldactone 25 mg daily  s/p AICD shock yesterday , beta blocker increased to 100 mg daily , pt can not tolerate AMIO was tried before  continue iv heparin plan will discuss with ID  cardiac cath noted with patent graft, increase EDP, need to increase Lasix dose, observe bp closely, excellent uo  pt is a high risk for surgery, unless other way of medical therapy  will hold Entresto if bp low  fu bp and hr closely  NO AICD EXTRACTION AT THIS TIME, pt very unstable ,blood culture very transient bacteriemia, cleared within one  day, CHLOE negative  LIKE TO OBSERVE CLINICALLY, Discussed with ID  kimber increase entresto as bp tolerated  continue meds/ no arrythmia on tele, abx course as oer ID

## 2023-10-16 NOTE — PROGRESS NOTE ADULT - SUBJECTIVE AND OBJECTIVE BOX
Chief complaint  Patient is a 62y old  Male who presents with a chief complaint of fevers/  foot infection (16 Oct 2023 13:40)         Labs and Fingersticks  CAPILLARY BLOOD GLUCOSE      POCT Blood Glucose.: 147 mg/dL (16 Oct 2023 12:42)  POCT Blood Glucose.: 115 mg/dL (16 Oct 2023 07:43)  POCT Blood Glucose.: 140 mg/dL (15 Oct 2023 22:26)  POCT Blood Glucose.: 113 mg/dL (15 Oct 2023 16:55)      Anion Gap: 10 (10-16 @ 07:00)  Anion Gap: 14 (10-15 @ 07:04)      Calcium: 9.0 (10-16 @ 07:00)  Calcium: 9.1 (10-15 @ 07:04)          10-16    137  |  100  |  19  ----------------------------<  115<H>  4.0   |  27  |  1.06    Ca    9.0      16 Oct 2023 07:00  Mg     2.2     10-15                          10.1   5.83  )-----------( 233      ( 15 Oct 2023 07:04 )             32.3     Medications  MEDICATIONS  (STANDING):  ampicillin/sulbactam  IVPB 3 Gram(s) IV Intermittent every 6 hours  aspirin enteric coated 81 milliGRAM(s) Oral daily  atorvastatin 80 milliGRAM(s) Oral at bedtime  dextrose 5%. 1000 milliLiter(s) (100 mL/Hr) IV Continuous <Continuous>  dextrose 5%. 1000 milliLiter(s) (50 mL/Hr) IV Continuous <Continuous>  dextrose 50% Injectable 25 Gram(s) IV Push once  dextrose 50% Injectable 12.5 Gram(s) IV Push once  dextrose 50% Injectable 25 Gram(s) IV Push once  furosemide    Tablet 20 milliGRAM(s) Oral two times a day  glucagon  Injectable 1 milliGRAM(s) IntraMuscular once  insulin lispro (ADMELOG) corrective regimen sliding scale   SubCutaneous three times a day before meals  insulin lispro (ADMELOG) corrective regimen sliding scale   SubCutaneous at bedtime  melatonin 5 milliGRAM(s) Oral at bedtime  metoprolol succinate  milliGRAM(s) Oral daily  rivaroxaban 20 milliGRAM(s) Oral with dinner  sacubitril 24 mG/valsartan 26 mG 1 Tablet(s) Oral two times a day  senna 2 Tablet(s) Oral at bedtime  spironolactone 25 milliGRAM(s) Oral daily      Physical Exam  General: Patient comfortable in bed   Vital Signs Last 12 Hrs  T(F): 97.8 (10-16-23 @ 12:21), Max: 97.8 (10-16-23 @ 12:21)  HR: 74 (10-16-23 @ 12:21) (73 - 74)  BP: 101/68 (10-16-23 @ 12:46) (91/57 - 106/68)  BP(mean): --  RR: 18 (10-16-23 @ 12:21) (18 - 18)  SpO2: 97% (10-16-23 @ 12:21) (97% - 99%)    CVS: S1S2   Respiratory: No wheezing, no crepitations  GI: Abdomen soft, bowel sounds positive  Musculoskeletal:  moves all extremities  : Voiding       Chief complaint  Patient is a 62y old  Male who presents with a chief complaint of fevers/  foot infection (16 Oct 2023 13:40)         Labs and Fingersticks  CAPILLARY BLOOD GLUCOSE      POCT Blood Glucose.: 147 mg/dL (16 Oct 2023 12:42)  POCT Blood Glucose.: 115 mg/dL (16 Oct 2023 07:43)  POCT Blood Glucose.: 140 mg/dL (15 Oct 2023 22:26)  POCT Blood Glucose.: 113 mg/dL (15 Oct 2023 16:55)      Anion Gap: 10 (10-16 @ 07:00)  Anion Gap: 14 (10-15 @ 07:04)      Calcium: 9.0 (10-16 @ 07:00)  Calcium: 9.1 (10-15 @ 07:04)          10-16    137  |  100  |  19  ----------------------------<  115<H>  4.0   |  27  |  1.06    Ca    9.0      16 Oct 2023 07:00  Mg     2.2     10-15                          10.1   5.83  )-----------( 233      ( 15 Oct 2023 07:04 )             32.3     Medications  MEDICATIONS  (STANDING):  ampicillin/sulbactam  IVPB 3 Gram(s) IV Intermittent every 6 hours  aspirin enteric coated 81 milliGRAM(s) Oral daily  atorvastatin 80 milliGRAM(s) Oral at bedtime  dextrose 5%. 1000 milliLiter(s) (100 mL/Hr) IV Continuous <Continuous>  dextrose 5%. 1000 milliLiter(s) (50 mL/Hr) IV Continuous <Continuous>  dextrose 50% Injectable 25 Gram(s) IV Push once  dextrose 50% Injectable 12.5 Gram(s) IV Push once  dextrose 50% Injectable 25 Gram(s) IV Push once  furosemide    Tablet 20 milliGRAM(s) Oral two times a day  glucagon  Injectable 1 milliGRAM(s) IntraMuscular once  insulin lispro (ADMELOG) corrective regimen sliding scale   SubCutaneous three times a day before meals  insulin lispro (ADMELOG) corrective regimen sliding scale   SubCutaneous at bedtime  melatonin 5 milliGRAM(s) Oral at bedtime  metoprolol succinate  milliGRAM(s) Oral daily  rivaroxaban 20 milliGRAM(s) Oral with dinner  sacubitril 24 mG/valsartan 26 mG 1 Tablet(s) Oral two times a day  senna 2 Tablet(s) Oral at bedtime  spironolactone 25 milliGRAM(s) Oral daily      Physical Exam  General: Patient comfortable in bed   Vital Signs Last 12 Hrs  T(F): 97.8 (10-16-23 @ 12:21), Max: 97.8 (10-16-23 @ 12:21)  HR: 74 (10-16-23 @ 12:21) (73 - 74)  BP: 101/68 (10-16-23 @ 12:46) (91/57 - 106/68)  BP(mean): --  RR: 18 (10-16-23 @ 12:21) (18 - 18)  SpO2: 97% (10-16-23 @ 12:21) (97% - 99%)    CVS: S1S2   Respiratory: No wheezing, no crepitations  GI: Abdomen soft, bowel sounds positive  Musculoskeletal:  moves all extremities  : Voiding

## 2023-10-16 NOTE — PROGRESS NOTE ADULT - SUBJECTIVE AND OBJECTIVE BOX
Patient is a 62y old  Male who presents with a chief complaint of fevers/  foot infection (16 Oct 2023 14:31)    Being followed by ID for        Interval history:  No other acute events      ROS:  No cough,SOB,CP  No N/V/D  No abd pain  No urinary complaints  No HA  No joint or limb pain  No other complaints    PAST MEDICAL & SURGICAL HISTORY:  Diabetes      Chronic osteomyelitis      H/O heart failure      No significant past surgical history        Allergies    No Known Allergies    Intolerances      Antimicrobials:    ampicillin/sulbactam  IVPB 3 Gram(s) IV Intermittent every 6 hours    MEDICATIONS  (STANDING):  ampicillin/sulbactam  IVPB 3 Gram(s) IV Intermittent every 6 hours  aspirin enteric coated 81 milliGRAM(s) Oral daily  atorvastatin 80 milliGRAM(s) Oral at bedtime  dextrose 5%. 1000 milliLiter(s) (50 mL/Hr) IV Continuous <Continuous>  dextrose 5%. 1000 milliLiter(s) (100 mL/Hr) IV Continuous <Continuous>  dextrose 50% Injectable 12.5 Gram(s) IV Push once  dextrose 50% Injectable 25 Gram(s) IV Push once  dextrose 50% Injectable 25 Gram(s) IV Push once  furosemide    Tablet 20 milliGRAM(s) Oral two times a day  glucagon  Injectable 1 milliGRAM(s) IntraMuscular once  insulin lispro (ADMELOG) corrective regimen sliding scale   SubCutaneous three times a day before meals  insulin lispro (ADMELOG) corrective regimen sliding scale   SubCutaneous at bedtime  melatonin 5 milliGRAM(s) Oral at bedtime  metoprolol succinate  milliGRAM(s) Oral daily  rivaroxaban 20 milliGRAM(s) Oral with dinner  sacubitril 24 mG/valsartan 26 mG 1 Tablet(s) Oral two times a day  senna 2 Tablet(s) Oral at bedtime  spironolactone 25 milliGRAM(s) Oral daily      Vital Signs Last 24 Hrs  T(C): 36.6 (10-16-23 @ 12:21), Max: 37 (10-15-23 @ 21:07)  T(F): 97.8 (10-16-23 @ 12:21), Max: 98.6 (10-15-23 @ 21:07)  HR: 74 (10-16-23 @ 12:21) (72 - 74)  BP: 101/68 (10-16-23 @ 12:46) (91/57 - 106/68)  BP(mean): --  RR: 18 (10-16-23 @ 12:21) (18 - 18)  SpO2: 97% (10-16-23 @ 12:21) (96% - 99%)    Physical Exam:    Constitutional well preserved,comfortable,pleasant    HEENT PERRLA EOMI,No pallor or icterus    No oral exudate or erythema    Neck supple no JVD or LN    Chest Good AE,CTA    CVS RRR S1 S2 WNl No murmur or rub or gallop    Abd soft BS normal No tenderness no masses    Ext No cyanosis clubbing or edema    IV site no erythema tenderness or discharge    Joints no swelling or LOM    CNS AAO X 3 no focal    Lab Data:                          10.1   5.83  )-----------( 233      ( 15 Oct 2023 07:04 )             32.3       10-16    137  |  100  |  19  ----------------------------<  115<H>  4.0   |  27  |  1.06    Ca    9.0      16 Oct 2023 07:00  Mg     2.2     10-15              .Tissue Other  10-13-23   No growth  --    No polymorphonuclear cells seen per low power field  No organisms seen per oil power field      .Blood Blood-Peripheral  10-10-23   No growth at 5 days  --  --                    WBC Count: 5.83 (10-15-23 @ 07:04)  WBC Count: 5.05 (10-14-23 @ 07:00)  WBC Count: 4.26 (10-13-23 @ 07:21)  WBC Count: 4.96 (10-12-23 @ 07:55)  WBC Count: 4.88 (10-12-23 @ 00:57)  WBC Count: 5.43 (10-11-23 @ 16:41)  WBC Count: 4.46 (10-11-23 @ 07:25)  WBC Count: 4.75 (10-10-23 @ 12:34)  WBC Count: 4.54 (10-10-23 @ 07:43)  WBC Count: 4.23 (10-10-23 @ 00:43)              Patient is a 62y old  Male who presents with a chief complaint of fevers/  foot infection (16 Oct 2023 14:31)    Being followed by ID for bacteremia        Interval history:  d/c planning in progress  feeling ok  No other acute events          PAST MEDICAL & SURGICAL HISTORY:  Diabetes      Chronic osteomyelitis      H/O heart failure      No significant past surgical history        Allergies    No Known Allergies    Intolerances      Antimicrobials:    ampicillin/sulbactam  IVPB 3 Gram(s) IV Intermittent every 6 hours    MEDICATIONS  (STANDING):  ampicillin/sulbactam  IVPB 3 Gram(s) IV Intermittent every 6 hours  aspirin enteric coated 81 milliGRAM(s) Oral daily  atorvastatin 80 milliGRAM(s) Oral at bedtime  dextrose 5%. 1000 milliLiter(s) (50 mL/Hr) IV Continuous <Continuous>  dextrose 5%. 1000 milliLiter(s) (100 mL/Hr) IV Continuous <Continuous>  dextrose 50% Injectable 12.5 Gram(s) IV Push once  dextrose 50% Injectable 25 Gram(s) IV Push once  dextrose 50% Injectable 25 Gram(s) IV Push once  furosemide    Tablet 20 milliGRAM(s) Oral two times a day  glucagon  Injectable 1 milliGRAM(s) IntraMuscular once  insulin lispro (ADMELOG) corrective regimen sliding scale   SubCutaneous three times a day before meals  insulin lispro (ADMELOG) corrective regimen sliding scale   SubCutaneous at bedtime  melatonin 5 milliGRAM(s) Oral at bedtime  metoprolol succinate  milliGRAM(s) Oral daily  rivaroxaban 20 milliGRAM(s) Oral with dinner  sacubitril 24 mG/valsartan 26 mG 1 Tablet(s) Oral two times a day  senna 2 Tablet(s) Oral at bedtime  spironolactone 25 milliGRAM(s) Oral daily      Vital Signs Last 24 Hrs  T(C): 36.6 (10-16-23 @ 12:21), Max: 37 (10-15-23 @ 21:07)  T(F): 97.8 (10-16-23 @ 12:21), Max: 98.6 (10-15-23 @ 21:07)  HR: 74 (10-16-23 @ 12:21) (72 - 74)  BP: 101/68 (10-16-23 @ 12:46) (91/57 - 106/68)  BP(mean): --  RR: 18 (10-16-23 @ 12:21) (18 - 18)  SpO2: 97% (10-16-23 @ 12:21) (96% - 99%)    Physical Exam:    Constitutional well preserved,comfortable,pleasant    HEENT PERRLA EOMI,No pallor or icterus    No oral exudate or erythema    Neck supple no JVD or LN    Chest Good AE,CTA    CVS RRR S1 S2 WNl No murmur or rub or gallop    Abd soft BS normal No tenderness no masses    Ext No cyanosis clubbing or edema    IV site no erythema tenderness or discharge    Joints no swelling or LOM    CNS AAO X 3 no focal    Lab Data:                          10.1   5.83  )-----------( 233      ( 15 Oct 2023 07:04 )             32.3       10-16    137  |  100  |  19  ----------------------------<  115<H>  4.0   |  27  |  1.06    Ca    9.0      16 Oct 2023 07:00  Mg     2.2     10-15              .Tissue Other  10-13-23   No growth  --    No polymorphonuclear cells seen per low power field  No organisms seen per oil power field      .Blood Blood-Peripheral  10-10-23   No growth at 5 days  --  --      < from: CHLOE W or WO Ultrasound Enhancing Agent (10.12.23 @ 13:45) >     CONCLUSIONS:      1. Left ventricular systolic function is severely decreased with an ejection fraction visually estimated at <20 %.   2. Moderate mitral regurgitation.   3. No pericardial effusion seen.   4. No echocardiographic evidence of valvular endocarditis.      No vegetation on the visible segments of the device leads.   5. Compared to the transthoracic echocardiogram performed on 10/9/2023 there have been no significant interval changes.   6. Minimal (grade 1) spontaneous echo contrast located in the left atrial apendage and minimal (grade 1) spontaneous echo contrast located in the left atrium.   7. No evidence of left atrial or left atrial appendage thrombus. The left atrial appendage emptying velocity is low.   8. Symmetric mitral valve leaflet tethering.    ___________________________________________________    < end of copied text >      < from: Cardiac Catheterization (10.11.23 @ 11:23) >  Diagnostic Findings:     Coronary Angiography   The coronary circulation is right dominant.      LM   Left main artery: The segment is visually normal in size and  structure.    LAD   Proximal left anterior descending: There is an 80 % stenosis. Mid left  anterior descending: There is a 50 % in-stent restenosis.    CX   Mid circumflex: There is a 100 % stenosis. First obtuse marginal:  There is a 100 % stenosis in the ostium portion of the  segment.      RCA   Distal right coronary artery: There is a 100 % stenosis. Proximal  right coronary artery: There is a 50 % in-stent restenosis. Mid  right coronary artery: There is an 80 % in-stent restenosi    < end of copied text >            WBC Count: 5.83 (10-15-23 @ 07:04)  WBC Count: 5.05 (10-14-23 @ 07:00)  WBC Count: 4.26 (10-13-23 @ 07:21)  WBC Count: 4.96 (10-12-23 @ 07:55)  WBC Count: 4.88 (10-12-23 @ 00:57)  WBC Count: 5.43 (10-11-23 @ 16:41)  WBC Count: 4.46 (10-11-23 @ 07:25)  WBC Count: 4.75 (10-10-23 @ 12:34)  WBC Count: 4.54 (10-10-23 @ 07:43)  WBC Count: 4.23 (10-10-23 @ 00:43)       Culture - Abscess with Gram Stain (10.06.23 @ 15:12)    Gram Stain:   Rare polymorphonuclear leukocytes per low power field  Numerous Gram Variable Rods per oil power field   Specimen Source: .Abscess right foot   Culture Results:   Moderate Gram Positive Rods Most closely resembling Arcanobacterium  haemolyticum "Susceptibilities not performed"  Moderate Streptococcus agalactiae (Group B) isolated  Group B streptococci are susceptible to ampicillin,  penicillin and cefazolin,but may be resistant to  erythromycin and clindamycin.    Culture - Blood (10.06.23 @ 06:28)    -  Streptococcus agalactiae (Group B): Detec   -  Vancomycin: S 0.5   Gram Stain:   Growth in aerobic and anaerobic bottles: Gram Positive Cocci in Pairs and  Chains   -  Ceftriaxone: S <=0.25   -  Clindamycin: S <=0.06   -  Levofloxacin: S 1   -  Penicillin: S 0.06 Predicts results for ampicillin, amoxicillin, amoxicillin/clavulanate, ampicillin/sulbactam, 1st, 2nd and 3rd generation cephalosporins and carbapenems.   Specimen Source: .Blood Blood-Peripheral   Organism: Blood Culture PCR   Organism: Streptococcus agalactiae (Group B)   Culture Results:   Growth in aerobic and anaerobic bottles: Streptococcus agalactiae (Group  B)  Direct identification is available within approximately 3-5  hours either by Blood Panel Multiplexed PCR or Direct  MALDI-TOF. Details: [email or web address]   Organism Identification: Blood Culture PCR  Streptococcus agalactiae (Group B)   Method Type: PCR   Method Type: HEATHER    · Comments/Other Details	pt is s/p partial resection of right foot 5th metatarsal bone, open  - low concern for residual infection, bone hard and of good quality  - low concern for viability, adequate intraoperative bleeding  - wound closed proximally and distally, packed with iodaform centrally.  - pod stable for d/c on PO antibiotics, recs by ID, pending no growth on clean margins for 48h

## 2023-10-16 NOTE — PROGRESS NOTE ADULT - ASSESSMENT
62yr M hx of  HTN,  CAD, HF AICD,  AFIB,  DM, with right foot  osteo  Assessment  DMT2: 62y Male with DM T2 with hyperglycemia, A1C 6% , was on oral meds at home, now on low dose insulin, feeling better, eating meals, sugars are improving.  Had mild BHB on labs initially, elevated lactate on VBG, s/p IV hydration, glucose now trending stable. Postop partial resection with podiatry.   CAD: on medications, stable, monitored. s/p angiogram  Foot Osteo: Podiatry eval, wound care, on IV Zosyn. OR with podiatry   HTN: on antihypertensive medications, monitored, asymptomatic.        Discussed plan and management with Dr Roxanne Lennon NP - TEAMS  Wanda Oliveira MD  Cell: [phone]  Office: [phone]  62yr M hx of  HTN,  CAD, HF AICD,  AFIB,  DM, with right foot  osteo  Assessment  DMT2: 62y Male with DM T2 with hyperglycemia, A1C 6% , was on oral meds at home, now on low dose insulin, feeling better, eating meals,  sugars are improving.  Had mild BHB on labs initially, elevated lactate on VBG, s/p IV hydration, glucose now trending stable. Postop partial resection with podiatry.   CAD: on medications, stable, monitored. s/p angiogram  Foot Osteo: Podiatry eval, wound care, on IV Zosyn. OR with podiatry   HTN: on antihypertensive medications, monitored, asymptomatic.        Discussed plan and management with Dr Roxanne Lennon NP - TEAMS  Wanda Oliveira MD  Cell: [phone]  Office: [phone]

## 2023-10-16 NOTE — PROGRESS NOTE ADULT - PROBLEM SELECTOR PLAN 1
Will continue current insulin regimen for now. Will continue monitoring  blood sugars, will Follow up.  Patient counseled for compliance with consistent low carb diet.  Can continue home Metformin and Januvia on discharge.  [facility] outpatient, has endocrine Dr Ortiz.

## 2023-10-17 ENCOUNTER — TRANSCRIPTION ENCOUNTER (OUTPATIENT)
Age: 63
End: 2023-10-17

## 2023-10-17 LAB
GLUCOSE BLDC GLUCOMTR-MCNC: 120 MG/DL — HIGH (ref 70–99)
GLUCOSE BLDC GLUCOMTR-MCNC: 120 MG/DL — HIGH (ref 70–99)
GLUCOSE BLDC GLUCOMTR-MCNC: 137 MG/DL — HIGH (ref 70–99)
GLUCOSE BLDC GLUCOMTR-MCNC: 137 MG/DL — HIGH (ref 70–99)
GLUCOSE BLDC GLUCOMTR-MCNC: 139 MG/DL — HIGH (ref 70–99)
GLUCOSE BLDC GLUCOMTR-MCNC: 139 MG/DL — HIGH (ref 70–99)
GLUCOSE BLDC GLUCOMTR-MCNC: 145 MG/DL — HIGH (ref 70–99)
GLUCOSE BLDC GLUCOMTR-MCNC: 145 MG/DL — HIGH (ref 70–99)
HCT VFR BLD CALC: 32.4 % — LOW (ref 39–50)
HCT VFR BLD CALC: 32.4 % — LOW (ref 39–50)
HGB BLD-MCNC: 10.2 G/DL — LOW (ref 13–17)
HGB BLD-MCNC: 10.2 G/DL — LOW (ref 13–17)
MCHC RBC-ENTMCNC: 26.4 PG — LOW (ref 27–34)
MCHC RBC-ENTMCNC: 26.4 PG — LOW (ref 27–34)
MCHC RBC-ENTMCNC: 31.5 GM/DL — LOW (ref 32–36)
MCHC RBC-ENTMCNC: 31.5 GM/DL — LOW (ref 32–36)
MCV RBC AUTO: 83.9 FL — SIGNIFICANT CHANGE UP (ref 80–100)
MCV RBC AUTO: 83.9 FL — SIGNIFICANT CHANGE UP (ref 80–100)
NRBC # BLD: 0 /100 WBCS — SIGNIFICANT CHANGE UP (ref 0–0)
NRBC # BLD: 0 /100 WBCS — SIGNIFICANT CHANGE UP (ref 0–0)
PLATELET # BLD AUTO: 245 K/UL — SIGNIFICANT CHANGE UP (ref 150–400)
PLATELET # BLD AUTO: 245 K/UL — SIGNIFICANT CHANGE UP (ref 150–400)
RBC # BLD: 3.86 M/UL — LOW (ref 4.2–5.8)
RBC # BLD: 3.86 M/UL — LOW (ref 4.2–5.8)
RBC # FLD: 18 % — HIGH (ref 10.3–14.5)
RBC # FLD: 18 % — HIGH (ref 10.3–14.5)
WBC # BLD: 5.57 K/UL — SIGNIFICANT CHANGE UP (ref 3.8–10.5)
WBC # BLD: 5.57 K/UL — SIGNIFICANT CHANGE UP (ref 3.8–10.5)
WBC # FLD AUTO: 5.57 K/UL — SIGNIFICANT CHANGE UP (ref 3.8–10.5)
WBC # FLD AUTO: 5.57 K/UL — SIGNIFICANT CHANGE UP (ref 3.8–10.5)

## 2023-10-17 PROCEDURE — 99232 SBSQ HOSP IP/OBS MODERATE 35: CPT

## 2023-10-17 RX ORDER — CEFTRIAXONE 500 MG/1
2000 INJECTION, POWDER, FOR SOLUTION INTRAMUSCULAR; INTRAVENOUS ONCE
Refills: 0 | Status: COMPLETED | OUTPATIENT
Start: 2023-10-17 | End: 2023-10-17

## 2023-10-17 RX ORDER — CEFTRIAXONE 500 MG/1
1 INJECTION, POWDER, FOR SOLUTION INTRAMUSCULAR; INTRAVENOUS
Qty: 42 | Refills: 0
Start: 2023-10-17 | End: 2023-11-27

## 2023-10-17 RX ORDER — CEFTRIAXONE 500 MG/1
2000 INJECTION, POWDER, FOR SOLUTION INTRAMUSCULAR; INTRAVENOUS EVERY 24 HOURS
Refills: 0 | Status: DISCONTINUED | OUTPATIENT
Start: 2023-10-18 | End: 2023-10-18

## 2023-10-17 RX ORDER — CEFTRIAXONE 500 MG/1
2 INJECTION, POWDER, FOR SOLUTION INTRAMUSCULAR; INTRAVENOUS
Qty: 1 | Refills: 0
Start: 2023-10-17 | End: 2023-11-27

## 2023-10-17 RX ORDER — CEFTRIAXONE 500 MG/1
INJECTION, POWDER, FOR SOLUTION INTRAMUSCULAR; INTRAVENOUS
Refills: 0 | Status: DISCONTINUED | OUTPATIENT
Start: 2023-10-17 | End: 2023-10-18

## 2023-10-17 RX ADMIN — RIVAROXABAN 20 MILLIGRAM(S): KIT at 17:20

## 2023-10-17 RX ADMIN — CEFTRIAXONE 100 MILLIGRAM(S): 500 INJECTION, POWDER, FOR SOLUTION INTRAMUSCULAR; INTRAVENOUS at 13:24

## 2023-10-17 RX ADMIN — Medication 100 MILLIGRAM(S): at 05:44

## 2023-10-17 RX ADMIN — SENNA PLUS 2 TABLET(S): 8.6 TABLET ORAL at 21:32

## 2023-10-17 RX ADMIN — SACUBITRIL AND VALSARTAN 1 TABLET(S): 24; 26 TABLET, FILM COATED ORAL at 17:20

## 2023-10-17 RX ADMIN — SACUBITRIL AND VALSARTAN 1 TABLET(S): 24; 26 TABLET, FILM COATED ORAL at 05:43

## 2023-10-17 RX ADMIN — SPIRONOLACTONE 25 MILLIGRAM(S): 25 TABLET, FILM COATED ORAL at 05:44

## 2023-10-17 RX ADMIN — Medication 5 MILLIGRAM(S): at 21:32

## 2023-10-17 RX ADMIN — AMPICILLIN SODIUM AND SULBACTAM SODIUM 200 GRAM(S): 250; 125 INJECTION, POWDER, FOR SUSPENSION INTRAMUSCULAR; INTRAVENOUS at 12:45

## 2023-10-17 RX ADMIN — Medication 81 MILLIGRAM(S): at 12:47

## 2023-10-17 RX ADMIN — Medication 20 MILLIGRAM(S): at 13:25

## 2023-10-17 RX ADMIN — Medication 20 MILLIGRAM(S): at 05:44

## 2023-10-17 RX ADMIN — AMPICILLIN SODIUM AND SULBACTAM SODIUM 200 GRAM(S): 250; 125 INJECTION, POWDER, FOR SUSPENSION INTRAMUSCULAR; INTRAVENOUS at 05:43

## 2023-10-17 RX ADMIN — ATORVASTATIN CALCIUM 80 MILLIGRAM(S): 80 TABLET, FILM COATED ORAL at 21:32

## 2023-10-17 NOTE — DISCHARGE NOTE NURSING/CASE MANAGEMENT/SOCIAL WORK - NSSCCARECORD_GEN_ALL_CORE
Home Care Agency/Durable Medical Equipment Agency Home Care Agency/Durable Medical Equipment Agency/Community [facility]

## 2023-10-17 NOTE — PROGRESS NOTE ADULT - ASSESSMENT
62yr M hx of  HTN,  CAD, HF AICD,  AFIB,  DM, with right foot  osteo  Assessment  DMT2: 62y Male with DM T2 with hyperglycemia, A1C 6% , was on oral meds at home, now on low dose insulin, feeling better, eating meals,  sugars are improving.  Had mild BHB on labs initially, elevated lactate on VBG, s/p IV hydration, glucose now trending stable. Postop partial resection with podiatry.   CAD: on medications, stable, monitored. s/p angiogram  Foot Osteo: Podiatry eval, wound care, on IV Zosyn. OR with podiatry, now with wound vac   HTN: on antihypertensive medications, monitored, asymptomatic.  TSH mildly elevated, FT4 WNL, subclinical.       Discussed plan and management with Dr Roxanne Lennon NP - TEAMS  Wanda Oliveira MD  Cell: [phone]  Office: [phone]  62yr M hx of  HTN,  CAD, HF AICD,  AFIB,  DM, with right foot  osteo  Assessment  DMT2: 62y Male with DM T2 with hyperglycemia, A1C 6% , was on oral meds at home, now on low dose insulin, feeling better, eating meals, sugars are improving.  Had mild BHB on labs initially, elevated lactate on VBG, s/p IV hydration, glucose now trending stable. Postop partial resection with podiatry.   CAD: on medications, stable, monitored. s/p angiogram  Foot Osteo: Podiatry eval, wound care, on IV Zosyn. OR with podiatry, now with wound vac   HTN: on antihypertensive medications, monitored, asymptomatic.  TSH mildly elevated, FT4 WNL, subclinical.       Discussed plan and management with Dr Roxanne Lennon NP - TEAMS  Wanda Oliveira MD  Cell: [phone]  Office: [phone]

## 2023-10-17 NOTE — PROGRESS NOTE ADULT - ASSESSMENT
_________________________________________________________________________________________  ========>>  M E D I C A L   A T T E N D I N G    F O L L O W  U P  N O T E  <<=========  -----------------------------------------------------------------------------------------------------    - Patient evaluated by me, chart reviewed.  Patient evaluated before surgery, Note written post op  - In summary,  RYLEE HOWE is a 62y year old man admitted with Diabetic foot infection   - Patient today overall doing ok, comfortable .. no pain.. no     ==================>> REVIEW OF SYSTEM <<=================    GEN: no fever, no chills,   RESP: no SOB, no cough, no sputum  CVS: no chest pain, no palpitations, no edema  GI: no abdominal pain, no nausea,   : no dysuria, no frequency,  Neuro: no headache, no dizziness  Derm : no itching, no rash    ==================>> PHYSICAL EXAM <<=================    GEN: A&O X 3 , NAD , comfortable, pleasant, calm in bed .. encouraged out of bed to chair with assistance as needed.   HEENT: NCAT, PERRL, MMM, hearing intact  Neck: supple , no JVD appreciated  CVS: S1S2 , regular , No M/R/G appreciated  PULM: CTA B/L,  no W/R/R appreciated  ABD.: soft. non tender, non distended,  bowel sounds present  Extrem: intact pulses , Right foot wound dressed  PSYCH : normal mood,  not anxious        ( Note written / Date of service 10-17-23 ( This is certified to be the same as "ENTERED" date above ( for billing purposes)))    ==================>> MEDICATIONS <<====================    aspirin enteric coated 81 milliGRAM(s) Oral daily  atorvastatin 80 milliGRAM(s) Oral at bedtime  cefTRIAXone   IVPB      dextrose 5%. 1000 milliLiter(s) IV Continuous <Continuous>  dextrose 5%. 1000 milliLiter(s) IV Continuous <Continuous>  dextrose 50% Injectable 25 Gram(s) IV Push once  dextrose 50% Injectable 12.5 Gram(s) IV Push once  dextrose 50% Injectable 25 Gram(s) IV Push once  furosemide    Tablet 20 milliGRAM(s) Oral two times a day  glucagon  Injectable 1 milliGRAM(s) IntraMuscular once  insulin lispro (ADMELOG) corrective regimen sliding scale   SubCutaneous three times a day before meals  insulin lispro (ADMELOG) corrective regimen sliding scale   SubCutaneous at bedtime  melatonin 5 milliGRAM(s) Oral at bedtime  metoprolol succinate  milliGRAM(s) Oral daily  rivaroxaban 20 milliGRAM(s) Oral with dinner  sacubitril 24 mG/valsartan 26 mG 1 Tablet(s) Oral two times a day  senna 2 Tablet(s) Oral at bedtime  spironolactone 25 milliGRAM(s) Oral daily    MEDICATIONS  (PRN):  acetaminophen     Tablet .. 650 milliGRAM(s) Oral every 6 hours PRN Mild Pain (1 - 3)  dextrose Oral Gel 15 Gram(s) Oral once PRN Blood Glucose LESS THAN 70 milliGRAM(s)/deciliter    ___________  Active diet:  Diet, Consistent Carbohydrate w/Evening Snack:   DASH/TLC Sodium & Cholesterol Restricted (DASH)  ___________________    ==================>> VITAL SIGNS <<==================    Vital Signs Last 24 HrsT(C): 36.8 (10-17-23 @ 17:13)  T(F): 98.2 (10-17-23 @ 17:13), Max: 98.2 (10-17-23 @ 17:13)  HR: 74 (10-17-23 @ 17:13) (61 - 87)  BP: 101/67 (10-17-23 @ 17:13)  RR: 18 (10-17-23 @ 17:13) (18 - 18)  SpO2: 99% (10-17-23 @ 17:13) (95% - 99%)      CAPILLARY BLOOD GLUCOSE      POCT Blood Glucose.: 120 mg/dL (17 Oct 2023 17:12)  POCT Blood Glucose.: 145 mg/dL (17 Oct 2023 12:21)  POCT Blood Glucose.: 139 mg/dL (17 Oct 2023 08:46)  POCT Blood Glucose.: 178 mg/dL (16 Oct 2023 21:29)     ==================>> LAB AND IMAGING <<==================                        10.2   5.57  )-----------( 245      ( 17 Oct 2023 06:47 )             32.4        10-16    137  |  100  |  19  ----------------------------<  115<H>  4.0   |  27  |  1.06    Ca    9.0      16 Oct 2023 07:00      WBC count:   5.57 <<== ,  5.83 <<== ,  5.05 <<== ,  4.26 <<==   Hemoglobin:   10.2 <<==,  10.1 <<==,  10.6 <<==,  10.2 <<==  platelets:  245 <==, 233 <==, 244 <==, 231 <==, 198 <==, 190 <==    Creatinine:  1.06  <<==, 1.03  <<==, 1.06  <<==, 1.06  <<==, 1.01  <<==  Sodium:   137  <==, 140  <==, 140  <==, 140  <==, 139  <==       AST:               ALT:             AP:             Bili:            ____________________________    M I C R O B I O L O G Y :    Culture - Tissue with Gram Stain (collected 13 Oct 2023 22:35)  Source: .Tissue Other  Gram Stain (14 Oct 2023 03:51):    No polymorphonuclear cells seen per low power field    No organisms seen per oil power field  Preliminary Report (14 Oct 2023 21:59):    No growth    Culture - Blood (collected 10 Oct 2023 07:40)  Source: .Blood Blood-Peripheral  Final Report (15 Oct 2023 10:00):    No growth at 5 days    Culture - Blood (collected 10 Oct 2023 07:40)  Source: .Blood Blood-Peripheral  Final Report (15 Oct 2023 10:00):    No growth at 5 days    Culture - Blood (collected 08 Oct 2023 07:01)  Source: .Blood Blood-Peripheral  Final Report (13 Oct 2023 10:00):    No growth at 5 days    Culture - Blood (collected 08 Oct 2023 07:01)  Source: .Blood Blood-Peripheral  Final Report (13 Oct 2023 10:00):    No growth at 5 days          < from: CHLOE W or WO Ultrasound Enhancing Agent (10.12.23 @ 13:45) >  CONCLUSIONS:    1. Left ventricular systolic function is severely decreased with an ejection fraction visually estimated at <20 %.   2. Moderate mitral regurgitation.   3. No pericardial effusion seen.   4. No echocardiographic evidence of valvular endocarditis.      No vegetation on the visible segments of the device leads.   5. Compared to the transthoracic echocardiogram performed on 10/9/2023 there have been no significant interval changes.   6. Minimal (grade 1) spontaneous echo contrast located in the left atrial apendage and minimal (grade 1) spontaneous echo contrast located in the left atrium.   7. No evidence of left atrial or left atrial appendage thrombus. The left atrial appendage emptying velocity is low.   8. Symmetric mitral valve leaflet tethering.  < end of copied text >    < from: Cardiac Catheterization (10.11.23 @ 11:23) >  Procedures:                 1.    Ultrasound Guided Access   2.    Arterial Access - Left Femoral   3.    Diagnostic Coronary Angiography   4.    Diagnostic Graft Angiography   5.    Left Heart Cath   6.    Aortogram     Indications:                Preoperative evaluation for extracardiac  surgery  Non-sustained ventricular tachycardia     Lab Visit Indication:       ExCardEv, NonSVT     Diagnostic Conclusions:   Severe three vessel obstructive coronary artery disease   Chronic total occlusion of the right coronary artery   --Left to right, Rentrop grade 1-2 filling of the posterior descending  artery/posterolateral artery  Patent LIMA to the left anterior descending artery   Patent SVG to the obtuse marginal artery   Patent SVG to the first diagonal artery   Normal left main coronary artery   Right dominant system   LVEDP = 22mmHg   No gradient across the aortic valve    Recommendations:   Keep left leg straight for 4 hours following removal of sheath   Continue aggressive medical management of coronary artery disease and  associated risk factors  Gentle IV hydration as per protocol   < end of copied text >    __________________________________________________________________________________  ===============>>  A S S E S S M E N T   A N D   P L A N <<===============  ------------------------------------------------------------------------------------------    · Assessment	  62yr M with PMH of  HTN,   AFIB,  DM/. right foot  osteo (Recently completed IV antibiotics via PICC line) ,CAD, s/p  cabg / AICD,hx of Sommers fracture, non-union many years ago,   prior CT:  c/c  transverse   fx, through the fifth metatarsal base. soft tissue wound/ cortical irregularity along the fifth metatarsal base  wound cx   MSSA and fingoldia,  s/p I&D with necrotic tissue close to bone,suspicion for residual osteo  completed   ancef 2 gms IVSS q 8 hours .  last  month     now  admitted with fevers/  right foot  wound/  and  shanda   Rt  5th metatarsal  wound/  foot  cellulitis >> Now with bacteremia    multiple specialists following, appreciated    HTN/  HLD  CAD/   cardiomyopathy,  ef  25 Post AICD (as per patient not MRI compatible), VT, Afib,  on xarelo/  , anemia  DM,  on  farxiga. januvia. metformin    hold  lasix/  farxiga/  entresto, for  now/ farxiga  not  recommended  with osteo  foot/ ha s risk  for  amputation    bacteremia  Gp  BsStrep,  , on IV antibiotics iD  following  >> No evidence of endocarditis on CHLOE  amputation by podiatry today >> Resume Anticoagulation as able post op      cardiology following >> medications adjusted given PVC and NSVT / VT       post cath as above with no intervention         continue medical optimization per cardio   continuing the antibiotics per ID on discharge..      awaiting wound vac set up   -GI/DVT Prophylaxis per protocol.    >> Discharge planning home pending wound VAC     --------------------------------------------  Case discussed with Patient,   Education given on findings and plan of care  ___________________________  H. RACHAEL Boudreaux.  Pager: [phone]     _________________________________________________________________________________________  ========>>  M E D I C A L   A T T E N D I N G    F O L L O W  U P  N O T E  <<=========  -----------------------------------------------------------------------------------------------------    - Patient evaluated by me, chart reviewed.  Patient evaluated before surgery, Note written post op  - In summary,  RYLEE HOWE is a 62y year old man admitted with Diabetic foot infection   - Patient today overall doing ok, comfortable .. no pain.. no other complains , waiting to go home    ==================>> REVIEW OF SYSTEM <<=================    GEN: no fever, no chills,   RESP: no SOB, no cough, no sputum  CVS: no chest pain, no palpitations, no edema  GI: no abdominal pain, no nausea,   : no dysuria, no frequency,  Neuro: no headache, no dizziness  Derm : no itching, no rash    ==================>> PHYSICAL EXAM <<=================    GEN: A&O X 3 , NAD , comfortable, pleasant, calm in bed .. encouraged out of bed to chair with assistance as needed.   HEENT: NCAT, PERRL, MMM, hearing intact  Neck: supple , no JVD appreciated  CVS: S1S2 , regular , No M/R/G appreciated  PULM: CTA B/L,  no W/R/R appreciated  ABD.: soft. non tender, non distended,  bowel sounds present  Extrem: intact pulses , Right foot wound dressed  PSYCH : normal mood,  not anxious        ( Note written / Date of service 10-17-23 ( This is certified to be the same as "ENTERED" date above ( for billing purposes)))    ==================>> MEDICATIONS <<====================    aspirin enteric coated 81 milliGRAM(s) Oral daily  atorvastatin 80 milliGRAM(s) Oral at bedtime  cefTRIAXone   IVPB      dextrose 5%. 1000 milliLiter(s) IV Continuous <Continuous>  dextrose 5%. 1000 milliLiter(s) IV Continuous <Continuous>  dextrose 50% Injectable 25 Gram(s) IV Push once  dextrose 50% Injectable 12.5 Gram(s) IV Push once  dextrose 50% Injectable 25 Gram(s) IV Push once  furosemide    Tablet 20 milliGRAM(s) Oral two times a day  glucagon  Injectable 1 milliGRAM(s) IntraMuscular once  insulin lispro (ADMELOG) corrective regimen sliding scale   SubCutaneous three times a day before meals  insulin lispro (ADMELOG) corrective regimen sliding scale   SubCutaneous at bedtime  melatonin 5 milliGRAM(s) Oral at bedtime  metoprolol succinate  milliGRAM(s) Oral daily  rivaroxaban 20 milliGRAM(s) Oral with dinner  sacubitril 24 mG/valsartan 26 mG 1 Tablet(s) Oral two times a day  senna 2 Tablet(s) Oral at bedtime  spironolactone 25 milliGRAM(s) Oral daily    MEDICATIONS  (PRN):  acetaminophen     Tablet .. 650 milliGRAM(s) Oral every 6 hours PRN Mild Pain (1 - 3)  dextrose Oral Gel 15 Gram(s) Oral once PRN Blood Glucose LESS THAN 70 milliGRAM(s)/deciliter    ___________  Active diet:  Diet, Consistent Carbohydrate w/Evening Snack:   DASH/TLC Sodium & Cholesterol Restricted (DASH)  ___________________    ==================>> VITAL SIGNS <<==================    Vital Signs Last 24 HrsT(C): 36.8 (10-17-23 @ 17:13)  T(F): 98.2 (10-17-23 @ 17:13), Max: 98.2 (10-17-23 @ 17:13)  HR: 74 (10-17-23 @ 17:13) (61 - 87)  BP: 101/67 (10-17-23 @ 17:13)  RR: 18 (10-17-23 @ 17:13) (18 - 18)  SpO2: 99% (10-17-23 @ 17:13) (95% - 99%)      CAPILLARY BLOOD GLUCOSE    POCT Blood Glucose.: 120 mg/dL (17 Oct 2023 17:12)  POCT Blood Glucose.: 145 mg/dL (17 Oct 2023 12:21)  POCT Blood Glucose.: 139 mg/dL (17 Oct 2023 08:46)  POCT Blood Glucose.: 178 mg/dL (16 Oct 2023 21:29)     ==================>> LAB AND IMAGING <<==================                        10.2   5.57  )-----------( 245      ( 17 Oct 2023 06:47 )             32.4        10-16    137  |  100  |  19  ----------------------------<  115<H>  4.0   |  27  |  1.06    Ca    9.0      16 Oct 2023 07:00      WBC count:   5.57 <<== ,  5.83 <<== ,  5.05 <<== ,  4.26 <<==   Hemoglobin:   10.2 <<==,  10.1 <<==,  10.6 <<==,  10.2 <<==  platelets:  245 <==, 233 <==, 244 <==, 231 <==, 198 <==, 190 <==    Creatinine:  1.06  <<==, 1.03  <<==, 1.06  <<==, 1.06  <<==, 1.01  <<==  Sodium:   137  <==, 140  <==, 140  <==, 140  <==, 139  <==    ____________________________    M I C R O B I O L O G Y :  Culture - Tissue with Gram Stain (collected 13 Oct 2023 22:35)  Source: .Tissue Other  Gram Stain (14 Oct 2023 03:51):    No polymorphonuclear cells seen per low power field    No organisms seen per oil power field  Preliminary Report (14 Oct 2023 21:59):    No growth    Culture - Blood (collected 10 Oct 2023 07:40)  Source: .Blood Blood-Peripheral  Final Report (15 Oct 2023 10:00):    No growth at 5 days    Culture - Blood (collected 10 Oct 2023 07:40)  Source: .Blood Blood-Peripheral  Final Report (15 Oct 2023 10:00):    No growth at 5 days    Culture - Blood (collected 08 Oct 2023 07:01)  Source: .Blood Blood-Peripheral  Final Report (13 Oct 2023 10:00):    No growth at 5 days    Culture - Blood (collected 08 Oct 2023 07:01)  Source: .Blood Blood-Peripheral  Final Report (13 Oct 2023 10:00):    No growth at 5 days      < from: CHLOE W or WO Ultrasound Enhancing Agent (10.12.23 @ 13:45) >  CONCLUSIONS:    1. Left ventricular systolic function is severely decreased with an ejection fraction visually estimated at <20 %.   2. Moderate mitral regurgitation.   3. No pericardial effusion seen.   4. No echocardiographic evidence of valvular endocarditis.      No vegetation on the visible segments of the device leads.   5. Compared to the transthoracic echocardiogram performed on 10/9/2023 there have been no significant interval changes.   6. Minimal (grade 1) spontaneous echo contrast located in the left atrial apendage and minimal (grade 1) spontaneous echo contrast located in the left atrium.   7. No evidence of left atrial or left atrial appendage thrombus. The left atrial appendage emptying velocity is low.   8. Symmetric mitral valve leaflet tethering.  < end of copied text >    < from: Cardiac Catheterization (10.11.23 @ 11:23) >  Procedures:                 1.    Ultrasound Guided Access   2.    Arterial Access - Left Femoral   3.    Diagnostic Coronary Angiography   4.    Diagnostic Graft Angiography   5.    Left Heart Cath   6.    Aortogram     Indications:                Preoperative evaluation for extracardiac  surgery  Non-sustained ventricular tachycardia     Lab Visit Indication:       ExCardEv, NonSVT     Diagnostic Conclusions:   Severe three vessel obstructive coronary artery disease   Chronic total occlusion of the right coronary artery   --Left to right, Rentrop grade 1-2 filling of the posterior descending  artery/posterolateral artery  Patent LIMA to the left anterior descending artery   Patent SVG to the obtuse marginal artery   Patent SVG to the first diagonal artery   Normal left main coronary artery   Right dominant system   LVEDP = 22mmHg   No gradient across the aortic valve    Recommendations:   Keep left leg straight for 4 hours following removal of sheath   Continue aggressive medical management of coronary artery disease and  associated risk factors  Gentle IV hydration as per protocol   < end of copied text >    __________________________________________________________________________________  ===============>>  A S S E S S M E N T   A N D   P L A N <<===============  ------------------------------------------------------------------------------------------    · Assessment	  62yr M with PMH of  HTN,   AFIB,  DM/. right foot  osteo (Recently completed IV antibiotics via PICC line) ,CAD, s/p  cabg / AICD,hx of Sommers fracture, non-union many years ago,   prior CT:  c/c  transverse   fx, through the fifth metatarsal base. soft tissue wound/ cortical irregularity along the fifth metatarsal base  wound cx   MSSA and fingoldia,  s/p I&D with necrotic tissue close to bone,suspicion for residual osteo  completed   ancef 2 gms IVSS q 8 hours .  last  month     now  admitted with fevers/  right foot  wound/  and  shanda   Rt  5th metatarsal  wound/  foot  cellulitis >> Now with bacteremia    multiple specialists following, appreciated    HTN/  HLD  CAD/   cardiomyopathy,  ef  25 Post AICD (as per patient not MRI compatible), VT, Afib,  on xarelo/  , anemia  DM,  on  farxiga. januvia. metformin    hold  lasix/  farxiga/  entresto, for  now/ farxiga  not  recommended  with osteo  foot/ ha s risk  for  amputation    bacteremia  Gp  BsStrep,  , on IV antibiotics iD  following  >> No evidence of endocarditis on CHLOE  amputation by podiatry today >> Resume Anticoagulation as able post op      cardiology following >> medications adjusted given PVC and NSVT / VT       post cath as above with no intervention         continue medical optimization per cardio   continuing the antibiotics per ID on discharge..  >> Plan for IV antibiotics via PICC line (pending placement)    wound vac set up already     awaiting   -GI/DVT Prophylaxis per protocol.    >> Discharge planning home Pending above    --------------------------------------------  Case discussed with Patient, Nurse Practitioner, ID   Education given on findings and plan of care  ___________________________  H. RACHAEL Boudreaux.  Pager: [phone]     _________________________________________________________________________________________  ========>>  M E D I C A L   A T T E N D I N G    F O L L O W  U P  N O T E  <<=========  -----------------------------------------------------------------------------------------------------    - Patient evaluated by me, chart reviewed.  Patient evaluated before surgery, Note written post op  - In summary,  RYLEE HOWE is a 62y year old man admitted with Diabetic foot infection   - Patient today overall doing ok, comfortable .. no pain.. no other complains , waiting to go home    ==================>> REVIEW OF SYSTEM <<=================    GEN: no fever, no chills,   RESP: no SOB, no cough, no sputum  CVS: no chest pain, no palpitations, no edema  GI: no abdominal pain, no nausea,   : no dysuria, no frequency,  Neuro: no headache, no dizziness  Derm : no itching, no rash    ==================>> PHYSICAL EXAM <<=================    GEN: A&O X 3 , NAD , comfortable, pleasant, calm in bed .. encouraged out of bed to chair with assistance as needed.   HEENT: NCAT, PERRL, MMM, hearing intact  Neck: supple , no JVD appreciated  CVS: S1S2 , regular , No M/R/G appreciated  PULM: CTA B/L,  no W/R/R appreciated  ABD.: soft. non tender, non distended,  bowel sounds present  Extrem: intact pulses , Right foot wound dressed  PSYCH : normal mood,  not anxious        ( Note written / Date of service 10-17-23 ( This is certified to be the same as "ENTERED" date above ( for billing purposes)))    ==================>> MEDICATIONS <<====================    aspirin enteric coated 81 milliGRAM(s) Oral daily  atorvastatin 80 milliGRAM(s) Oral at bedtime  cefTRIAXone   IVPB      dextrose 5%. 1000 milliLiter(s) IV Continuous <Continuous>  dextrose 5%. 1000 milliLiter(s) IV Continuous <Continuous>  dextrose 50% Injectable 25 Gram(s) IV Push once  dextrose 50% Injectable 12.5 Gram(s) IV Push once  dextrose 50% Injectable 25 Gram(s) IV Push once  furosemide    Tablet 20 milliGRAM(s) Oral two times a day  glucagon  Injectable 1 milliGRAM(s) IntraMuscular once  insulin lispro (ADMELOG) corrective regimen sliding scale   SubCutaneous three times a day before meals  insulin lispro (ADMELOG) corrective regimen sliding scale   SubCutaneous at bedtime  melatonin 5 milliGRAM(s) Oral at bedtime  metoprolol succinate  milliGRAM(s) Oral daily  rivaroxaban 20 milliGRAM(s) Oral with dinner  sacubitril 24 mG/valsartan 26 mG 1 Tablet(s) Oral two times a day  senna 2 Tablet(s) Oral at bedtime  spironolactone 25 milliGRAM(s) Oral daily    MEDICATIONS  (PRN):  acetaminophen     Tablet .. 650 milliGRAM(s) Oral every 6 hours PRN Mild Pain (1 - 3)  dextrose Oral Gel 15 Gram(s) Oral once PRN Blood Glucose LESS THAN 70 milliGRAM(s)/deciliter    ___________  Active diet:  Diet, Consistent Carbohydrate w/Evening Snack:   DASH/TLC Sodium & Cholesterol Restricted (DASH)  ___________________    ==================>> VITAL SIGNS <<==================    Vital Signs Last 24 HrsT(C): 36.8 (10-17-23 @ 17:13)  T(F): 98.2 (10-17-23 @ 17:13), Max: 98.2 (10-17-23 @ 17:13)  HR: 74 (10-17-23 @ 17:13) (61 - 87)  BP: 101/67 (10-17-23 @ 17:13)  RR: 18 (10-17-23 @ 17:13) (18 - 18)  SpO2: 99% (10-17-23 @ 17:13) (95% - 99%)      CAPILLARY BLOOD GLUCOSE    POCT Blood Glucose.: 120 mg/dL (17 Oct 2023 17:12)  POCT Blood Glucose.: 145 mg/dL (17 Oct 2023 12:21)  POCT Blood Glucose.: 139 mg/dL (17 Oct 2023 08:46)  POCT Blood Glucose.: 178 mg/dL (16 Oct 2023 21:29)     ==================>> LAB AND IMAGING <<==================                        10.2   5.57  )-----------( 245      ( 17 Oct 2023 06:47 )             32.4        10-16    137  |  100  |  19  ----------------------------<  115<H>  4.0   |  27  |  1.06    Ca    9.0      16 Oct 2023 07:00      WBC count:   5.57 <<== ,  5.83 <<== ,  5.05 <<== ,  4.26 <<==   Hemoglobin:   10.2 <<==,  10.1 <<==,  10.6 <<==,  10.2 <<==  platelets:  245 <==, 233 <==, 244 <==, 231 <==, 198 <==, 190 <==    Creatinine:  1.06  <<==, 1.03  <<==, 1.06  <<==, 1.06  <<==, 1.01  <<==  Sodium:   137  <==, 140  <==, 140  <==, 140  <==, 139  <==    ____________________________    M I C R O B I O L O G Y :  Culture - Tissue with Gram Stain (collected 13 Oct 2023 22:35)  Source: .Tissue Other  Gram Stain (14 Oct 2023 03:51):    No polymorphonuclear cells seen per low power field    No organisms seen per oil power field  Preliminary Report (14 Oct 2023 21:59):    No growth    Culture - Blood (collected 10 Oct 2023 07:40)  Source: .Blood Blood-Peripheral  Final Report (15 Oct 2023 10:00):    No growth at 5 days    Culture - Blood (collected 10 Oct 2023 07:40)  Source: .Blood Blood-Peripheral  Final Report (15 Oct 2023 10:00):    No growth at 5 days    Culture - Blood (collected 08 Oct 2023 07:01)  Source: .Blood Blood-Peripheral  Final Report (13 Oct 2023 10:00):    No growth at 5 days    Culture - Blood (collected 08 Oct 2023 07:01)  Source: .Blood Blood-Peripheral  Final Report (13 Oct 2023 10:00):    No growth at 5 days      < from: CHLOE W or WO Ultrasound Enhancing Agent (10.12.23 @ 13:45) >  CONCLUSIONS:    1. Left ventricular systolic function is severely decreased with an ejection fraction visually estimated at <20 %.   2. Moderate mitral regurgitation.   3. No pericardial effusion seen.   4. No echocardiographic evidence of valvular endocarditis.      No vegetation on the visible segments of the device leads.   5. Compared to the transthoracic echocardiogram performed on 10/9/2023 there have been no significant interval changes.   6. Minimal (grade 1) spontaneous echo contrast located in the left atrial apendage and minimal (grade 1) spontaneous echo contrast located in the left atrium.   7. No evidence of left atrial or left atrial appendage thrombus. The left atrial appendage emptying velocity is low.   8. Symmetric mitral valve leaflet tethering.  < end of copied text >    < from: Cardiac Catheterization (10.11.23 @ 11:23) >  Procedures:                 1.    Ultrasound Guided Access   2.    Arterial Access - Left Femoral   3.    Diagnostic Coronary Angiography   4.    Diagnostic Graft Angiography   5.    Left Heart Cath   6.    Aortogram     Indications:                Preoperative evaluation for extracardiac  surgery  Non-sustained ventricular tachycardia     Lab Visit Indication:       ExCardEv, NonSVT     Diagnostic Conclusions:   Severe three vessel obstructive coronary artery disease   Chronic total occlusion of the right coronary artery   --Left to right, Rentrop grade 1-2 filling of the posterior descending  artery/posterolateral artery  Patent LIMA to the left anterior descending artery   Patent SVG to the obtuse marginal artery   Patent SVG to the first diagonal artery   Normal left main coronary artery   Right dominant system   LVEDP = 22mmHg   No gradient across the aortic valve    Recommendations:   Keep left leg straight for 4 hours following removal of sheath   Continue aggressive medical management of coronary artery disease and  associated risk factors  Gentle IV hydration as per protocol   < end of copied text >    __________________________________________________________________________________  ===============>>  A S S E S S M E N T   A N D   P L A N <<===============  ------------------------------------------------------------------------------------------    · Assessment	  62yr M with PMH of  HTN,   AFIB,  DM/. right foot  osteo (Recently completed IV antibiotics via PICC line) ,CAD, s/p  cabg / AICD,hx of Sommers fracture, non-union many years ago,   prior CT:  c/c  transverse   fx, through the fifth metatarsal base. soft tissue wound/ cortical irregularity along the fifth metatarsal base  wound cx   MSSA and fingoldia,  s/p I&D with necrotic tissue close to bone,suspicion for residual osteo  completed   ancef 2 gms IVSS q 8 hours .  last  month     now  admitted with fevers/  right foot  wound/  and  shanda   Rt  5th metatarsal  wound/  foot  cellulitis >> Now with bacteremia    multiple specialists following, appreciated    HTN/  HLD  CAD/   cardiomyopathy,  ef  25 Post AICD (as per patient not MRI compatible), VT, Afib,  on xarelo/  , anemia  DM,  on  farxiga. januvia. metformin    hold  lasix/  farxiga/  entresto, for  now/ farxiga  not  recommended  with osteo  foot/ ha s risk  for  amputation    bacteremia  Gp  BsStrep,  , on IV antibiotics iD  following  >> No evidence of endocarditis on CHLOE  post amputation by podiatry      cardiology following >> medications adjusted given PVC and NSVT / VT       post cath as above with no intervention         continue medical optimization per cardio   continuing the antibiotics per ID on discharge..  >> Plan for IV antibiotics via PICC line (pending placement)    wound vac set up already     awaiting   -GI/DVT Prophylaxis per protocol.    >> Discharge planning home Pending above    --------------------------------------------  Case discussed with Patient, Nurse Practitioner, ID   Education given on findings and plan of care  ___________________________  H. RACHAEL Boudreaux.  Pager: [phone]

## 2023-10-17 NOTE — DISCHARGE NOTE NURSING/CASE MANAGEMENT/SOCIAL WORK - NSDCPEFALRISK_GEN_ALL_CORE
For information on Fall & Injury Prevention, visit: [email or web address] OR  [email or web address] OR  https://www.cdc.gov/steadi/patient.html

## 2023-10-17 NOTE — PROGRESS NOTE ADULT - ASSESSMENT
Patient is our 62M who is consulted for a diabetic foot infection of the right, dorsal lateral foot. Past medical history of DM, defibrillator, CAD post CABG, CHF, hx of non-union Sommers fracture around 2014, right upper extremity infection in June 2023 s/p 1 month of daptomycin and ceftriaxone.     Patient noticed an ulcer on his lateral right foot for the past several weeks. Over the past few days right foot has been having increased edema, erythema and pain on ambulation. Temperature at home was 101-102. Denies recent fall or trauma. states denies n/v/d, CP, SOB, HA, dizziness, abdominal pain, urinary symptoms, cough, leg pain, swelling. Patient endorses stools have become darker.     On August 1, 2023, the patient saw podiatrist Dr. Foss for the foot infection and bone biopsy came back positive for staph. He began treatment with levaquin but his symptoms progressed and patient came to the ED for further evaluation.      ON 8/8 pt underwent  R foot Incision and Drainage and partial 5th met base resection  Pt grew mssa and finegolda  pt received 6 weeks IV abs at home    Pt was doing well at home . 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      A/P   #Group B strep bacteremia  concerning as grew so quickly   changed abs to unasyn- this would also cover previous cultured pathogens  repeat cultures negative   cardiology following - as patient with an AICD  cardiac echo noted  likely source is the foot  MRSA swab negative  hold further vancomycin      #right foot erythema/OSTEOMYELITIS   pt is s/p partial resection of right foot 5th metatarsal bone, open  concerned for residual osteo   PT's AICD is not MRI compatible  appreciate podiatry in put  AT THIS POINT WOULD D/C HOME ON IV CEFTRIAXONE 2 GMS IVSS DAILY  WILL NEED WEEKLY ESR, CRP, CMP, CBC WITH DIFF  PT NEEDS A PICC    If pt was to come back the next step may be a BKA which we want to avoid.           #elevated creatinine  dose meds per renal function  NO NEED TO ADJUST THE Rocephin       Lori Bennett M.D. ,   please reach via teams   If no answer, or after 5PM/ weekends,  then please call  [phone]    Assessment and plan discussed with the NP, with ID pharmD, with Dr Boudreaux , with Dr Lyn and with cardiology, Dr Wild and with patient            Patient is our 62M who is consulted for a diabetic foot infection of the right, dorsal lateral foot. Past medical history of DM, defibrillator, CAD post CABG, CHF, hx of non-union Sommers fracture around 2014, right upper extremity infection in June 2023 s/p 1 month of daptomycin and ceftriaxone.     Patient noticed an ulcer on his lateral right foot for the past several weeks. Over the past few days right foot has been having increased edema, erythema and pain on ambulation. Temperature at home was 101-102. Denies recent fall or trauma. states denies n/v/d, CP, SOB, HA, dizziness, abdominal pain, urinary symptoms, cough, leg pain, swelling. Patient endorses stools have become darker.     On August 1, 2023, the patient saw podiatrist Dr. Foss for the foot infection and bone biopsy came back positive for staph. He began treatment with levaquin but his symptoms progressed and patient came to the ED for further evaluation.      ON 8/8 pt underwent  R foot Incision and Drainage and partial 5th met base resection  Pt grew mssa and finegolda  pt received 6 weeks IV abs at home    Pt was doing well at home . 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      A/P   #Group B strep bacteremia  concerning as grew so quickly   changed abs to unasyn- this would also cover previous cultured pathogens  repeat cultures negative   cardiology following - as patient with an AICD  cardiac echo noted  likely source is the foot  MRSA swab negative  hold further vancomycin      #right foot erythema/OSTEOMYELITIS   pt is s/p partial resection of right foot 5th metatarsal bone, open  concerned for residual osteo   PT's AICD is not MRI compatible  appreciate podiatry in put  AT THIS POINT WOULD D/C HOME ON IV CEFTRIAXONE 2 GMS IVSS DAILY  WILL NEED WEEKLY ESR, CRP, CMP, CBC WITH DIFF  PT NEEDS A PICC    If pt was to come back the next step may be a BKA which we want to avoid.           #elevated creatinine  dose meds per renal function  NO NEED TO ADJUST THE Rocephin       Lori Bennett M.D. ,   please reach via teams   If no answer, or after 5PM/ weekends,  then please call  [phone]    Assessment and plan discussed with the Pa, with ID pharmD, with Dr Boudreaux , with Dr Lyn and with cardiology, Dr Wild and with patient

## 2023-10-17 NOTE — PROGRESS NOTE ADULT - SUBJECTIVE AND OBJECTIVE BOX
Date of Service: 10-17-23 @ 08:21           CARDIOLOGY     PROGRESS  NOTE   ________________________________________________    CHIEF COMPLAINT:Patient is a 62y old  Male who presents with a chief complaint of fevers/  foot infection (16 Oct 2023 17:50)  doing well  	  REVIEW OF SYSTEMS:  CONSTITUTIONAL: No fever, weight loss, or fatigue  EYES: No eye pain, visual disturbances, or discharge  ENT:  No difficulty hearing, tinnitus, vertigo; No sinus or throat pain  NECK: No pain or stiffness  RESPIRATORY: No cough, wheezing, chills or hemoptysis; No Shortness of Breath  CARDIOVASCULAR: No chest pain, palpitations, passing out, dizziness, or leg swelling  GASTROINTESTINAL: No abdominal or epigastric pain. No nausea, vomiting, or hematemesis; No diarrhea or constipation. No melena or hematochezia.  GENITOURINARY: No dysuria, frequency, hematuria, or incontinence  NEUROLOGICAL: No headaches, memory loss, loss of strength, numbness, or tremors  SKIN: No itching, burning, rashes, or lesions   LYMPH Nodes: No enlarged glands  ENDOCRINE: No heat or cold intolerance; No hair loss  MUSCULOSKELETAL: No joint pain or swelling; No muscle, back, or extremity pain  PSYCHIATRIC: No depression, anxiety, mood swings, or difficulty sleeping  HEME/LYMPH: No easy bruising, or bleeding gums  ALLERGY AND IMMUNOLOGIC: No hives or eczema	    [x ] All others negative	  [ ] Unable to obtain    PHYSICAL EXAM:  T(C): 36.3 (10-17-23 @ 04:23), Max: 36.6 (10-16-23 @ 12:21)  HR: 68 (10-17-23 @ 04:23) (68 - 87)  BP: 98/63 (10-17-23 @ 04:23) (91/57 - 101/68)  RR: 18 (10-17-23 @ 04:23) (18 - 18)  SpO2: 96% (10-17-23 @ 04:23) (95% - 97%)  Wt(kg): --  I&O's Summary    16 Oct 2023 07:01  -  17 Oct 2023 07:00  --------------------------------------------------------  IN: 1400 mL / OUT: 3000 mL / NET: -1600 mL        Appearance: Normal	  HEENT:   Normal oral mucosa, PERRL, EOMI	  Lymphatic: No lymphadenopathy  Cardiovascular: Normal S1 S2, No JVD, + murmurs, No edema  Respiratory: rhonchi  Psychiatry: A & O x 3, Mood & affect appropriate  Gastrointestinal:  Soft, Non-tender, + BS	  Skin: No rashes, No ecchymoses, No cyanosis	  Neurologic: Non-focal  Extremities: Normal range of motion, bandaged r foot  Vascular: + pvd    MEDICATIONS  (STANDING):  ampicillin/sulbactam  IVPB 3 Gram(s) IV Intermittent every 6 hours  aspirin enteric coated 81 milliGRAM(s) Oral daily  atorvastatin 80 milliGRAM(s) Oral at bedtime  dextrose 5%. 1000 milliLiter(s) (100 mL/Hr) IV Continuous <Continuous>  dextrose 5%. 1000 milliLiter(s) (50 mL/Hr) IV Continuous <Continuous>  dextrose 50% Injectable 12.5 Gram(s) IV Push once  dextrose 50% Injectable 25 Gram(s) IV Push once  dextrose 50% Injectable 25 Gram(s) IV Push once  furosemide    Tablet 20 milliGRAM(s) Oral two times a day  glucagon  Injectable 1 milliGRAM(s) IntraMuscular once  insulin lispro (ADMELOG) corrective regimen sliding scale   SubCutaneous three times a day before meals  insulin lispro (ADMELOG) corrective regimen sliding scale   SubCutaneous at bedtime  melatonin 5 milliGRAM(s) Oral at bedtime  metoprolol succinate  milliGRAM(s) Oral daily  rivaroxaban 20 milliGRAM(s) Oral with dinner  sacubitril 24 mG/valsartan 26 mG 1 Tablet(s) Oral two times a day  senna 2 Tablet(s) Oral at bedtime  spironolactone 25 milliGRAM(s) Oral daily      TELEMETRY: 	    ECG:  	  RADIOLOGY:  OTHER: 	  	  LABS:	 	    CARDIAC MARKERS:                                10.2   5.57  )-----------( 245      ( 17 Oct 2023 06:47 )             32.4     10-16    137  |  100  |  19  ----------------------------<  115<H>  4.0   |  27  |  1.06    Ca    9.0      16 Oct 2023 07:00      proBNP:   Lipid Profile:   HgA1c:   TSH: Thyroid Stimulating Hormone, Serum: 5.55 uIU/mL (10-07 @ 07:08)    #Group B strep bacteremia  concerning as grew so quickly   changed abs to unasyn- this would also cover previous cultured pathogens  Check repeat BC q 48 hours until clears  cardiology following - as patient with an AICD  cardiac echo noted  likely source is the foot  MRSA swab negative  hold further vancomycin      #right foot erythema  pt is s/p partial resection of right foot 5th metatarsal bone, open  concerned for residual osteo   PT's AICD is not MRI compatible  appreciate podiatry in put        Assessment and plan  ---------------------------  62M hx of type 2 DM on metformin, farixga, heart failure with reduced EF on metoprolol 75mg, lasix 20mg qd, xarelto for vte ppx (had RUE vte 2/2 PICC line), removed 2 weeks prior as well as right foot wound wac (present 2/2 osteo of foot with finegoldia magna), here for 36 hrs of fever, tmax 101, took 800mg advil today, prior to arrival. + slight right lower back pain, nonradiating, mild, no assc GI sxs. + urinating more freq as of late. Still tolerating PO. Denies chest pain, shortness of breath, diaphoresis. Hypotensive to 90s/50 in triage, then 80s/60s, last bp 100/40, not tachy but in setting of bb. Appears slightly dehydrated, clear lungs, s3 gallop, no murmurs appreciable. + right foot redness, swelling, nontender, no flucutance, no crepitations, 1+ dp pulses bilateral, full rom. Nontender calves. Benign abd, no peritoneal signs, no jaundice, no cva ttp. No midline spinal ttp. RUE no signs of infx, former picc site clean, Patient meets sepsis criteria by vitals. Will eval for common source of infection (ex. osteo, urinary, bacterial pulmonary, viral uri; unlikely central neurologic such as meninigitis or encephalitis) vs metabolic derangement. Check labs, lactate, UA, CXs, CXR, foot xray, inflam markers screening EKG, hydrate-> empiric abxs, antipyretics, additional crystalloid infusion as clinically warranted. Will start with 500 cc crystalloid 2/2 heart failure but euglycemic dka.  pt is well known to me with hx of htn, ashd, chf, s/p BIV AICD , PVD with multiple admissions sec to foot infection  pt with sig lv dysfunction/ severe ischemic cardiomyopathy  will  adjust cardiac meds  hold Farxiga for now. may  requiring surgery  dvt prophylaxis  vascula/podiatry consult  abx  pt Entresto dose has decrease sec to ?infection will gradually will increase dose  continue Metroprolol er  avoid excess fluid, may need to re start on  Lasix  and aldactone  check inr if elevated will give vitamin K on Xarelto  may need to decrease Xarelto dose if increase renal function  + BC for strep, continue iv abx, pt has hardware (biv Aicd)  will increase Entresto as bp tolerates  re start on aldactone 25 mg daily  s/p AICD shock yesterday , beta blocker increased to 100 mg daily , pt can not tolerate AMIO was tried before  continue iv heparin plan will discuss with ID  cardiac cath noted with patent graft, increase EDP, need to increase Lasix dose, observe bp closely, excellent uo  pt is a high risk for surgery, unless other way of medical therapy  will hold Entresto if bp low  fu bp and hr closely  NO AICD EXTRACTION AT THIS TIME, pt very unstable ,blood culture very transient bacteriemia, cleared within one  day, CHLOE negative  LIKE TO OBSERVE CLINICALLY, Discussed with ID  will increase entresto as bp tolerated  continue meds/ no arrythmia on tele, abx course as per  ID  DO not hold pt cardiac meds unless bp less than 90 systolic

## 2023-10-17 NOTE — DISCHARGE NOTE NURSING/CASE MANAGEMENT/SOCIAL WORK - PATIENT PORTAL LINK FT
You can access the FollowMyHealth Patient Portal offered by [facility] by registering at the following website: [email or web address]. By joining [facility]’s FollowMyHealth portal, you will also be able to view your health information using other applications (apps) compatible with our system.

## 2023-10-17 NOTE — PROGRESS NOTE ADULT - SUBJECTIVE AND OBJECTIVE BOX
Patient is a 62y Male whom presented to the hospital with shanda     PAST MEDICAL & SURGICAL HISTORY:  Diabetes      Chronic osteomyelitis      H/O heart failure      No significant past surgical history          MEDICATIONS  (STANDING):  aspirin enteric coated 81 milliGRAM(s) Oral daily  atorvastatin 80 milliGRAM(s) Oral at bedtime  dextrose 5%. 1000 milliLiter(s) (100 mL/Hr) IV Continuous <Continuous>  dextrose 5%. 1000 milliLiter(s) (50 mL/Hr) IV Continuous <Continuous>  dextrose 50% Injectable 25 Gram(s) IV Push once  dextrose 50% Injectable 25 Gram(s) IV Push once  dextrose 50% Injectable 12.5 Gram(s) IV Push once  glucagon  Injectable 1 milliGRAM(s) IntraMuscular once  insulin lispro (ADMELOG) corrective regimen sliding scale   SubCutaneous three times a day before meals  metoprolol succinate ER 25 milliGRAM(s) Oral daily  piperacillin/tazobactam IVPB.. 3.375 Gram(s) IV Intermittent every 8 hours  rivaroxaban 20 milliGRAM(s) Oral with dinner  sodium bicarbonate 650 milliGRAM(s) Oral three times a day      Allergies    No Known Allergies    Intolerances        SOCIAL HISTORY:  Denies ETOh,Smoking,     FAMILY HISTORY:      REVIEW OF SYSTEMS:    CONSTITUTIONAL: No weakness, fevers or chills  EYES/ENT: No visual changes;  no throat pain   NECK: No pain or stiffness  RESPIRATORY: No cough, wheezing, hemoptysis; No shortness of breath                                                                                                       10.2   5.57  )-----------( 245      ( 17 Oct 2023 06:47 )             32.4       CBC Full  -  ( 17 Oct 2023 06:47 )  WBC Count : 5.57 K/uL  RBC Count : 3.86 M/uL  Hemoglobin : 10.2 g/dL  Hematocrit : 32.4 %  Platelet Count - Automated : 245 K/uL  Mean Cell Volume : 83.9 fl  Mean Cell Hemoglobin : 26.4 pg  Mean Cell Hemoglobin Concentration : 31.5 gm/dL  Auto Neutrophil # : x  Auto Lymphocyte # : x  Auto Monocyte # : x  Auto Eosinophil # : x  Auto Basophil # : x  Auto Neutrophil % : x  Auto Lymphocyte % : x  Auto Monocyte % : x  Auto Eosinophil % : x  Auto Basophil % : x      10-16    137  |  100  |  19  ----------------------------<  115<H>  4.0   |  27  |  1.06    Ca    9.0      16 Oct 2023 07:00        CAPILLARY BLOOD GLUCOSE      POCT Blood Glucose.: 145 mg/dL (17 Oct 2023 12:21)  POCT Blood Glucose.: 139 mg/dL (17 Oct 2023 08:46)  POCT Blood Glucose.: 178 mg/dL (16 Oct 2023 21:29)  POCT Blood Glucose.: 123 mg/dL (16 Oct 2023 17:04)  POCT Blood Glucose.: 147 mg/dL (16 Oct 2023 12:42)      Vital Signs Last 24 Hrs  T(C): 36.7 (17 Oct 2023 11:48), Max: 36.7 (17 Oct 2023 11:48)  T(F): 98.1 (17 Oct 2023 11:48), Max: 98.1 (17 Oct 2023 11:48)  HR: 61 (17 Oct 2023 11:48) (61 - 87)  BP: 97/61 (17 Oct 2023 11:48) (97/61 - 101/68)  BP(mean): --  RR: 18 (17 Oct 2023 11:48) (18 - 18)  SpO2: 98% (17 Oct 2023 11:48) (95% - 98%)    Parameters below as of 17 Oct 2023 11:48  Patient On (Oxygen Delivery Method): room air        Urinalysis Basic - ( 16 Oct 2023 07:00 )    Color: x / Appearance: x / SG: x / pH: x  Gluc: 115 mg/dL / Ketone: x  / Bili: x / Urobili: x   Blood: x / Protein: x / Nitrite: x   Leuk Esterase: x / RBC: x / WBC x   Sq Epi: x / Non Sq Epi: x / Bacteria: x                                           10.1   5.83  )-----------( 233      ( 15 Oct 2023 07:04 )             32.3       CBC Full  -  ( 15 Oct 2023 07:04 )  WBC Count : 5.83 K/uL  RBC Count : 3.78 M/uL  Hemoglobin : 10.1 g/dL  Hematocrit : 32.3 %  Platelet Count - Automated : 233 K/uL  Mean Cell Volume : 85.4 fl  Mean Cell Hemoglobin : 26.7 pg  Mean Cell Hemoglobin Concentration : 31.3 gm/dL  Auto Neutrophil # : x  Auto Lymphocyte # : x  Auto Monocyte # : x  Auto Eosinophil # : x  Auto Basophil # : x  Auto Neutrophil % : x  Auto Lymphocyte % : x  Auto Monocyte % : x  Auto Eosinophil % : x  Auto Basophil % : x      10-16    137  |  100  |  19  ----------------------------<  115<H>  4.0   |  27  |  1.06    Ca    9.0      16 Oct 2023 07:00  Mg     2.2     10-15        CAPILLARY BLOOD GLUCOSE      POCT Blood Glucose.: 115 mg/dL (16 Oct 2023 07:43)  POCT Blood Glucose.: 140 mg/dL (15 Oct 2023 22:26)  POCT Blood Glucose.: 113 mg/dL (15 Oct 2023 16:55)  POCT Blood Glucose.: 161 mg/dL (15 Oct 2023 12:57)      Vital Signs Last 24 Hrs  T(C): 36.6 (16 Oct 2023 12:21), Max: 37 (15 Oct 2023 21:07)  T(F): 97.8 (16 Oct 2023 12:21), Max: 98.6 (15 Oct 2023 21:07)  HR: 74 (16 Oct 2023 12:21) (58 - 74)  BP: 91/57 (16 Oct 2023 12:21) (91/57 - 106/68)  BP(mean): --  RR: 18 (16 Oct 2023 12:21) (18 - 18)  SpO2: 97% (16 Oct 2023 12:21) (96% - 99%)    Parameters below as of 16 Oct 2023 12:21  Patient On (Oxygen Delivery Method): room air        Urinalysis Basic - ( 16 Oct 2023 07:00 )    Color: x / Appearance: x / SG: x / pH: x  Gluc: 115 mg/dL / Ketone: x  / Bili: x / Urobili: x   Blood: x / Protein: x / Nitrite: x   Leuk Esterase: x / RBC: x / WBC x   Sq Epi: x / Non Sq Epi: x / Bacteria: x                  PHYSICAL EXAM:    Constitutional: NAD  HEENT: conjunctive   clear   Neck:  No JVD  Respiratory: CTAB  Cardiovascular: S1 and S2  Gastrointestinal: BS+, soft, NT/ND  Extremities: No peripheral edema  Neurological:  no focal deficits  Psychiatric: Normal mood, normal affect  : No Atkins  Skin: No rashes  Access: Not applicable

## 2023-10-17 NOTE — DISCHARGE NOTE NURSING/CASE MANAGEMENT/SOCIAL WORK - NSDCFUADDAPPT_GEN_ALL_CORE_FT
Podiatry Discharge Instructions:  Follow up: Please follow up with Dr. Foss within 1 week of discharge from the hospital, please call [phone] for appointment and discuss that you recently were seen in the hospital.  Wound Care: Please manage wound vac to right foot at 125mmHg with black foam continuously and change 3x per week.  Weight bearing: Please be non weight bearing to right foot.  Antibiotics: Please continue as instructed.

## 2023-10-17 NOTE — PROGRESS NOTE ADULT - SUBJECTIVE AND OBJECTIVE BOX
Patient is a 62y old  Male who presents with a chief complaint of fevers/  foot infection (17 Oct 2023 12:30)    Being followed by ID for        Interval history:  pt resting quietly  anxious for discharge  No other acute events        PAST MEDICAL & SURGICAL HISTORY:  Diabetes      Chronic osteomyelitis      H/O heart failure    AICD      No significant past surgical history        Allergies    No Known Allergies    Intolerances      Antimicrobials:    ampicillin/sulbactam  IVPB 3 Gram(s) IV Intermittent every 6 hours    MEDICATIONS  (STANDING):  ampicillin/sulbactam  IVPB 3 Gram(s) IV Intermittent every 6 hours  aspirin enteric coated 81 milliGRAM(s) Oral daily  atorvastatin 80 milliGRAM(s) Oral at bedtime  dextrose 5%. 1000 milliLiter(s) (50 mL/Hr) IV Continuous <Continuous>  dextrose 5%. 1000 milliLiter(s) (100 mL/Hr) IV Continuous <Continuous>  dextrose 50% Injectable 25 Gram(s) IV Push once  dextrose 50% Injectable 12.5 Gram(s) IV Push once  dextrose 50% Injectable 25 Gram(s) IV Push once  furosemide    Tablet 20 milliGRAM(s) Oral two times a day  glucagon  Injectable 1 milliGRAM(s) IntraMuscular once  insulin lispro (ADMELOG) corrective regimen sliding scale   SubCutaneous three times a day before meals  insulin lispro (ADMELOG) corrective regimen sliding scale   SubCutaneous at bedtime  melatonin 5 milliGRAM(s) Oral at bedtime  metoprolol succinate  milliGRAM(s) Oral daily  rivaroxaban 20 milliGRAM(s) Oral with dinner  sacubitril 24 mG/valsartan 26 mG 1 Tablet(s) Oral two times a day  senna 2 Tablet(s) Oral at bedtime  spironolactone 25 milliGRAM(s) Oral daily      Vital Signs Last 24 Hrs  T(C): 36.7 (10-17-23 @ 11:48), Max: 36.7 (10-17-23 @ 11:48)  T(F): 98.1 (10-17-23 @ 11:48), Max: 98.1 (10-17-23 @ 11:48)  HR: 61 (10-17-23 @ 11:48) (61 - 87)  BP: 97/61 (10-17-23 @ 11:48) (97/61 - 101/68)  BP(mean): --  RR: 18 (10-17-23 @ 11:48) (18 - 18)  SpO2: 98% (10-17-23 @ 11:48) (95% - 98%)    Physical Exam:    Constitutional well preserved,comfortable,pleasant    HEENT PERRLA EOMI,No pallor or icterus    No oral exudate or erythema    Neck supple no JVD or LN    Chest Good AE,CTA    CVS  S1 S2     Abd soft BS normal No tenderness no masses    Ext No cyanosis clubbing or edema    IV site no erythema tenderness or discharge    Joints no swelling or LOM    CNS AAO X 3 no focal    Lab Data:                          10.2   5.57  )-----------( 245      ( 17 Oct 2023 06:47 )             32.4       10-16    137  |  100  |  19  ----------------------------<  115<H>  4.0   |  27  |  1.06    Ca    9.0      16 Oct 2023 07:00        Urinalysis (10.06.23 @ 16:25)    pH Urine: 5.5   Glucose Qualitative, Urine: 1000 mg/dL   Blood, Urine: Small   Color: Yellow   Urine Appearance: Slightly Turbid   Bilirubin: Negative   Ketone - Urine: Negative   Specific Gravity: 1.023   Protein, Urine: 100 mg/dl   Urobilinogen: Negative   Nitrite: Negative   Leukocyte Esterase Concentration: Negative          .Tissue Other  10-13-23   No growth  --    No polymorphonuclear cells seen per low power field  No organisms seen per oil power field        Culture - Abscess with Gram Stain (10.06.23 @ 15:12)    Gram Stain:   Rare polymorphonuclear leukocytes per low power field  Numerous Gram Variable Rods per oil power field   Specimen Source: .Abscess right foot   Culture Results:   Moderate Gram Positive Rods Most closely resembling Arcanobacterium  haemolyticum "Susceptibilities not performed"  Moderate Streptococcus agalactiae (Group B) isolated  Group B streptococci are susceptible to ampicillin,  penicillin and cefazolin,but may be resistant to  erythromycin and clindamycin.        Culture - Blood (10.06.23 @ 06:28)    -  Streptococcus agalactiae (Group B): Detec   -  Vancomycin: S 0.5   -  Penicillin: S 0.06 Predicts results for ampicillin, amoxicillin, amoxicillin/clavulanate, ampicillin/sulbactam, 1st, 2nd and 3rd generation cephalosporins and carbapenems.   -  Ceftriaxone: S <=0.25   -  Clindamycin: S <=0.06   -  Levofloxacin: S 1   Gram Stain:   Growth in aerobic and anaerobic bottles: Gram Positive Cocci in Pairs and  Chains   Specimen Source: .Blood Blood-Peripheral   Organism: Blood Culture PCR   Organism: Streptococcus agalactiae (Group B)   Culture Results:   Growth in aerobic and anaerobic bottles: Streptococcus agalactiae (Group  B)  Direct identification is available within approximately 3-5  hours either by Blood Panel Multiplexed PCR or Direct  MALDI-TOF. Details: [email or web address]   Organism Identification: Blood Culture PCR  Streptococcus agalactiae (Group B)   Method Type: PCR   Method Type: HEATHER            WBC Count: 5.57 (10-17-23 @ 06:47)  WBC Count: 5.83 (10-15-23 @ 07:04)  WBC Count: 5.05 (10-14-23 @ 07:00)  WBC Count: 4.26 (10-13-23 @ 07:21)  WBC Count: 4.96 (10-12-23 @ 07:55)  WBC Count: 4.88 (10-12-23 @ 00:57)  WBC Count: 5.43 (10-11-23 @ 16:41)  WBC Count: 4.46 (10-11-23 @ 07:25)

## 2023-10-17 NOTE — PROGRESS NOTE ADULT - SUBJECTIVE AND OBJECTIVE BOX
Chief complaint  Patient is a 62y old  Male who presents with a chief complaint of fevers/  foot infection (17 Oct 2023 12:34)         Labs and Fingersticks  CAPILLARY BLOOD GLUCOSE      POCT Blood Glucose.: 145 mg/dL (17 Oct 2023 12:21)  POCT Blood Glucose.: 139 mg/dL (17 Oct 2023 08:46)  POCT Blood Glucose.: 178 mg/dL (16 Oct 2023 21:29)  POCT Blood Glucose.: 123 mg/dL (16 Oct 2023 17:04)      Anion Gap: 10 (10-16 @ 07:00)      Calcium: 9.0 (10-16 @ 07:00)          10-16    137  |  100  |  19  ----------------------------<  115<H>  4.0   |  27  |  1.06    Ca    9.0      16 Oct 2023 07:00                          10.2   5.57  )-----------( 245      ( 17 Oct 2023 06:47 )             32.4     Medications  MEDICATIONS  (STANDING):  aspirin enteric coated 81 milliGRAM(s) Oral daily  atorvastatin 80 milliGRAM(s) Oral at bedtime  cefTRIAXone   IVPB      cefTRIAXone   IVPB 2000 milliGRAM(s) IV Intermittent once  dextrose 5%. 1000 milliLiter(s) (50 mL/Hr) IV Continuous <Continuous>  dextrose 5%. 1000 milliLiter(s) (100 mL/Hr) IV Continuous <Continuous>  dextrose 50% Injectable 12.5 Gram(s) IV Push once  dextrose 50% Injectable 25 Gram(s) IV Push once  dextrose 50% Injectable 25 Gram(s) IV Push once  furosemide    Tablet 20 milliGRAM(s) Oral two times a day  glucagon  Injectable 1 milliGRAM(s) IntraMuscular once  insulin lispro (ADMELOG) corrective regimen sliding scale   SubCutaneous three times a day before meals  insulin lispro (ADMELOG) corrective regimen sliding scale   SubCutaneous at bedtime  melatonin 5 milliGRAM(s) Oral at bedtime  metoprolol succinate  milliGRAM(s) Oral daily  rivaroxaban 20 milliGRAM(s) Oral with dinner  sacubitril 24 mG/valsartan 26 mG 1 Tablet(s) Oral two times a day  senna 2 Tablet(s) Oral at bedtime  spironolactone 25 milliGRAM(s) Oral daily      Physical Exam  General: Patient comfortable in bed   Vital Signs Last 12 Hrs  T(F): 98.1 (10-17-23 @ 11:48), Max: 98.1 (10-17-23 @ 11:48)  HR: 61 (10-17-23 @ 11:48) (61 - 68)  BP: 97/61 (10-17-23 @ 11:48) (97/61 - 98/63)  BP(mean): --  RR: 18 (10-17-23 @ 11:48) (18 - 18)  SpO2: 98% (10-17-23 @ 11:48) (96% - 98%)    CVS: S1S2   Respiratory: No wheezing, no crepitations  GI: Abdomen soft, bowel sounds positive  Musculoskeletal:  moves all extremities  : Voiding         Chief complaint  Patient is a 62y old  Male who presents with a chief complaint of fevers/  foot infection (17 Oct 2023 12:34)     Labs and Fingersticks  CAPILLARY BLOOD GLUCOSE      POCT Blood Glucose.: 145 mg/dL (17 Oct 2023 12:21)  POCT Blood Glucose.: 139 mg/dL (17 Oct 2023 08:46)  POCT Blood Glucose.: 178 mg/dL (16 Oct 2023 21:29)  POCT Blood Glucose.: 123 mg/dL (16 Oct 2023 17:04)      Anion Gap: 10 (10-16 @ 07:00)      Calcium: 9.0 (10-16 @ 07:00)          10-16    137  |  100  |  19  ----------------------------<  115<H>  4.0   |  27  |  1.06    Ca    9.0      16 Oct 2023 07:00                          10.2   5.57  )-----------( 245      ( 17 Oct 2023 06:47 )             32.4     Medications  MEDICATIONS  (STANDING):  aspirin enteric coated 81 milliGRAM(s) Oral daily  atorvastatin 80 milliGRAM(s) Oral at bedtime  cefTRIAXone   IVPB      cefTRIAXone   IVPB 2000 milliGRAM(s) IV Intermittent once  dextrose 5%. 1000 milliLiter(s) (50 mL/Hr) IV Continuous <Continuous>  dextrose 5%. 1000 milliLiter(s) (100 mL/Hr) IV Continuous <Continuous>  dextrose 50% Injectable 12.5 Gram(s) IV Push once  dextrose 50% Injectable 25 Gram(s) IV Push once  dextrose 50% Injectable 25 Gram(s) IV Push once  furosemide    Tablet 20 milliGRAM(s) Oral two times a day  glucagon  Injectable 1 milliGRAM(s) IntraMuscular once  insulin lispro (ADMELOG) corrective regimen sliding scale   SubCutaneous three times a day before meals  insulin lispro (ADMELOG) corrective regimen sliding scale   SubCutaneous at bedtime  melatonin 5 milliGRAM(s) Oral at bedtime  metoprolol succinate  milliGRAM(s) Oral daily  rivaroxaban 20 milliGRAM(s) Oral with dinner  sacubitril 24 mG/valsartan 26 mG 1 Tablet(s) Oral two times a day  senna 2 Tablet(s) Oral at bedtime  spironolactone 25 milliGRAM(s) Oral daily      Physical Exam  General: Patient comfortable in bed   Vital Signs Last 12 Hrs  T(F): 98.1 (10-17-23 @ 11:48), Max: 98.1 (10-17-23 @ 11:48)  HR: 61 (10-17-23 @ 11:48) (61 - 68)  BP: 97/61 (10-17-23 @ 11:48) (97/61 - 98/63)  BP(mean): --  RR: 18 (10-17-23 @ 11:48) (18 - 18)  SpO2: 98% (10-17-23 @ 11:48) (96% - 98%)    CVS: S1S2   Respiratory: No wheezing, no crepitations  GI: Abdomen soft, bowel sounds positive  Musculoskeletal:  moves all extremities  : Voiding

## 2023-10-17 NOTE — PROGRESS NOTE ADULT - NS ATTEND AMEND GEN_ALL_CORE FT
seen and agree
Chart, labs, vitals, radiology reviewed. Above H&P reviewed and edited where appropriate. Agree with history and physical exam. Agree with assessment and plan. I reviewed the overnight course of events and discussed the care with the patient/ family.  All the decisions in assessment and plan are solely made by me.
Chart, labs, vitals, radiology reviewed. Above H&P reviewed and edited where appropriate. Agree with history and physical exam. Agree with assessment and plan. I reviewed the overnight course of events and discussed the care with the patient/ family.  All the decisions in assessment and plan are solely made by me..
Chart, labs, vitals, radiology reviewed. Above H&P reviewed and edited where appropriate. Agree with history and physical exam. Agree with assessment and plan. I reviewed the overnight course of events and discussed the care with the patient/ family.  All the decisions in assessment and plan are solely made by me
Chart, labs, vitals, radiology reviewed. Above H&P reviewed and edited where appropriate. Agree with history and physical exam. Agree with assessment and plan. I reviewed the overnight course of events and discussed the care with the patient/ family.  All the decisions in assessment and plan are made by me..
Chart, labs, vitals, radiology reviewed. Above H&P reviewed and edited where appropriate. Agree with history and physical exam. Agree with assessment and plan. I reviewed the overnight course of events and discussed the care with the patient/ family.  All the decisions in assessment and plan are solely made by me
Chart, labs, vitals, radiology reviewed. Above H&P reviewed and edited where appropriate. Agree with history and physical exam. Agree with assessment and plan. I reviewed the overnight course of events and discussed the care with the patient/ family.  All the decisions in assessment and plan are solely made by me.
Chart, labs, vitals, radiology reviewed. Above H&P reviewed and edited where appropriate. Agree with history and physical exam. Agree with assessment and plan. I reviewed the overnight course of events and discussed the care with the patient/ family.  All the decisions in assessment and plan are solely made by me.
Chart, labs, vitals, radiology reviewed. Above H&P reviewed and edited where appropriate. Agree with history and physical exam. Agree with assessment and plan. I reviewed the overnight course of events and discussed the care with the patient/ family.  All the decisions in assessment and plan are solely made by me
Chart, labs, vitals, radiology reviewed. Above H&P reviewed and edited where appropriate. Agree with history and physical exam. Agree with assessment and plan. I reviewed the overnight course of events and discussed the care with the patient/ family.  All the decisions in assessment and plan are solely made by me.

## 2023-10-18 ENCOUNTER — NON-APPOINTMENT (OUTPATIENT)
Age: 63
End: 2023-10-18

## 2023-10-18 VITALS
OXYGEN SATURATION: 96 % | SYSTOLIC BLOOD PRESSURE: 94 MMHG | RESPIRATION RATE: 18 BRPM | HEART RATE: 84 BPM | DIASTOLIC BLOOD PRESSURE: 63 MMHG | TEMPERATURE: 98 F

## 2023-10-18 LAB
ANION GAP SERPL CALC-SCNC: 13 MMOL/L — SIGNIFICANT CHANGE UP (ref 5–17)
ANION GAP SERPL CALC-SCNC: 13 MMOL/L — SIGNIFICANT CHANGE UP (ref 5–17)
BUN SERPL-MCNC: 22 MG/DL — SIGNIFICANT CHANGE UP (ref 7–23)
BUN SERPL-MCNC: 22 MG/DL — SIGNIFICANT CHANGE UP (ref 7–23)
CALCIUM SERPL-MCNC: 9.6 MG/DL — SIGNIFICANT CHANGE UP (ref 8.4–10.5)
CALCIUM SERPL-MCNC: 9.6 MG/DL — SIGNIFICANT CHANGE UP (ref 8.4–10.5)
CHLORIDE SERPL-SCNC: 99 MMOL/L — SIGNIFICANT CHANGE UP (ref 96–108)
CHLORIDE SERPL-SCNC: 99 MMOL/L — SIGNIFICANT CHANGE UP (ref 96–108)
CO2 SERPL-SCNC: 26 MMOL/L — SIGNIFICANT CHANGE UP (ref 22–31)
CO2 SERPL-SCNC: 26 MMOL/L — SIGNIFICANT CHANGE UP (ref 22–31)
CREAT SERPL-MCNC: 1.14 MG/DL — SIGNIFICANT CHANGE UP (ref 0.5–1.3)
CREAT SERPL-MCNC: 1.14 MG/DL — SIGNIFICANT CHANGE UP (ref 0.5–1.3)
CULTURE RESULTS: SIGNIFICANT CHANGE UP
CULTURE RESULTS: SIGNIFICANT CHANGE UP
EGFR: 73 ML/MIN/1.73M2 — SIGNIFICANT CHANGE UP
EGFR: 73 ML/MIN/1.73M2 — SIGNIFICANT CHANGE UP
GLUCOSE BLDC GLUCOMTR-MCNC: 114 MG/DL — HIGH (ref 70–99)
GLUCOSE BLDC GLUCOMTR-MCNC: 114 MG/DL — HIGH (ref 70–99)
GLUCOSE BLDC GLUCOMTR-MCNC: 157 MG/DL — HIGH (ref 70–99)
GLUCOSE BLDC GLUCOMTR-MCNC: 157 MG/DL — HIGH (ref 70–99)
GLUCOSE SERPL-MCNC: 121 MG/DL — HIGH (ref 70–99)
GLUCOSE SERPL-MCNC: 121 MG/DL — HIGH (ref 70–99)
HCT VFR BLD CALC: 33.9 % — LOW (ref 39–50)
HCT VFR BLD CALC: 33.9 % — LOW (ref 39–50)
HGB BLD-MCNC: 10.5 G/DL — LOW (ref 13–17)
HGB BLD-MCNC: 10.5 G/DL — LOW (ref 13–17)
MCHC RBC-ENTMCNC: 26.1 PG — LOW (ref 27–34)
MCHC RBC-ENTMCNC: 26.1 PG — LOW (ref 27–34)
MCHC RBC-ENTMCNC: 31 GM/DL — LOW (ref 32–36)
MCHC RBC-ENTMCNC: 31 GM/DL — LOW (ref 32–36)
MCV RBC AUTO: 84.1 FL — SIGNIFICANT CHANGE UP (ref 80–100)
MCV RBC AUTO: 84.1 FL — SIGNIFICANT CHANGE UP (ref 80–100)
NRBC # BLD: 0 /100 WBCS — SIGNIFICANT CHANGE UP (ref 0–0)
NRBC # BLD: 0 /100 WBCS — SIGNIFICANT CHANGE UP (ref 0–0)
PLATELET # BLD AUTO: 271 K/UL — SIGNIFICANT CHANGE UP (ref 150–400)
PLATELET # BLD AUTO: 271 K/UL — SIGNIFICANT CHANGE UP (ref 150–400)
POTASSIUM SERPL-MCNC: 4.3 MMOL/L — SIGNIFICANT CHANGE UP (ref 3.5–5.3)
POTASSIUM SERPL-MCNC: 4.3 MMOL/L — SIGNIFICANT CHANGE UP (ref 3.5–5.3)
POTASSIUM SERPL-SCNC: 4.3 MMOL/L — SIGNIFICANT CHANGE UP (ref 3.5–5.3)
POTASSIUM SERPL-SCNC: 4.3 MMOL/L — SIGNIFICANT CHANGE UP (ref 3.5–5.3)
RBC # BLD: 4.03 M/UL — LOW (ref 4.2–5.8)
RBC # BLD: 4.03 M/UL — LOW (ref 4.2–5.8)
RBC # FLD: 18.1 % — HIGH (ref 10.3–14.5)
RBC # FLD: 18.1 % — HIGH (ref 10.3–14.5)
SODIUM SERPL-SCNC: 138 MMOL/L — SIGNIFICANT CHANGE UP (ref 135–145)
SODIUM SERPL-SCNC: 138 MMOL/L — SIGNIFICANT CHANGE UP (ref 135–145)
SPECIMEN SOURCE: SIGNIFICANT CHANGE UP
SPECIMEN SOURCE: SIGNIFICANT CHANGE UP
WBC # BLD: 6.17 K/UL — SIGNIFICANT CHANGE UP (ref 3.8–10.5)
WBC # BLD: 6.17 K/UL — SIGNIFICANT CHANGE UP (ref 3.8–10.5)
WBC # FLD AUTO: 6.17 K/UL — SIGNIFICANT CHANGE UP (ref 3.8–10.5)
WBC # FLD AUTO: 6.17 K/UL — SIGNIFICANT CHANGE UP (ref 3.8–10.5)

## 2023-10-18 PROCEDURE — 71045 X-RAY EXAM CHEST 1 VIEW: CPT | Mod: 26

## 2023-10-18 RX ORDER — EPLERENONE 50 MG/1
1 TABLET, FILM COATED ORAL
Refills: 0 | DISCHARGE

## 2023-10-18 RX ORDER — METOPROLOL TARTRATE 50 MG
1 TABLET ORAL
Qty: 30 | Refills: 0
Start: 2023-10-18 | End: 2023-11-16

## 2023-10-18 RX ORDER — DAPAGLIFLOZIN 10 MG/1
1 TABLET, FILM COATED ORAL
Refills: 0 | DISCHARGE

## 2023-10-18 RX ORDER — SPIRONOLACTONE 25 MG/1
1 TABLET, FILM COATED ORAL
Qty: 30 | Refills: 0
Start: 2023-10-18 | End: 2023-11-16

## 2023-10-18 RX ORDER — SACUBITRIL AND VALSARTAN 24; 26 MG/1; MG/1
1 TABLET, FILM COATED ORAL
Qty: 60 | Refills: 0
Start: 2023-10-18 | End: 2023-11-16

## 2023-10-18 RX ORDER — FUROSEMIDE 40 MG
1 TABLET ORAL
Qty: 60 | Refills: 0
Start: 2023-10-18 | End: 2023-11-16

## 2023-10-18 RX ADMIN — Medication 100 MILLIGRAM(S): at 05:35

## 2023-10-18 RX ADMIN — SACUBITRIL AND VALSARTAN 1 TABLET(S): 24; 26 TABLET, FILM COATED ORAL at 05:35

## 2023-10-18 RX ADMIN — Medication 81 MILLIGRAM(S): at 12:32

## 2023-10-18 RX ADMIN — CEFTRIAXONE 100 MILLIGRAM(S): 500 INJECTION, POWDER, FOR SOLUTION INTRAMUSCULAR; INTRAVENOUS at 12:34

## 2023-10-18 RX ADMIN — Medication 20 MILLIGRAM(S): at 05:35

## 2023-10-18 RX ADMIN — SPIRONOLACTONE 25 MILLIGRAM(S): 25 TABLET, FILM COATED ORAL at 05:35

## 2023-10-18 RX ADMIN — Medication 1: at 12:52

## 2023-10-18 NOTE — PROGRESS NOTE ADULT - SUBJECTIVE AND OBJECTIVE BOX
Chief complaint  Patient is a 62y old  Male who presents with a chief complaint of fevers/  foot infection (18 Oct 2023 07:52)         Labs and Fingersticks  CAPILLARY BLOOD GLUCOSE      POCT Blood Glucose.: 114 mg/dL (18 Oct 2023 08:47)  POCT Blood Glucose.: 137 mg/dL (17 Oct 2023 21:31)  POCT Blood Glucose.: 120 mg/dL (17 Oct 2023 17:12)      Anion Gap: 13 (10-18 @ 07:30)      Calcium: 9.6 (10-18 @ 07:30)          10-18    138  |  99  |  22  ----------------------------<  121<H>  4.3   |  26  |  1.14    Ca    9.6      18 Oct 2023 07:30                          10.5   6.17  )-----------( 271      ( 18 Oct 2023 07:30 )             33.9     Medications  MEDICATIONS  (STANDING):  aspirin enteric coated 81 milliGRAM(s) Oral daily  atorvastatin 80 milliGRAM(s) Oral at bedtime  cefTRIAXone   IVPB 2000 milliGRAM(s) IV Intermittent every 24 hours  cefTRIAXone   IVPB      dextrose 5%. 1000 milliLiter(s) (100 mL/Hr) IV Continuous <Continuous>  dextrose 5%. 1000 milliLiter(s) (50 mL/Hr) IV Continuous <Continuous>  dextrose 50% Injectable 12.5 Gram(s) IV Push once  dextrose 50% Injectable 25 Gram(s) IV Push once  dextrose 50% Injectable 25 Gram(s) IV Push once  furosemide    Tablet 20 milliGRAM(s) Oral two times a day  glucagon  Injectable 1 milliGRAM(s) IntraMuscular once  insulin lispro (ADMELOG) corrective regimen sliding scale   SubCutaneous three times a day before meals  insulin lispro (ADMELOG) corrective regimen sliding scale   SubCutaneous at bedtime  melatonin 5 milliGRAM(s) Oral at bedtime  metoprolol succinate  milliGRAM(s) Oral daily  rivaroxaban 20 milliGRAM(s) Oral with dinner  sacubitril 24 mG/valsartan 26 mG 1 Tablet(s) Oral two times a day  senna 2 Tablet(s) Oral at bedtime  spironolactone 25 milliGRAM(s) Oral daily      Physical Exam  General: Patient comfortable in bed  Vital Signs Last 12 Hrs  T(F): 97.6 (10-18-23 @ 11:35), Max: 97.7 (10-18-23 @ 04:39)  HR: 84 (10-18-23 @ 11:35) (77 - 84)  BP: 94/63 (10-18-23 @ 11:35) (94/63 - 101/64)  BP(mean): --  RR: 18 (10-18-23 @ 11:35) (18 - 18)  SpO2: 96% (10-18-23 @ 11:35) (96% - 97%)    CVS: S1S2   Respiratory: No wheezing, no crepitations  GI: Abdomen soft, bowel sounds positive  Musculoskeletal:  moves all extremities  : Voiding

## 2023-10-18 NOTE — PROGRESS NOTE ADULT - REASON FOR ADMISSION
fevers/  foot infection

## 2023-10-18 NOTE — PROGRESS NOTE ADULT - PROBLEM SELECTOR PLAN 3
Continue medications, monitoring, FU primary team recommendations. .

## 2023-10-18 NOTE — PROGRESS NOTE ADULT - NUTRITIONAL ASSESSMENT
MEDICATIONS  (STANDING):  ampicillin/sulbactam  IVPB 3 Gram(s) IV Intermittent every 6 hours  aspirin enteric coated 81 milliGRAM(s) Oral daily  atorvastatin 80 milliGRAM(s) Oral at bedtime  dextrose 5%. 1000 milliLiter(s) (100 mL/Hr) IV Continuous <Continuous>  dextrose 5%. 1000 milliLiter(s) (50 mL/Hr) IV Continuous <Continuous>  dextrose 50% Injectable 25 Gram(s) IV Push once  dextrose 50% Injectable 12.5 Gram(s) IV Push once  dextrose 50% Injectable 25 Gram(s) IV Push once  furosemide    Tablet 20 milliGRAM(s) Oral two times a day  glucagon  Injectable 1 milliGRAM(s) IntraMuscular once  insulin lispro (ADMELOG) corrective regimen sliding scale   SubCutaneous three times a day before meals  insulin lispro (ADMELOG) corrective regimen sliding scale   SubCutaneous at bedtime  melatonin 5 milliGRAM(s) Oral at bedtime  metoprolol succinate  milliGRAM(s) Oral daily  rivaroxaban 20 milliGRAM(s) Oral with dinner  sacubitril 24 mG/valsartan 26 mG 1 Tablet(s) Oral two times a day  senna 2 Tablet(s) Oral at bedtime  spironolactone 25 milliGRAM(s) Oral daily
MEDICATIONS  (STANDING):  ampicillin/sulbactam  IVPB 3 Gram(s) IV Intermittent every 6 hours  aspirin enteric coated 81 milliGRAM(s) Oral daily  atorvastatin 80 milliGRAM(s) Oral at bedtime  dextrose 5%. 1000 milliLiter(s) (100 mL/Hr) IV Continuous <Continuous>  dextrose 5%. 1000 milliLiter(s) (50 mL/Hr) IV Continuous <Continuous>  dextrose 50% Injectable 25 Gram(s) IV Push once  dextrose 50% Injectable 12.5 Gram(s) IV Push once  dextrose 50% Injectable 25 Gram(s) IV Push once  furosemide   Injectable 20 milliGRAM(s) IV Push two times a day  glucagon  Injectable 1 milliGRAM(s) IntraMuscular once  insulin lispro (ADMELOG) corrective regimen sliding scale   SubCutaneous three times a day before meals  insulin lispro (ADMELOG) corrective regimen sliding scale   SubCutaneous at bedtime  melatonin 5 milliGRAM(s) Oral at bedtime  metoprolol succinate  milliGRAM(s) Oral daily  sacubitril 24 mG/valsartan 26 mG 1 Tablet(s) Oral two times a day  senna 2 Tablet(s) Oral at bedtime  sodium bicarbonate 650 milliGRAM(s) Oral three times a day  spironolactone 25 milliGRAM(s) Oral daily
MEDICATIONS  (STANDING):  ampicillin/sulbactam  IVPB 3 Gram(s) IV Intermittent every 6 hours  ampicillin/sulbactam  IVPB      aspirin enteric coated 81 milliGRAM(s) Oral daily  atorvastatin 80 milliGRAM(s) Oral at bedtime  dextrose 5%. 1000 milliLiter(s) (50 mL/Hr) IV Continuous <Continuous>  dextrose 5%. 1000 milliLiter(s) (100 mL/Hr) IV Continuous <Continuous>  dextrose 50% Injectable 25 Gram(s) IV Push once  dextrose 50% Injectable 25 Gram(s) IV Push once  dextrose 50% Injectable 12.5 Gram(s) IV Push once  furosemide    Tablet 20 milliGRAM(s) Oral daily  glucagon  Injectable 1 milliGRAM(s) IntraMuscular once  heparin  Infusion.  Unit(s)/Hr (19 mL/Hr) IV Continuous <Continuous>  insulin lispro (ADMELOG) corrective regimen sliding scale   SubCutaneous three times a day before meals  melatonin 5 milliGRAM(s) Oral at bedtime  metoprolol succinate ER 75 milliGRAM(s) Oral daily  polyethylene glycol 3350 17 Gram(s) Oral daily  sacubitril 24 mG/valsartan 26 mG 1 Tablet(s) Oral two times a day  sodium bicarbonate 650 milliGRAM(s) Oral three times a day  spironolactone 25 milliGRAM(s) Oral daily
MEDICATIONS  (STANDING):  ampicillin/sulbactam  IVPB 3 Gram(s) IV Intermittent every 6 hours  ampicillin/sulbactam  IVPB      aspirin enteric coated 81 milliGRAM(s) Oral daily  atorvastatin 80 milliGRAM(s) Oral at bedtime  dextrose 5%. 1000 milliLiter(s) (50 mL/Hr) IV Continuous <Continuous>  dextrose 5%. 1000 milliLiter(s) (100 mL/Hr) IV Continuous <Continuous>  dextrose 50% Injectable 25 Gram(s) IV Push once  dextrose 50% Injectable 25 Gram(s) IV Push once  dextrose 50% Injectable 12.5 Gram(s) IV Push once  furosemide    Tablet 20 milliGRAM(s) Oral daily  glucagon  Injectable 1 milliGRAM(s) IntraMuscular once  heparin  Infusion.  Unit(s)/Hr (19 mL/Hr) IV Continuous <Continuous>  insulin lispro (ADMELOG) corrective regimen sliding scale   SubCutaneous three times a day before meals  melatonin 5 milliGRAM(s) Oral at bedtime  metoprolol succinate ER 75 milliGRAM(s) Oral daily  polyethylene glycol 3350 17 Gram(s) Oral daily  sacubitril 24 mG/valsartan 26 mG 1 Tablet(s) Oral two times a day  sodium bicarbonate 650 milliGRAM(s) Oral three times a day  spironolactone 25 milliGRAM(s) Oral daily
MEDICATIONS  (STANDING):  ampicillin/sulbactam  IVPB 3 Gram(s) IV Intermittent every 6 hours  aspirin enteric coated 81 milliGRAM(s) Oral daily  atorvastatin 80 milliGRAM(s) Oral at bedtime  dextrose 5%. 1000 milliLiter(s) (100 mL/Hr) IV Continuous <Continuous>  dextrose 5%. 1000 milliLiter(s) (50 mL/Hr) IV Continuous <Continuous>  dextrose 50% Injectable 25 Gram(s) IV Push once  dextrose 50% Injectable 12.5 Gram(s) IV Push once  dextrose 50% Injectable 25 Gram(s) IV Push once  furosemide    Tablet 20 milliGRAM(s) Oral two times a day  glucagon  Injectable 1 milliGRAM(s) IntraMuscular once  insulin lispro (ADMELOG) corrective regimen sliding scale   SubCutaneous three times a day before meals  insulin lispro (ADMELOG) corrective regimen sliding scale   SubCutaneous at bedtime  melatonin 5 milliGRAM(s) Oral at bedtime  metoprolol succinate  milliGRAM(s) Oral daily  rivaroxaban 20 milliGRAM(s) Oral with dinner  sacubitril 24 mG/valsartan 26 mG 1 Tablet(s) Oral two times a day  senna 2 Tablet(s) Oral at bedtime  spironolactone 25 milliGRAM(s) Oral daily
MEDICATIONS  (STANDING):  ampicillin/sulbactam  IVPB 3 Gram(s) IV Intermittent every 6 hours  aspirin enteric coated 81 milliGRAM(s) Oral daily  atorvastatin 80 milliGRAM(s) Oral at bedtime  dextrose 5%. 1000 milliLiter(s) (50 mL/Hr) IV Continuous <Continuous>  dextrose 5%. 1000 milliLiter(s) (100 mL/Hr) IV Continuous <Continuous>  dextrose 50% Injectable 12.5 Gram(s) IV Push once  dextrose 50% Injectable 25 Gram(s) IV Push once  dextrose 50% Injectable 25 Gram(s) IV Push once  furosemide   Injectable 20 milliGRAM(s) IV Push two times a day  glucagon  Injectable 1 milliGRAM(s) IntraMuscular once  insulin lispro (ADMELOG) corrective regimen sliding scale   SubCutaneous three times a day before meals  insulin lispro (ADMELOG) corrective regimen sliding scale   SubCutaneous at bedtime  melatonin 5 milliGRAM(s) Oral at bedtime  metoprolol succinate  milliGRAM(s) Oral daily  sacubitril 24 mG/valsartan 26 mG 1 Tablet(s) Oral two times a day  senna 2 Tablet(s) Oral at bedtime  sodium bicarbonate 650 milliGRAM(s) Oral three times a day  spironolactone 25 milliGRAM(s) Oral daily
MEDICATIONS  (STANDING):  ampicillin/sulbactam  IVPB 3 Gram(s) IV Intermittent every 6 hours  ampicillin/sulbactam  IVPB      aspirin enteric coated 81 milliGRAM(s) Oral daily  atorvastatin 80 milliGRAM(s) Oral at bedtime  dextrose 5%. 1000 milliLiter(s) (50 mL/Hr) IV Continuous <Continuous>  dextrose 5%. 1000 milliLiter(s) (100 mL/Hr) IV Continuous <Continuous>  dextrose 50% Injectable 25 Gram(s) IV Push once  dextrose 50% Injectable 25 Gram(s) IV Push once  dextrose 50% Injectable 12.5 Gram(s) IV Push once  furosemide    Tablet 20 milliGRAM(s) Oral daily  glucagon  Injectable 1 milliGRAM(s) IntraMuscular once  heparin  Infusion.  Unit(s)/Hr (19 mL/Hr) IV Continuous <Continuous>  insulin lispro (ADMELOG) corrective regimen sliding scale   SubCutaneous three times a day before meals  melatonin 5 milliGRAM(s) Oral at bedtime  metoprolol succinate ER 75 milliGRAM(s) Oral daily  polyethylene glycol 3350 17 Gram(s) Oral daily  sacubitril 24 mG/valsartan 26 mG 1 Tablet(s) Oral two times a day  sodium bicarbonate 650 milliGRAM(s) Oral three times a day  spironolactone 25 milliGRAM(s) Oral daily
MEDICATIONS  (STANDING):  ampicillin/sulbactam  IVPB 3 Gram(s) IV Intermittent every 6 hours  aspirin enteric coated 81 milliGRAM(s) Oral daily  atorvastatin 80 milliGRAM(s) Oral at bedtime  dextrose 5%. 1000 milliLiter(s) (100 mL/Hr) IV Continuous <Continuous>  dextrose 5%. 1000 milliLiter(s) (50 mL/Hr) IV Continuous <Continuous>  dextrose 50% Injectable 25 Gram(s) IV Push once  dextrose 50% Injectable 12.5 Gram(s) IV Push once  dextrose 50% Injectable 25 Gram(s) IV Push once  furosemide    Tablet 20 milliGRAM(s) Oral two times a day  glucagon  Injectable 1 milliGRAM(s) IntraMuscular once  insulin lispro (ADMELOG) corrective regimen sliding scale   SubCutaneous three times a day before meals  insulin lispro (ADMELOG) corrective regimen sliding scale   SubCutaneous at bedtime  melatonin 5 milliGRAM(s) Oral at bedtime  metoprolol succinate  milliGRAM(s) Oral daily  rivaroxaban 20 milliGRAM(s) Oral with dinner  sacubitril 24 mG/valsartan 26 mG 1 Tablet(s) Oral two times a day  senna 2 Tablet(s) Oral at bedtime  spironolactone 25 milliGRAM(s) Oral daily
MEDICATIONS  (STANDING):  ampicillin/sulbactam  IVPB 3 Gram(s) IV Intermittent every 6 hours  aspirin enteric coated 81 milliGRAM(s) Oral daily  atorvastatin 80 milliGRAM(s) Oral at bedtime  dextrose 5%. 1000 milliLiter(s) (100 mL/Hr) IV Continuous <Continuous>  dextrose 5%. 1000 milliLiter(s) (50 mL/Hr) IV Continuous <Continuous>  dextrose 50% Injectable 25 Gram(s) IV Push once  dextrose 50% Injectable 12.5 Gram(s) IV Push once  dextrose 50% Injectable 25 Gram(s) IV Push once  furosemide    Tablet 20 milliGRAM(s) Oral two times a day  glucagon  Injectable 1 milliGRAM(s) IntraMuscular once  insulin lispro (ADMELOG) corrective regimen sliding scale   SubCutaneous three times a day before meals  insulin lispro (ADMELOG) corrective regimen sliding scale   SubCutaneous at bedtime  melatonin 5 milliGRAM(s) Oral at bedtime  metoprolol succinate  milliGRAM(s) Oral daily  rivaroxaban 20 milliGRAM(s) Oral with dinner  sacubitril 24 mG/valsartan 26 mG 1 Tablet(s) Oral two times a day  senna 2 Tablet(s) Oral at bedtime  spironolactone 25 milliGRAM(s) Oral daily

## 2023-10-18 NOTE — PROGRESS NOTE ADULT - SUBJECTIVE AND OBJECTIVE BOX
Date of Service: 10-18-23 @ 07:13           CARDIOLOGY     PROGRESS  NOTE   ________________________________________________    CHIEF COMPLAINT:Patient is a 62y old  Male who presents with a chief complaint of fevers/  foot infection (17 Oct 2023 19:20)  no complain  	  REVIEW OF SYSTEMS:  CONSTITUTIONAL: No fever, weight loss, or fatigue  EYES: No eye pain, visual disturbances, or discharge  ENT:  No difficulty hearing, tinnitus, vertigo; No sinus or throat pain  NECK: No pain or stiffness  RESPIRATORY: No cough, wheezing, chills or hemoptysis; No Shortness of Breath  CARDIOVASCULAR: No chest pain, palpitations, passing out, dizziness, or leg swelling  GASTROINTESTINAL: No abdominal or epigastric pain. No nausea, vomiting, or hematemesis; No diarrhea or constipation. No melena or hematochezia.  GENITOURINARY: No dysuria, frequency, hematuria, or incontinence  NEUROLOGICAL: No headaches, memory loss, loss of strength, numbness, or tremors  SKIN: No itching, burning, rashes, or lesions   LYMPH Nodes: No enlarged glands  ENDOCRINE: No heat or cold intolerance; No hair loss  MUSCULOSKELETAL: No joint pain or swelling; No muscle, back, or extremity pain  PSYCHIATRIC: No depression, anxiety, mood swings, or difficulty sleeping  HEME/LYMPH: No easy bruising, or bleeding gums  ALLERGY AND IMMUNOLOGIC: No hives or eczema	    [x ] All others negative	  [ ] Unable to obtain    PHYSICAL EXAM:  T(C): 36.5 (10-18-23 @ 04:39), Max: 37.1 (10-17-23 @ 20:43)  HR: 77 (10-18-23 @ 04:39) (61 - 77)  BP: 101/64 (10-18-23 @ 04:39) (97/61 - 103/67)  RR: 18 (10-18-23 @ 04:39) (18 - 18)  SpO2: 97% (10-18-23 @ 04:39) (95% - 99%)  Wt(kg): --  I&O's Summary    17 Oct 2023 07:01  -  18 Oct 2023 07:00  --------------------------------------------------------  IN: 660 mL / OUT: 800 mL / NET: -140 mL        Appearance: Normal	  HEENT:   Normal oral mucosa, PERRL, EOMI	  Lymphatic: No lymphadenopathy  Cardiovascular: Normal S1 S2, No JVD, N+murmurs, No edema  Respiratory: Lungs clear to auscultation	  Psychiatry: A & O x 3, Mood & affect appropriate  Gastrointestinal:  Soft, Non-tender, + BS	  Skin: No rashes, No ecchymoses, No cyanosis	  Neurologic: Non-focal  Extremities: Normal range of motion, r foot bandaged  Vascular: + pvd    MEDICATIONS  (STANDING):  aspirin enteric coated 81 milliGRAM(s) Oral daily  atorvastatin 80 milliGRAM(s) Oral at bedtime  cefTRIAXone   IVPB      cefTRIAXone   IVPB 2000 milliGRAM(s) IV Intermittent every 24 hours  dextrose 5%. 1000 milliLiter(s) (50 mL/Hr) IV Continuous <Continuous>  dextrose 5%. 1000 milliLiter(s) (100 mL/Hr) IV Continuous <Continuous>  dextrose 50% Injectable 12.5 Gram(s) IV Push once  dextrose 50% Injectable 25 Gram(s) IV Push once  dextrose 50% Injectable 25 Gram(s) IV Push once  furosemide    Tablet 20 milliGRAM(s) Oral two times a day  glucagon  Injectable 1 milliGRAM(s) IntraMuscular once  insulin lispro (ADMELOG) corrective regimen sliding scale   SubCutaneous three times a day before meals  insulin lispro (ADMELOG) corrective regimen sliding scale   SubCutaneous at bedtime  melatonin 5 milliGRAM(s) Oral at bedtime  metoprolol succinate  milliGRAM(s) Oral daily  rivaroxaban 20 milliGRAM(s) Oral with dinner  sacubitril 24 mG/valsartan 26 mG 1 Tablet(s) Oral two times a day  senna 2 Tablet(s) Oral at bedtime  spironolactone 25 milliGRAM(s) Oral daily      TELEMETRY: 	    ECG:  	  RADIOLOGY:  OTHER: 	  	  LABS:	 	    CARDIAC MARKERS:                          10.2   5.57  )-----------( 245      ( 17 Oct 2023 06:47 )             32.4           proBNP:   Lipid Profile:   HgA1c:   TSH: Thyroid Stimulating Hormone, Serum: 5.55 uIU/mL (10-07 @ 07:08)        A/P   #Group B strep bacteremia  concerning as grew so quickly   changed abs to unasyn- this would also cover previous cultured pathogens  repeat cultures negative   cardiology following - as patient with an AICD  cardiac echo noted  likely source is the foot  MRSA swab negative  hold further vancomycin      #right foot erythema/OSTEOMYELITIS   pt is s/p partial resection of right foot 5th metatarsal bone, open  concerned for residual osteo   PT's AICD is not MRI compatible  appreciate podiatry in put  AT THIS POINT WOULD D/C HOME ON IV CEFTRIAXONE 2 GMS IVSS DAILY  WILL NEED WEEKLY ESR, CRP, CMP, CBC WITH DIFF  PT NEEDS A PICC    If pt was to come back the next step may be a BKA which we want to avoid.    Assessment and plan  ---------------------------  62M hx of type 2 DM on metformin, farixga, heart failure with reduced EF on metoprolol 75mg, lasix 20mg qd, xarelto for vte ppx (had RUE vte 2/2 PICC line), removed 2 weeks prior as well as right foot wound wac (present 2/2 osteo of foot with finegoldia magna), here for 36 hrs of fever, tmax 101, took 800mg advil today, prior to arrival. + slight right lower back pain, nonradiating, mild, no assc GI sxs. + urinating more freq as of late. Still tolerating PO. Denies chest pain, shortness of breath, diaphoresis. Hypotensive to 90s/50 in triage, then 80s/60s, last bp 100/40, not tachy but in setting of bb. Appears slightly dehydrated, clear lungs, s3 gallop, no murmurs appreciable. + right foot redness, swelling, nontender, no flucutance, no crepitations, 1+ dp pulses bilateral, full rom. Nontender calves. Benign abd, no peritoneal signs, no jaundice, no cva ttp. No midline spinal ttp. RUE no signs of infx, former picc site clean, Patient meets sepsis criteria by vitals. Will eval for common source of infection (ex. osteo, urinary, bacterial pulmonary, viral uri; unlikely central neurologic such as meninigitis or encephalitis) vs metabolic derangement. Check labs, lactate, UA, CXs, CXR, foot xray, inflam markers screening EKG, hydrate-> empiric abxs, antipyretics, additional crystalloid infusion as clinically warranted. Will start with 500 cc crystalloid 2/2 heart failure but euglycemic dka.  pt is well known to me with hx of htn, ashd, chf, s/p BIV AICD , PVD with multiple admissions sec to foot infection  pt with sig lv dysfunction/ severe ischemic cardiomyopathy  will  adjust cardiac meds  hold Farxiga for now. may  requiring surgery  dvt prophylaxis  vascula/podiatry consult  abx  pt Entresto dose has decrease sec to ?infection will gradually will increase dose  continue Metroprolol er  avoid excess fluid, may need to re start on  Lasix  and aldactone  check inr if elevated will give vitamin K on Xarelto  may need to decrease Xarelto dose if increase renal function  + BC for strep, continue iv abx, pt has hardware (biv Aicd)  will increase Entresto as bp tolerates  re start on aldactone 25 mg daily  s/p AICD shock yesterday , beta blocker increased to 100 mg daily , pt can not tolerate AMIO was tried before  continue iv heparin plan will discuss with ID  cardiac cath noted with patent graft, increase EDP, need to increase Lasix dose, observe bp closely, excellent uo  pt is a high risk for surgery, unless other way of medical therapy  will hold Entresto if bp low  fu bp and hr closely  NO AICD EXTRACTION AT THIS TIME, pt very unstable ,blood culture very transient bacteriemia, cleared within one  day, CHLOE negative  LIKE TO OBSERVE CLINICALLY, Discussed with ID  will increase entresto as bp tolerated  continue meds/ no arrythmia on tele, abx course as per  ID  DO not hold pt cardiac meds unless bp less than 90 systolic  ID noted agree with prolong IV abx as out pt

## 2023-10-18 NOTE — PROGRESS NOTE ADULT - SUBJECTIVE AND OBJECTIVE BOX
Patient is a 62y Male whom presented to the hospital with shanda     PAST MEDICAL & SURGICAL HISTORY:  Diabetes      Chronic osteomyelitis      H/O heart failure      No significant past surgical history          MEDICATIONS  (STANDING):  aspirin enteric coated 81 milliGRAM(s) Oral daily  atorvastatin 80 milliGRAM(s) Oral at bedtime  dextrose 5%. 1000 milliLiter(s) (100 mL/Hr) IV Continuous <Continuous>  dextrose 5%. 1000 milliLiter(s) (50 mL/Hr) IV Continuous <Continuous>  dextrose 50% Injectable 25 Gram(s) IV Push once  dextrose 50% Injectable 25 Gram(s) IV Push once  dextrose 50% Injectable 12.5 Gram(s) IV Push once  glucagon  Injectable 1 milliGRAM(s) IntraMuscular once  insulin lispro (ADMELOG) corrective regimen sliding scale   SubCutaneous three times a day before meals  metoprolol succinate ER 25 milliGRAM(s) Oral daily  piperacillin/tazobactam IVPB.. 3.375 Gram(s) IV Intermittent every 8 hours  rivaroxaban 20 milliGRAM(s) Oral with dinner  sodium bicarbonate 650 milliGRAM(s) Oral three times a day      Allergies    No Known Allergies    Intolerances        SOCIAL HISTORY:  Denies ETOh,Smoking,     FAMILY HISTORY:      REVIEW OF SYSTEMS:    CONSTITUTIONAL: No weakness, fevers or chills  EYES/ENT: No visual changes;  no throat pain   NECK: No pain or stiffness  RESPIRATORY: No cough, wheezing, hemoptysis; No shortness of breath                          10.5   6.17  )-----------( 271      ( 18 Oct 2023 07:30 )             33.9       CBC Full  -  ( 18 Oct 2023 07:30 )  WBC Count : 6.17 K/uL  RBC Count : 4.03 M/uL  Hemoglobin : 10.5 g/dL  Hematocrit : 33.9 %  Platelet Count - Automated : 271 K/uL  Mean Cell Volume : 84.1 fl  Mean Cell Hemoglobin : 26.1 pg  Mean Cell Hemoglobin Concentration : 31.0 gm/dL  Auto Neutrophil # : x  Auto Lymphocyte # : x  Auto Monocyte # : x  Auto Eosinophil # : x  Auto Basophil # : x  Auto Neutrophil % : x  Auto Lymphocyte % : x  Auto Monocyte % : x  Auto Eosinophil % : x  Auto Basophil % : x      10-18    138  |  99  |  22  ----------------------------<  121<H>  4.3   |  26  |  1.14    Ca    9.6      18 Oct 2023 07:30        CAPILLARY BLOOD GLUCOSE      POCT Blood Glucose.: 157 mg/dL (18 Oct 2023 12:48)  POCT Blood Glucose.: 114 mg/dL (18 Oct 2023 08:47)  POCT Blood Glucose.: 137 mg/dL (17 Oct 2023 21:31)  POCT Blood Glucose.: 120 mg/dL (17 Oct 2023 17:12)      Vital Signs Last 24 Hrs  T(C): 36.4 (18 Oct 2023 11:35), Max: 37.1 (17 Oct 2023 20:43)  T(F): 97.6 (18 Oct 2023 11:35), Max: 98.7 (17 Oct 2023 20:43)  HR: 84 (18 Oct 2023 11:35) (74 - 84)  BP: 94/63 (18 Oct 2023 11:35) (94/63 - 103/67)  BP(mean): --  RR: 18 (18 Oct 2023 11:35) (18 - 18)  SpO2: 96% (18 Oct 2023 11:35) (95% - 99%)    Parameters below as of 18 Oct 2023 11:35  Patient On (Oxygen Delivery Method): room air        Urinalysis Basic - ( 18 Oct 2023 07:30 )    Color: x / Appearance: x / SG: x / pH: x  Gluc: 121 mg/dL / Ketone: x  / Bili: x / Urobili: x   Blood: x / Protein: x / Nitrite: x   Leuk Esterase: x / RBC: x / WBC x   Sq Epi: x / Non Sq Epi: x / Bacteria: x                PHYSICAL EXAM:    Constitutional: NAD  HEENT: conjunctive   clear   Neck:  No JVD  Respiratory: CTAB  Cardiovascular: S1 and S2  Gastrointestinal: BS+, soft, NT/ND  Extremities: No peripheral edema  Neurological:  no focal deficits  Psychiatric: Normal mood, normal affect  : No Atkins  Skin: No rashes  Access: Not applicable

## 2023-10-18 NOTE — PROGRESS NOTE ADULT - PROVIDER SPECIALTY LIST ADULT
Cardiology
Cardiology
Infectious Disease
Internal Medicine
Internal Medicine
Nephrology
Podiatry
Podiatry
Vascular Surgery
Cardiology
Electrophysiology
Electrophysiology
Infectious Disease
Internal Medicine
Nephrology
Podiatry
Podiatry
Cardiology
Electrophysiology
Endocrinology
Infectious Disease
Internal Medicine
Podiatry
Podiatry
Vascular Surgery
Nephrology
Podiatry
Nephrology
Nephrology
Endocrinology

## 2023-10-18 NOTE — PROGRESS NOTE ADULT - ASSESSMENT
62yr M hx of  HTN,  CAD, HF AICD,  AFIB,  DM, with right foot  osteo  Assessment  DMT2: 62y Male with DM T2 with hyperglycemia, A1C 6% , was on oral meds at home, now on low dose insulin, feeling better, eating meals, sugars are improving.  Had mild BHB on labs initially, elevated lactate on VBG, s/p IV hydration, glucose now trending stable. Postop partial resection with podiatry.   DC planning   CAD: on medications, stable, monitored. s/p angiogram  Foot Osteo: Podiatry eval, wound care, on IV Zosyn. OR with podiatry, now with wound vac   HTN: on antihypertensive medications, monitored, asymptomatic.  TSH mildly elevated, FT4 WNL, subclinical.       Discussed plan and management with Dr Roxanne Lennon NP - TEAMS  Wanda Oliveira MD  Cell: [phone]  Office: [phone]

## 2023-10-18 NOTE — PROGRESS NOTE ADULT - ASSESSMENT
_________________________________________________________________________________________  ========>>  M E D I C A L   A T T E N D I N G    F O L L O W  U P  N O T E  <<=========  -----------------------------------------------------------------------------------------------------    - Patient evaluated by me, chart reviewed.  Patient evaluated before surgery, Note written post op  - Patient today overall doing ok, comfortable .. no pain.. no other complains , waiting to go home PICC line placed     ==================>> REVIEW OF SYSTEM <<=================    GEN: no fever, no chills,   RESP: no SOB, no cough, no sputum  CVS: no chest pain, no palpitations, no edema  GI: no abdominal pain, no nausea,   : no dysuria, no frequency,  Neuro: no headache, no dizziness    ==================>> PHYSICAL EXAM <<=================    GEN: A&O X 3 , NAD , comfortable, pleasant, calm in bed .. encouraged out of bed to chair with assistance as needed.   HEENT: NCAT, PERRL, MMM, hearing intact  CVS: S1S2 , regular , No M/R/G appreciated  PULM: CTA B/L,  no W/R/R appreciated  ABD.: soft. non tender, non distended,  bowel sounds present  Extrem: intact pulses , Right foot wound dressed     wound vac in place     PICC line in right arm..        ( Note written / Date of service 10-18-23 ( This is certified to be the same as "ENTERED" date above ( for billing purposes)))    ==================>> MEDICATIONS <<====================    aspirin enteric coated 81 milliGRAM(s) Oral daily  atorvastatin 80 milliGRAM(s) Oral at bedtime  cefTRIAXone   IVPB 2000 milliGRAM(s) IV Intermittent every 24 hours  cefTRIAXone   IVPB      dextrose 5%. 1000 milliLiter(s) IV Continuous <Continuous>  dextrose 5%. 1000 milliLiter(s) IV Continuous <Continuous>  dextrose 50% Injectable 12.5 Gram(s) IV Push once  dextrose 50% Injectable 25 Gram(s) IV Push once  dextrose 50% Injectable 25 Gram(s) IV Push once  furosemide    Tablet 20 milliGRAM(s) Oral two times a day  glucagon  Injectable 1 milliGRAM(s) IntraMuscular once  insulin lispro (ADMELOG) corrective regimen sliding scale   SubCutaneous three times a day before meals  insulin lispro (ADMELOG) corrective regimen sliding scale   SubCutaneous at bedtime  melatonin 5 milliGRAM(s) Oral at bedtime  metoprolol succinate  milliGRAM(s) Oral daily  rivaroxaban 20 milliGRAM(s) Oral with dinner  sacubitril 24 mG/valsartan 26 mG 1 Tablet(s) Oral two times a day  senna 2 Tablet(s) Oral at bedtime  spironolactone 25 milliGRAM(s) Oral daily    MEDICATIONS  (PRN):  acetaminophen     Tablet .. 650 milliGRAM(s) Oral every 6 hours PRN Mild Pain (1 - 3)  dextrose Oral Gel 15 Gram(s) Oral once PRN Blood Glucose LESS THAN 70 milliGRAM(s)/deciliter    ___________  Active diet:  Diet, Consistent Carbohydrate w/Evening Snack:   DASH/TLC Sodium & Cholesterol Restricted (DASH)  ___________________    ==================>> VITAL SIGNS <<==================    Vital Signs Last 24 HrsT(C): 36.4 (10-18-23 @ 11:35)  T(F): 97.6 (10-18-23 @ 11:35), Max: 98.7 (10-17-23 @ 20:43)  HR: 84 (10-18-23 @ 11:35) (74 - 84)  BP: 94/63 (10-18-23 @ 11:35)  RR: 18 (10-18-23 @ 11:35) (18 - 18)  SpO2: 96% (10-18-23 @ 11:35) (95% - 99%)      CAPILLARY BLOOD GLUCOSE      POCT Blood Glucose.: 157 mg/dL (18 Oct 2023 12:48)  POCT Blood Glucose.: 114 mg/dL (18 Oct 2023 08:47)  POCT Blood Glucose.: 137 mg/dL (17 Oct 2023 21:31)  POCT Blood Glucose.: 120 mg/dL (17 Oct 2023 17:12)     ==================>> LAB AND IMAGING <<==================                        10.5   6.17  )-----------( 271      ( 18 Oct 2023 07:30 )             33.9        10-18    138  |  99  |  22  ----------------------------<  121<H>  4.3   |  26  |  1.14    Ca    9.6      18 Oct 2023 07:30      WBC count:   6.17 <<== ,  5.57 <<== ,  5.83 <<== ,  5.05 <<==   Hemoglobin:   10.5 <<==,  10.2 <<==,  10.1 <<==,  10.6 <<==  platelets:  271 <==, 245 <==, 233 <==, 244 <==, 231 <==    Creatinine:  1.14  <<==, 1.06  <<==, 1.03  <<==, 1.06  <<==, 1.06  <<==  Sodium:   138  <==, 137  <==, 140  <==, 140  <==, 140  <==    ____________________________    M I C R O B I O L O G Y :    Culture - Tissue with Gram Stain (collected 13 Oct 2023 22:35)  Source: .Tissue Other  Gram Stain (14 Oct 2023 03:51):    No polymorphonuclear cells seen per low power field    No organisms seen per oil power field  Preliminary Report (14 Oct 2023 21:59):    No growth    Culture - Blood (collected 10 Oct 2023 07:40)  Source: .Blood Blood-Peripheral  Final Report (15 Oct 2023 10:00):    No growth at 5 days    Culture - Blood (collected 10 Oct 2023 07:40)  Source: .Blood Blood-Peripheral  Final Report (15 Oct 2023 10:00):    No growth at 5 days      < from: CHLOE W or WO Ultrasound Enhancing Agent (10.12.23 @ 13:45) >  CONCLUSIONS:    1. Left ventricular systolic function is severely decreased with an ejection fraction visually estimated at <20 %.   2. Moderate mitral regurgitation.   3. No pericardial effusion seen.   4. No echocardiographic evidence of valvular endocarditis.      No vegetation on the visible segments of the device leads.   5. Compared to the transthoracic echocardiogram performed on 10/9/2023 there have been no significant interval changes.   6. Minimal (grade 1) spontaneous echo contrast located in the left atrial apendage and minimal (grade 1) spontaneous echo contrast located in the left atrium.   7. No evidence of left atrial or left atrial appendage thrombus. The left atrial appendage emptying velocity is low.   8. Symmetric mitral valve leaflet tethering.  < end of copied text >    < from: Cardiac Catheterization (10.11.23 @ 11:23) >  Procedures:                 1.    Ultrasound Guided Access   2.    Arterial Access - Left Femoral   3.    Diagnostic Coronary Angiography   4.    Diagnostic Graft Angiography   5.    Left Heart Cath   6.    Aortogram     Indications:                Preoperative evaluation for extracardiac  surgery  Non-sustained ventricular tachycardia     Lab Visit Indication:       ExCardEv, NonSVT     Diagnostic Conclusions:   Severe three vessel obstructive coronary artery disease   Chronic total occlusion of the right coronary artery   --Left to right, Rentrop grade 1-2 filling of the posterior descending  artery/posterolateral artery  Patent LIMA to the left anterior descending artery   Patent SVG to the obtuse marginal artery   Patent SVG to the first diagonal artery   Normal left main coronary artery   Right dominant system   LVEDP = 22mmHg   No gradient across the aortic valve    Recommendations:   Keep left leg straight for 4 hours following removal of sheath   Continue aggressive medical management of coronary artery disease and  associated risk factors  Gentle IV hydration as per protocol   < end of copied text >    __________________________________________________________________________________  ===============>>  A S S E S S M E N T   A N D   P L A N <<===============  ------------------------------------------------------------------------------------------    · Assessment	  62yr M with PMH of  HTN,   AFIB,  DM/. right foot  osteo (Recently completed IV antibiotics via PICC line) ,CAD, s/p  cabg / AICD,hx of Sommers fracture, non-union many years ago,   prior CT:  c/c  transverse   fx, through the fifth metatarsal base. soft tissue wound/ cortical irregularity along the fifth metatarsal base  wound cx   MSSA and fingoldia,  s/p I&D with necrotic tissue close to bone,suspicion for residual osteo  completed   ancef 2 gms IVSS q 8 hours .  last  month     now  admitted with fevers/  right foot  wound/  and  shanda   Rt  5th metatarsal  wound/  foot  cellulitis >> Now with bacteremia    multiple specialists following, appreciated    HTN/  HLD  CAD/   cardiomyopathy,  ef  25 Post AICD (as per patient not MRI compatible), VT, Afib,  on xarelo/  , anemia  DM,  on  farxiga. januvia. metformin    bacteremia  Gp  BsStrep,  , on IV antibiotics iD  following  >> No evidence of endocarditis on CHLOE  post amputation by podiatry      cardiology following >> medications adjusted given PVC and NSVT / VT       post cath as above with no intervention         continue medical optimization per cardio   continuing the antibiotics per ID on discharge..  >> Plan for IV antibiotics at home via PICC line (being set up by ORLIN )    wound vac set up already     awaiting   -GI/DVT Prophylaxis per protocol.    >> Discharge planning home in process as above     --------------------------------------------  Case discussed with Patient, Nurse Practitioner  Education given on findings and plan of care  ___________________________  H. RACHAEL Boudreaux.  Pager: [phone]

## 2023-10-18 NOTE — PROGRESS NOTE ADULT - PROBLEM SELECTOR PLAN 2
Suggest to continue medications, monitoring, FU primary team recommendations.
Suggest to continue medications, monitoring, FU primary team recommendations. .
Suggest to continue medications, monitoring, FU primary team recommendations.
Suggest to continue medications, monitoring, FU primary team recommendations.
Suggest to continue medications, monitoring, FU primary team recommendations. .
Suggest to continue medications, monitoring, FU primary team recommendations. .

## 2023-10-18 NOTE — PROGRESS NOTE ADULT - SUBJECTIVE AND OBJECTIVE BOX
Patient is a 62y old  Male who presents with a chief complaint of fevers/  foot infection (18 Oct 2023 07:13)       INTERVAL HPI/OVERNIGHT EVENTS:  Patient seen and evaluated at bedside.  Pt is resting comfortable in NAD. Denies N/V/F/C.    Allergies    No Known Allergies    Intolerances        Vital Signs Last 24 Hrs  T(C): 36.5 (18 Oct 2023 04:39), Max: 37.1 (17 Oct 2023 20:43)  T(F): 97.7 (18 Oct 2023 04:39), Max: 98.7 (17 Oct 2023 20:43)  HR: 77 (18 Oct 2023 04:39) (61 - 77)  BP: 101/64 (18 Oct 2023 04:39) (97/61 - 103/67)  BP(mean): --  RR: 18 (18 Oct 2023 04:39) (18 - 18)  SpO2: 97% (18 Oct 2023 04:39) (95% - 99%)    Parameters below as of 18 Oct 2023 04:39  Patient On (Oxygen Delivery Method): room air        LABS:                        10.2   5.57  )-----------( 245      ( 17 Oct 2023 06:47 )             32.4               CAPILLARY BLOOD GLUCOSE      POCT Blood Glucose.: 137 mg/dL (17 Oct 2023 21:31)  POCT Blood Glucose.: 120 mg/dL (17 Oct 2023 17:12)  POCT Blood Glucose.: 145 mg/dL (17 Oct 2023 12:21)  POCT Blood Glucose.: 139 mg/dL (17 Oct 2023 08:46)      Lower Extremity Physical Exam:  Vascular: DP/PT 0/4 B/L, CFT <3 sec x 10, Temp gradient warm to warm, RLE, warm to cool LLE.    Neurology: Epicritic sensation intact to level of digits, B/L  Musculoskeletal/Ortho: pain to palpation over right foot 5th digit, 8/8 s/p right foot I&D w/ Right foot partial 5th ray resection, closed  Skin: 10/13 s/p right foot 5th metatarsal base resection, open: distal and proximal sutures intact, central wound has granular wound bed, mild sanguinous drainage, no hematoma formation, periwound erythema resolving, no purulence.    RADIOLOGY & ADDITIONAL TESTS:

## 2023-10-18 NOTE — PROGRESS NOTE ADULT - ASSESSMENT
62M s/p right foot 5th metatarsal base resection, open (DOS 10/13/23).  - Patient seen and evaluated.  - Afebrile, no leukocytosis.  - 10/13 s/p right foot 5th metatarsal base resection, open: distal and proximal sutures intact, central wound has granular wound bed, mild sanguinous drainage, no hematoma formation, periwound erythema resolving, no purulence.  - Intraop Findings: Low concern for residual infection and viability.  - Right Foot Clean Bone Margin Culture: No growth (prelim).  - ID recs, appreciated.  - Right foot wound VAC to be re-applied today 10/18.  - Pt is stable for discharge from the podiatry standpoint pending final ID recs and PICC line insertion  - Wound care instructions and followup information listed in discharge provider note.  - Discussed with attending.

## 2023-10-18 NOTE — PROGRESS NOTE ADULT - PROBLEM SELECTOR PROBLEM 3
Chronic osteomyelitis

## 2023-10-18 NOTE — ADVANCED PRACTICE NURSE CONSULT - ASSESSMENT
Picc Insertion Note  Patient or patient representative educated about central line associated blood stream infection prevention practices.  Catheter type: 4F,  SL Solo Picc  : Bard  Power injectable: Yes  Lot# [id number]    Informed consent obtained by covering floor team.  Procedure assisted by: AMAURI Gallegos RN  Time out was preformed, confirming the patient's first and last name, date of birth, MR#, procedure, and correct site prior to start of procedure.    Patient was placed with HOB 30 degrees. Patient placement site was prepped with chlorhexidine solution, then draped using maximum sterile barrier protection. The area was injected with 2 ml of 1% lidocaine. Using the Bard Site Rite 8, the catheter was placed using the Modified Seldinger Technique. Strict adherence to outline aseptic technique including handwashing, glove and gown, utilizing mask and cap, plus draping the patient with a sterile drape was observed, Patient wore mask. Upon completion of line placement, the insertion site was covered with a sterile occlusive CHG dressing. Pt tolerated procedure well.   Pre / Post VS: WNL    All materials used for catheter insertion, including the intact guide wires, were accounted for at the end of the procedure.  Number of attempts: 1  Complications/Comments: None    Emergency Placement:  No  Site: New   Anatomical Site of insertion:  Right  Basilic  Catheter size/length: 4F,  48cm  US guided Bard single lumen Solo power picc placed    Post procedure verification with chest Xray as per orders.

## 2023-10-19 PROBLEM — Z86.79 PERSONAL HISTORY OF OTHER DISEASES OF THE CIRCULATORY SYSTEM: Chronic | Status: ACTIVE | Noted: 2023-10-06

## 2023-10-24 ENCOUNTER — LABORATORY RESULT (OUTPATIENT)
Age: 63
End: 2023-10-24

## 2023-10-25 ENCOUNTER — APPOINTMENT (OUTPATIENT)
Dept: INFECTIOUS DISEASE | Facility: CLINIC | Age: 63
End: 2023-10-25
Payer: MEDICARE

## 2023-10-25 VITALS
DIASTOLIC BLOOD PRESSURE: 67 MMHG | SYSTOLIC BLOOD PRESSURE: 105 MMHG | WEIGHT: 225 LBS | BODY MASS INDEX: 29.82 KG/M2 | TEMPERATURE: 98.1 F | HEART RATE: 92 BPM | HEIGHT: 73 IN | OXYGEN SATURATION: 100 %

## 2023-10-25 PROCEDURE — 99212 OFFICE O/P EST SF 10 MIN: CPT

## 2023-10-31 ENCOUNTER — LABORATORY RESULT (OUTPATIENT)
Age: 63
End: 2023-10-31

## 2023-11-07 ENCOUNTER — LABORATORY RESULT (OUTPATIENT)
Age: 63
End: 2023-11-07

## 2023-11-13 PROCEDURE — 82330 ASSAY OF CALCIUM: CPT

## 2023-11-13 PROCEDURE — 85610 PROTHROMBIN TIME: CPT

## 2023-11-13 PROCEDURE — C1887: CPT

## 2023-11-13 PROCEDURE — 82803 BLOOD GASES ANY COMBINATION: CPT

## 2023-11-13 PROCEDURE — 86901 BLOOD TYPING SEROLOGIC RH(D): CPT

## 2023-11-13 PROCEDURE — 88305 TISSUE EXAM BY PATHOLOGIST: CPT

## 2023-11-13 PROCEDURE — 36415 COLL VENOUS BLD VENIPUNCTURE: CPT

## 2023-11-13 PROCEDURE — 93005 ELECTROCARDIOGRAM TRACING: CPT

## 2023-11-13 PROCEDURE — 87640 STAPH A DNA AMP PROBE: CPT

## 2023-11-13 PROCEDURE — 82947 ASSAY GLUCOSE BLOOD QUANT: CPT

## 2023-11-13 PROCEDURE — 87086 URINE CULTURE/COLONY COUNT: CPT

## 2023-11-13 PROCEDURE — 73620 X-RAY EXAM OF FOOT: CPT

## 2023-11-13 PROCEDURE — C1769: CPT

## 2023-11-13 PROCEDURE — 87186 SC STD MICRODIL/AGAR DIL: CPT

## 2023-11-13 PROCEDURE — 88311 DECALCIFY TISSUE: CPT

## 2023-11-13 PROCEDURE — 87077 CULTURE AEROBIC IDENTIFY: CPT

## 2023-11-13 PROCEDURE — C1894: CPT

## 2023-11-13 PROCEDURE — 83036 HEMOGLOBIN GLYCOSYLATED A1C: CPT

## 2023-11-13 PROCEDURE — 87040 BLOOD CULTURE FOR BACTERIA: CPT

## 2023-11-13 PROCEDURE — 93325 DOPPLER ECHO COLOR FLOW MAPG: CPT

## 2023-11-13 PROCEDURE — 97605 NEG PRS WND THER DME<=50SQCM: CPT

## 2023-11-13 PROCEDURE — C1751: CPT

## 2023-11-13 PROCEDURE — 86140 C-REACTIVE PROTEIN: CPT

## 2023-11-13 PROCEDURE — 80048 BASIC METABOLIC PNL TOTAL CA: CPT

## 2023-11-13 PROCEDURE — 84443 ASSAY THYROID STIM HORMONE: CPT

## 2023-11-13 PROCEDURE — 87150 DNA/RNA AMPLIFIED PROBE: CPT

## 2023-11-13 PROCEDURE — 82962 GLUCOSE BLOOD TEST: CPT

## 2023-11-13 PROCEDURE — 81001 URINALYSIS AUTO W/SCOPE: CPT

## 2023-11-13 PROCEDURE — 85025 COMPLETE CBC W/AUTO DIFF WBC: CPT

## 2023-11-13 PROCEDURE — 82010 KETONE BODYS QUAN: CPT

## 2023-11-13 PROCEDURE — 96375 TX/PRO/DX INJ NEW DRUG ADDON: CPT

## 2023-11-13 PROCEDURE — 99285 EMERGENCY DEPT VISIT HI MDM: CPT | Mod: 25

## 2023-11-13 PROCEDURE — 71045 X-RAY EXAM CHEST 1 VIEW: CPT

## 2023-11-13 PROCEDURE — 93320 DOPPLER ECHO COMPLETE: CPT

## 2023-11-13 PROCEDURE — 86850 RBC ANTIBODY SCREEN: CPT

## 2023-11-13 PROCEDURE — 0225U NFCT DS DNA&RNA 21 SARSCOV2: CPT

## 2023-11-13 PROCEDURE — 85730 THROMBOPLASTIN TIME PARTIAL: CPT

## 2023-11-13 PROCEDURE — 73630 X-RAY EXAM OF FOOT: CPT

## 2023-11-13 PROCEDURE — C8924: CPT

## 2023-11-13 PROCEDURE — 87075 CULTR BACTERIA EXCEPT BLOOD: CPT

## 2023-11-13 PROCEDURE — 84295 ASSAY OF SERUM SODIUM: CPT

## 2023-11-13 PROCEDURE — 97164 PT RE-EVAL EST PLAN CARE: CPT

## 2023-11-13 PROCEDURE — 84439 ASSAY OF FREE THYROXINE: CPT

## 2023-11-13 PROCEDURE — 84132 ASSAY OF SERUM POTASSIUM: CPT

## 2023-11-13 PROCEDURE — 84100 ASSAY OF PHOSPHORUS: CPT

## 2023-11-13 PROCEDURE — 85652 RBC SED RATE AUTOMATED: CPT

## 2023-11-13 PROCEDURE — 97530 THERAPEUTIC ACTIVITIES: CPT

## 2023-11-13 PROCEDURE — 87641 MR-STAPH DNA AMP PROBE: CPT

## 2023-11-13 PROCEDURE — 97161 PT EVAL LOW COMPLEX 20 MIN: CPT

## 2023-11-13 PROCEDURE — 87205 SMEAR GRAM STAIN: CPT

## 2023-11-13 PROCEDURE — 93321 DOPPLER ECHO F-UP/LMTD STD: CPT

## 2023-11-13 PROCEDURE — 84145 PROCALCITONIN (PCT): CPT

## 2023-11-13 PROCEDURE — 80202 ASSAY OF VANCOMYCIN: CPT

## 2023-11-13 PROCEDURE — 93312 ECHO TRANSESOPHAGEAL: CPT

## 2023-11-13 PROCEDURE — 88304 TISSUE EXAM BY PATHOLOGIST: CPT

## 2023-11-13 PROCEDURE — 83735 ASSAY OF MAGNESIUM: CPT

## 2023-11-13 PROCEDURE — 82435 ASSAY OF BLOOD CHLORIDE: CPT

## 2023-11-13 PROCEDURE — 85014 HEMATOCRIT: CPT

## 2023-11-13 PROCEDURE — 87070 CULTURE OTHR SPECIMN AEROBIC: CPT

## 2023-11-13 PROCEDURE — 86900 BLOOD TYPING SEROLOGIC ABO: CPT

## 2023-11-13 PROCEDURE — 93567 NJX CAR CTH SPRVLV AORTGRPHY: CPT

## 2023-11-13 PROCEDURE — 96374 THER/PROPH/DIAG INJ IV PUSH: CPT

## 2023-11-13 PROCEDURE — 93459 L HRT ART/GRFT ANGIO: CPT

## 2023-11-13 PROCEDURE — 80053 COMPREHEN METABOLIC PANEL: CPT

## 2023-11-13 PROCEDURE — 76604 US EXAM CHEST: CPT

## 2023-11-13 PROCEDURE — 83605 ASSAY OF LACTIC ACID: CPT

## 2023-11-13 PROCEDURE — 36569 INSJ PICC 5 YR+ W/O IMAGING: CPT

## 2023-11-13 PROCEDURE — 85027 COMPLETE CBC AUTOMATED: CPT

## 2023-11-13 PROCEDURE — 85018 HEMOGLOBIN: CPT

## 2023-11-14 ENCOUNTER — LABORATORY RESULT (OUTPATIENT)
Age: 63
End: 2023-11-14

## 2023-11-16 ENCOUNTER — APPOINTMENT (OUTPATIENT)
Dept: INFECTIOUS DISEASE | Facility: CLINIC | Age: 63
End: 2023-11-16
Payer: MEDICARE

## 2023-11-16 VITALS
BODY MASS INDEX: 29.16 KG/M2 | HEIGHT: 73 IN | HEART RATE: 96 BPM | SYSTOLIC BLOOD PRESSURE: 100 MMHG | OXYGEN SATURATION: 100 % | DIASTOLIC BLOOD PRESSURE: 58 MMHG | WEIGHT: 220 LBS | TEMPERATURE: 97.7 F

## 2023-11-16 DIAGNOSIS — A49.1 STREPTOCOCCAL INFECTION, UNSPECIFIED SITE: ICD-10-CM

## 2023-11-16 DIAGNOSIS — E11.59 TYPE 2 DIABETES MELLITUS WITH OTHER CIRCULATORY COMPLICATIONS: ICD-10-CM

## 2023-11-16 DIAGNOSIS — I10 ESSENTIAL (PRIMARY) HYPERTENSION: ICD-10-CM

## 2023-11-16 DIAGNOSIS — M86.9 OSTEOMYELITIS, UNSPECIFIED: ICD-10-CM

## 2023-11-16 DIAGNOSIS — I50.22 CHRONIC SYSTOLIC (CONGESTIVE) HEART FAILURE: ICD-10-CM

## 2023-11-16 DIAGNOSIS — Z23 ENCOUNTER FOR IMMUNIZATION: ICD-10-CM

## 2023-11-16 PROCEDURE — 99214 OFFICE O/P EST MOD 30 MIN: CPT

## 2023-11-16 RX ORDER — METOPROLOL TARTRATE 100 MG/1
100 TABLET ORAL DAILY
Refills: 0 | Status: ACTIVE | COMMUNITY

## 2023-11-21 ENCOUNTER — LABORATORY RESULT (OUTPATIENT)
Age: 63
End: 2023-11-21

## 2023-11-22 ENCOUNTER — APPOINTMENT (OUTPATIENT)
Dept: ULTRASOUND IMAGING | Facility: CLINIC | Age: 63
End: 2023-11-22

## 2023-11-23 NOTE — ED ADULT TRIAGE NOTE - PATIENT ON (OXYGEN DELIVERY METHOD)
room air pt is A/Ox4 from home and is ambulatory. pt to the ED with c/o of HA and R sided facial numbness x4 days. pt maintains, equal  strength bilaterally, +ROM in all extremities. No noted facial droop, slurred speech, bilateral arm or leg drift, vision changes, CP or SOB. pt at  2 days ago for same complaint with full workup and negative result. pt denies hx of migraines. pt with no stated PMH. pt respirations even and unlabored. pt in NAD. Per MD orders pt IV placed, labs sent, and pt medicated.

## 2023-11-24 PROBLEM — A49.1 GROUP B STREPTOCOCCAL INFECTION: Status: ACTIVE | Noted: 2023-10-26

## 2023-11-24 PROBLEM — E11.59 TYPE 2 DIABETES MELLITUS WITH OTHER CIRCULATORY COMPLICATION: Status: ACTIVE | Noted: 2023-08-24

## 2023-11-24 PROBLEM — I10 HYPERTENSION, UNSPECIFIED TYPE: Status: ACTIVE | Noted: 2023-08-24

## 2023-11-24 PROBLEM — M86.9 OSTEOMYELITIS: Status: ACTIVE | Noted: 2023-08-24

## 2023-11-24 PROBLEM — I50.22 CHRONIC SYSTOLIC CHF (CONGESTIVE HEART FAILURE): Status: ACTIVE | Noted: 2023-09-11

## 2023-11-28 ENCOUNTER — LABORATORY RESULT (OUTPATIENT)
Age: 63
End: 2023-11-28

## 2023-11-28 ENCOUNTER — NON-APPOINTMENT (OUTPATIENT)
Age: 63
End: 2023-11-28

## 2023-11-28 RX ORDER — PENICILLIN V POTASSIUM 500 MG/1
500 TABLET, FILM COATED ORAL
Qty: 60 | Refills: 1 | Status: ACTIVE | COMMUNITY
Start: 2023-11-28 | End: 1900-01-01

## 2023-11-30 ENCOUNTER — NON-APPOINTMENT (OUTPATIENT)
Age: 63
End: 2023-11-30

## 2023-12-13 PROCEDURE — 86901 BLOOD TYPING SEROLOGIC RH(D): CPT

## 2023-12-13 PROCEDURE — 85730 THROMBOPLASTIN TIME PARTIAL: CPT

## 2023-12-13 PROCEDURE — 86803 HEPATITIS C AB TEST: CPT

## 2023-12-13 PROCEDURE — 80048 BASIC METABOLIC PNL TOTAL CA: CPT

## 2023-12-13 PROCEDURE — 80053 COMPREHEN METABOLIC PANEL: CPT

## 2023-12-13 PROCEDURE — 86140 C-REACTIVE PROTEIN: CPT

## 2023-12-13 PROCEDURE — 87186 SC STD MICRODIL/AGAR DIL: CPT

## 2023-12-13 PROCEDURE — 36569 INSJ PICC 5 YR+ W/O IMAGING: CPT

## 2023-12-13 PROCEDURE — 97164 PT RE-EVAL EST PLAN CARE: CPT

## 2023-12-13 PROCEDURE — 82962 GLUCOSE BLOOD TEST: CPT

## 2023-12-13 PROCEDURE — 97605 NEG PRS WND THER DME<=50SQCM: CPT

## 2023-12-13 PROCEDURE — C8929: CPT

## 2023-12-13 PROCEDURE — 84132 ASSAY OF SERUM POTASSIUM: CPT

## 2023-12-13 PROCEDURE — 97161 PT EVAL LOW COMPLEX 20 MIN: CPT

## 2023-12-13 PROCEDURE — 73610 X-RAY EXAM OF ANKLE: CPT

## 2023-12-13 PROCEDURE — 87641 MR-STAPH DNA AMP PROBE: CPT

## 2023-12-13 PROCEDURE — 87070 CULTURE OTHR SPECIMN AEROBIC: CPT

## 2023-12-13 PROCEDURE — 85014 HEMATOCRIT: CPT

## 2023-12-13 PROCEDURE — 87075 CULTR BACTERIA EXCEPT BLOOD: CPT

## 2023-12-13 PROCEDURE — 96367 TX/PROPH/DG ADDL SEQ IV INF: CPT

## 2023-12-13 PROCEDURE — 88311 DECALCIFY TISSUE: CPT

## 2023-12-13 PROCEDURE — 36415 COLL VENOUS BLD VENIPUNCTURE: CPT

## 2023-12-13 PROCEDURE — 82435 ASSAY OF BLOOD CHLORIDE: CPT

## 2023-12-13 PROCEDURE — 82330 ASSAY OF CALCIUM: CPT

## 2023-12-13 PROCEDURE — 36573 INSJ PICC RS&I 5 YR+: CPT

## 2023-12-13 PROCEDURE — 73701 CT LOWER EXTREMITY W/DYE: CPT

## 2023-12-13 PROCEDURE — C1751: CPT

## 2023-12-13 PROCEDURE — 97116 GAIT TRAINING THERAPY: CPT

## 2023-12-13 PROCEDURE — 93923 UPR/LXTR ART STDY 3+ LVLS: CPT

## 2023-12-13 PROCEDURE — 96376 TX/PRO/DX INJ SAME DRUG ADON: CPT

## 2023-12-13 PROCEDURE — 88304 TISSUE EXAM BY PATHOLOGIST: CPT

## 2023-12-13 PROCEDURE — 87040 BLOOD CULTURE FOR BACTERIA: CPT

## 2023-12-13 PROCEDURE — 96365 THER/PROPH/DIAG IV INF INIT: CPT

## 2023-12-13 PROCEDURE — 85652 RBC SED RATE AUTOMATED: CPT

## 2023-12-13 PROCEDURE — 76705 ECHO EXAM OF ABDOMEN: CPT

## 2023-12-13 PROCEDURE — 83605 ASSAY OF LACTIC ACID: CPT

## 2023-12-13 PROCEDURE — 85610 PROTHROMBIN TIME: CPT

## 2023-12-13 PROCEDURE — 80202 ASSAY OF VANCOMYCIN: CPT

## 2023-12-13 PROCEDURE — 85027 COMPLETE CBC AUTOMATED: CPT

## 2023-12-13 PROCEDURE — 73630 X-RAY EXAM OF FOOT: CPT

## 2023-12-13 PROCEDURE — 85025 COMPLETE CBC W/AUTO DIFF WBC: CPT

## 2023-12-13 PROCEDURE — 82947 ASSAY GLUCOSE BLOOD QUANT: CPT

## 2023-12-13 PROCEDURE — 97110 THERAPEUTIC EXERCISES: CPT

## 2023-12-13 PROCEDURE — 86850 RBC ANTIBODY SCREEN: CPT

## 2023-12-13 PROCEDURE — 80061 LIPID PANEL: CPT

## 2023-12-13 PROCEDURE — 87077 CULTURE AEROBIC IDENTIFY: CPT

## 2023-12-13 PROCEDURE — 96366 THER/PROPH/DIAG IV INF ADDON: CPT

## 2023-12-13 PROCEDURE — 87205 SMEAR GRAM STAIN: CPT

## 2023-12-13 PROCEDURE — 73620 X-RAY EXAM OF FOOT: CPT

## 2023-12-13 PROCEDURE — 99285 EMERGENCY DEPT VISIT HI MDM: CPT | Mod: 25

## 2023-12-13 PROCEDURE — 71045 X-RAY EXAM CHEST 1 VIEW: CPT

## 2023-12-13 PROCEDURE — 93970 EXTREMITY STUDY: CPT

## 2023-12-13 PROCEDURE — 82803 BLOOD GASES ANY COMBINATION: CPT

## 2023-12-13 PROCEDURE — 85018 HEMOGLOBIN: CPT

## 2023-12-13 PROCEDURE — 87640 STAPH A DNA AMP PROBE: CPT

## 2023-12-13 PROCEDURE — 84295 ASSAY OF SERUM SODIUM: CPT

## 2023-12-13 PROCEDURE — 86900 BLOOD TYPING SEROLOGIC ABO: CPT

## 2023-12-13 PROCEDURE — 83036 HEMOGLOBIN GLYCOSYLATED A1C: CPT

## 2023-12-13 PROCEDURE — 93306 TTE W/DOPPLER COMPLETE: CPT

## 2024-02-11 NOTE — PRE-ANESTHESIA EVALUATION ADULT - NSATTENDATTESTRD_GEN_ALL_CORE
Patient/Caregiver provided printed discharge information.
The patient has been re-examined and I agree with the above assessment or I updated with my findings.
The patient has been re-examined and I agree with the above assessment or I updated with my findings.

## 2024-02-22 ENCOUNTER — APPOINTMENT (OUTPATIENT)
Dept: INFECTIOUS DISEASE | Facility: CLINIC | Age: 64
End: 2024-02-22

## 2024-02-29 ENCOUNTER — APPOINTMENT (OUTPATIENT)
Dept: INFECTIOUS DISEASE | Facility: CLINIC | Age: 64
End: 2024-02-29

## 2024-04-18 NOTE — CONSULT NOTE ADULT - NS ATTEND OPT1 GEN_ALL_CORE
Patient is on Mounjaro 5mg x 5 weeks, and is having difficulty finding available supply. Reports to be tolerating current dose well, denies nausea, vomiting, diarrhea, constipation, or abdominal pain. Has BM daily. Last dose of Mounjaro 5mg was taken a week ago, on 4/11/24. Currently patient is out of medication. Kyle is requesting to switch back to Ozempic, she found available supply for Ozempic in all doses at [facility] listed below - please advise    Last office visit: 2/21/24  Next office visit: Due for follow up in May     [facility]  [address] [place] [address]   Tel [phone]   I attest my time as attending is greater than 50% of the total combined time spent on qualifying patient care activities by the PA/NP and attending.

## 2024-06-26 NOTE — ED PROVIDER NOTE - HIV OFFER
Came in to ED ambulatory. Came in due to worsening acid reflux and having shortness of breath, feeling discomfort on the chest. And swollen on left cheek.        
Opt out

## 2024-07-01 NOTE — PHYSICAL THERAPY INITIAL EVALUATION ADULT - NS ASR WT BEARING DETAIL LLE
Subjective     Patient ID: Navdeep Avery is a 65 y.o. male.    Chief Complaint: Follow-up (1wk f/u wound ck (R) foot (graft) )    4/9:  Presents the clinic for wound evaluation and application of PuraPly XT.  Patient doing well.  He has developed a cough and was tested positive for COVID yesterday.  Denies any chest pain/shortness of breath, nausea/vomiting/diarrhea, fever/chills.    4/16: Presents the clinic for wound evaluation and application of PuraPly XT.  Patient doing well; feeling better after COVID.  Denies any chest pain/shortness of breath, nausea/vomiting/diarrhea, fever/chills.    4/23: Presents the clinic for wound evaluation and application of Apligraf.  Patient doing well; feeling better after COVID.  Denies any chest pain/shortness of breath, nausea/vomiting/diarrhea, fever/chills.    4/30: Presents the clinic for wound evaluation and application of Apligraf.  Denies any chest pain/shortness of breath, nausea/vomiting/diarrhea, fever/chills.    5/7: Presents the clinic for wound evaluation and application of Apligraf.  Denies any chest pain/shortness of breath, nausea/vomiting/diarrhea, fever/chills.    5/15: Presents the clinic for wound evaluation and application of Apligraf.  Denies any chest pain/shortness of breath, nausea/vomiting/diarrhea, fever/chills.    5/21: Presents the clinic for wound evaluation and application of Apligraf.  Denies any chest pain/shortness of breath, nausea/vomiting/diarrhea, fever/chills.    5/29: Presents the clinic for wound evaluation and application of Affinity.  Denies any chest pain/shortness of breath, nausea/vomiting/diarrhea, fever/chills.    6/7: Presents the clinic for wound evaluation and application of Affinity.  Denies any chest pain/shortness of breath, nausea/vomiting/diarrhea, fever/chills.    6/17: Presents the clinic for wound evaluation and application of Affinity.  Denies any chest pain/shortness of breath, nausea/vomiting/diarrhea,  fever/chills.    7/1:  Patient presents for re-evaluation of the wound of his right foot.  Denies any chest pain/shortness of breath, nausea/vomiting/diarrhea, fever/chills.            Review of Systems   Constitutional: Negative.    HENT: Negative.     Eyes: Negative.    Respiratory:  Negative for shortness of breath.    Cardiovascular: Negative.    Gastrointestinal:  Negative for abdominal pain, blood in stool, change in bowel habit and vomiting.   Endocrine: Negative.    Genitourinary: Negative.  Negative for hematuria.   Musculoskeletal:  Negative for back pain and myalgias.   Integumentary:  Negative.   Allergic/Immunologic: Negative.    Neurological:  Negative for dizziness, weakness and light-headedness.   Psychiatric/Behavioral: Negative.            Objective     Physical Exam  Constitutional:       General: He is not in acute distress.     Appearance: Normal appearance.   HENT:      Head: Normocephalic.   Cardiovascular:      Rate and Rhythm: Normal rate.   Pulmonary:      Effort: Pulmonary effort is normal. No respiratory distress.   Abdominal:      General: There is no distension.      Tenderness: There is no abdominal tenderness.   Musculoskeletal:         General: Normal range of motion.        Feet:    Feet:      Comments: Opening healed with scab from right Lisfranc amputation site.  No signs of infection or drainage  Skin:     General: Skin is warm.      Coloration: Skin is not jaundiced.   Neurological:      General: No focal deficit present.      Mental Status: He is alert and oriented to person, place, and time.      Cranial Nerves: No cranial nerve deficit.            Assessment and Plan     1. 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  Follow back up in 6 weeks     No follow-ups on file.                         weight-bearing as tolerated

## 2024-10-29 NOTE — PROVIDER CONTACT NOTE (OTHER) - ACTION/TREATMENT ORDERED:
Continued Stay SW/CM Assessment/Plan of Care Note       Active Substitute Decision Maker (SDM)    There are no active Substitute Decision Maker (SDM) on file.           Progress note:  Chart reviewed and Pt discussed with interdisciplinary team.   Pt remains hospitalized with SDH, hx ESRD on HD. Neuro and Neph on consult.     0854 - CM placed call to [facility]() to ensure receipt of referral, LM with request for return call.     1145 - CM received return call from [facility], Pt is declined, they have no bed availability. CM spoke with Jennifer at [facility], referral e-faxed.   1545 - CM received update from Jennifer at [facility], Pt is clinically accepted. CM to provided update on anticipated dc date, in order to submit for auth.       [facility]  MWF - 1215pm  chair time  P-/ f-    See SW/JOEL flowsheets for other objective data.    Disposition Recommendations:  Preliminary discharge destination: Planned Discharge Destination: Location not determined at this time  SW/CM recommendation for discharge: Other (comment) (TBD)    Destination Pharmacy:        Discharge Plan/Needs:     Continued Care and Services - Admitted Since 10/26/2024       Destination        Service Provider Request Status Selected Services Address Phone Fax    [facility] SN[facility] PAN Pending - Request Sent -- [address] [address] [phone]                  Continued Care and Services - Linked Episodes Includes continued care and service providers from the active episodes linked to this encounter, which are listed below      Care Transitions Episode start date: 10/27/2024   There are no active outsourced providers for this episode.                     Devices/ Equipment that need to be arranged for discharge:     Accepted   Pending insurance authorization     Prior To Hospitalization:    Living Situation: Spouse and residing at House    
Plan of care ongoing. Provider aware.
Provider notified and aware. No new orders at this time. Patient care continues.
.  Support Systems: Family members, Spouse   Home Devices/Equipment: None            Mobility Assist Devices: Front-wheeled walker   Type of Service Prior to Hospitalization: None               Patient/Family discharge goal (s):        Resources provided:           Prior Function        Ambulation in the Home: Modified  Independent (10/27/24 1000 : Jim Montenegro OT)  Ambulation in the Community: Modified Independent (10/27/24 1000 : Jim Montenegro OT)    Current Function  Last Filed Values         Value Time User    Current Function  significantly below baseline level of function 10/27/2024 12:15 PM Jenae Adrian PT    Therapy Impairments  activity tolerance; balance; strength; sensation; safety awareness 10/27/2024 12:15 PM Jenae Adrian PT    ADLs Requiring Support  bed mobility; transfers; ambulation; stairs 10/27/2024 12:15 PM Jenae Adrian PT            Therapy Recommendations for Discharge:   PT:      Last Filed Values         Value Time User    PT Discharge Needs  therapy 5 or more times per week 10/27/2024 12:15 PM Jenae Adrian, PT          OT:       Last Filed Values         Value Time User    OT Discharge Needs  therapy 5 or more times per week 10/28/2024  3:43 PM Siomara Myrick OT          SLP:    Last Filed Values         Value Time User    SLP Discharge Needs  does not require ongoing therapy 10/27/2024  9:21 AM Bonnie Jacobs, SLP            Mobility Equipment Recommended for Discharge: TBD owns 4ww      Barriers to Discharge  Identified Barriers to Discharge/Transition Planning:                    
Fallon Bach ACP informed and aware. Assessed patient at bedside. Informed by tele tech had run of Vtach. Provider placed stat orders for PO lopressor, and Mg supp.
No new orders at this time.
Provider Leela James notified. No further action is being taken at this moment.
increase metropolol to 50

## 2025-01-30 NOTE — PROCEDURE NOTE - NSPROCNAME_GEN_A_CORE
CRT-D (Cardiac Resynchronization Therapy with Defibrillation Capabilities) Interrogation Note ambulatory

## (undated) DEVICE — VENODYNE/SCD SLEEVE CALF LARGE

## (undated) DEVICE — STRYKER INTERPULSE HANDPIECE W IRR SUCTION TUBE

## (undated) DEVICE — SAW BLADE MICROAIRE SAGITTAL 9.4MMX25.4MMX0.6MM

## (undated) DEVICE — SAW BLADE MICROAIRE OSCILATING 25.4MM X 90MM X 1.27MM

## (undated) DEVICE — PACKING GAUZE PLAIN 0.5"

## (undated) DEVICE — DRSG VAC GRANUFOAM LARGE (BLACK)

## (undated) DEVICE — DRAPE ISOLATION BAG 20X20"

## (undated) DEVICE — POSITIONER FOAM EGG CRATE ULNAR 2PCS (PINK)

## (undated) DEVICE — DRAIN RESERVOIR FOR JACKSON PRATT 100CC CARDINAL

## (undated) DEVICE — TOURNIQUET CUFF 30" SINGLE PORT W PLC

## (undated) DEVICE — WOUND IRR SURGIPHOR

## (undated) DEVICE — BLADE SCALPEL SAFETYLOCK #15

## (undated) DEVICE — DRAPE SPLIT SHEET 77" X 108"

## (undated) DEVICE — GLV 8.5 PROTEXIS (WHITE)

## (undated) DEVICE — SPECIMEN CONTAINER 100ML

## (undated) DEVICE — PACKING GAUZE PLAIN 2"

## (undated) DEVICE — MEDICATION LABELS W MARKER

## (undated) DEVICE — DRAPE TOWEL BLUE 17" X 24"

## (undated) DEVICE — GLV 7 PROTEXIS (WHITE)

## (undated) DEVICE — DRSG KLING 4"

## (undated) DEVICE — DRAIN JACKSON PRATT 10FR ROUND END NO TROCAR

## (undated) DEVICE — SAW BLADE MICROAIRE SAGITTAL 5.8X25.4X0.6 MM

## (undated) DEVICE — GOWN TRIMAX LG

## (undated) DEVICE — NDL HYPO REGULAR BEVEL 25G X 1.5" (BLUE)

## (undated) DEVICE — VISITEC 4X4

## (undated) DEVICE — FOLEY TRAY 16FR 5CC LTX UMETER CLOSED

## (undated) DEVICE — DRAIN JACKSON PRATT 7MM FLAT FULL W 15 FR TROCAR

## (undated) DEVICE — SOL IRR POUR H2O 250ML

## (undated) DEVICE — MARKING PEN W RULER

## (undated) DEVICE — LAP PAD 18 X 18"

## (undated) DEVICE — GLV 8 PROTEXIS (WHITE)

## (undated) DEVICE — SUT PLAIN GUT 4-0 18" P-12

## (undated) DEVICE — BUR STRYKER OVAL SOLID CARBIDE MED 4MM

## (undated) DEVICE — DRAIN JACKSON PRATT 10MM FLAT FULL NO TROCAR

## (undated) DEVICE — SOL IRR BAG NS 0.9% 3000ML

## (undated) DEVICE — DRSG STERISTRIPS 0.5 X 4"

## (undated) DEVICE — DRAIN JACKSON PRATT 3 SPRING RESERVOIR W 15FR PVC DRAIN

## (undated) DEVICE — WARMING BLANKET UPPER ADULT

## (undated) DEVICE — DRAPE MAGNETIC INSTRUMENT MEDIUM

## (undated) DEVICE — DRSG STOCKINETTE IMPERVIOUS XL

## (undated) DEVICE — SUT POLYSORB 2-0 30" GS-21 UNDYED

## (undated) DEVICE — SOL IRR POUR NS 0.9% 500ML

## (undated) DEVICE — DRAPE IOBAN 23" X 23"

## (undated) DEVICE — PREP BETADINE KIT

## (undated) DEVICE — SYR LUER LOK 10CC

## (undated) DEVICE — PACKING GAUZE IODOFORM 2"

## (undated) DEVICE — STAPLER SKIN VISI-STAT 35 WIDE

## (undated) DEVICE — DRAPE LIGHT HANDLE COVER (BLUE)

## (undated) DEVICE — BLADE SCALPEL SAFETYLOCK #10

## (undated) DEVICE — DRSG XEROFORM 1 X 8"

## (undated) DEVICE — DRAPE 3/4 SHEET W REINFORCEMENT 56X77"

## (undated) DEVICE — DRSG COMBINE 5X9"

## (undated) DEVICE — DRSG STOCKINETTE IMPERVIOUS MED

## (undated) DEVICE — CANISTER KCI 500ML GEL SENSA TRAC

## (undated) DEVICE — DRAPE INSTRUMENT POUCH 6.75" X 11"

## (undated) DEVICE — DRSG ACE BANDAGE 6"

## (undated) DEVICE — DRAPE MAYO STAND 30"

## (undated) DEVICE — WARMING BLANKET LOWER ADULT

## (undated) DEVICE — PACK EXTREMITY

## (undated) DEVICE — DRAPE 1/2 SHEET 40X57"

## (undated) DEVICE — GLV 7.5 PROTEXIS (WHITE)